# Patient Record
Sex: MALE | Race: WHITE | NOT HISPANIC OR LATINO | URBAN - METROPOLITAN AREA
[De-identification: names, ages, dates, MRNs, and addresses within clinical notes are randomized per-mention and may not be internally consistent; named-entity substitution may affect disease eponyms.]

---

## 2017-04-21 ENCOUNTER — OUTPATIENT (OUTPATIENT)
Dept: OUTPATIENT SERVICES | Facility: HOSPITAL | Age: 67
LOS: 1 days | Discharge: HOME | End: 2017-04-21

## 2017-06-09 ENCOUNTER — OUTPATIENT (OUTPATIENT)
Dept: OUTPATIENT SERVICES | Facility: HOSPITAL | Age: 67
LOS: 1 days | Discharge: HOME | End: 2017-06-09

## 2017-06-09 DIAGNOSIS — K57.90 DIVERTICULOSIS OF INTESTINE, PART UNSPECIFIED, WITHOUT PERFORATION OR ABSCESS WITHOUT BLEEDING: ICD-10-CM

## 2017-06-09 DIAGNOSIS — K50.90 CROHN'S DISEASE, UNSPECIFIED, WITHOUT COMPLICATIONS: ICD-10-CM

## 2017-06-09 DIAGNOSIS — E78.5 HYPERLIPIDEMIA, UNSPECIFIED: ICD-10-CM

## 2017-06-09 DIAGNOSIS — G47.33 OBSTRUCTIVE SLEEP APNEA (ADULT) (PEDIATRIC): ICD-10-CM

## 2017-06-09 DIAGNOSIS — C44.90 UNSPECIFIED MALIGNANT NEOPLASM OF SKIN, UNSPECIFIED: ICD-10-CM

## 2017-06-09 DIAGNOSIS — I10 ESSENTIAL (PRIMARY) HYPERTENSION: ICD-10-CM

## 2017-06-09 DIAGNOSIS — F93.0 SEPARATION ANXIETY DISORDER OF CHILDHOOD: ICD-10-CM

## 2017-06-09 DIAGNOSIS — I25.10 ATHEROSCLEROTIC HEART DISEASE OF NATIVE CORONARY ARTERY WITHOUT ANGINA PECTORIS: ICD-10-CM

## 2017-06-09 DIAGNOSIS — I48.91 UNSPECIFIED ATRIAL FIBRILLATION: ICD-10-CM

## 2017-06-09 DIAGNOSIS — K56.600 PARTIAL INTESTINAL OBSTRUCTION, UNSPECIFIED AS TO CAUSE: ICD-10-CM

## 2017-06-28 DIAGNOSIS — I48.91 UNSPECIFIED ATRIAL FIBRILLATION: ICD-10-CM

## 2017-06-28 DIAGNOSIS — Z79.01 LONG TERM (CURRENT) USE OF ANTICOAGULANTS: ICD-10-CM

## 2017-07-05 ENCOUNTER — OUTPATIENT (OUTPATIENT)
Dept: OUTPATIENT SERVICES | Facility: HOSPITAL | Age: 67
LOS: 1 days | Discharge: HOME | End: 2017-07-05

## 2017-07-05 DIAGNOSIS — I10 ESSENTIAL (PRIMARY) HYPERTENSION: ICD-10-CM

## 2017-07-05 DIAGNOSIS — C44.90 UNSPECIFIED MALIGNANT NEOPLASM OF SKIN, UNSPECIFIED: ICD-10-CM

## 2017-07-05 DIAGNOSIS — I25.10 ATHEROSCLEROTIC HEART DISEASE OF NATIVE CORONARY ARTERY WITHOUT ANGINA PECTORIS: ICD-10-CM

## 2017-07-05 DIAGNOSIS — E78.5 HYPERLIPIDEMIA, UNSPECIFIED: ICD-10-CM

## 2017-07-05 DIAGNOSIS — F93.0 SEPARATION ANXIETY DISORDER OF CHILDHOOD: ICD-10-CM

## 2017-07-05 DIAGNOSIS — K57.90 DIVERTICULOSIS OF INTESTINE, PART UNSPECIFIED, WITHOUT PERFORATION OR ABSCESS WITHOUT BLEEDING: ICD-10-CM

## 2017-07-05 DIAGNOSIS — I48.91 UNSPECIFIED ATRIAL FIBRILLATION: ICD-10-CM

## 2017-07-05 DIAGNOSIS — Z79.01 LONG TERM (CURRENT) USE OF ANTICOAGULANTS: ICD-10-CM

## 2017-07-05 DIAGNOSIS — K50.90 CROHN'S DISEASE, UNSPECIFIED, WITHOUT COMPLICATIONS: ICD-10-CM

## 2017-07-05 DIAGNOSIS — G47.33 OBSTRUCTIVE SLEEP APNEA (ADULT) (PEDIATRIC): ICD-10-CM

## 2017-07-05 DIAGNOSIS — K56.600 PARTIAL INTESTINAL OBSTRUCTION, UNSPECIFIED AS TO CAUSE: ICD-10-CM

## 2017-08-08 ENCOUNTER — OUTPATIENT (OUTPATIENT)
Dept: OUTPATIENT SERVICES | Facility: HOSPITAL | Age: 67
LOS: 1 days | Discharge: HOME | End: 2017-08-08

## 2017-08-08 DIAGNOSIS — E78.5 HYPERLIPIDEMIA, UNSPECIFIED: ICD-10-CM

## 2017-08-08 DIAGNOSIS — I48.91 UNSPECIFIED ATRIAL FIBRILLATION: ICD-10-CM

## 2017-08-08 DIAGNOSIS — G47.33 OBSTRUCTIVE SLEEP APNEA (ADULT) (PEDIATRIC): ICD-10-CM

## 2017-08-08 DIAGNOSIS — K50.90 CROHN'S DISEASE, UNSPECIFIED, WITHOUT COMPLICATIONS: ICD-10-CM

## 2017-08-08 DIAGNOSIS — F93.0 SEPARATION ANXIETY DISORDER OF CHILDHOOD: ICD-10-CM

## 2017-08-08 DIAGNOSIS — K57.90 DIVERTICULOSIS OF INTESTINE, PART UNSPECIFIED, WITHOUT PERFORATION OR ABSCESS WITHOUT BLEEDING: ICD-10-CM

## 2017-08-08 DIAGNOSIS — C44.90 UNSPECIFIED MALIGNANT NEOPLASM OF SKIN, UNSPECIFIED: ICD-10-CM

## 2017-08-08 DIAGNOSIS — Z79.01 LONG TERM (CURRENT) USE OF ANTICOAGULANTS: ICD-10-CM

## 2017-08-08 DIAGNOSIS — I25.10 ATHEROSCLEROTIC HEART DISEASE OF NATIVE CORONARY ARTERY WITHOUT ANGINA PECTORIS: ICD-10-CM

## 2017-08-08 DIAGNOSIS — I10 ESSENTIAL (PRIMARY) HYPERTENSION: ICD-10-CM

## 2017-08-08 DIAGNOSIS — K56.600 PARTIAL INTESTINAL OBSTRUCTION, UNSPECIFIED AS TO CAUSE: ICD-10-CM

## 2017-09-05 ENCOUNTER — OUTPATIENT (OUTPATIENT)
Dept: OUTPATIENT SERVICES | Facility: HOSPITAL | Age: 67
LOS: 1 days | Discharge: HOME | End: 2017-09-05

## 2017-09-05 DIAGNOSIS — Z79.01 LONG TERM (CURRENT) USE OF ANTICOAGULANTS: ICD-10-CM

## 2017-09-05 DIAGNOSIS — G47.33 OBSTRUCTIVE SLEEP APNEA (ADULT) (PEDIATRIC): ICD-10-CM

## 2017-09-05 DIAGNOSIS — K57.90 DIVERTICULOSIS OF INTESTINE, PART UNSPECIFIED, WITHOUT PERFORATION OR ABSCESS WITHOUT BLEEDING: ICD-10-CM

## 2017-09-05 DIAGNOSIS — I48.91 UNSPECIFIED ATRIAL FIBRILLATION: ICD-10-CM

## 2017-09-05 DIAGNOSIS — C44.90 UNSPECIFIED MALIGNANT NEOPLASM OF SKIN, UNSPECIFIED: ICD-10-CM

## 2017-09-05 DIAGNOSIS — I25.10 ATHEROSCLEROTIC HEART DISEASE OF NATIVE CORONARY ARTERY WITHOUT ANGINA PECTORIS: ICD-10-CM

## 2017-09-05 DIAGNOSIS — I10 ESSENTIAL (PRIMARY) HYPERTENSION: ICD-10-CM

## 2017-09-05 DIAGNOSIS — K50.90 CROHN'S DISEASE, UNSPECIFIED, WITHOUT COMPLICATIONS: ICD-10-CM

## 2017-09-05 DIAGNOSIS — K56.600 PARTIAL INTESTINAL OBSTRUCTION, UNSPECIFIED AS TO CAUSE: ICD-10-CM

## 2017-09-05 DIAGNOSIS — F93.0 SEPARATION ANXIETY DISORDER OF CHILDHOOD: ICD-10-CM

## 2017-09-05 DIAGNOSIS — E78.5 HYPERLIPIDEMIA, UNSPECIFIED: ICD-10-CM

## 2017-10-03 ENCOUNTER — OUTPATIENT (OUTPATIENT)
Dept: OUTPATIENT SERVICES | Facility: HOSPITAL | Age: 67
LOS: 1 days | Discharge: HOME | End: 2017-10-03

## 2017-10-03 DIAGNOSIS — Z79.01 LONG TERM (CURRENT) USE OF ANTICOAGULANTS: ICD-10-CM

## 2017-10-03 DIAGNOSIS — C44.90 UNSPECIFIED MALIGNANT NEOPLASM OF SKIN, UNSPECIFIED: ICD-10-CM

## 2017-10-03 DIAGNOSIS — E78.5 HYPERLIPIDEMIA, UNSPECIFIED: ICD-10-CM

## 2017-10-03 DIAGNOSIS — I48.91 UNSPECIFIED ATRIAL FIBRILLATION: ICD-10-CM

## 2017-10-03 DIAGNOSIS — I25.10 ATHEROSCLEROTIC HEART DISEASE OF NATIVE CORONARY ARTERY WITHOUT ANGINA PECTORIS: ICD-10-CM

## 2017-10-03 DIAGNOSIS — K57.90 DIVERTICULOSIS OF INTESTINE, PART UNSPECIFIED, WITHOUT PERFORATION OR ABSCESS WITHOUT BLEEDING: ICD-10-CM

## 2017-10-03 DIAGNOSIS — K56.600 PARTIAL INTESTINAL OBSTRUCTION, UNSPECIFIED AS TO CAUSE: ICD-10-CM

## 2017-10-03 DIAGNOSIS — I10 ESSENTIAL (PRIMARY) HYPERTENSION: ICD-10-CM

## 2017-10-03 DIAGNOSIS — F93.0 SEPARATION ANXIETY DISORDER OF CHILDHOOD: ICD-10-CM

## 2017-10-03 DIAGNOSIS — K50.90 CROHN'S DISEASE, UNSPECIFIED, WITHOUT COMPLICATIONS: ICD-10-CM

## 2017-10-03 DIAGNOSIS — G47.33 OBSTRUCTIVE SLEEP APNEA (ADULT) (PEDIATRIC): ICD-10-CM

## 2017-10-16 ENCOUNTER — OUTPATIENT (OUTPATIENT)
Dept: OUTPATIENT SERVICES | Facility: HOSPITAL | Age: 67
LOS: 1 days | Discharge: HOME | End: 2017-10-16

## 2017-10-16 DIAGNOSIS — K50.90 CROHN'S DISEASE, UNSPECIFIED, WITHOUT COMPLICATIONS: ICD-10-CM

## 2017-10-16 DIAGNOSIS — I48.91 UNSPECIFIED ATRIAL FIBRILLATION: ICD-10-CM

## 2017-10-16 DIAGNOSIS — Z79.01 LONG TERM (CURRENT) USE OF ANTICOAGULANTS: ICD-10-CM

## 2017-10-16 DIAGNOSIS — K56.600 PARTIAL INTESTINAL OBSTRUCTION, UNSPECIFIED AS TO CAUSE: ICD-10-CM

## 2017-10-16 DIAGNOSIS — E78.5 HYPERLIPIDEMIA, UNSPECIFIED: ICD-10-CM

## 2017-10-16 DIAGNOSIS — C44.90 UNSPECIFIED MALIGNANT NEOPLASM OF SKIN, UNSPECIFIED: ICD-10-CM

## 2017-10-16 DIAGNOSIS — G47.33 OBSTRUCTIVE SLEEP APNEA (ADULT) (PEDIATRIC): ICD-10-CM

## 2017-10-16 DIAGNOSIS — F93.0 SEPARATION ANXIETY DISORDER OF CHILDHOOD: ICD-10-CM

## 2017-10-16 DIAGNOSIS — K57.90 DIVERTICULOSIS OF INTESTINE, PART UNSPECIFIED, WITHOUT PERFORATION OR ABSCESS WITHOUT BLEEDING: ICD-10-CM

## 2017-10-16 DIAGNOSIS — I10 ESSENTIAL (PRIMARY) HYPERTENSION: ICD-10-CM

## 2017-10-16 DIAGNOSIS — I25.10 ATHEROSCLEROTIC HEART DISEASE OF NATIVE CORONARY ARTERY WITHOUT ANGINA PECTORIS: ICD-10-CM

## 2017-11-06 ENCOUNTER — OUTPATIENT (OUTPATIENT)
Dept: OUTPATIENT SERVICES | Facility: HOSPITAL | Age: 67
LOS: 1 days | Discharge: HOME | End: 2017-11-06

## 2017-11-06 DIAGNOSIS — I10 ESSENTIAL (PRIMARY) HYPERTENSION: ICD-10-CM

## 2017-11-06 DIAGNOSIS — K57.90 DIVERTICULOSIS OF INTESTINE, PART UNSPECIFIED, WITHOUT PERFORATION OR ABSCESS WITHOUT BLEEDING: ICD-10-CM

## 2017-11-06 DIAGNOSIS — F93.0 SEPARATION ANXIETY DISORDER OF CHILDHOOD: ICD-10-CM

## 2017-11-06 DIAGNOSIS — K50.90 CROHN'S DISEASE, UNSPECIFIED, WITHOUT COMPLICATIONS: ICD-10-CM

## 2017-11-06 DIAGNOSIS — G47.33 OBSTRUCTIVE SLEEP APNEA (ADULT) (PEDIATRIC): ICD-10-CM

## 2017-11-06 DIAGNOSIS — I48.91 UNSPECIFIED ATRIAL FIBRILLATION: ICD-10-CM

## 2017-11-06 DIAGNOSIS — E78.5 HYPERLIPIDEMIA, UNSPECIFIED: ICD-10-CM

## 2017-11-06 DIAGNOSIS — K56.600 PARTIAL INTESTINAL OBSTRUCTION, UNSPECIFIED AS TO CAUSE: ICD-10-CM

## 2017-11-06 DIAGNOSIS — I25.10 ATHEROSCLEROTIC HEART DISEASE OF NATIVE CORONARY ARTERY WITHOUT ANGINA PECTORIS: ICD-10-CM

## 2017-11-06 DIAGNOSIS — Z79.01 LONG TERM (CURRENT) USE OF ANTICOAGULANTS: ICD-10-CM

## 2017-11-06 DIAGNOSIS — C44.90 UNSPECIFIED MALIGNANT NEOPLASM OF SKIN, UNSPECIFIED: ICD-10-CM

## 2017-11-27 ENCOUNTER — OUTPATIENT (OUTPATIENT)
Dept: OUTPATIENT SERVICES | Facility: HOSPITAL | Age: 67
LOS: 1 days | Discharge: HOME | End: 2017-11-27

## 2017-11-27 DIAGNOSIS — I25.10 ATHEROSCLEROTIC HEART DISEASE OF NATIVE CORONARY ARTERY WITHOUT ANGINA PECTORIS: ICD-10-CM

## 2017-11-27 DIAGNOSIS — I48.91 UNSPECIFIED ATRIAL FIBRILLATION: ICD-10-CM

## 2017-11-27 DIAGNOSIS — Z79.01 LONG TERM (CURRENT) USE OF ANTICOAGULANTS: ICD-10-CM

## 2017-11-27 DIAGNOSIS — F93.0 SEPARATION ANXIETY DISORDER OF CHILDHOOD: ICD-10-CM

## 2017-11-27 DIAGNOSIS — C44.90 UNSPECIFIED MALIGNANT NEOPLASM OF SKIN, UNSPECIFIED: ICD-10-CM

## 2017-11-27 DIAGNOSIS — K56.600 PARTIAL INTESTINAL OBSTRUCTION, UNSPECIFIED AS TO CAUSE: ICD-10-CM

## 2017-11-27 DIAGNOSIS — E78.5 HYPERLIPIDEMIA, UNSPECIFIED: ICD-10-CM

## 2017-11-27 DIAGNOSIS — K57.90 DIVERTICULOSIS OF INTESTINE, PART UNSPECIFIED, WITHOUT PERFORATION OR ABSCESS WITHOUT BLEEDING: ICD-10-CM

## 2017-11-27 DIAGNOSIS — G47.33 OBSTRUCTIVE SLEEP APNEA (ADULT) (PEDIATRIC): ICD-10-CM

## 2017-11-27 DIAGNOSIS — I10 ESSENTIAL (PRIMARY) HYPERTENSION: ICD-10-CM

## 2017-11-27 DIAGNOSIS — K50.90 CROHN'S DISEASE, UNSPECIFIED, WITHOUT COMPLICATIONS: ICD-10-CM

## 2017-12-18 ENCOUNTER — OUTPATIENT (OUTPATIENT)
Dept: OUTPATIENT SERVICES | Facility: HOSPITAL | Age: 67
LOS: 1 days | Discharge: HOME | End: 2017-12-18

## 2017-12-18 DIAGNOSIS — K56.600 PARTIAL INTESTINAL OBSTRUCTION, UNSPECIFIED AS TO CAUSE: ICD-10-CM

## 2017-12-18 DIAGNOSIS — C44.90 UNSPECIFIED MALIGNANT NEOPLASM OF SKIN, UNSPECIFIED: ICD-10-CM

## 2017-12-18 DIAGNOSIS — E78.5 HYPERLIPIDEMIA, UNSPECIFIED: ICD-10-CM

## 2017-12-18 DIAGNOSIS — F93.0 SEPARATION ANXIETY DISORDER OF CHILDHOOD: ICD-10-CM

## 2017-12-18 DIAGNOSIS — K50.90 CROHN'S DISEASE, UNSPECIFIED, WITHOUT COMPLICATIONS: ICD-10-CM

## 2017-12-18 DIAGNOSIS — I10 ESSENTIAL (PRIMARY) HYPERTENSION: ICD-10-CM

## 2017-12-18 DIAGNOSIS — I25.10 ATHEROSCLEROTIC HEART DISEASE OF NATIVE CORONARY ARTERY WITHOUT ANGINA PECTORIS: ICD-10-CM

## 2017-12-18 DIAGNOSIS — G47.33 OBSTRUCTIVE SLEEP APNEA (ADULT) (PEDIATRIC): ICD-10-CM

## 2017-12-18 DIAGNOSIS — Z79.01 LONG TERM (CURRENT) USE OF ANTICOAGULANTS: ICD-10-CM

## 2017-12-18 DIAGNOSIS — K57.90 DIVERTICULOSIS OF INTESTINE, PART UNSPECIFIED, WITHOUT PERFORATION OR ABSCESS WITHOUT BLEEDING: ICD-10-CM

## 2017-12-18 DIAGNOSIS — I48.91 UNSPECIFIED ATRIAL FIBRILLATION: ICD-10-CM

## 2017-12-29 ENCOUNTER — OUTPATIENT (OUTPATIENT)
Dept: OUTPATIENT SERVICES | Facility: HOSPITAL | Age: 67
LOS: 1 days | Discharge: HOME | End: 2017-12-29

## 2017-12-29 DIAGNOSIS — K50.90 CROHN'S DISEASE, UNSPECIFIED, WITHOUT COMPLICATIONS: ICD-10-CM

## 2017-12-29 DIAGNOSIS — I48.91 UNSPECIFIED ATRIAL FIBRILLATION: ICD-10-CM

## 2017-12-29 DIAGNOSIS — K56.600 PARTIAL INTESTINAL OBSTRUCTION, UNSPECIFIED AS TO CAUSE: ICD-10-CM

## 2017-12-29 DIAGNOSIS — F93.0 SEPARATION ANXIETY DISORDER OF CHILDHOOD: ICD-10-CM

## 2017-12-29 DIAGNOSIS — Z79.01 LONG TERM (CURRENT) USE OF ANTICOAGULANTS: ICD-10-CM

## 2017-12-29 DIAGNOSIS — G47.33 OBSTRUCTIVE SLEEP APNEA (ADULT) (PEDIATRIC): ICD-10-CM

## 2017-12-29 DIAGNOSIS — C44.90 UNSPECIFIED MALIGNANT NEOPLASM OF SKIN, UNSPECIFIED: ICD-10-CM

## 2017-12-29 DIAGNOSIS — I10 ESSENTIAL (PRIMARY) HYPERTENSION: ICD-10-CM

## 2017-12-29 DIAGNOSIS — I25.10 ATHEROSCLEROTIC HEART DISEASE OF NATIVE CORONARY ARTERY WITHOUT ANGINA PECTORIS: ICD-10-CM

## 2017-12-29 DIAGNOSIS — E78.5 HYPERLIPIDEMIA, UNSPECIFIED: ICD-10-CM

## 2017-12-29 DIAGNOSIS — K57.90 DIVERTICULOSIS OF INTESTINE, PART UNSPECIFIED, WITHOUT PERFORATION OR ABSCESS WITHOUT BLEEDING: ICD-10-CM

## 2018-01-22 ENCOUNTER — OUTPATIENT (OUTPATIENT)
Dept: OUTPATIENT SERVICES | Facility: HOSPITAL | Age: 68
LOS: 1 days | Discharge: HOME | End: 2018-01-22

## 2018-01-22 DIAGNOSIS — I48.91 UNSPECIFIED ATRIAL FIBRILLATION: ICD-10-CM

## 2018-01-22 DIAGNOSIS — Z79.01 LONG TERM (CURRENT) USE OF ANTICOAGULANTS: ICD-10-CM

## 2018-01-29 ENCOUNTER — OUTPATIENT (OUTPATIENT)
Dept: OUTPATIENT SERVICES | Facility: HOSPITAL | Age: 68
LOS: 1 days | Discharge: HOME | End: 2018-01-29

## 2018-01-29 DIAGNOSIS — I48.91 UNSPECIFIED ATRIAL FIBRILLATION: ICD-10-CM

## 2018-01-29 DIAGNOSIS — Z79.01 LONG TERM (CURRENT) USE OF ANTICOAGULANTS: ICD-10-CM

## 2018-02-04 DIAGNOSIS — C44.90 UNSPECIFIED MALIGNANT NEOPLASM OF SKIN, UNSPECIFIED: ICD-10-CM

## 2018-02-04 DIAGNOSIS — I25.10 ATHEROSCLEROTIC HEART DISEASE OF NATIVE CORONARY ARTERY WITHOUT ANGINA PECTORIS: ICD-10-CM

## 2018-02-04 DIAGNOSIS — K57.90 DIVERTICULOSIS OF INTESTINE, PART UNSPECIFIED, WITHOUT PERFORATION OR ABSCESS WITHOUT BLEEDING: ICD-10-CM

## 2018-02-04 DIAGNOSIS — G47.33 OBSTRUCTIVE SLEEP APNEA (ADULT) (PEDIATRIC): ICD-10-CM

## 2018-02-04 DIAGNOSIS — I48.91 UNSPECIFIED ATRIAL FIBRILLATION: ICD-10-CM

## 2018-02-04 DIAGNOSIS — K50.90 CROHN'S DISEASE, UNSPECIFIED, WITHOUT COMPLICATIONS: ICD-10-CM

## 2018-02-04 DIAGNOSIS — F93.0 SEPARATION ANXIETY DISORDER OF CHILDHOOD: ICD-10-CM

## 2018-02-04 DIAGNOSIS — E78.5 HYPERLIPIDEMIA, UNSPECIFIED: ICD-10-CM

## 2018-02-04 DIAGNOSIS — I10 ESSENTIAL (PRIMARY) HYPERTENSION: ICD-10-CM

## 2018-02-04 DIAGNOSIS — K56.600 PARTIAL INTESTINAL OBSTRUCTION, UNSPECIFIED AS TO CAUSE: ICD-10-CM

## 2018-02-12 ENCOUNTER — OUTPATIENT (OUTPATIENT)
Dept: OUTPATIENT SERVICES | Facility: HOSPITAL | Age: 68
LOS: 1 days | Discharge: HOME | End: 2018-02-12

## 2018-02-12 DIAGNOSIS — I48.91 UNSPECIFIED ATRIAL FIBRILLATION: ICD-10-CM

## 2018-02-12 DIAGNOSIS — Z79.01 LONG TERM (CURRENT) USE OF ANTICOAGULANTS: ICD-10-CM

## 2018-02-26 ENCOUNTER — OUTPATIENT (OUTPATIENT)
Dept: OUTPATIENT SERVICES | Facility: HOSPITAL | Age: 68
LOS: 1 days | Discharge: HOME | End: 2018-02-26

## 2018-02-26 DIAGNOSIS — Z79.01 LONG TERM (CURRENT) USE OF ANTICOAGULANTS: ICD-10-CM

## 2018-02-26 DIAGNOSIS — I48.91 UNSPECIFIED ATRIAL FIBRILLATION: ICD-10-CM

## 2018-03-12 ENCOUNTER — OUTPATIENT (OUTPATIENT)
Dept: OUTPATIENT SERVICES | Facility: HOSPITAL | Age: 68
LOS: 1 days | Discharge: HOME | End: 2018-03-12

## 2018-03-12 DIAGNOSIS — I48.91 UNSPECIFIED ATRIAL FIBRILLATION: ICD-10-CM

## 2018-03-12 DIAGNOSIS — Z79.01 LONG TERM (CURRENT) USE OF ANTICOAGULANTS: ICD-10-CM

## 2018-04-02 ENCOUNTER — OUTPATIENT (OUTPATIENT)
Dept: OUTPATIENT SERVICES | Facility: HOSPITAL | Age: 68
LOS: 1 days | Discharge: HOME | End: 2018-04-02

## 2018-04-02 DIAGNOSIS — Z79.01 LONG TERM (CURRENT) USE OF ANTICOAGULANTS: ICD-10-CM

## 2018-04-02 DIAGNOSIS — I48.91 UNSPECIFIED ATRIAL FIBRILLATION: ICD-10-CM

## 2018-04-16 ENCOUNTER — OUTPATIENT (OUTPATIENT)
Dept: OUTPATIENT SERVICES | Facility: HOSPITAL | Age: 68
LOS: 1 days | Discharge: HOME | End: 2018-04-16

## 2018-04-16 DIAGNOSIS — I48.91 UNSPECIFIED ATRIAL FIBRILLATION: ICD-10-CM

## 2018-04-16 DIAGNOSIS — Z79.01 LONG TERM (CURRENT) USE OF ANTICOAGULANTS: ICD-10-CM

## 2018-05-08 ENCOUNTER — OUTPATIENT (OUTPATIENT)
Dept: OUTPATIENT SERVICES | Facility: HOSPITAL | Age: 68
LOS: 1 days | Discharge: HOME | End: 2018-05-08

## 2018-05-08 DIAGNOSIS — Z79.01 LONG TERM (CURRENT) USE OF ANTICOAGULANTS: ICD-10-CM

## 2018-05-08 DIAGNOSIS — I48.91 UNSPECIFIED ATRIAL FIBRILLATION: ICD-10-CM

## 2018-05-29 ENCOUNTER — OUTPATIENT (OUTPATIENT)
Dept: OUTPATIENT SERVICES | Facility: HOSPITAL | Age: 68
LOS: 1 days | Discharge: HOME | End: 2018-05-29

## 2018-05-29 DIAGNOSIS — Z79.01 LONG TERM (CURRENT) USE OF ANTICOAGULANTS: ICD-10-CM

## 2018-05-29 DIAGNOSIS — I48.91 UNSPECIFIED ATRIAL FIBRILLATION: ICD-10-CM

## 2018-06-26 ENCOUNTER — OUTPATIENT (OUTPATIENT)
Dept: OUTPATIENT SERVICES | Facility: HOSPITAL | Age: 68
LOS: 1 days | Discharge: HOME | End: 2018-06-26

## 2018-06-26 DIAGNOSIS — I48.91 UNSPECIFIED ATRIAL FIBRILLATION: ICD-10-CM

## 2018-06-26 DIAGNOSIS — Z79.01 LONG TERM (CURRENT) USE OF ANTICOAGULANTS: ICD-10-CM

## 2018-07-30 ENCOUNTER — OUTPATIENT (OUTPATIENT)
Dept: OUTPATIENT SERVICES | Facility: HOSPITAL | Age: 68
LOS: 1 days | Discharge: HOME | End: 2018-07-30

## 2018-07-30 DIAGNOSIS — I48.91 UNSPECIFIED ATRIAL FIBRILLATION: ICD-10-CM

## 2018-07-30 DIAGNOSIS — Z79.01 LONG TERM (CURRENT) USE OF ANTICOAGULANTS: ICD-10-CM

## 2018-09-07 ENCOUNTER — OUTPATIENT (OUTPATIENT)
Dept: OUTPATIENT SERVICES | Facility: HOSPITAL | Age: 68
LOS: 1 days | Discharge: HOME | End: 2018-09-07

## 2018-09-07 DIAGNOSIS — Z79.01 LONG TERM (CURRENT) USE OF ANTICOAGULANTS: ICD-10-CM

## 2018-09-07 DIAGNOSIS — I48.91 UNSPECIFIED ATRIAL FIBRILLATION: ICD-10-CM

## 2018-09-07 LAB
POCT INR: 3.2 RATIO — HIGH (ref 0.9–1.2)
POCT PT: 38.8 SEC — HIGH (ref 10–13.4)

## 2018-10-05 ENCOUNTER — OUTPATIENT (OUTPATIENT)
Dept: OUTPATIENT SERVICES | Facility: HOSPITAL | Age: 68
LOS: 1 days | Discharge: HOME | End: 2018-10-05

## 2018-10-05 DIAGNOSIS — Z79.01 LONG TERM (CURRENT) USE OF ANTICOAGULANTS: ICD-10-CM

## 2018-10-05 DIAGNOSIS — I48.91 UNSPECIFIED ATRIAL FIBRILLATION: ICD-10-CM

## 2018-10-05 LAB
POCT INR: 3.4 RATIO — HIGH (ref 0.9–1.2)
POCT PT: 41.3 SEC — HIGH (ref 10–13.4)

## 2018-10-09 ENCOUNTER — OUTPATIENT (OUTPATIENT)
Dept: OUTPATIENT SERVICES | Facility: HOSPITAL | Age: 68
LOS: 1 days | Discharge: HOME | End: 2018-10-09

## 2018-10-09 DIAGNOSIS — I48.91 UNSPECIFIED ATRIAL FIBRILLATION: ICD-10-CM

## 2018-10-09 DIAGNOSIS — Z79.01 LONG TERM (CURRENT) USE OF ANTICOAGULANTS: ICD-10-CM

## 2018-10-09 LAB
POCT INR: 1.2 RATIO — SIGNIFICANT CHANGE UP (ref 0.9–1.2)
POCT PT: 14.2 SEC — HIGH (ref 10–13.4)

## 2018-10-13 ENCOUNTER — OUTPATIENT (OUTPATIENT)
Dept: OUTPATIENT SERVICES | Facility: HOSPITAL | Age: 68
LOS: 1 days | Discharge: HOME | End: 2018-10-13

## 2018-10-13 DIAGNOSIS — I48.91 UNSPECIFIED ATRIAL FIBRILLATION: ICD-10-CM

## 2018-10-13 DIAGNOSIS — Z79.01 LONG TERM (CURRENT) USE OF ANTICOAGULANTS: ICD-10-CM

## 2018-10-13 LAB
POCT INR: 1.4 RATIO — HIGH (ref 0.9–1.2)
POCT PT: 17.2 SEC — HIGH (ref 10–13.4)

## 2018-10-15 ENCOUNTER — OUTPATIENT (OUTPATIENT)
Dept: OUTPATIENT SERVICES | Facility: HOSPITAL | Age: 68
LOS: 1 days | Discharge: HOME | End: 2018-10-15

## 2018-10-15 DIAGNOSIS — Z79.01 LONG TERM (CURRENT) USE OF ANTICOAGULANTS: ICD-10-CM

## 2018-10-15 DIAGNOSIS — I48.91 UNSPECIFIED ATRIAL FIBRILLATION: ICD-10-CM

## 2018-10-15 LAB
POCT INR: 3 RATIO — HIGH (ref 0.9–1.2)
POCT PT: 35.9 SEC — HIGH (ref 10–13.4)

## 2018-10-23 ENCOUNTER — OUTPATIENT (OUTPATIENT)
Dept: OUTPATIENT SERVICES | Facility: HOSPITAL | Age: 68
LOS: 1 days | Discharge: HOME | End: 2018-10-23

## 2018-10-23 DIAGNOSIS — Z79.01 LONG TERM (CURRENT) USE OF ANTICOAGULANTS: ICD-10-CM

## 2018-10-23 DIAGNOSIS — I48.91 UNSPECIFIED ATRIAL FIBRILLATION: ICD-10-CM

## 2018-11-14 ENCOUNTER — OUTPATIENT (OUTPATIENT)
Dept: OUTPATIENT SERVICES | Facility: HOSPITAL | Age: 68
LOS: 1 days | Discharge: HOME | End: 2018-11-14

## 2018-11-14 DIAGNOSIS — Z79.01 LONG TERM (CURRENT) USE OF ANTICOAGULANTS: ICD-10-CM

## 2018-11-14 DIAGNOSIS — I48.91 UNSPECIFIED ATRIAL FIBRILLATION: ICD-10-CM

## 2018-11-14 LAB
POCT INR: 1.5 RATIO — HIGH (ref 0.9–1.2)
POCT PT: 18.3 SEC — HIGH (ref 10–13.4)

## 2018-11-21 ENCOUNTER — OUTPATIENT (OUTPATIENT)
Dept: OUTPATIENT SERVICES | Facility: HOSPITAL | Age: 68
LOS: 1 days | Discharge: HOME | End: 2018-11-21

## 2018-11-21 DIAGNOSIS — I48.91 UNSPECIFIED ATRIAL FIBRILLATION: ICD-10-CM

## 2018-11-21 DIAGNOSIS — Z79.01 LONG TERM (CURRENT) USE OF ANTICOAGULANTS: ICD-10-CM

## 2018-11-21 LAB
POCT INR: 1.4 RATIO — HIGH (ref 0.9–1.2)
POCT PT: 17 SEC — HIGH (ref 10–13.4)

## 2018-11-28 ENCOUNTER — OUTPATIENT (OUTPATIENT)
Dept: OUTPATIENT SERVICES | Facility: HOSPITAL | Age: 68
LOS: 1 days | Discharge: HOME | End: 2018-11-28

## 2018-11-28 DIAGNOSIS — Z79.01 LONG TERM (CURRENT) USE OF ANTICOAGULANTS: ICD-10-CM

## 2018-11-28 DIAGNOSIS — I48.91 UNSPECIFIED ATRIAL FIBRILLATION: ICD-10-CM

## 2018-11-28 LAB
POCT INR: 3 RATIO — HIGH (ref 0.9–1.2)
POCT PT: 36.1 SEC — HIGH (ref 10–13.4)

## 2018-12-10 ENCOUNTER — OUTPATIENT (OUTPATIENT)
Dept: OUTPATIENT SERVICES | Facility: HOSPITAL | Age: 68
LOS: 1 days | Discharge: HOME | End: 2018-12-10

## 2018-12-10 DIAGNOSIS — Z79.01 LONG TERM (CURRENT) USE OF ANTICOAGULANTS: ICD-10-CM

## 2018-12-10 DIAGNOSIS — I48.91 UNSPECIFIED ATRIAL FIBRILLATION: ICD-10-CM

## 2018-12-10 LAB
POCT INR: 2.1 RATIO — HIGH (ref 0.9–1.2)
POCT PT: 25.1 SEC — HIGH (ref 10–13.4)

## 2018-12-19 ENCOUNTER — OUTPATIENT (OUTPATIENT)
Dept: OUTPATIENT SERVICES | Facility: HOSPITAL | Age: 68
LOS: 1 days | Discharge: HOME | End: 2018-12-19

## 2018-12-19 DIAGNOSIS — Z79.01 LONG TERM (CURRENT) USE OF ANTICOAGULANTS: ICD-10-CM

## 2018-12-19 DIAGNOSIS — I48.91 UNSPECIFIED ATRIAL FIBRILLATION: ICD-10-CM

## 2018-12-19 LAB
POCT INR: 3.5 RATIO — HIGH (ref 0.9–1.2)
POCT PT: 42.1 SEC — HIGH (ref 10–13.4)

## 2019-01-02 ENCOUNTER — OUTPATIENT (OUTPATIENT)
Dept: OUTPATIENT SERVICES | Facility: HOSPITAL | Age: 69
LOS: 1 days | Discharge: HOME | End: 2019-01-02

## 2019-01-02 DIAGNOSIS — Z79.01 LONG TERM (CURRENT) USE OF ANTICOAGULANTS: ICD-10-CM

## 2019-01-02 DIAGNOSIS — I48.91 UNSPECIFIED ATRIAL FIBRILLATION: ICD-10-CM

## 2019-01-02 LAB
POCT INR: 2.4 RATIO — HIGH (ref 0.9–1.2)
POCT PT: 29.2 SEC — HIGH (ref 10–13.4)

## 2019-01-24 ENCOUNTER — OUTPATIENT (OUTPATIENT)
Dept: OUTPATIENT SERVICES | Facility: HOSPITAL | Age: 69
LOS: 1 days | Discharge: HOME | End: 2019-01-24

## 2019-01-24 DIAGNOSIS — I48.91 UNSPECIFIED ATRIAL FIBRILLATION: ICD-10-CM

## 2019-01-24 DIAGNOSIS — Z79.01 LONG TERM (CURRENT) USE OF ANTICOAGULANTS: ICD-10-CM

## 2019-02-21 ENCOUNTER — OUTPATIENT (OUTPATIENT)
Dept: OUTPATIENT SERVICES | Facility: HOSPITAL | Age: 69
LOS: 1 days | Discharge: HOME | End: 2019-02-21

## 2019-02-21 DIAGNOSIS — Z79.01 LONG TERM (CURRENT) USE OF ANTICOAGULANTS: ICD-10-CM

## 2019-02-21 DIAGNOSIS — I48.91 UNSPECIFIED ATRIAL FIBRILLATION: ICD-10-CM

## 2019-02-21 LAB
POCT INR: 1.7 RATIO — HIGH (ref 0.9–1.2)
POCT PT: 20.5 SEC — HIGH (ref 10–13.4)

## 2019-03-06 ENCOUNTER — OUTPATIENT (OUTPATIENT)
Dept: OUTPATIENT SERVICES | Facility: HOSPITAL | Age: 69
LOS: 1 days | Discharge: HOME | End: 2019-03-06

## 2019-03-06 DIAGNOSIS — Z79.01 LONG TERM (CURRENT) USE OF ANTICOAGULANTS: ICD-10-CM

## 2019-03-06 DIAGNOSIS — I48.91 UNSPECIFIED ATRIAL FIBRILLATION: ICD-10-CM

## 2019-03-06 LAB
POCT INR: 2.7 RATIO — HIGH (ref 0.9–1.2)
POCT PT: 32 SEC — HIGH (ref 10–13.4)

## 2019-04-03 ENCOUNTER — OUTPATIENT (OUTPATIENT)
Dept: OUTPATIENT SERVICES | Facility: HOSPITAL | Age: 69
LOS: 1 days | Discharge: HOME | End: 2019-04-03

## 2019-04-03 DIAGNOSIS — I48.91 UNSPECIFIED ATRIAL FIBRILLATION: ICD-10-CM

## 2019-04-03 DIAGNOSIS — Z79.01 LONG TERM (CURRENT) USE OF ANTICOAGULANTS: ICD-10-CM

## 2019-04-03 LAB
POCT INR: 2.8 RATIO — HIGH (ref 0.9–1.2)
POCT PT: 33.9 SEC — HIGH (ref 10–13.4)

## 2019-05-01 ENCOUNTER — OUTPATIENT (OUTPATIENT)
Dept: OUTPATIENT SERVICES | Facility: HOSPITAL | Age: 69
LOS: 1 days | Discharge: HOME | End: 2019-05-01

## 2019-05-01 DIAGNOSIS — I48.91 UNSPECIFIED ATRIAL FIBRILLATION: ICD-10-CM

## 2019-05-01 DIAGNOSIS — Z79.01 LONG TERM (CURRENT) USE OF ANTICOAGULANTS: ICD-10-CM

## 2019-05-01 LAB
POCT INR: 2.9 RATIO — HIGH (ref 0.9–1.2)
POCT PT: 35.2 SEC — HIGH (ref 10–13.4)

## 2019-05-18 ENCOUNTER — EMERGENCY (EMERGENCY)
Facility: HOSPITAL | Age: 69
LOS: 0 days | Discharge: HOME | End: 2019-05-19
Attending: STUDENT IN AN ORGANIZED HEALTH CARE EDUCATION/TRAINING PROGRAM | Admitting: STUDENT IN AN ORGANIZED HEALTH CARE EDUCATION/TRAINING PROGRAM
Payer: MEDICARE

## 2019-05-18 VITALS
TEMPERATURE: 98 F | SYSTOLIC BLOOD PRESSURE: 215 MMHG | OXYGEN SATURATION: 98 % | HEART RATE: 71 BPM | WEIGHT: 197.98 LBS | DIASTOLIC BLOOD PRESSURE: 90 MMHG | RESPIRATION RATE: 18 BRPM

## 2019-05-18 DIAGNOSIS — E11.9 TYPE 2 DIABETES MELLITUS WITHOUT COMPLICATIONS: ICD-10-CM

## 2019-05-18 DIAGNOSIS — M54.41 LUMBAGO WITH SCIATICA, RIGHT SIDE: ICD-10-CM

## 2019-05-18 DIAGNOSIS — I10 ESSENTIAL (PRIMARY) HYPERTENSION: ICD-10-CM

## 2019-05-18 DIAGNOSIS — Z79.899 OTHER LONG TERM (CURRENT) DRUG THERAPY: ICD-10-CM

## 2019-05-18 DIAGNOSIS — Z79.84 LONG TERM (CURRENT) USE OF ORAL HYPOGLYCEMIC DRUGS: ICD-10-CM

## 2019-05-18 DIAGNOSIS — M54.9 DORSALGIA, UNSPECIFIED: ICD-10-CM

## 2019-05-18 LAB
HCT VFR BLD CALC: 43.6 % — SIGNIFICANT CHANGE UP (ref 42–52)
HGB BLD-MCNC: 14.8 G/DL — SIGNIFICANT CHANGE UP (ref 14–18)
MCHC RBC-ENTMCNC: 29.2 PG — SIGNIFICANT CHANGE UP (ref 27–31)
MCHC RBC-ENTMCNC: 33.9 G/DL — SIGNIFICANT CHANGE UP (ref 32–37)
MCV RBC AUTO: 86.2 FL — SIGNIFICANT CHANGE UP (ref 80–94)
PLATELET # BLD AUTO: 238 K/UL — SIGNIFICANT CHANGE UP (ref 130–400)
RBC # BLD: 5.06 M/UL — SIGNIFICANT CHANGE UP (ref 4.7–6.1)
RBC # FLD: 12.8 % — SIGNIFICANT CHANGE UP (ref 11.5–14.5)
WBC # BLD: 9.63 K/UL — SIGNIFICANT CHANGE UP (ref 4.8–10.8)
WBC # FLD AUTO: 9.63 K/UL — SIGNIFICANT CHANGE UP (ref 4.8–10.8)

## 2019-05-18 PROCEDURE — 99284 EMERGENCY DEPT VISIT MOD MDM: CPT

## 2019-05-18 RX ORDER — MORPHINE SULFATE 50 MG/1
6 CAPSULE, EXTENDED RELEASE ORAL ONCE
Refills: 0 | Status: DISCONTINUED | OUTPATIENT
Start: 2019-05-18 | End: 2019-05-18

## 2019-05-18 RX ORDER — METHOCARBAMOL 500 MG/1
1000 TABLET, FILM COATED ORAL ONCE
Refills: 0 | Status: COMPLETED | OUTPATIENT
Start: 2019-05-18 | End: 2019-05-18

## 2019-05-18 RX ORDER — ONDANSETRON 8 MG/1
4 TABLET, FILM COATED ORAL ONCE
Refills: 0 | Status: COMPLETED | OUTPATIENT
Start: 2019-05-18 | End: 2019-05-18

## 2019-05-18 RX ADMIN — METHOCARBAMOL 1000 MILLIGRAM(S): 500 TABLET, FILM COATED ORAL at 23:38

## 2019-05-18 RX ADMIN — MORPHINE SULFATE 6 MILLIGRAM(S): 50 CAPSULE, EXTENDED RELEASE ORAL at 23:39

## 2019-05-18 RX ADMIN — MORPHINE SULFATE 6 MILLIGRAM(S): 50 CAPSULE, EXTENDED RELEASE ORAL at 23:27

## 2019-05-18 RX ADMIN — ONDANSETRON 4 MILLIGRAM(S): 8 TABLET, FILM COATED ORAL at 23:27

## 2019-05-18 NOTE — ED ADULT NURSE NOTE - OBJECTIVE STATEMENT
68 y.o male with a hx of chrons, dm complains of back 68 y.o male with a hx of Crohn's, dm, htn, skin cancer, and Factor V Leiden  complains of back 68 y.o male with a hx of Crohn's, dm, htn, skin cancer, and Factor V Leiden  complains of back spasm on the right side with pain. pt is yelling very loudly

## 2019-05-18 NOTE — ED ADULT NURSE NOTE - NSIMPLEMENTINTERV_GEN_ALL_ED
Implemented All Fall with Harm Risk Interventions:  Guin to call system. Call bell, personal items and telephone within reach. Instruct patient to call for assistance. Room bathroom lighting operational. Non-slip footwear when patient is off stretcher. Physically safe environment: no spills, clutter or unnecessary equipment. Stretcher in lowest position, wheels locked, appropriate side rails in place. Provide visual cue, wrist band, yellow gown, etc. Monitor gait and stability. Monitor for mental status changes and reorient to person, place, and time. Review medications for side effects contributing to fall risk. Reinforce activity limits and safety measures with patient and family. Provide visual clues: red socks.

## 2019-05-18 NOTE — ED ADULT TRIAGE NOTE - CHIEF COMPLAINT QUOTE
pt c/o intermittent back spasms x 3 days. pt stated they started after doing yard work.  pt hx of 4 stents, DM, and chrons.

## 2019-05-18 NOTE — ED PROVIDER NOTE - CARE PLAN
Principal Discharge DX:	Acute low back pain, unspecified back pain laterality, with sciatica presence unspecified  Assessment and plan of treatment:	pt w/ progressive RLBP after yardwork. now acutely worse, no improvement w/ apap. no numbness, weakness, intcontinence/retention.  given severity of pain, will get basic labs, dissection protocol, pain control, serial reassessment

## 2019-05-18 NOTE — ED PROVIDER NOTE - NSFOLLOWUPCLINICS_GEN_ALL_ED_FT
[facility] ([facility])  Rehabilitation  [facility] [address], [address] [place] [address]  Phone: [phone]  Fax:   Follow Up Time:

## 2019-05-18 NOTE — ED PROVIDER NOTE - CLINICAL SUMMARY MEDICAL DECISION MAKING FREE TEXT BOX
pt w/ R LBP x3 days, acutely worse. unable to walk 2/2 pain  no fnd. ed w/u negative- labs, cta. pt ambulatory after analgesia. will d/c w/ pcp f/u, physical therapy

## 2019-05-18 NOTE — ED PROVIDER NOTE - PHYSICAL EXAMINATION
PHYSICAL EXAM:    Constitutional: awake, alert, NAD  Eyes: EOMI, no conj injection  HENT: NC AT  Back: R lower back paraspinal ttp rad to side  Respiratory: no respiratory distress, breath sounds equal b/l, no wheezing, rhonchi or stridor.   Cardiovascular: RRR nml S1S2  Gastrointestinal: soft, no masses, nontender, nondistended. No guarding or rebound.   Extremities: no peripheral edema  Neurological: AAOx3, CN II-XII grossly intact, no focal numbness or weakness  Skin: no rash  Musculoskeletal: no gross deformity PHYSICAL EXAM:    Constitutional: awake, alert, NAD  Eyes: EOMI, no conj injection  HENT: NC AT  Back: R lower back paraspinal ttp rad to side  Respiratory: no respiratory distress, breath sounds equal b/l, no wheezing, rhonchi or stridor.   Cardiovascular: RRR nml S1S2  Gastrointestinal: soft, no masses, nontender, nondistended. No guarding or rebound.   Extremities: no peripheral edema  Neurological: AAOx3, CN II-XII grossly intact, no focal numbness or weakness, no saddle anesthesia  Skin: no rash  Musculoskeletal: no gross deformity

## 2019-05-18 NOTE — ED PROVIDER NOTE - PLAN OF CARE
pt w/ progressive RLBP after yardwork. now acutely worse, no improvement w/ apap. no numbness, weakness, intcontinence/retention.  given severity of pain, will get basic labs, dissection protocol, pain control, serial reassessment

## 2019-05-18 NOTE — ED PROVIDER NOTE - NSFOLLOWUPINSTRUCTIONS_ED_ALL_ED_FT
Take acetaminophen 650mg every 4 hours for mild pain. Apply lidoderm patch for 12 hours a day and remove. Take methocarbamol for moderate to severe pain. Take oxycodone for severe pain. Follow up with physical therapy. Do not drive if taking oxycodone or methocarbamol.    Back Pain    Back pain is very common in adults. The cause of back pain is rarely dangerous and the pain often gets better over time. The cause of your back pain may not be known and may include strain of muscles or ligaments, degeneration of the spinal disks, or arthritis. Occasionally the pain may radiate down your leg(s). Over-the-counter medicines to reduce pain and inflammation are often the most helpful. Stretching and remaining active frequently helps the healing process.     SEEK IMMEDIATE MEDICAL CARE IF YOU HAVE ANY OF THE FOLLOWING SYMPTOMS: bowel or bladder control problems, unusual weakness or numbness in your arms or legs, nausea or vomiting, abdominal pain, fever, dizziness/lightheadedness.

## 2019-05-18 NOTE — ED PROVIDER NOTE - NS ED ROS FT
Constitutional: no fever or rigors  Eyes: no eye redness, acute visual change  ENMT: no ear pain, no throat pain  Card: no chest pain, no palpitations  Pulm: no cough, no shortness of breath  GI: pos abdominal/flank pain, no nausea or vomiting  : no dysuria or hematuria  MSK: pos LBP, no neck pain  Skin: no rash, no abrasion  Neuro: no numbness, no focal weakness  Heme/Onc: no easy bruising, no bleeding tendency   Allergic: no hives, no throat swelling

## 2019-05-18 NOTE — ED PROVIDER NOTE - OBJECTIVE STATEMENT
69 yo m hx cad s/p pci x4, htn, dm, chrons, factor 5 leiden here for back. pt states he was doing yardwork 3 days and has had back spasm. pain more lower right back. no radiation down leg. pain wraps to front. no numbness or weakness. pain intermittent. pain worse with movement.

## 2019-05-19 VITALS
RESPIRATION RATE: 20 BRPM | OXYGEN SATURATION: 95 % | DIASTOLIC BLOOD PRESSURE: 83 MMHG | HEART RATE: 63 BPM | SYSTOLIC BLOOD PRESSURE: 163 MMHG

## 2019-05-19 LAB
ALBUMIN SERPL ELPH-MCNC: 5 G/DL — SIGNIFICANT CHANGE UP (ref 3.5–5.2)
ALP SERPL-CCNC: 83 U/L — SIGNIFICANT CHANGE UP (ref 30–115)
ALT FLD-CCNC: 25 U/L — SIGNIFICANT CHANGE UP (ref 0–41)
ANION GAP SERPL CALC-SCNC: 15 MMOL/L — HIGH (ref 7–14)
ANISOCYTOSIS BLD QL: SLIGHT — SIGNIFICANT CHANGE UP
APTT BLD: 41.2 SEC — HIGH (ref 27–39.2)
AST SERPL-CCNC: 54 U/L — HIGH (ref 0–41)
BASOPHILS # BLD AUTO: 0.25 K/UL — HIGH (ref 0–0.2)
BASOPHILS NFR BLD AUTO: 2.6 % — HIGH (ref 0–1)
BILIRUB SERPL-MCNC: 0.5 MG/DL — SIGNIFICANT CHANGE UP (ref 0.2–1.2)
BUN SERPL-MCNC: 18 MG/DL — SIGNIFICANT CHANGE UP (ref 10–20)
CALCIUM SERPL-MCNC: 9.7 MG/DL — SIGNIFICANT CHANGE UP (ref 8.5–10.1)
CHLORIDE SERPL-SCNC: 100 MMOL/L — SIGNIFICANT CHANGE UP (ref 98–110)
CO2 SERPL-SCNC: 26 MMOL/L — SIGNIFICANT CHANGE UP (ref 17–32)
CREAT SERPL-MCNC: 1 MG/DL — SIGNIFICANT CHANGE UP (ref 0.7–1.5)
EOSINOPHIL # BLD AUTO: 0 K/UL — SIGNIFICANT CHANGE UP (ref 0–0.7)
EOSINOPHIL NFR BLD AUTO: 0 % — SIGNIFICANT CHANGE UP (ref 0–8)
GIANT PLATELETS BLD QL SMEAR: PRESENT — SIGNIFICANT CHANGE UP
GLUCOSE SERPL-MCNC: 153 MG/DL — HIGH (ref 70–99)
INR BLD: 2.47 RATIO — HIGH (ref 0.65–1.3)
LIDOCAIN IGE QN: 35 U/L — SIGNIFICANT CHANGE UP (ref 7–60)
LYMPHOCYTES # BLD AUTO: 0.67 K/UL — LOW (ref 1.2–3.4)
LYMPHOCYTES # BLD AUTO: 7 % — LOW (ref 20.5–51.1)
MANUAL SMEAR VERIFICATION: SIGNIFICANT CHANGE UP
MONOCYTES # BLD AUTO: 0.67 K/UL — HIGH (ref 0.1–0.6)
MONOCYTES NFR BLD AUTO: 7 % — SIGNIFICANT CHANGE UP (ref 1.7–9.3)
NEUTROPHILS # BLD AUTO: 7.86 K/UL — HIGH (ref 1.4–6.5)
NEUTROPHILS NFR BLD AUTO: 81.6 % — HIGH (ref 42.2–75.2)
PLAT MORPH BLD: NORMAL — SIGNIFICANT CHANGE UP
POTASSIUM SERPL-MCNC: 5.3 MMOL/L — HIGH (ref 3.5–5)
POTASSIUM SERPL-SCNC: 5.3 MMOL/L — HIGH (ref 3.5–5)
PROT SERPL-MCNC: 7.8 G/DL — SIGNIFICANT CHANGE UP (ref 6–8)
PROTHROM AB SERPL-ACNC: 28.2 SEC — HIGH (ref 9.95–12.87)
RBC BLD AUTO: NORMAL — SIGNIFICANT CHANGE UP
SMUDGE CELLS # BLD: PRESENT — SIGNIFICANT CHANGE UP
SODIUM SERPL-SCNC: 141 MMOL/L — SIGNIFICANT CHANGE UP (ref 135–146)
TROPONIN T SERPL-MCNC: <0.01 NG/ML — SIGNIFICANT CHANGE UP
VARIANT LYMPHS # BLD: 1.8 % — SIGNIFICANT CHANGE UP (ref 0–5)

## 2019-05-19 PROCEDURE — 93010 ELECTROCARDIOGRAM REPORT: CPT

## 2019-05-19 PROCEDURE — 71275 CT ANGIOGRAPHY CHEST: CPT | Mod: 26

## 2019-05-19 RX ORDER — LIDOCAINE 4 G/100G
1 CREAM TOPICAL
Qty: 12 | Refills: 0
Start: 2019-05-19

## 2019-05-19 RX ORDER — DIAZEPAM 5 MG
5 TABLET ORAL ONCE
Refills: 0 | Status: DISCONTINUED | OUTPATIENT
Start: 2019-05-19 | End: 2019-05-19

## 2019-05-19 RX ORDER — OXYCODONE HYDROCHLORIDE 5 MG/1
1 TABLET ORAL
Qty: 9 | Refills: 0
Start: 2019-05-19

## 2019-05-19 RX ORDER — METHOCARBAMOL 500 MG/1
2 TABLET, FILM COATED ORAL
Qty: 20 | Refills: 0
Start: 2019-05-19

## 2019-05-19 RX ORDER — ACETAMINOPHEN 500 MG
975 TABLET ORAL ONCE
Refills: 0 | Status: COMPLETED | OUTPATIENT
Start: 2019-05-19 | End: 2019-05-19

## 2019-05-19 RX ORDER — KETOROLAC TROMETHAMINE 30 MG/ML
15 SYRINGE (ML) INJECTION ONCE
Refills: 0 | Status: DISCONTINUED | OUTPATIENT
Start: 2019-05-19 | End: 2019-05-19

## 2019-05-19 RX ORDER — ACETAMINOPHEN 500 MG
2 TABLET ORAL
Qty: 28 | Refills: 0
Start: 2019-05-19

## 2019-05-19 RX ADMIN — Medication 15 MILLIGRAM(S): at 05:00

## 2019-05-19 RX ADMIN — MORPHINE SULFATE 6 MILLIGRAM(S): 50 CAPSULE, EXTENDED RELEASE ORAL at 00:00

## 2019-05-19 RX ADMIN — Medication 15 MILLIGRAM(S): at 04:36

## 2019-05-19 RX ADMIN — Medication 975 MILLIGRAM(S): at 01:41

## 2019-05-19 RX ADMIN — Medication 975 MILLIGRAM(S): at 02:41

## 2019-05-19 RX ADMIN — Medication 5 MILLIGRAM(S): at 01:41

## 2019-05-30 ENCOUNTER — OUTPATIENT (OUTPATIENT)
Dept: OUTPATIENT SERVICES | Facility: HOSPITAL | Age: 69
LOS: 1 days | Discharge: HOME | End: 2019-05-30

## 2019-05-30 DIAGNOSIS — I48.91 UNSPECIFIED ATRIAL FIBRILLATION: ICD-10-CM

## 2019-05-30 DIAGNOSIS — Z79.01 LONG TERM (CURRENT) USE OF ANTICOAGULANTS: ICD-10-CM

## 2019-05-30 PROBLEM — E11.9 TYPE 2 DIABETES MELLITUS WITHOUT COMPLICATIONS: Chronic | Status: ACTIVE | Noted: 2019-05-18

## 2019-05-30 PROBLEM — I10 ESSENTIAL (PRIMARY) HYPERTENSION: Chronic | Status: ACTIVE | Noted: 2019-05-18

## 2019-05-30 PROBLEM — D68.51 ACTIVATED PROTEIN C RESISTANCE: Chronic | Status: ACTIVE | Noted: 2019-05-18

## 2019-05-30 LAB
POCT INR: 3.1 RATIO — HIGH (ref 0.9–1.2)
POCT PT: 37.5 SEC — HIGH (ref 10–13.4)

## 2019-06-26 ENCOUNTER — OUTPATIENT (OUTPATIENT)
Dept: OUTPATIENT SERVICES | Facility: HOSPITAL | Age: 69
LOS: 1 days | Discharge: HOME | End: 2019-06-26

## 2019-06-26 DIAGNOSIS — I48.91 UNSPECIFIED ATRIAL FIBRILLATION: ICD-10-CM

## 2019-06-26 DIAGNOSIS — Z79.01 LONG TERM (CURRENT) USE OF ANTICOAGULANTS: ICD-10-CM

## 2019-06-26 LAB
POCT INR: 3.2 RATIO — HIGH (ref 0.9–1.2)
POCT PT: 38.6 SEC — HIGH (ref 10–13.4)

## 2019-07-18 ENCOUNTER — OUTPATIENT (OUTPATIENT)
Dept: OUTPATIENT SERVICES | Facility: HOSPITAL | Age: 69
LOS: 1 days | Discharge: HOME | End: 2019-07-18

## 2019-07-18 DIAGNOSIS — I48.91 UNSPECIFIED ATRIAL FIBRILLATION: ICD-10-CM

## 2019-07-18 DIAGNOSIS — Z79.01 LONG TERM (CURRENT) USE OF ANTICOAGULANTS: ICD-10-CM

## 2019-08-15 ENCOUNTER — OUTPATIENT (OUTPATIENT)
Dept: OUTPATIENT SERVICES | Facility: HOSPITAL | Age: 69
LOS: 1 days | Discharge: HOME | End: 2019-08-15

## 2019-08-15 DIAGNOSIS — I48.91 UNSPECIFIED ATRIAL FIBRILLATION: ICD-10-CM

## 2019-08-15 DIAGNOSIS — Z79.01 LONG TERM (CURRENT) USE OF ANTICOAGULANTS: ICD-10-CM

## 2019-08-15 LAB
POCT INR: 3 RATIO — HIGH (ref 0.9–1.2)
POCT PT: 35.7 SEC — HIGH (ref 10–13.4)

## 2019-09-17 ENCOUNTER — OUTPATIENT (OUTPATIENT)
Dept: OUTPATIENT SERVICES | Facility: HOSPITAL | Age: 69
LOS: 1 days | Discharge: HOME | End: 2019-09-17

## 2019-09-17 DIAGNOSIS — Z79.01 LONG TERM (CURRENT) USE OF ANTICOAGULANTS: ICD-10-CM

## 2019-09-17 DIAGNOSIS — I48.91 UNSPECIFIED ATRIAL FIBRILLATION: ICD-10-CM

## 2019-09-17 LAB
POCT INR: 1.7 RATIO — HIGH (ref 0.9–1.2)
POCT PT: 20 SEC — HIGH (ref 10–13.4)

## 2019-09-24 ENCOUNTER — OUTPATIENT (OUTPATIENT)
Dept: OUTPATIENT SERVICES | Facility: HOSPITAL | Age: 69
LOS: 1 days | Discharge: HOME | End: 2019-09-24

## 2019-09-24 DIAGNOSIS — I48.91 UNSPECIFIED ATRIAL FIBRILLATION: ICD-10-CM

## 2019-09-24 DIAGNOSIS — Z79.01 LONG TERM (CURRENT) USE OF ANTICOAGULANTS: ICD-10-CM

## 2019-09-24 LAB
POCT INR: 2.1 RATIO — HIGH (ref 0.9–1.2)
POCT PT: 25.3 SEC — HIGH (ref 10–13.4)

## 2019-10-10 ENCOUNTER — OUTPATIENT (OUTPATIENT)
Dept: OUTPATIENT SERVICES | Facility: HOSPITAL | Age: 69
LOS: 1 days | Discharge: HOME | End: 2019-10-10

## 2019-10-10 DIAGNOSIS — I48.91 UNSPECIFIED ATRIAL FIBRILLATION: ICD-10-CM

## 2019-10-10 DIAGNOSIS — Z79.01 LONG TERM (CURRENT) USE OF ANTICOAGULANTS: ICD-10-CM

## 2019-11-01 ENCOUNTER — OUTPATIENT (OUTPATIENT)
Dept: OUTPATIENT SERVICES | Facility: HOSPITAL | Age: 69
LOS: 1 days | Discharge: HOME | End: 2019-11-01

## 2019-11-01 DIAGNOSIS — I48.91 UNSPECIFIED ATRIAL FIBRILLATION: ICD-10-CM

## 2019-11-01 DIAGNOSIS — Z79.01 LONG TERM (CURRENT) USE OF ANTICOAGULANTS: ICD-10-CM

## 2019-11-01 LAB
POCT INR: 1.9 RATIO — HIGH (ref 0.9–1.2)
POCT PT: 23.3 SEC — HIGH (ref 10–13.4)

## 2019-11-15 ENCOUNTER — OUTPATIENT (OUTPATIENT)
Dept: OUTPATIENT SERVICES | Facility: HOSPITAL | Age: 69
LOS: 1 days | Discharge: HOME | End: 2019-11-15

## 2019-11-15 DIAGNOSIS — Z79.01 LONG TERM (CURRENT) USE OF ANTICOAGULANTS: ICD-10-CM

## 2019-11-15 DIAGNOSIS — I48.91 UNSPECIFIED ATRIAL FIBRILLATION: ICD-10-CM

## 2019-11-15 LAB
POCT INR: 3.7 RATIO — HIGH (ref 0.9–1.2)
POCT PT: 43.9 SEC — HIGH (ref 10–13.4)

## 2019-11-21 DIAGNOSIS — Z79.01 LONG TERM (CURRENT) USE OF ANTICOAGULANTS: ICD-10-CM

## 2019-11-21 DIAGNOSIS — I48.91 UNSPECIFIED ATRIAL FIBRILLATION: ICD-10-CM

## 2019-12-03 ENCOUNTER — OUTPATIENT (OUTPATIENT)
Dept: OUTPATIENT SERVICES | Facility: HOSPITAL | Age: 69
LOS: 1 days | Discharge: HOME | End: 2019-12-03

## 2019-12-03 DIAGNOSIS — Z79.01 LONG TERM (CURRENT) USE OF ANTICOAGULANTS: ICD-10-CM

## 2019-12-03 DIAGNOSIS — I48.91 UNSPECIFIED ATRIAL FIBRILLATION: ICD-10-CM

## 2019-12-03 LAB
POCT INR: 2.1 RATIO — HIGH (ref 0.9–1.2)
POCT PT: 24.9 SEC — HIGH (ref 10–13.4)

## 2019-12-26 ENCOUNTER — OUTPATIENT (OUTPATIENT)
Dept: OUTPATIENT SERVICES | Facility: HOSPITAL | Age: 69
LOS: 1 days | Discharge: HOME | End: 2019-12-26

## 2019-12-26 DIAGNOSIS — I48.91 UNSPECIFIED ATRIAL FIBRILLATION: ICD-10-CM

## 2019-12-26 DIAGNOSIS — Z79.01 LONG TERM (CURRENT) USE OF ANTICOAGULANTS: ICD-10-CM

## 2019-12-26 LAB
POCT INR: 4 RATIO — HIGH (ref 0.9–1.2)
POCT PT: 48.5 SEC — HIGH (ref 10–13.4)

## 2020-01-02 ENCOUNTER — OUTPATIENT (OUTPATIENT)
Dept: OUTPATIENT SERVICES | Facility: HOSPITAL | Age: 70
LOS: 1 days | Discharge: HOME | End: 2020-01-02

## 2020-01-02 DIAGNOSIS — Z79.01 LONG TERM (CURRENT) USE OF ANTICOAGULANTS: ICD-10-CM

## 2020-01-02 DIAGNOSIS — I48.91 UNSPECIFIED ATRIAL FIBRILLATION: ICD-10-CM

## 2020-01-02 LAB
POCT INR: 3.3 RATIO — HIGH (ref 0.9–1.2)
POCT PT: 29.4 SEC — HIGH (ref 10–13.4)

## 2020-01-10 ENCOUNTER — OUTPATIENT (OUTPATIENT)
Dept: OUTPATIENT SERVICES | Facility: HOSPITAL | Age: 70
LOS: 1 days | Discharge: HOME | End: 2020-01-10

## 2020-01-10 DIAGNOSIS — I48.91 UNSPECIFIED ATRIAL FIBRILLATION: ICD-10-CM

## 2020-01-10 DIAGNOSIS — Z79.01 LONG TERM (CURRENT) USE OF ANTICOAGULANTS: ICD-10-CM

## 2020-01-10 LAB
POCT INR: 2.4 RATIO — HIGH (ref 0.9–1.2)
POCT PT: 29.2 SEC — HIGH (ref 10–13.4)

## 2020-01-24 ENCOUNTER — OUTPATIENT (OUTPATIENT)
Dept: OUTPATIENT SERVICES | Facility: HOSPITAL | Age: 70
LOS: 1 days | Discharge: HOME | End: 2020-01-24

## 2020-01-24 DIAGNOSIS — Z79.01 LONG TERM (CURRENT) USE OF ANTICOAGULANTS: ICD-10-CM

## 2020-01-24 DIAGNOSIS — I48.91 UNSPECIFIED ATRIAL FIBRILLATION: ICD-10-CM

## 2020-01-24 LAB
POCT INR: 1.9 RATIO — HIGH (ref 0.9–1.2)
POCT PT: 22.5 SEC — HIGH (ref 10–13.4)

## 2020-02-07 ENCOUNTER — OUTPATIENT (OUTPATIENT)
Dept: OUTPATIENT SERVICES | Facility: HOSPITAL | Age: 70
LOS: 1 days | Discharge: HOME | End: 2020-02-07

## 2020-02-07 DIAGNOSIS — I48.91 UNSPECIFIED ATRIAL FIBRILLATION: ICD-10-CM

## 2020-02-07 DIAGNOSIS — Z79.01 LONG TERM (CURRENT) USE OF ANTICOAGULANTS: ICD-10-CM

## 2020-02-07 LAB
POCT INR: 2.7 RATIO — HIGH (ref 0.9–1.2)
POCT PT: 32.8 SEC — HIGH (ref 10–13.4)

## 2020-02-28 ENCOUNTER — OUTPATIENT (OUTPATIENT)
Dept: OUTPATIENT SERVICES | Facility: HOSPITAL | Age: 70
LOS: 1 days | Discharge: HOME | End: 2020-02-28

## 2020-02-28 DIAGNOSIS — I48.91 UNSPECIFIED ATRIAL FIBRILLATION: ICD-10-CM

## 2020-02-28 DIAGNOSIS — Z79.01 LONG TERM (CURRENT) USE OF ANTICOAGULANTS: ICD-10-CM

## 2020-02-28 LAB
POCT INR: 4.1 RATIO — HIGH (ref 0.9–1.2)
POCT INR: 4.1 RATIO — HIGH (ref 0.9–1.2)
POCT PT: 48.7 SEC — HIGH (ref 10–13.4)
POCT PT: 48.7 SEC — HIGH (ref 10–13.4)

## 2020-03-06 ENCOUNTER — OUTPATIENT (OUTPATIENT)
Dept: OUTPATIENT SERVICES | Facility: HOSPITAL | Age: 70
LOS: 1 days | Discharge: HOME | End: 2020-03-06

## 2020-03-06 DIAGNOSIS — Z79.01 LONG TERM (CURRENT) USE OF ANTICOAGULANTS: ICD-10-CM

## 2020-03-06 DIAGNOSIS — I48.91 UNSPECIFIED ATRIAL FIBRILLATION: ICD-10-CM

## 2020-03-06 LAB
POCT INR: 2.5 RATIO — HIGH (ref 0.9–1.2)
POCT PT: 30.4 SEC — HIGH (ref 10–13.4)

## 2020-03-19 ENCOUNTER — OUTPATIENT (OUTPATIENT)
Dept: OUTPATIENT SERVICES | Facility: HOSPITAL | Age: 70
LOS: 1 days | Discharge: HOME | End: 2020-03-19

## 2020-03-19 DIAGNOSIS — I48.91 UNSPECIFIED ATRIAL FIBRILLATION: ICD-10-CM

## 2020-03-19 DIAGNOSIS — Z79.01 LONG TERM (CURRENT) USE OF ANTICOAGULANTS: ICD-10-CM

## 2020-04-16 ENCOUNTER — OUTPATIENT (OUTPATIENT)
Dept: OUTPATIENT SERVICES | Facility: HOSPITAL | Age: 70
LOS: 1 days | Discharge: HOME | End: 2020-04-16

## 2020-04-16 DIAGNOSIS — I48.91 UNSPECIFIED ATRIAL FIBRILLATION: ICD-10-CM

## 2020-04-16 DIAGNOSIS — Z79.01 LONG TERM (CURRENT) USE OF ANTICOAGULANTS: ICD-10-CM

## 2020-04-16 LAB
POCT INR: 2.4 RATIO — HIGH (ref 0.9–1.2)
POCT PT: 29.1 SEC — HIGH (ref 10–13.4)

## 2020-04-30 ENCOUNTER — OUTPATIENT (OUTPATIENT)
Dept: OUTPATIENT SERVICES | Facility: HOSPITAL | Age: 70
LOS: 1 days | Discharge: HOME | End: 2020-04-30

## 2020-04-30 DIAGNOSIS — Z79.01 LONG TERM (CURRENT) USE OF ANTICOAGULANTS: ICD-10-CM

## 2020-04-30 DIAGNOSIS — I48.91 UNSPECIFIED ATRIAL FIBRILLATION: ICD-10-CM

## 2020-04-30 LAB
POCT INR: 2.3 RATIO — HIGH (ref 0.9–1.2)
POCT PT: 27.3 SEC — HIGH (ref 10–13.4)

## 2020-05-28 ENCOUNTER — OUTPATIENT (OUTPATIENT)
Dept: OUTPATIENT SERVICES | Facility: HOSPITAL | Age: 70
LOS: 1 days | Discharge: HOME | End: 2020-05-28

## 2020-05-28 DIAGNOSIS — I48.91 UNSPECIFIED ATRIAL FIBRILLATION: ICD-10-CM

## 2020-05-28 DIAGNOSIS — Z79.01 LONG TERM (CURRENT) USE OF ANTICOAGULANTS: ICD-10-CM

## 2020-05-28 LAB
POCT INR: 2.5 RATIO — HIGH (ref 0.9–1.2)
POCT PT: 30.5 SEC — HIGH (ref 10–13.4)

## 2020-06-10 ENCOUNTER — TRANSCRIPTION ENCOUNTER (OUTPATIENT)
Age: 70
End: 2020-06-10

## 2020-06-10 ENCOUNTER — INPATIENT (INPATIENT)
Facility: HOSPITAL | Age: 70
LOS: 0 days | Discharge: HOME | End: 2020-06-10
Attending: SURGERY | Admitting: SURGERY
Payer: MEDICARE

## 2020-06-10 ENCOUNTER — EMERGENCY (EMERGENCY)
Facility: HOSPITAL | Age: 70
LOS: 0 days | Discharge: HOME | End: 2020-06-10
Admitting: PATHOLOGY

## 2020-06-10 VITALS
SYSTOLIC BLOOD PRESSURE: 133 MMHG | HEART RATE: 64 BPM | RESPIRATION RATE: 18 BRPM | OXYGEN SATURATION: 99 % | DIASTOLIC BLOOD PRESSURE: 75 MMHG | TEMPERATURE: 98 F

## 2020-06-10 VITALS
TEMPERATURE: 98 F | WEIGHT: 169.98 LBS | HEART RATE: 63 BPM | SYSTOLIC BLOOD PRESSURE: 185 MMHG | OXYGEN SATURATION: 97 % | DIASTOLIC BLOOD PRESSURE: 86 MMHG | RESPIRATION RATE: 18 BRPM

## 2020-06-10 DIAGNOSIS — S32.009A UNSPECIFIED FRACTURE OF UNSPECIFIED LUMBAR VERTEBRA, INITIAL ENCOUNTER FOR CLOSED FRACTURE: ICD-10-CM

## 2020-06-10 DIAGNOSIS — M54.89 OTHER DORSALGIA: ICD-10-CM

## 2020-06-10 DIAGNOSIS — D68.51 ACTIVATED PROTEIN C RESISTANCE: ICD-10-CM

## 2020-06-10 DIAGNOSIS — I25.10 ATHEROSCLEROTIC HEART DISEASE OF NATIVE CORONARY ARTERY WITHOUT ANGINA PECTORIS: ICD-10-CM

## 2020-06-10 DIAGNOSIS — I10 ESSENTIAL (PRIMARY) HYPERTENSION: ICD-10-CM
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
## 2020-06-10 DIAGNOSIS — Z87.19 PERSONAL HISTORY OF OTHER DISEASES OF THE DIGESTIVE SYSTEM: ICD-10-CM
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
## 2020-06-10 DIAGNOSIS — S22.32XA FRACTURE OF ONE RIB, LEFT SIDE, INITIAL ENCOUNTER FOR CLOSED FRACTURE: ICD-10-CM

## 2020-06-10 DIAGNOSIS — Z79.01 LONG TERM (CURRENT) USE OF ANTICOAGULANTS: ICD-10-CM

## 2020-06-10 DIAGNOSIS — Z79.84 LONG TERM (CURRENT) USE OF ORAL HYPOGLYCEMIC DRUGS: ICD-10-CM

## 2020-06-10 DIAGNOSIS — E78.5 HYPERLIPIDEMIA, UNSPECIFIED: ICD-10-CM
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
## 2020-06-10 DIAGNOSIS — E78.00 PURE HYPERCHOLESTEROLEMIA, UNSPECIFIED: ICD-10-CM

## 2020-06-10 DIAGNOSIS — D68.2 HEREDITARY DEFICIENCY OF OTHER CLOTTING FACTORS: ICD-10-CM

## 2020-06-10 DIAGNOSIS — E11.9 TYPE 2 DIABETES MELLITUS WITHOUT COMPLICATIONS: ICD-10-CM

## 2020-06-10 DIAGNOSIS — S22.39XA FRACTURE OF ONE RIB, UNSPECIFIED SIDE, INITIAL ENCOUNTER FOR CLOSED FRACTURE: ICD-10-CM

## 2020-06-10 DIAGNOSIS — I48.91 UNSPECIFIED ATRIAL FIBRILLATION: ICD-10-CM
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
## 2020-06-10 DIAGNOSIS — Z85.828 PERSONAL HISTORY OF OTHER MALIGNANT NEOPLASM OF SKIN: ICD-10-CM

## 2020-06-10 DIAGNOSIS — Z79.02 LONG TERM (CURRENT) USE OF ANTITHROMBOTICS/ANTIPLATELETS: ICD-10-CM

## 2020-06-10 DIAGNOSIS — R26.9 UNSPECIFIED ABNORMALITIES OF GAIT AND MOBILITY: ICD-10-CM

## 2020-06-10 LAB
ALBUMIN SERPL ELPH-MCNC: 4.4 G/DL — SIGNIFICANT CHANGE UP (ref 3.5–5.2)
ALP SERPL-CCNC: 124 U/L — HIGH (ref 30–115)
ALT FLD-CCNC: 18 U/L — SIGNIFICANT CHANGE UP (ref 0–41)
ANION GAP SERPL CALC-SCNC: 12 MMOL/L — SIGNIFICANT CHANGE UP (ref 7–14)
APTT BLD: 41.2 SEC — HIGH (ref 27–39.2)
AST SERPL-CCNC: 20 U/L — SIGNIFICANT CHANGE UP (ref 0–41)
BASOPHILS # BLD AUTO: 0.08 K/UL — SIGNIFICANT CHANGE UP (ref 0–0.2)
BASOPHILS NFR BLD AUTO: 1.4 % — HIGH (ref 0–1)
BILIRUB SERPL-MCNC: 0.3 MG/DL — SIGNIFICANT CHANGE UP (ref 0.2–1.2)
BUN SERPL-MCNC: 14 MG/DL — SIGNIFICANT CHANGE UP (ref 10–20)
CALCIUM SERPL-MCNC: 9 MG/DL — SIGNIFICANT CHANGE UP (ref 8.5–10.1)
CHLORIDE SERPL-SCNC: 98 MMOL/L — SIGNIFICANT CHANGE UP (ref 98–110)
CO2 SERPL-SCNC: 27 MMOL/L — SIGNIFICANT CHANGE UP (ref 17–32)
CREAT SERPL-MCNC: 0.5 MG/DL — LOW (ref 0.7–1.5)
EOSINOPHIL # BLD AUTO: 0.15 K/UL — SIGNIFICANT CHANGE UP (ref 0–0.7)
EOSINOPHIL NFR BLD AUTO: 2.7 % — SIGNIFICANT CHANGE UP (ref 0–8)
GLUCOSE SERPL-MCNC: 255 MG/DL — HIGH (ref 70–99)
HCT VFR BLD CALC: 41.4 % — LOW (ref 42–52)
HGB BLD-MCNC: 14.6 G/DL — SIGNIFICANT CHANGE UP (ref 14–18)
IMM GRANULOCYTES NFR BLD AUTO: 0.5 % — HIGH (ref 0.1–0.3)
INR BLD: 2.57 RATIO — HIGH (ref 0.65–1.3)
LACTATE SERPL-SCNC: 2.2 MMOL/L — HIGH (ref 0.7–2)
LIDOCAIN IGE QN: 31 U/L — SIGNIFICANT CHANGE UP (ref 7–60)
LYMPHOCYTES # BLD AUTO: 0.6 K/UL — LOW (ref 1.2–3.4)
LYMPHOCYTES # BLD AUTO: 10.7 % — LOW (ref 20.5–51.1)
MCHC RBC-ENTMCNC: 30.5 PG — SIGNIFICANT CHANGE UP (ref 27–31)
MCHC RBC-ENTMCNC: 35.3 G/DL — SIGNIFICANT CHANGE UP (ref 32–37)
MCV RBC AUTO: 86.6 FL — SIGNIFICANT CHANGE UP (ref 80–94)
MONOCYTES # BLD AUTO: 0.83 K/UL — HIGH (ref 0.1–0.6)
MONOCYTES NFR BLD AUTO: 14.8 % — HIGH (ref 1.7–9.3)
NEUTROPHILS # BLD AUTO: 3.92 K/UL — SIGNIFICANT CHANGE UP (ref 1.4–6.5)
NEUTROPHILS NFR BLD AUTO: 69.9 % — SIGNIFICANT CHANGE UP (ref 42.2–75.2)
NRBC # BLD: 0 /100 WBCS — SIGNIFICANT CHANGE UP (ref 0–0)
PLATELET # BLD AUTO: 204 K/UL — SIGNIFICANT CHANGE UP (ref 130–400)
POTASSIUM SERPL-MCNC: 3.5 MMOL/L — SIGNIFICANT CHANGE UP (ref 3.5–5)
POTASSIUM SERPL-SCNC: 3.5 MMOL/L — SIGNIFICANT CHANGE UP (ref 3.5–5)
PROT SERPL-MCNC: 6.8 G/DL — SIGNIFICANT CHANGE UP (ref 6–8)
PROTHROM AB SERPL-ACNC: 29.5 SEC — HIGH (ref 9.95–12.87)
RBC # BLD: 4.78 M/UL — SIGNIFICANT CHANGE UP (ref 4.7–6.1)
RBC # FLD: 12.5 % — SIGNIFICANT CHANGE UP (ref 11.5–14.5)
SARS-COV-2 RNA SPEC QL NAA+PROBE: SIGNIFICANT CHANGE UP
SODIUM SERPL-SCNC: 137 MMOL/L — SIGNIFICANT CHANGE UP (ref 135–146)
WBC # BLD: 5.61 K/UL — SIGNIFICANT CHANGE UP (ref 4.8–10.8)
WBC # FLD AUTO: 5.61 K/UL — SIGNIFICANT CHANGE UP (ref 4.8–10.8)

## 2020-06-10 PROCEDURE — 70450 CT HEAD/BRAIN W/O DYE: CPT | Mod: 26

## 2020-06-10 PROCEDURE — 74177 CT ABD & PELVIS W/CONTRAST: CPT | Mod: 26

## 2020-06-10 PROCEDURE — 99285 EMERGENCY DEPT VISIT HI MDM: CPT | Mod: CS

## 2020-06-10 PROCEDURE — 71260 CT THORAX DX C+: CPT | Mod: 26

## 2020-06-10 PROCEDURE — 72125 CT NECK SPINE W/O DYE: CPT | Mod: 26

## 2020-06-10 PROCEDURE — 99222 1ST HOSP IP/OBS MODERATE 55: CPT

## 2020-06-10 PROCEDURE — 93010 ELECTROCARDIOGRAM REPORT: CPT

## 2020-06-10 RX ORDER — SERTRALINE 25 MG/1
50 TABLET, FILM COATED ORAL DAILY
Refills: 0 | Status: DISCONTINUED | OUTPATIENT
Start: 2020-06-10 | End: 2020-06-10

## 2020-06-10 RX ORDER — ATORVASTATIN CALCIUM 80 MG/1
1 TABLET, FILM COATED ORAL
Qty: 0 | Refills: 0 | DISCHARGE
Start: 2020-06-10

## 2020-06-10 RX ORDER — OMEGA-3 ACID ETHYL ESTERS 1 G
2 CAPSULE ORAL
Refills: 0 | Status: DISCONTINUED | OUTPATIENT
Start: 2020-06-10 | End: 2020-06-10

## 2020-06-10 RX ORDER — IBUPROFEN 200 MG
400 TABLET ORAL EVERY 8 HOURS
Refills: 0 | Status: DISCONTINUED | OUTPATIENT
Start: 2020-06-10 | End: 2020-06-10

## 2020-06-10 RX ORDER — AMLODIPINE BESYLATE 2.5 MG/1
5 TABLET ORAL DAILY
Refills: 0 | Status: DISCONTINUED | OUTPATIENT
Start: 2020-06-10 | End: 2020-06-10

## 2020-06-10 RX ORDER — OXYCODONE HYDROCHLORIDE 5 MG/1
5 TABLET ORAL EVERY 6 HOURS
Refills: 0 | Status: DISCONTINUED | OUTPATIENT
Start: 2020-06-10 | End: 2020-06-10

## 2020-06-10 RX ORDER — CHLORHEXIDINE GLUCONATE 213 G/1000ML
1 SOLUTION TOPICAL
Refills: 0 | Status: DISCONTINUED | OUTPATIENT
Start: 2020-06-10 | End: 2020-06-10

## 2020-06-10 RX ORDER — LOSARTAN POTASSIUM 100 MG/1
100 TABLET, FILM COATED ORAL DAILY
Refills: 0 | Status: DISCONTINUED | OUTPATIENT
Start: 2020-06-10 | End: 2020-06-10

## 2020-06-10 RX ORDER — KETOROLAC TROMETHAMINE 30 MG/ML
15 SYRINGE (ML) INJECTION ONCE
Refills: 0 | Status: DISCONTINUED | OUTPATIENT
Start: 2020-06-10 | End: 2020-06-10

## 2020-06-10 RX ORDER — PANTOPRAZOLE SODIUM 20 MG/1
40 TABLET, DELAYED RELEASE ORAL DAILY
Refills: 0 | Status: DISCONTINUED | OUTPATIENT
Start: 2020-06-10 | End: 2020-06-10

## 2020-06-10 RX ORDER — ACETAMINOPHEN 500 MG
650 TABLET ORAL EVERY 6 HOURS
Refills: 0 | Status: DISCONTINUED | OUTPATIENT
Start: 2020-06-10 | End: 2020-06-10

## 2020-06-10 RX ORDER — POTASSIUM CHLORIDE 20 MEQ
1 PACKET (EA) ORAL
Qty: 0 | Refills: 0 | DISCHARGE

## 2020-06-10 RX ORDER — POTASSIUM CHLORIDE 20 MEQ
20 PACKET (EA) ORAL ONCE
Refills: 0 | Status: COMPLETED | OUTPATIENT
Start: 2020-06-10 | End: 2020-06-10

## 2020-06-10 RX ORDER — KETOROLAC TROMETHAMINE 30 MG/ML
15 SYRINGE (ML) INJECTION EVERY 8 HOURS
Refills: 0 | Status: DISCONTINUED | OUTPATIENT
Start: 2020-06-10 | End: 2020-06-10

## 2020-06-10 RX ORDER — MORPHINE SULFATE 50 MG/1
4 CAPSULE, EXTENDED RELEASE ORAL ONCE
Refills: 0 | Status: DISCONTINUED | OUTPATIENT
Start: 2020-06-10 | End: 2020-06-10

## 2020-06-10 RX ORDER — METHOCARBAMOL 500 MG/1
1500 TABLET, FILM COATED ORAL ONCE
Refills: 0 | Status: COMPLETED | OUTPATIENT
Start: 2020-06-10 | End: 2020-06-10

## 2020-06-10 RX ORDER — OXYCODONE HYDROCHLORIDE 5 MG/1
1 TABLET ORAL
Qty: 5 | Refills: 0
Start: 2020-06-10

## 2020-06-10 RX ORDER — PRIMIDONE 250 MG/1
50 TABLET ORAL DAILY
Refills: 0 | Status: DISCONTINUED | OUTPATIENT
Start: 2020-06-10 | End: 2020-06-10

## 2020-06-10 RX ORDER — LIDOCAINE 4 G/100G
1 CREAM TOPICAL DAILY
Refills: 0 | Status: DISCONTINUED | OUTPATIENT
Start: 2020-06-10 | End: 2020-06-10

## 2020-06-10 RX ORDER — LOSARTAN POTASSIUM 100 MG/1
1 TABLET, FILM COATED ORAL
Qty: 0 | Refills: 0 | DISCHARGE
Start: 2020-06-10

## 2020-06-10 RX ORDER — METHOCARBAMOL 500 MG/1
500 TABLET, FILM COATED ORAL
Refills: 0 | Status: DISCONTINUED | OUTPATIENT
Start: 2020-06-10 | End: 2020-06-10

## 2020-06-10 RX ORDER — IBUPROFEN 200 MG
1 TABLET ORAL
Qty: 0 | Refills: 0 | DISCHARGE
Start: 2020-06-10

## 2020-06-10 RX ORDER — ATORVASTATIN CALCIUM 80 MG/1
80 TABLET, FILM COATED ORAL AT BEDTIME
Refills: 0 | Status: DISCONTINUED | OUTPATIENT
Start: 2020-06-10 | End: 2020-06-10

## 2020-06-10 RX ORDER — CLOPIDOGREL BISULFATE 75 MG/1
75 TABLET, FILM COATED ORAL DAILY
Refills: 0 | Status: DISCONTINUED | OUTPATIENT
Start: 2020-06-10 | End: 2020-06-10

## 2020-06-10 RX ADMIN — Medication 15 MILLIGRAM(S): at 05:30

## 2020-06-10 RX ADMIN — AMLODIPINE BESYLATE 5 MILLIGRAM(S): 2.5 TABLET ORAL at 06:00

## 2020-06-10 RX ADMIN — Medication 15 MILLIGRAM(S): at 08:21

## 2020-06-10 RX ADMIN — SERTRALINE 50 MILLIGRAM(S): 25 TABLET, FILM COATED ORAL at 11:49

## 2020-06-10 RX ADMIN — MORPHINE SULFATE 4 MILLIGRAM(S): 50 CAPSULE, EXTENDED RELEASE ORAL at 03:27

## 2020-06-10 RX ADMIN — Medication 2 GRAM(S): at 06:01

## 2020-06-10 RX ADMIN — METHOCARBAMOL 500 MILLIGRAM(S): 500 TABLET, FILM COATED ORAL at 11:48

## 2020-06-10 RX ADMIN — Medication 1 TABLET(S): at 11:48

## 2020-06-10 RX ADMIN — OXYCODONE HYDROCHLORIDE 5 MILLIGRAM(S): 5 TABLET ORAL at 09:20

## 2020-06-10 RX ADMIN — LOSARTAN POTASSIUM 100 MILLIGRAM(S): 100 TABLET, FILM COATED ORAL at 06:00

## 2020-06-10 RX ADMIN — METHOCARBAMOL 1500 MILLIGRAM(S): 500 TABLET, FILM COATED ORAL at 05:28

## 2020-06-10 RX ADMIN — Medication 50 MILLIEQUIVALENT(S): at 08:50

## 2020-06-10 RX ADMIN — MORPHINE SULFATE 4 MILLIGRAM(S): 50 CAPSULE, EXTENDED RELEASE ORAL at 01:51

## 2020-06-10 RX ADMIN — PRIMIDONE 50 MILLIGRAM(S): 250 TABLET ORAL at 11:49

## 2020-06-10 RX ADMIN — Medication 650 MILLIGRAM(S): at 11:48

## 2020-06-10 RX ADMIN — Medication 650 MILLIGRAM(S): at 06:00

## 2020-06-10 RX ADMIN — CLOPIDOGREL BISULFATE 75 MILLIGRAM(S): 75 TABLET, FILM COATED ORAL at 11:48

## 2020-06-10 RX ADMIN — LIDOCAINE 1 PATCH: 4 CREAM TOPICAL at 11:29

## 2020-06-10 NOTE — H&P ADULT - HISTORY OF PRESENT ILLNESS
70y m  70y m    TRAUMA ACTIVATION LEVEL:  consult    MECHANISM OF INJURY:      [] Blunt  	[] MVC	[x] Fall	[] Pedestrian Struck	[] Motorcycle   [] Assault   [] Bicycle collision  [] Sports injury     [] Penetrating  	[] Gun Shot Wound 		[] Stab Wound    GCS: 15    HPI:  70 year old male with a past medical history of factor V deficiency a fib, on coumadin, CAD s/p stents x4 on plavix, HTN, HLD, Crohn's (in remission, no tx currently), skin cancer s/p resection presented to the ED s/p fall down 5 stairs -HT -LOC +coumadin and plavix. Patient landed on left side/back and states he heard a crack upon landing. Immediately after, he suffered from excruciating pain.     PAST MEDICAL & SURGICAL HISTORY:  Factor V Leiden  Hypertension, unspecified type  Diabetes mellitus  Coronary artery disease      Allergies    No Known Drug Allergies  Originally Entered as [HIVES] reaction to [blood] (Unknown) TRAUMA ACTIVATION LEVEL:  consult    MECHANISM OF INJURY:      [] Blunt  	[] MVC	[x] Fall	[] Pedestrian Struck	[] Motorcycle   [] Assault   [] Bicycle collision  [] Sports injury     [] Penetrating  	[] Gun Shot Wound 		[] Stab Wound    GCS: 15    HPI:  70 year old male with a past medical history of factor V deficiency a fib, on coumadin, CAD s/p stents x4 on plavix, HTN, HLD, Crohn's (in remission, no tx currently), skin cancer s/p resection presented to the ED s/p fall down 5 stairs -HT -LOC +coumadin and plavix. Patient landed on left side/back and states he heard a crack upon landing. Immediately after, he suffered from excruciating pain.

## 2020-06-10 NOTE — PHYSICAL THERAPY INITIAL EVALUATION ADULT - GENERAL OBSERVATIONS, REHAB EVAL
Pt encountered supine in bed stretcher in NAD, RN at b/s, c/o lumbar stiffness due to recent fall no c/o pain. Pt is indep in bed moblity, transfer mobility and ambulation >300 ft without AD with no gait deviation. Pt negotiated 11 steps indep step -over pattern using 1HR. Pt is not indicated for acute PT services secondary indep in functional mobility. Recommed out-patient PT services.

## 2020-06-10 NOTE — DISCHARGE NOTE PROVIDER - HOSPITAL COURSE
70 year old male with a PMHx of factor V deficiency a fib, on coumadin, CAD s/p stents x4 on plavix, HTN, HLD, Crohn's (in remission, no tx currently), skin cancer s/p resection presented to the ED s/p fall down 5 stairs -HT -LOC +coumadin and plavix. Patient reported landing on left side/back and states he heard a crack upon landing. Immediately after, he reported excruciating pain. CT imaging was significant for acute displaced left posterior 11th rib fracture with no signs of pneumothorax or additional acute traumatic injuries to the chest, abdomen or pelvis. Patient was admitted for pain control and further observation. While here patient was puilling 1-1/5L on incentive spirometer and pain has been well controlled. Patient was evaluated by physical therapy, no assistive devices were needed with a gait distance of >300ft with normal balance. Patient is tolerating diet, ambulating with adequate pain control.

## 2020-06-10 NOTE — CONSULT NOTE ADULT - ASSESSMENT
IMPRESSION: Rehab of gait dysfunction / left 11th rib fx    PRECAUTIONS: [  ] Cardiac  [  ] Respiratory  [  ] Seizures [  ] Contact Isolation  [  ] Droplet Isolation  [  ] Other    Weight Bearing Status:     RECOMMENDATION:    Out of Bed to Chair     DVT/Decubiti Prophylaxis    REHAB PLAN:     [ x  ] Bedside P/T 3-5 times a week   [   ]   Bedside O/T  2-3 times a week             [   ] No Rehab Therapy Indicated                   [   ]  Speech Therapy   Conditioning/ROM                                    ADL  Bed Mobility                                               Conditioning/ROM  Transfers                                                     Bed Mobility  Sitting /Standing Balance                         Transfers                                        Gait Training                                               Sitting/Standing Balance  Stair Training [   ]Applicable                    Home equipment Eval                                                                        Splinting  [   ] Only      GOALS:   ADL   [   ]   Independent                    Transfers  [ x  ] Independent                          Ambulation  [  x ] Independent     [   x ] With device                            [   ]  CG                                                         [   ]  CG                                                                  [   ] CG                            [    ] Min A                                                   [   ] Min A                                                              [   ] Min  A          DISCHARGE PLAN:   [   ]  Good candidate for Intensive Rehabilitation/Hospital based                                             Will tolerate 3hrs Intensive Rehab Daily                                       [    ]  Short Term Rehab in Skilled Nursing Facility                                       [  x  ]  Home with Outpatient or  services                                         [    ]  Possible Candidate for Intensive Hospital based Rehab

## 2020-06-10 NOTE — H&P ADULT - NSHPLABSRESULTS_GEN_ALL_CORE
LABS:  Labs:  CAPILLARY BLOOD GLUCOSE                          14.6   5.61  )-----------( 204      ( 10 Shane 2020 01:38 )             41.4       Auto Immature Granulocyte %: 0.5 % (06-10-20 @ 01:38)  Auto Neutrophil %: 69.9 % (06-10-20 @ 01:38)    06-10    137  |  98  |  14  ----------------------------<  255<H>  3.5   |  27  |  0.5<L>      Calcium, Total Serum: 9.0 mg/dL (06-10-20 @ 01:38)      LFTs:             6.8  | 0.3  | 20       ------------------[124     ( 10 Shane 2020 01:38 )  4.4  | x    | 18          Lipase:31     Amylase:x         Lactate, Blood: 2.2 mmol/L (06-10-20 @ 01:38)      Coags:     29.50  ----< 2.57    ( 10 Shane 2020 01:38 )     41.2    RADIOLOGY & ADDITIONAL STUDIES:  < from: CT Head No Cont (06.10.20 @ 03:16) >    IMPRESSION:    No CT evidence of acute intracranial hemorrhage, midline shift, or mass effect.    < end of copied text >    < from: CT Cervical Spine No Cont (06.10.20 @ 03:17) >    IMPRESSION:    2 mm fragment off the superior posterior endplate of C5. It is unclear if this is well corticated and therefore may reflect an age indeterminate fracture. No CT evidence for retropulsion or significant surrounding soft tissue swelling is identified. If warranted, MRI can be utilized for further evaluation.    < end of copied text >  < from: CT Chest w/ IV Cont (06.10.20 @ 03:21) >    IMPRESSION:    Displaced acute fracture of the posterior left 11th rib. Probable nondisplaced fractures of the left L1-L3 transverse processes.    < end of copied text > LABS:  Labs:  CAPILLARY BLOOD GLUCOSE                          14.6   5.61  )-----------( 204      ( 10 Shane 2020 01:38 )             41.4       Auto Immature Granulocyte %: 0.5 % (06-10-20 @ 01:38)  Auto Neutrophil %: 69.9 % (06-10-20 @ 01:38)    06-10    137  |  98  |  14  ----------------------------<  255<H>  3.5   |  27  |  0.5<L>      Calcium, Total Serum: 9.0 mg/dL (06-10-20 @ 01:38)      LFTs:             6.8  | 0.3  | 20       ------------------[124     ( 10 Shane 2020 01:38 )  4.4  | x    | 18          Lipase:31     Amylase:x         Lactate, Blood: 2.2 mmol/L (06-10-20 @ 01:38)      Coags:     29.50  ----< 2.57    ( 10 Shane 2020 01:38 )     41.2    RADIOLOGY & ADDITIONAL STUDIES:  < from: CT Head No Cont (06.10.20 @ 03:16) >    IMPRESSION:    No CT evidence of acute intracranial hemorrhage, midline shift, or mass effect.    < end of copied text >    < from: CT Cervical Spine No Cont (06.10.20 @ 03:17) >    IMPRESSION:    2 mm fragment off the superior posterior endplate of C5. It is unclear if this is well corticated and therefore may reflect an age indeterminate fracture. No CT evidence for retropulsion or significant surrounding soft tissue swelling is identified. If warranted, MRI can be utilized for further evaluation.    < end of copied text >    < from: CT Abdomen and Pelvis w/ IV Cont (06.10.20 @ 03:22) >    IMPRESSION:    Acute displaced left posterior 11th rib fracture.No pneumothorax.    No definite additional acute traumatic injury to the chest, abdomen or pelvis was identified.    < end of copied text >

## 2020-06-10 NOTE — ED ADULT TRIAGE NOTE - CHIEF COMPLAINT QUOTE
pt slipped and fell at home landing on lower back; pt only c/o lower back pain, no LOC, did not hit head; pt on coumadin and plavix

## 2020-06-10 NOTE — H&P ADULT - NSHPPHYSICALEXAM_GEN_ALL_CORE
Primary Survey:    A - airway intact  B - bilateral breath sounds and good chest rise  C - palpable pulses in all extremities  D - GCS 15 on arrival, CONCEPCION  Exposure obtained    Vital Signs Last 24 Hrs  T(C): 36.7 (10 Shane 2020 01:26), Max: 36.7 (10 Shane 2020 01:26)  T(F): 98.1 (10 Shane 2020 01:26), Max: 98.1 (10 Shane 2020 01:26)  HR: 61 (10 Shane 2020 03:22) (61 - 63)  BP: 168/79 (10 Shane 2020 03:22) (168/79 - 185/86)  BP(mean): --  RR: 16 (10 Shane 2020 03:22) (16 - 18)  SpO2: 98% (10 Shane 2020 03:22) (97% - 98%)    Secondary Survey:   General: NAD  HEENT: Normocephalic, atraumatic, EOMI, PEERLA. no scalp lacerations   Neck: Soft, midline trachea. no cspine tenderness  Chest: No left lateral wall tenderness. or subq  emphysema   Cardiac: S1, S2, RRR  Respiratory: Bilateral breath sounds, clear and equal bilaterally  Abdomen: Soft, non-distended, non-tender, no rebound,   Groin: Normal appearing, pelvis stable   Ext: palp radial b/l UE, b/l DP palp in Lower Extrem.   Back: tenderness in mid thoracic spine, no palpable runoff/stepoff/deformity Primary Survey:    A - airway intact  B - bilateral breath sounds and good chest rise  C - palpable pulses in all extremities  D - GCS 15 on arrival, CONCEPCION  Exposure obtained    Vital Signs Last 24 Hrs  T(C): 36.7 (10 Shane 2020 01:26), Max: 36.7 (10 Shane 2020 01:26)  T(F): 98.1 (10 Shane 2020 01:26), Max: 98.1 (10 Shane 2020 01:26)  HR: 61 (10 Shane 2020 03:22) (61 - 63)  BP: 168/79 (10 Shane 2020 03:22) (168/79 - 185/86)  BP(mean): --  RR: 16 (10 Shane 2020 03:22) (16 - 18)  SpO2: 98% (10 Shane 2020 03:22) (97% - 98%)    Secondary Survey:   General: NAD  HEENT: Normocephalic, atraumatic, EOMI, PEERLA. no scalp lacerations   Neck: Soft, midline trachea. no cspine tenderness  Chest: No left lateral wall tenderness. or subq  emphysema pain and muscle spasms at rest, pulling 1.5l on Incentive  Cardiac: S1, S2, RRR  Respiratory: Bilateral breath sounds, clear and equal bilaterally  Abdomen: Soft, non-distended, non-tender, no rebound,   Groin: Normal appearing, pelvis stable   Ext: palp radial b/l UE, b/l DP palp in Lower Extrem.   Back: tenderness in mid thoracic spine, no palpable runoff/stepoff/deformity

## 2020-06-10 NOTE — ED PROVIDER NOTE - ATTENDING CONTRIBUTION TO CARE
I personally evaluated the patient. I reviewed the Resident’s or Physician Assistant’s note (as assigned above), and agree with the findings and plan except as documented in my note.    71 yo male, pmg of fact V deficiency on coumadin, htn, hld, cad on plavix, presents to ed for fall. pt reports had mechanical trip and fall down five steps tonight. Reports some discomfort over r lower rib area  Denies loc, ha, cp, sob, le swelling, nvd, neck pain, joint pain.    CONSTITUTIONAL: Well-developed; well-nourished; in no acute distress. Sitting up and providing appropriate history and physical examination  SKIN: skin exam is warm and dry, no acute rash.  HEAD: Normocephalic; atraumatic.  EYES: PERRL, 3 mm bilateral, no nystagmus, EOM intact; conjunctiva and sclera clear.  ENT: No nasal discharge; airway clear.  NECK: Supple; non tender.+ full passive ROM in all directions. No JVD  CARD: S1, S2 normal; no murmurs, gallops, or rubs. Regular rate and rhythm. + Symmetric Strong Pulses  RESP: No wheezes, rales or rhonchi. Good air movement bilaterally  ABD: soft; non-distended; non-tender. No Rebound, No Gaurding, No signs of peritnitis, No CVA tenderness  EXT: Normal ROM. No clubbing, cyanosis or edema. Dp and Pt Pulses intact. Cap refill less than 3 seconds      Plan- CT head, ct chest, labs, reassess

## 2020-06-10 NOTE — ED PROVIDER NOTE - OBJECTIVE STATEMENT
71 yo male, pmg of fact V deficiency on coumadin, htn, hld, cad on plavix, presents to ed for fall. pt reports had mechanical trip and fall down five steps tonight. Pt c/o left back pain, mild, aching, no radiation. Pt states di not hit head. Denies loc, ha, cp, sob, le swelling, nvd, neck pain, joint pain.

## 2020-06-10 NOTE — DISCHARGE NOTE PROVIDER - NSDCCPCAREPLAN_GEN_ALL_CORE_FT
PRINCIPAL DISCHARGE DIAGNOSIS  Diagnosis: Rib fracture  Assessment and Plan of Treatment: You are being discharged after your fall, resulting in a left 11th rib fracture.   Pain: Continue taking Tylenol and Motrin around the clock with food. Oxycodone has been sent to your pharmacy, take for severe pain. Please take with caution, it can cause drowsiness. Continue the use of the incentive spirometer 10 times per hour.   Medications: Continue taking your home medications as previously prescribed.   Diet: continue regular diet. Take medications with food.   Follow up: please make an appointment at trauma clinic in two weeks for your follow up appointment. The phone number is attached to this discharge.   Please return to the emergency room if you experience severe pain, shortness of breath, chest pain or unable to tolerate diet.

## 2020-06-10 NOTE — CONSULT NOTE ADULT - SUBJECTIVE AND OBJECTIVE BOX
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 pt reports had mechanical trip and fall down five steps tonight. Pt c/o left back pain, mild, aching, no radiation. Pt states di not hit head. Denies loc, ha, cp, sob, le swelling, nvd, neck pain, joint pain.	    PAST MEDICAL/SURGICAL/FAMILY/SOCIAL HISTORY:    Past Medical History:  Coronary artery disease    Diabetes mellitus    Factor V Leiden    Hypertension, unspecified type.    MEDICATIONS  (STANDING):  acetaminophen   Tablet .. 650 milliGRAM(s) Oral every 6 hours  amLODIPine   Tablet 5 milliGRAM(s) Oral daily  atorvastatin 80 milliGRAM(s) Oral at bedtime  calcium carbonate 1250 mG  + Vitamin D (OsCal 500 + D) 1 Tablet(s) Oral daily  chlorhexidine 4% Liquid 1 Application(s) Topical <User Schedule>  clopidogrel Tablet 75 milliGRAM(s) Oral daily  ibuprofen  Tablet. 400 milliGRAM(s) Oral every 8 hours  losartan 100 milliGRAM(s) Oral daily  methocarbamol 500 milliGRAM(s) Oral four times a day  omega-3-Acid Ethyl Esters 2 Gram(s) Oral two times a day  pantoprazole   Suspension 40 milliGRAM(s) Oral daily  potassium chloride  20 mEq/100 mL IVPB 20 milliEquivalent(s) IV Intermittent once  primidone 50 milliGRAM(s) Oral daily  sertraline 50 milliGRAM(s) Oral daily    MEDICATIONS  (PRN):  ketorolac   Injectable 15 milliGRAM(s) IV Push every 8 hours PRN Moderate Pain (4 - 6)  oxyCODONE    IR 5 milliGRAM(s) Oral every 6 hours PRN Severe Pain (7 - 10)      Allergies    No Known Drug Allergies  Originally Entered as [HIVES] reaction to [blood] (Unknown)      Vital Signs Last 24 Hrs  T(C): 36.7 (10 Shane 2020 01:26), Max: 36.7 (10 Shane 2020 01:26)  T(F): 98.1 (10 Shane 2020 01:26), Max: 98.1 (10 Shane 2020 01:26)  HR: 61 (10 Shane 2020 03:22) (61 - 63)  BP: 168/79 (10 Shane 2020 03:22) (168/79 - 185/86)  BP(mean): --  RR: 16 (10 Shane 2020 03:22) (16 - 18)  SpO2: 98% (10 Shane 2020 03:22) (97% - 98%)    PHYSICAL EXAM:    GENERAL: NAD, well-groomed, well-developed  HEAD:  Atraumatic, Normocephalic  EYES: EOMI, PERRLA, conjunctiva and sclera clear  NERVOUS SYSTEM:  Awake  alert oriented to self, place situation   Fund of knowledge, recent and remote memory are intact   Mood; normal affect   CN II-XII intact PERRRL, EOMI, no ptosis, no Nystagmus, normal shoulder shrug   Face symmetrical tongue is midline speech is clear and fluent  Motor: 5/5 UE/LE all muscle groups   Sensory: No deficit to light touch   Gait is not tested      EXTREMITIES:  2+ Peripheral Pulses, No clubbing, cyanosis, or edema      LABS:                        14.6   5.61  )-----------( 204      ( 10 Shane 2020 01:38 )             41.4     06-10    137  |  98  |  14  ----------------------------<  255<H>  3.5   |  27  |  0.5<L>    Ca    9.0      10 Shane 2020 01:38    TPro  6.8  /  Alb  4.4  /  TBili  0.3  /  DBili  x   /  AST  20  /  ALT  18  /  AlkPhos  124<H>  06-10    PT/INR - ( 10 Shane 2020 01:38 )   PT: 29.50 sec;   INR: 2.57 ratio         PTT - ( 10 Shane 2020 01:38 )  PTT:41.2 sec      RADIOLOGY & ADDITIONAL TESTS:  < from: CT Chest w/ IV Cont (06.10.20 @ 03:21) >  EXAM:  CT ABDOMEN AND PELVIS IC        EXAM:  CT CHEST IC            PROCEDURE DATE:  06/10/2020            INTERPRETATION:  CLINICAL HISTORY/REASON FOR EXAM: Trauma.    TECHNIQUE: Contiguous axial CT images were obtained from the thoracic inlet tothe pubic symphysis following administration of intravenous contrast. Oral contrast was not administered. Reformatted images in the coronal and sagittal planes and MIP images were also obtained.    COMPARISON: CTA dissection protocol dated 5/19/2019.CT abdomen pelvis dated 1/1/2016.      FINDINGS:    CHEST:    LUNGS, PLEURA, AIRWAYS: The central tracheobronchial tree is patent. Bilateral dependent subsegmental atelectasis. No pulmonary consolidation or mass. No pneumothorax or pleural effusion.    THORACIC NODES: No axillary, mediastinal, or hilar lymphadenopathy.    MEDIASTINUM/GREAT VESSELS: No pericardial effusion. The thoracic aorta and main pulmonary trunk are normal in caliber. Diffuse coronary artery calcifications.    ABDOMEN/PELVIS:    HEPATOBILIARY: Unremarkable.    SPLEEN: Unremarkable.    PANCREAS: Unremarkable.    ADRENAL GLANDS: Unremarkable.    KIDNEYS: No hydronephrosis bilaterally. Symmetric nephrograms.    ABDOMINOPELVIC NODES: No lymphadenopathy.    PELVIC ORGANS: Enlarged prostate gland.    PERITONEUM/MESENTERY/BOWEL: There is mild wall thickening and mucosal hyperenhancement of the distal/terminal ileum, likely reflecting mild active disease in the setting of underlying inflammatory bowel disease. No bowel obstruction, ascites or free air. Unremarkable appendix. Small bilateral fat-containing inguinal hernias    BONES/SOFT TISSUES: Displaced acute fracture of the posterior left 11th rib.         IMPRESSION:    Acute displaced left posterior 11th rib fracture.No pneumothorax.    No definite additional acute traumatic injury to the chest, abdomen or pelvis was identified.    < end of copied text >

## 2020-06-10 NOTE — DISCHARGE NOTE PROVIDER - NSDCMRMEDTOKEN_GEN_ALL_CORE_FT
acetaminophen 325 mg oral tablet: 2 tab(s) orally 4 times a day prn mild - moderate pain  amLODIPine 5 mg oral tablet: 1 tab(s) orally once a day  Co-Q10 100 mg oral capsule: 1 cap(s) orally once a day  ferrous sulfate 75 mg (15 mg elemental iron) oral tablet: 1 tab(s) orally once a day  Fish Oil oral capsule:   ibuprofen 400 mg oral tablet: 1 tab(s) orally every 8 hours  Klor-Con 10 mEq oral tablet, extended release: 1 tab(s) orally 2 times a day  Lipitor 80 mg oral tablet: 1 tab(s) orally once a day (at bedtime)  losartan 100 mg oral tablet: 1 tab(s) orally once a day  Lutein:   metFORMIN 500 mg oral tablet, extended release: 1 tab(s) orally once a day  methocarbamol 500 mg oral tablet: 2 tab(s) orally every 8 hours prn moderate to severe pain  oxyCODONE 5 mg oral capsule: 1 cap(s) orally every 8 hours MDD:3 prn severe pain  Plavix 75 mg oral tablet: 1 tab(s) orally once a day  primidone 50 mg oral tablet: orally once a day  Probiotic Formula oral capsule: 1 cap(s) orally once a day  rosuvastatin 20 mg oral tablet: 1 tab(s) orally once a day  sertraline 50 mg oral tablet: 1 tab(s) orally once a day  telmisartan 80 mg oral tablet: 1 tab(s) orally once a day  vitamin D-calcium (as carbonate) 1000 intl units (25 mcg)-90 mg oral tablet: 1 tab(s) orally once a day  warfarin 5 mg oral tablet: 1 tab(s) orally once a day acetaminophen 325 mg oral tablet: 2 tab(s) orally 4 times a day prn mild - moderate pain  amLODIPine 5 mg oral tablet: 1 tab(s) orally once a day  Co-Q10 100 mg oral capsule: 1 cap(s) orally once a day  ferrous sulfate 75 mg (15 mg elemental iron) oral tablet: 1 tab(s) orally once a day  Fish Oil oral capsule:   ibuprofen 400 mg oral tablet: 1 tab(s) orally every 8 hours  Klor-Con 10 mEq oral tablet, extended release: 1 tab(s) orally 2 times a day  Lipitor 80 mg oral tablet: 1 tab(s) orally once a day (at bedtime)  losartan 100 mg oral tablet: 1 tab(s) orally once a day  Lutein:   metFORMIN 500 mg oral tablet, extended release: 1 tab(s) orally once a day  methocarbamol 500 mg oral tablet: 2 tab(s) orally every 8 hours prn moderate to severe pain  oxyCODONE 5 mg oral tablet: 1 tab(s) orally every 6 hours MDD:4  Plavix 75 mg oral tablet: 1 tab(s) orally once a day  primidone 50 mg oral tablet: orally once a day  Probiotic Formula oral capsule: 1 cap(s) orally once a day  rosuvastatin 20 mg oral tablet: 1 tab(s) orally once a day  sertraline 50 mg oral tablet: 1 tab(s) orally once a day  telmisartan 80 mg oral tablet: 1 tab(s) orally once a day  vitamin D-calcium (as carbonate) 1000 intl units (25 mcg)-90 mg oral tablet: 1 tab(s) orally once a day  warfarin 5 mg oral tablet: 1 tab(s) orally once a day

## 2020-06-10 NOTE — DISCHARGE NOTE NURSING/CASE MANAGEMENT/SOCIAL WORK - PATIENT PORTAL LINK FT
You can access the FollowMyHealth Patient Portal offered by [facility] by registering at the following website: [email or web address]. By joining [facility]’s FollowMyHealth portal, you will also be able to view your health information using other applications (apps) compatible with our system.

## 2020-06-10 NOTE — H&P ADULT - ATTENDING COMMENTS
I examined the patient with the pa and discussed my plan with the resident  the patient has pain with 1 rib fx.  he is pulling >1.5 L on is, and ambulating.  he can be discharged home with outpatient follow-up

## 2020-06-10 NOTE — ED ADULT NURSE NOTE - CHPI ED NUR SYMPTOMS NEG
no deformity/no numbness/no vomiting/no weakness/no fever/no loss of consciousness/no tingling/no abrasion/no bleeding/no confusion

## 2020-06-10 NOTE — DISCHARGE NOTE PROVIDER - CARE PROVIDER_API CALL
Shaggy Ashley  SURGERY  [address] [place] [id number]  Phone: [phone]  Fax: [phone]  Follow Up Time:

## 2020-06-10 NOTE — ED PROVIDER NOTE - PHYSICAL EXAMINATION
Physical Exam    Vital Signs: I have reviewed the initial vital signs.  Constitutional: well-nourished, appears stated age, no acute distress  Eyes: Conjunctiva pink, Sclera clear,   Cardiovascular: S1 and S2, regular rate, regular rhythm, well-perfused extremities, radial pulses equal and 2+  Respiratory: unlabored respiratory effort, clear to auscultation bilaterally no wheezing, rales and rhonchi  Gastrointestinal: soft, non-tender abdomen, no pulsatile mass, normal bowl sounds  Musculoskeletal: supple neck, no lower extremity edema, no midline tenderness, ttp of left lower back over ribs, no midline ttp, neck from, joints from.   Integumentary: warm, dry, no rash  Neurologic: awake, alert, nvi

## 2020-06-10 NOTE — H&P ADULT - NSICDXPASTMEDICALHX_GEN_ALL_CORE_FT
PAST MEDICAL HISTORY:  Coronary artery disease     Diabetes mellitus     Factor V Leiden     Hypertension, unspecified type

## 2020-06-10 NOTE — PHYSICAL THERAPY INITIAL EVALUATION ADULT - REHAB POTENTIAL, PT EVAL
Pt is not indicated for acute care rehab secondary indep in functional mobility, ambulation and stair management.

## 2020-06-10 NOTE — CONSULT NOTE ADULT - REASON FOR ADMISSION
s/p fall down 5 steps -HT - LOC +coumadin and plavix
s/p fall down 5 steps -HT - LOC +coumadin and plavix

## 2020-06-10 NOTE — H&P ADULT - ASSESSMENT
ASSESSMENT:  70 year old male with a past medical history of factor V deficiency a fib, on coumadin, CAD s/p stents x4 on plavix, HTN, HLD, Crohn's (in remission, no tx currently), skin cancer s/p resection presented to the ED s/p fall down 5 stairs -HT -LOC +coumadin and plavix. Prelim read of PAN scan demonstrates an acute 11th rib fracture and L1-L3 TP fracture.     PLAN:   - follow up final read of CT TAP    - will complete med rec   - pain control   - IS (currently getting 1000)  - f/u NSx ASSESSMENT:  70 year old male with a past medical history of factor V deficiency a fib, on coumadin, CAD s/p stents x4 on plavix, HTN, HLD, Crohn's (in remission, no tx currently), skin cancer s/p resection presented to the ED s/p fall down 5 stairs -HT -LOC +coumadin and plavix. Prelim read of PAN scan demonstrates an acute 11th rib fracture and L1-L3 TP fracture.     PLAN:   - follow up final read of CT CAP    - Resume home medications  - pain control (tylenol, motrin, oxycodone, robaxin)  - IS (currently getting 1-1.5 L)  - f/u NSx res for TP fx, bedside stated no intervention required    d/w Dr. Ashley ASSESSMENT:  70 year old male with a past medical history of factor V deficiency a fib, on coumadin, CAD s/p stents x4 on plavix, HTN, HLD, Crohn's (in remission, no tx currently), skin cancer s/p resection presented to the ED s/p fall down 5 stairs -HT -LOC +coumadin and plavix. PAN scan demonstrates an acute 11th rib fracture.     PLAN:     - Resume home medications  - pain control (tylenol, motrin, toradol oxycodone, robaxin)  - IS (currently getting 1-1.5 L)  - f/u NSx res for TP fx, bedside stated no intervention required and final read does not include TP fractures that were included in prelim read    d/w Dr. Ashley

## 2020-06-10 NOTE — PHYSICAL THERAPY INITIAL EVALUATION ADULT - PERTINENT HX OF CURRENT PROBLEM, REHAB EVAL
70 year old male with a past medical history of factor V deficiency a fib, on coumadin, CAD s/p stents x4 on plavix, HTN, HLD, Crohn's (in remission, no tx currently), skin cancer s/p resection presented to the ED s/p fall down 5 stairs -HT -LOC +coumadin and plavix. Patient landed on left side/back and states he heard a crack upon landing. Immediately after, he suffered from excruciating pain.

## 2020-06-10 NOTE — ED PROVIDER NOTE - CARE PLAN
Principal Discharge DX:	Rib fracture  Secondary Diagnosis:	Lumbar transverse process fracture  Secondary Diagnosis:	Fall

## 2020-06-10 NOTE — ED ADULT NURSE NOTE - OBJECTIVE STATEMENT
Pt reports GLF from stairs. Was walking down and fell on back on edge of stair. Pt states " I heard my back crack when I landed down " Pt reports extreme sharp shooting and stabbing pain in lower back and left lower chest. On Plavix and Coumadin.

## 2020-06-10 NOTE — CONSULT NOTE ADULT - SUBJECTIVE AND OBJECTIVE BOX
HPI:  TRAUMA ACTIVATION LEVEL:  consult    MECHANISM OF INJURY:      [] Blunt  	[] MVC	[x] Fall	[] Pedestrian Struck	[] Motorcycle   [] Assault   [] Bicycle collision  [] Sports injury     [] Penetrating  	[] Gun Shot Wound 		[] Stab Wound    GCS: 15    HPI:  70 year old male with a past medical history of factor V deficiency a fib, on coumadin, CAD s/p stents x4 on plavix, HTN, HLD, Crohn's (in remission, no tx currently), skin cancer s/p resection presented to the ED s/p fall down 5 stairs -HT -LOC +coumadin and plavix. Patient landed on left side/back and states he heard a crack upon landing. Immediately after, he suffered from excruciating pain. (10 Shane 2020 05:01). Trauma w/u + for left 11th rib fx without pneumothorax.      PAST MEDICAL & SURGICAL HISTORY:  Factor V Leiden  Hypertension, unspecified type  Diabetes mellitus  Coronary artery disease      Hospital Course:    TODAY'S SUBJECTIVE & REVIEW OF SYMPTOMS:     Constitutional WNL   Cardio WNL   Resp WNL   GI WNL  Heme WNL  Endo WNL  Skin WNL  MSK pain  Neuro WNL  Cognitive WNL  Psych WNL      MEDICATIONS  (STANDING):  acetaminophen   Tablet .. 650 milliGRAM(s) Oral every 6 hours  amLODIPine   Tablet 5 milliGRAM(s) Oral daily  atorvastatin 80 milliGRAM(s) Oral at bedtime  calcium carbonate 1250 mG  + Vitamin D (OsCal 500 + D) 1 Tablet(s) Oral daily  chlorhexidine 4% Liquid 1 Application(s) Topical <User Schedule>  clopidogrel Tablet 75 milliGRAM(s) Oral daily  ibuprofen  Tablet. 400 milliGRAM(s) Oral every 8 hours  lidocaine   Patch 1 Patch Transdermal daily  losartan 100 milliGRAM(s) Oral daily  methocarbamol 500 milliGRAM(s) Oral four times a day  omega-3-Acid Ethyl Esters 2 Gram(s) Oral two times a day  pantoprazole   Suspension 40 milliGRAM(s) Oral daily  primidone 50 milliGRAM(s) Oral daily  sertraline 50 milliGRAM(s) Oral daily    MEDICATIONS  (PRN):  ketorolac   Injectable 15 milliGRAM(s) IV Push every 8 hours PRN Moderate Pain (4 - 6)  oxyCODONE    IR 5 milliGRAM(s) Oral every 6 hours PRN Severe Pain (7 - 10)      FAMILY HISTORY:      Allergies    No Known Drug Allergies  Originally Entered as [HIVES] reaction to [blood] (Unknown)    Intolerances        SOCIAL HISTORY:    [  ] Etoh  [  ] Smoking  [  ] Substance abuse     Home Environment:  [  ] Home Alone  [ x ] Lives with Family  [  ] Home Health Aid    Dwelling:  [  ] Apartment  [ x ] Private House  [  ] Adult Home  [  ] Skilled Nursing Facility      [  ] Short Term  [  ] Long Term  [ x ] Stairs       Elevator [  ]    FUNCTIONAL STATUS PTA: (Check all that apply)  Ambulation: [ x  ]Independent    [  ] Dependent     [  ] Non-Ambulatory  Assistive Device: [  ] SA Cane  [  ]  Q Cane  [  ] Walker  [  ]  Wheelchair  ADL : [x  ] Independent  [  ]  Dependent       Vital Signs Last 24 Hrs  T(C): 36.8 (10 Shane 2020 07:28), Max: 36.8 (10 Shane 2020 07:28)  T(F): 98.3 (10 Shane 2020 07:28), Max: 98.3 (10 Shane 2020 07:28)  HR: 64 (10 Shane 2020 07:28) (61 - 64)  BP: 133/75 (10 Shane 2020 07:28) (133/75 - 185/86)  BP(mean): --  RR: 18 (10 Shane 2020 07:28) (16 - 18)  SpO2: 99% (10 Shane 2020 07:28) (97% - 99%)      PHYSICAL EXAM: Alert & Oriented X3  GENERAL: NAD, well-groomed, well-developed  HEAD:  Atraumatic, Normocephalic  CHEST/LUNG: Clear   HEART: S1S2+  ABDOMEN: Soft, Nontender  EXTREMITIES:  no calf tenderness    NERVOUS SYSTEM:  Cranial Nerves 2-12 intact [  ] Abnormal  [  ]  ROM: WFL all extremities [x  ]  Abnormal [  ]  Motor Strength: WFL all extremities  [x  ]  Abnormal [  ]  Sensation: intact to light touch [x  ] Abnormal [  ]  Reflexes: Symmetric [  ]  Abnormal [  ]    FUNCTIONAL STATUS:  Bed Mobility: Independent [  ]  Supervision [ x ]  Needs Assistance [  ]  N/A [  ]  Transfers: Independent [  ]  Supervision [x  ]  Needs Assistance [  ]  N/A [  ]   Ambulation: Independent [  ]  Supervision [ x ]  Needs Assistance [  ]  N/A [  ]  ADL: Independent [  ] Requires Assistance [  ] N/A [  ]      LABS:                        14.6   5.61  )-----------( 204      ( 10 Shane 2020 01:38 )             41.4     06-10    137  |  98  |  14  ----------------------------<  255<H>  3.5   |  27  |  0.5<L>    Ca    9.0      10 Shane 2020 01:38    TPro  6.8  /  Alb  4.4  /  TBili  0.3  /  DBili  x   /  AST  20  /  ALT  18  /  AlkPhos  124<H>  06-10    PT/INR - ( 10 Shane 2020 01:38 )   PT: 29.50 sec;   INR: 2.57 ratio         PTT - ( 10 Shane 2020 01:38 )  PTT:41.2 sec      RADIOLOGY & ADDITIONAL STUDIES:    Assesment:

## 2020-06-10 NOTE — ED ADULT NURSE NOTE - NSIMPLEMENTINTERV_GEN_ALL_ED
Implemented All Fall Risk Interventions:  [place] to call system. Call bell, personal items and telephone within reach. Instruct patient to call for assistance. Room bathroom lighting operational. Non-slip footwear when patient is off stretcher. Physically safe environment: no spills, clutter or unnecessary equipment. Stretcher in lowest position, wheels locked, appropriate side rails in place. Provide visual cue, wrist band, yellow gown, etc. Monitor gait and stability. Monitor for mental status changes and reorient to person, place, and time. Review medications for side effects contributing to fall risk. Reinforce activity limits and safety measures with patient and family.

## 2020-06-17 PROBLEM — Z00.00 ENCOUNTER FOR PREVENTIVE HEALTH EXAMINATION: Status: ACTIVE | Noted: 2020-06-17

## 2020-06-18 ENCOUNTER — TRANSCRIPTION ENCOUNTER (OUTPATIENT)
Age: 70
End: 2020-06-18

## 2020-06-18 ENCOUNTER — APPOINTMENT (OUTPATIENT)
Dept: SURGERY | Facility: CLINIC | Age: 70
End: 2020-06-18
Payer: MEDICARE

## 2020-06-18 VITALS
HEART RATE: 87 BPM | SYSTOLIC BLOOD PRESSURE: 138 MMHG | TEMPERATURE: 97.5 F | BODY MASS INDEX: 30.21 KG/M2 | DIASTOLIC BLOOD PRESSURE: 90 MMHG | HEIGHT: 69 IN | WEIGHT: 204 LBS

## 2020-06-18 PROCEDURE — 99211 OFF/OP EST MAY X REQ PHY/QHP: CPT

## 2020-06-18 NOTE — HISTORY OF PRESENT ILLNESS
[de-identified] : the patient resports persistent intermittent spasms, though he reports he is able to pull 2L on is routinely\par he has good bilateral respiratory excursion\par I reassured him about his fx healing and will call him in 2 weeks to f/u his symptoms

## 2020-06-25 ENCOUNTER — TRANSCRIPTION ENCOUNTER (OUTPATIENT)
Age: 70
End: 2020-06-25

## 2020-06-29 ENCOUNTER — OUTPATIENT (OUTPATIENT)
Dept: OUTPATIENT SERVICES | Facility: HOSPITAL | Age: 70
LOS: 1 days | Discharge: HOME | End: 2020-06-29

## 2020-06-29 DIAGNOSIS — I48.91 UNSPECIFIED ATRIAL FIBRILLATION: ICD-10-CM

## 2020-06-29 DIAGNOSIS — Z79.01 LONG TERM (CURRENT) USE OF ANTICOAGULANTS: ICD-10-CM

## 2020-06-29 LAB
INR PPP: 2.1 RATIO
POCT INR: 2.1 RATIO — HIGH (ref 0.9–1.2)
POCT PT: 25.7 SEC — HIGH (ref 10–13.4)
POCT-PROTHROMBIN TIME: 25.7 SECS
QUALITY CONTROL: YES

## 2020-07-02 ENCOUNTER — APPOINTMENT (OUTPATIENT)
Dept: SURGERY | Facility: CLINIC | Age: 70
End: 2020-07-02
Payer: MEDICARE

## 2020-07-02 DIAGNOSIS — S22.32XD FRACTURE OF ONE RIB, LEFT SIDE, SUBSEQUENT ENCOUNTER FOR FRACTURE WITH ROUTINE HEALING: ICD-10-CM

## 2020-07-02 PROCEDURE — G2012 BRIEF CHECK IN BY MD/QHP: CPT

## 2020-07-14 PROBLEM — S22.32XD CLOSED FRACTURE OF ONE RIB OF LEFT SIDE WITH ROUTINE HEALING, SUBSEQUENT ENCOUNTER: Status: ACTIVE | Noted: 2020-06-18

## 2020-07-31 ENCOUNTER — OUTPATIENT (OUTPATIENT)
Dept: OUTPATIENT SERVICES | Facility: HOSPITAL | Age: 70
LOS: 1 days | Discharge: HOME | End: 2020-07-31

## 2020-07-31 DIAGNOSIS — I48.91 UNSPECIFIED ATRIAL FIBRILLATION: ICD-10-CM

## 2020-07-31 DIAGNOSIS — Z79.01 LONG TERM (CURRENT) USE OF ANTICOAGULANTS: ICD-10-CM

## 2020-07-31 LAB
INR PPP: 2.6 RATIO
POCT INR: 2.6 RATIO — HIGH (ref 0.9–1.2)
POCT PT: 31 SEC — HIGH (ref 10–13.4)
POCT-PROTHROMBIN TIME: 31 SECS
QUALITY CONTROL: YES

## 2020-08-07 ENCOUNTER — RECORD ABSTRACTING (OUTPATIENT)
Age: 70
End: 2020-08-07

## 2020-08-07 DIAGNOSIS — Z86.79 PERSONAL HISTORY OF OTHER DISEASES OF THE CIRCULATORY SYSTEM: ICD-10-CM

## 2020-08-07 DIAGNOSIS — Z86.39 PERSONAL HISTORY OF OTHER ENDOCRINE, NUTRITIONAL AND METABOLIC DISEASE: ICD-10-CM

## 2020-08-07 DIAGNOSIS — Z86.2 PERSONAL HISTORY OF DISEASES OF THE BLOOD AND BLOOD-FORMING ORGANS AND CERTAIN DISORDERS INVOLVING THE IMMUNE MECHANISM: ICD-10-CM

## 2020-08-07 DIAGNOSIS — I82.90 ACUTE EMBOLISM AND THROMBOSIS OF UNSPECIFIED VEIN: ICD-10-CM

## 2020-08-07 DIAGNOSIS — Z86.69 PERSONAL HISTORY OF OTHER DISEASES OF THE NERVOUS SYSTEM AND SENSE ORGANS: ICD-10-CM

## 2020-08-07 DIAGNOSIS — I10 ESSENTIAL (PRIMARY) HYPERTENSION: ICD-10-CM

## 2020-08-07 DIAGNOSIS — I25.2 OLD MYOCARDIAL INFARCTION: ICD-10-CM

## 2020-08-07 DIAGNOSIS — Z86.718 PERSONAL HISTORY OF OTHER VENOUS THROMBOSIS AND EMBOLISM: ICD-10-CM

## 2020-08-07 DIAGNOSIS — Z78.9 OTHER SPECIFIED HEALTH STATUS: ICD-10-CM

## 2020-08-07 RX ORDER — METFORMIN ER 750 MG 750 MG/1
750 TABLET ORAL DAILY
Refills: 0 | Status: ACTIVE | COMMUNITY

## 2020-08-07 RX ORDER — ROSUVASTATIN CALCIUM 20 MG/1
20 TABLET, FILM COATED ORAL DAILY
Refills: 0 | Status: ACTIVE | COMMUNITY

## 2020-08-07 RX ORDER — SERTRALINE HYDROCHLORIDE 50 MG/1
50 TABLET, FILM COATED ORAL DAILY
Refills: 0 | Status: ACTIVE | COMMUNITY

## 2020-08-07 RX ORDER — FLUOROURACIL 5 MG/G
CREAM TOPICAL
Refills: 0 | Status: ACTIVE | COMMUNITY

## 2020-08-07 RX ORDER — PSYLLIUM HUSK 0.4 G
CAPSULE ORAL
Refills: 0 | Status: ACTIVE | COMMUNITY

## 2020-08-07 RX ORDER — UBIDECARENONE 200 MG
CAPSULE ORAL
Refills: 0 | Status: ACTIVE | COMMUNITY

## 2020-08-07 RX ORDER — CHROMIUM 200 MCG
TABLET ORAL
Refills: 0 | Status: ACTIVE | COMMUNITY

## 2020-08-07 RX ORDER — AMLODIPINE BESYLATE 5 MG/1
5 TABLET ORAL DAILY
Refills: 0 | Status: ACTIVE | COMMUNITY

## 2020-09-09 ENCOUNTER — OUTPATIENT (OUTPATIENT)
Dept: OUTPATIENT SERVICES | Facility: HOSPITAL | Age: 70
LOS: 1 days | Discharge: HOME | End: 2020-09-09

## 2020-09-09 ENCOUNTER — APPOINTMENT (OUTPATIENT)
Dept: MEDICATION MANAGEMENT | Facility: CLINIC | Age: 70
End: 2020-09-09

## 2020-09-09 VITALS — RESPIRATION RATE: 16 BRPM | OXYGEN SATURATION: 98 % | HEART RATE: 60 BPM

## 2020-09-09 DIAGNOSIS — Z79.01 LONG TERM (CURRENT) USE OF ANTICOAGULANTS: ICD-10-CM

## 2020-09-09 DIAGNOSIS — I48.91 UNSPECIFIED ATRIAL FIBRILLATION: ICD-10-CM

## 2020-09-09 LAB
INR PPP: 2.2 RATIO
POCT-PROTHROMBIN TIME: 26.2 SECS
QUALITY CONTROL: YES

## 2020-10-14 ENCOUNTER — OUTPATIENT (OUTPATIENT)
Dept: OUTPATIENT SERVICES | Facility: HOSPITAL | Age: 70
LOS: 1 days | Discharge: HOME | End: 2020-10-14

## 2020-10-14 ENCOUNTER — APPOINTMENT (OUTPATIENT)
Dept: MEDICATION MANAGEMENT | Facility: CLINIC | Age: 70
End: 2020-10-14

## 2020-10-14 VITALS — OXYGEN SATURATION: 98 % | HEART RATE: 63 BPM | RESPIRATION RATE: 16 BRPM

## 2020-10-14 DIAGNOSIS — Z79.01 LONG TERM (CURRENT) USE OF ANTICOAGULANTS: ICD-10-CM

## 2020-10-14 DIAGNOSIS — I48.91 UNSPECIFIED ATRIAL FIBRILLATION: ICD-10-CM

## 2020-10-14 LAB
INR PPP: 3.1 RATIO
POCT-PROTHROMBIN TIME: 37.5 SECS
QUALITY CONTROL: YES

## 2020-11-15 ENCOUNTER — TRANSCRIPTION ENCOUNTER (OUTPATIENT)
Age: 70
End: 2020-11-15

## 2020-12-07 ENCOUNTER — APPOINTMENT (OUTPATIENT)
Dept: MEDICATION MANAGEMENT | Facility: CLINIC | Age: 70
End: 2020-12-07

## 2020-12-07 ENCOUNTER — OUTPATIENT (OUTPATIENT)
Dept: OUTPATIENT SERVICES | Facility: HOSPITAL | Age: 70
LOS: 1 days | Discharge: HOME | End: 2020-12-07

## 2020-12-07 VITALS — OXYGEN SATURATION: 98 % | HEART RATE: 62 BPM | RESPIRATION RATE: 16 BRPM

## 2020-12-07 DIAGNOSIS — Z79.01 LONG TERM (CURRENT) USE OF ANTICOAGULANTS: ICD-10-CM

## 2020-12-07 DIAGNOSIS — I48.91 UNSPECIFIED ATRIAL FIBRILLATION: ICD-10-CM

## 2020-12-07 LAB
INR PPP: 3.8 RATIO
POCT-PROTHROMBIN TIME: 46 SECS
QUALITY CONTROL: YES

## 2020-12-18 ENCOUNTER — TRANSCRIPTION ENCOUNTER (OUTPATIENT)
Age: 70
End: 2020-12-18

## 2020-12-21 ENCOUNTER — OUTPATIENT (OUTPATIENT)
Dept: OUTPATIENT SERVICES | Facility: HOSPITAL | Age: 70
LOS: 1 days | Discharge: HOME | End: 2020-12-21

## 2020-12-21 ENCOUNTER — APPOINTMENT (OUTPATIENT)
Dept: MEDICATION MANAGEMENT | Facility: CLINIC | Age: 70
End: 2020-12-21

## 2020-12-21 VITALS — OXYGEN SATURATION: 97 % | HEART RATE: 65 BPM | RESPIRATION RATE: 16 BRPM

## 2020-12-21 DIAGNOSIS — Z79.01 LONG TERM (CURRENT) USE OF ANTICOAGULANTS: ICD-10-CM

## 2020-12-21 DIAGNOSIS — I48.91 UNSPECIFIED ATRIAL FIBRILLATION: ICD-10-CM

## 2020-12-21 LAB
INR PPP: 3 RATIO
POCT-PROTHROMBIN TIME: 36.3 SECS
QUALITY CONTROL: YES

## 2020-12-21 RX ORDER — TROSPIUM CHLORIDE 60 MG/1
60 CAPSULE, EXTENDED RELEASE ORAL
Refills: 0 | Status: ACTIVE | COMMUNITY
Start: 2020-12-21

## 2020-12-21 RX ORDER — TAMSULOSIN HYDROCHLORIDE 0.4 MG/1
0.4 CAPSULE ORAL
Refills: 0 | Status: ACTIVE | COMMUNITY
Start: 2020-12-21

## 2021-01-19 ENCOUNTER — APPOINTMENT (OUTPATIENT)
Dept: MEDICATION MANAGEMENT | Facility: CLINIC | Age: 71
End: 2021-01-19

## 2021-01-19 ENCOUNTER — OUTPATIENT (OUTPATIENT)
Dept: OUTPATIENT SERVICES | Facility: HOSPITAL | Age: 71
LOS: 1 days | Discharge: HOME | End: 2021-01-19

## 2021-01-19 VITALS — HEART RATE: 58 BPM | RESPIRATION RATE: 16 BRPM | OXYGEN SATURATION: 98 %

## 2021-01-19 DIAGNOSIS — I48.91 UNSPECIFIED ATRIAL FIBRILLATION: ICD-10-CM

## 2021-01-19 DIAGNOSIS — Z79.01 LONG TERM (CURRENT) USE OF ANTICOAGULANTS: ICD-10-CM

## 2021-01-19 LAB
INR PPP: 2.7 RATIO
POCT-PROTHROMBIN TIME: 32.3 SECS
QUALITY CONTROL: YES

## 2021-02-16 ENCOUNTER — OUTPATIENT (OUTPATIENT)
Dept: OUTPATIENT SERVICES | Facility: HOSPITAL | Age: 71
LOS: 1 days | Discharge: HOME | End: 2021-02-16

## 2021-02-16 ENCOUNTER — APPOINTMENT (OUTPATIENT)
Dept: MEDICATION MANAGEMENT | Facility: CLINIC | Age: 71
End: 2021-02-16

## 2021-02-16 VITALS — OXYGEN SATURATION: 99 % | HEART RATE: 62 BPM | RESPIRATION RATE: 16 BRPM

## 2021-02-16 DIAGNOSIS — I48.91 UNSPECIFIED ATRIAL FIBRILLATION: ICD-10-CM

## 2021-02-16 DIAGNOSIS — Z79.01 LONG TERM (CURRENT) USE OF ANTICOAGULANTS: ICD-10-CM

## 2021-02-16 LAB
INR PPP: 2.4 RATIO
POCT-PROTHROMBIN TIME: 29.2 SECS
QUALITY CONTROL: YES

## 2021-03-12 DIAGNOSIS — I25.10 ATHEROSCLEROTIC HEART DISEASE OF NATIVE CORONARY ARTERY W/OUT ANGINA PECTORIS: ICD-10-CM

## 2021-03-12 DIAGNOSIS — Z87.891 PERSONAL HISTORY OF NICOTINE DEPENDENCE: ICD-10-CM

## 2021-03-12 RX ORDER — VITAMIN B COMPLEX
CAPSULE ORAL
Refills: 0 | Status: ACTIVE | COMMUNITY

## 2021-03-15 ENCOUNTER — OUTPATIENT (OUTPATIENT)
Dept: OUTPATIENT SERVICES | Facility: HOSPITAL | Age: 71
LOS: 1 days | Discharge: HOME | End: 2021-03-15

## 2021-03-15 ENCOUNTER — APPOINTMENT (OUTPATIENT)
Dept: MEDICATION MANAGEMENT | Facility: CLINIC | Age: 71
End: 2021-03-15

## 2021-03-15 VITALS — HEART RATE: 60 BPM | RESPIRATION RATE: 16 BRPM | OXYGEN SATURATION: 97 %

## 2021-03-15 DIAGNOSIS — I48.91 UNSPECIFIED ATRIAL FIBRILLATION: ICD-10-CM

## 2021-03-15 DIAGNOSIS — Z79.01 LONG TERM (CURRENT) USE OF ANTICOAGULANTS: ICD-10-CM

## 2021-03-15 LAB
INR PPP: 3 RATIO
POCT-PROTHROMBIN TIME: 35.9 SECS
QUALITY CONTROL: YES

## 2021-03-31 ENCOUNTER — APPOINTMENT (OUTPATIENT)
Dept: PULMONOLOGY | Facility: CLINIC | Age: 71
End: 2021-03-31
Payer: MEDICARE

## 2021-03-31 VITALS
HEIGHT: 69 IN | BODY MASS INDEX: 31.4 KG/M2 | RESPIRATION RATE: 14 BRPM | DIASTOLIC BLOOD PRESSURE: 76 MMHG | HEART RATE: 65 BPM | OXYGEN SATURATION: 94 % | WEIGHT: 212 LBS | SYSTOLIC BLOOD PRESSURE: 124 MMHG

## 2021-03-31 PROCEDURE — 94010 BREATHING CAPACITY TEST: CPT

## 2021-03-31 PROCEDURE — 71046 X-RAY EXAM CHEST 2 VIEWS: CPT

## 2021-03-31 PROCEDURE — 99213 OFFICE O/P EST LOW 20 MIN: CPT | Mod: 25

## 2021-03-31 NOTE — PROCEDURE
[FreeTextEntry1] : CXR PA and Lateral \par The costophrenic and cardiophrenic angles are sharp\par The veena parenchyma shows no infiltrates, consolidations, or nodules \par The Mediastinum is within normal limits\par No pleural effusions\par

## 2021-03-31 NOTE — HISTORY OF PRESENT ILLNESS
[Follow-Up - Routine Clinic] : a routine clinic follow-up of [None] : No associated symptoms are reported [BiPAP] : BiPAP [Good Compliance] : good compliance with treatment [Good Tolerance] : good tolerance of treatment [Good Symptom Control] : good symptom control [Shortness of Breath] : Shortness of Breath

## 2021-04-21 ENCOUNTER — OUTPATIENT (OUTPATIENT)
Dept: OUTPATIENT SERVICES | Facility: HOSPITAL | Age: 71
LOS: 1 days | Discharge: HOME | End: 2021-04-21

## 2021-04-21 ENCOUNTER — APPOINTMENT (OUTPATIENT)
Dept: MEDICATION MANAGEMENT | Facility: CLINIC | Age: 71
End: 2021-04-21

## 2021-04-21 VITALS — RESPIRATION RATE: 16 BRPM | HEART RATE: 59 BPM | OXYGEN SATURATION: 97 %

## 2021-04-21 DIAGNOSIS — I48.91 UNSPECIFIED ATRIAL FIBRILLATION: ICD-10-CM

## 2021-04-21 DIAGNOSIS — Z79.01 LONG TERM (CURRENT) USE OF ANTICOAGULANTS: ICD-10-CM

## 2021-04-21 LAB
INR PPP: 2.3 RATIO
POCT-PROTHROMBIN TIME: 27.1 SECS
QUALITY CONTROL: YES

## 2021-05-26 ENCOUNTER — OUTPATIENT (OUTPATIENT)
Dept: OUTPATIENT SERVICES | Facility: HOSPITAL | Age: 71
LOS: 1 days | Discharge: HOME | End: 2021-05-26

## 2021-05-26 ENCOUNTER — APPOINTMENT (OUTPATIENT)
Dept: MEDICATION MANAGEMENT | Facility: CLINIC | Age: 71
End: 2021-05-26

## 2021-05-26 VITALS — RESPIRATION RATE: 16 BRPM | OXYGEN SATURATION: 98 % | HEART RATE: 61 BPM

## 2021-05-26 DIAGNOSIS — Z79.01 LONG TERM (CURRENT) USE OF ANTICOAGULANTS: ICD-10-CM

## 2021-05-26 DIAGNOSIS — I48.91 UNSPECIFIED ATRIAL FIBRILLATION: ICD-10-CM

## 2021-05-26 LAB
INR PPP: 1.9 RATIO
POCT-PROTHROMBIN TIME: 23.2 SECS
QUALITY CONTROL: YES

## 2021-06-09 ENCOUNTER — APPOINTMENT (OUTPATIENT)
Dept: MEDICATION MANAGEMENT | Facility: CLINIC | Age: 71
End: 2021-06-09

## 2021-06-09 VITALS — RESPIRATION RATE: 16 BRPM | HEART RATE: 60 BPM | OXYGEN SATURATION: 97 %

## 2021-06-09 LAB
INR PPP: 2.1 RATIO
POCT-PROTHROMBIN TIME: 25.1 SECS
QUALITY CONTROL: YES

## 2021-07-07 ENCOUNTER — OUTPATIENT (OUTPATIENT)
Dept: OUTPATIENT SERVICES | Facility: HOSPITAL | Age: 71
LOS: 1 days | Discharge: HOME | End: 2021-07-07

## 2021-07-07 ENCOUNTER — APPOINTMENT (OUTPATIENT)
Dept: MEDICATION MANAGEMENT | Facility: CLINIC | Age: 71
End: 2021-07-07

## 2021-07-07 VITALS — RESPIRATION RATE: 16 BRPM | OXYGEN SATURATION: 96 % | HEART RATE: 68 BPM

## 2021-07-07 DIAGNOSIS — I48.91 UNSPECIFIED ATRIAL FIBRILLATION: ICD-10-CM

## 2021-07-07 DIAGNOSIS — Z79.01 LONG TERM (CURRENT) USE OF ANTICOAGULANTS: ICD-10-CM

## 2021-07-07 LAB
INR PPP: 2.5 RATIO
POCT-PROTHROMBIN TIME: 30.1 SECS
QUALITY CONTROL: YES

## 2021-07-09 ENCOUNTER — APPOINTMENT (OUTPATIENT)
Dept: PLASTIC SURGERY | Facility: CLINIC | Age: 71
End: 2021-07-09
Payer: MEDICARE

## 2021-07-09 VITALS — WEIGHT: 205 LBS | BODY MASS INDEX: 30.36 KG/M2 | HEIGHT: 69 IN

## 2021-07-09 DIAGNOSIS — C43.20 MALIGNANT MELANOMA OF UNSPECIFIED EAR AND EXTERNAL AURICULAR CANAL: ICD-10-CM

## 2021-07-09 PROCEDURE — 99203 OFFICE O/P NEW LOW 30 MIN: CPT

## 2021-07-09 RX ORDER — POTASSIUM CHLORIDE 1500 MG/1
20 TABLET, EXTENDED RELEASE ORAL
Refills: 0 | Status: ACTIVE | COMMUNITY

## 2021-07-09 RX ORDER — LUTEIN 6 MG
TABLET ORAL
Refills: 0 | Status: ACTIVE | COMMUNITY

## 2021-07-09 RX ORDER — TELMISARTAN 80 MG/1
80 TABLET ORAL
Refills: 0 | Status: ACTIVE | COMMUNITY

## 2021-07-09 NOTE — HISTORY OF PRESENT ILLNESS
[FreeTextEntry1] : 71 yr old male seen w wife\par here for tx option re melanoma posterior neck\par \par multiple prior SCC and BCC (seven--scattered throughout body)\par \par \par \par 6/28/2021--right postauricualr SCC--Nic to address completely\par 0.6mm melanoma--this is reason for referral\par \par NIDDM\par nonsmoker\par Crohns (in remission)--not on immunosupressants\par MI 2006--4 stents placed  [place]

## 2021-07-09 NOTE — ASSESSMENT
[FreeTextEntry1] : recovered from Covi in Nov--vaccinated\par \par discussed nature of 0.6mm melanoma and workup necessary\par discussed possible upstaging of melanoma with final resection path report\par \par given no systemioc sxs and no LAD appreciated will get PET scan preop\par \par pt aware maybe indicated for SLNB either at this operation (results of PET) or in the future (final path report)\par \par Regarding the procedure proposed to excise the melanoma, we discussed the following risks:  Patient is aware of possible upstaging pending the final pathology report, as well as the need for additional surgery and/or other therapeutic interventions.  We discussed scarring, risk of repeat procedure, poor wound healing, bleeding, infection, and dissatisfaction with the outcome.  We also discussed the possible role of adjuvant therapy and other specialists being involved in the care of the patient.  Dermatologic surveillance was emphasized.\par All questions were answered to the patient's satisfaction.\par \par Regarding the reconstruction after skin cancer  surgery, we discussed scarring, risk of repeat procedure, poor wound healing, need for additional revisionary surgery,asymmetry, dissatisfaction with the outcome, and unplanned surgery in the future.  All questions were answered.  All risks were well understood by the patient.\par \par Regarding the procedure, we discussed scarring, poor wound healing, bleeding, infection, need for additional surgery, and dissatisfaction with the outcome.  Also discussed possibility of keloid and/or hypertrophic scar formation as well as recurrence.  All questions were answered and risks understood.\par \par Due to COVID 19, pre-visit patient instructions were explained to the patient and their symptoms were checked upon arrival.  \par Masks were used by the health care providers and staff and the examination room was cleaned after the patient visit was completed.\par \par

## 2021-07-09 NOTE — PHYSICAL EXAM
[NI] : Normal [de-identified] : excesssive sun damage w sig scattered actinic changes [de-identified] : posterior neck with well healing scar approx 4mm  no LAD appreciated

## 2021-07-16 ENCOUNTER — OUTPATIENT (OUTPATIENT)
Dept: OUTPATIENT SERVICES | Facility: HOSPITAL | Age: 71
LOS: 1 days | Discharge: HOME | End: 2021-07-16
Payer: MEDICARE

## 2021-07-16 ENCOUNTER — LABORATORY RESULT (OUTPATIENT)
Age: 71
End: 2021-07-16

## 2021-07-16 ENCOUNTER — RESULT REVIEW (OUTPATIENT)
Age: 71
End: 2021-07-16

## 2021-07-16 DIAGNOSIS — C43.20 MALIGNANT MELANOMA OF UNSPECIFIED EAR AND EXTERNAL AURICULAR CANAL: ICD-10-CM

## 2021-07-16 DIAGNOSIS — C43.4 MALIGNANT MELANOMA OF SCALP AND NECK: ICD-10-CM

## 2021-07-16 LAB — GLUCOSE BLDC GLUCOMTR-MCNC: 160 MG/DL — HIGH (ref 70–99)

## 2021-07-16 PROCEDURE — 78816 PET IMAGE W/CT FULL BODY: CPT | Mod: 26,PI,MH

## 2021-07-26 ENCOUNTER — RESULT REVIEW (OUTPATIENT)
Age: 71
End: 2021-07-26

## 2021-07-26 ENCOUNTER — OUTPATIENT (OUTPATIENT)
Dept: OUTPATIENT SERVICES | Facility: HOSPITAL | Age: 71
LOS: 1 days | Discharge: HOME | End: 2021-07-26
Payer: MEDICARE

## 2021-07-26 VITALS
SYSTOLIC BLOOD PRESSURE: 133 MMHG | OXYGEN SATURATION: 97 % | DIASTOLIC BLOOD PRESSURE: 67 MMHG | HEART RATE: 65 BPM | HEIGHT: 69 IN | WEIGHT: 209.66 LBS | RESPIRATION RATE: 18 BRPM | TEMPERATURE: 97 F

## 2021-07-26 DIAGNOSIS — Z98.890 OTHER SPECIFIED POSTPROCEDURAL STATES: Chronic | ICD-10-CM

## 2021-07-26 DIAGNOSIS — C43.4 MALIGNANT MELANOMA OF SCALP AND NECK: ICD-10-CM

## 2021-07-26 DIAGNOSIS — Z01.818 ENCOUNTER FOR OTHER PREPROCEDURAL EXAMINATION: ICD-10-CM

## 2021-07-26 DIAGNOSIS — Z95.5 PRESENCE OF CORONARY ANGIOPLASTY IMPLANT AND GRAFT: Chronic | ICD-10-CM

## 2021-07-26 LAB
A1C WITH ESTIMATED AVERAGE GLUCOSE RESULT: 7.4 % — HIGH (ref 4–5.6)
ALBUMIN SERPL ELPH-MCNC: 4.9 G/DL — SIGNIFICANT CHANGE UP (ref 3.5–5.2)
ALP SERPL-CCNC: 87 U/L — SIGNIFICANT CHANGE UP (ref 30–115)
ALT FLD-CCNC: 21 U/L — SIGNIFICANT CHANGE UP (ref 0–41)
ANION GAP SERPL CALC-SCNC: 11 MMOL/L — SIGNIFICANT CHANGE UP (ref 7–14)
APTT BLD: 40 SEC — HIGH (ref 27–39.2)
AST SERPL-CCNC: 25 U/L — SIGNIFICANT CHANGE UP (ref 0–41)
BASOPHILS # BLD AUTO: 0.06 K/UL — SIGNIFICANT CHANGE UP (ref 0–0.2)
BASOPHILS NFR BLD AUTO: 0.9 % — SIGNIFICANT CHANGE UP (ref 0–1)
BILIRUB SERPL-MCNC: 0.6 MG/DL — SIGNIFICANT CHANGE UP (ref 0.2–1.2)
BUN SERPL-MCNC: 15 MG/DL — SIGNIFICANT CHANGE UP (ref 10–20)
CALCIUM SERPL-MCNC: 9.3 MG/DL — SIGNIFICANT CHANGE UP (ref 8.5–10.1)
CHLORIDE SERPL-SCNC: 103 MMOL/L — SIGNIFICANT CHANGE UP (ref 98–110)
CO2 SERPL-SCNC: 27 MMOL/L — SIGNIFICANT CHANGE UP (ref 17–32)
CREAT SERPL-MCNC: 1 MG/DL — SIGNIFICANT CHANGE UP (ref 0.7–1.5)
EOSINOPHIL # BLD AUTO: 0.1 K/UL — SIGNIFICANT CHANGE UP (ref 0–0.7)
EOSINOPHIL NFR BLD AUTO: 1.5 % — SIGNIFICANT CHANGE UP (ref 0–8)
ESTIMATED AVERAGE GLUCOSE: 166 MG/DL — HIGH (ref 68–114)
GLUCOSE SERPL-MCNC: 146 MG/DL — HIGH (ref 70–99)
HCT VFR BLD CALC: 42.5 % — SIGNIFICANT CHANGE UP (ref 42–52)
HGB BLD-MCNC: 14.3 G/DL — SIGNIFICANT CHANGE UP (ref 14–18)
IMM GRANULOCYTES NFR BLD AUTO: 0.6 % — HIGH (ref 0.1–0.3)
INR BLD: 2.23 RATIO — HIGH (ref 0.65–1.3)
LYMPHOCYTES # BLD AUTO: 0.46 K/UL — LOW (ref 1.2–3.4)
LYMPHOCYTES # BLD AUTO: 6.7 % — LOW (ref 20.5–51.1)
MCHC RBC-ENTMCNC: 28.8 PG — SIGNIFICANT CHANGE UP (ref 27–31)
MCHC RBC-ENTMCNC: 33.6 G/DL — SIGNIFICANT CHANGE UP (ref 32–37)
MCV RBC AUTO: 85.5 FL — SIGNIFICANT CHANGE UP (ref 80–94)
MONOCYTES # BLD AUTO: 0.71 K/UL — HIGH (ref 0.1–0.6)
MONOCYTES NFR BLD AUTO: 10.4 % — HIGH (ref 1.7–9.3)
NEUTROPHILS # BLD AUTO: 5.48 K/UL — SIGNIFICANT CHANGE UP (ref 1.4–6.5)
NEUTROPHILS NFR BLD AUTO: 79.9 % — HIGH (ref 42.2–75.2)
NRBC # BLD: 0 /100 WBCS — SIGNIFICANT CHANGE UP (ref 0–0)
PLATELET # BLD AUTO: 199 K/UL — SIGNIFICANT CHANGE UP (ref 130–400)
POTASSIUM SERPL-MCNC: 3.9 MMOL/L — SIGNIFICANT CHANGE UP (ref 3.5–5)
POTASSIUM SERPL-SCNC: 3.9 MMOL/L — SIGNIFICANT CHANGE UP (ref 3.5–5)
PROT SERPL-MCNC: 7.3 G/DL — SIGNIFICANT CHANGE UP (ref 6–8)
PROTHROM AB SERPL-ACNC: 25.7 SEC — HIGH (ref 9.95–12.87)
RBC # BLD: 4.97 M/UL — SIGNIFICANT CHANGE UP (ref 4.7–6.1)
RBC # FLD: 12.8 % — SIGNIFICANT CHANGE UP (ref 11.5–14.5)
SODIUM SERPL-SCNC: 141 MMOL/L — SIGNIFICANT CHANGE UP (ref 135–146)
WBC # BLD: 6.85 K/UL — SIGNIFICANT CHANGE UP (ref 4.8–10.8)
WBC # FLD AUTO: 6.85 K/UL — SIGNIFICANT CHANGE UP (ref 4.8–10.8)

## 2021-07-26 PROCEDURE — 93010 ELECTROCARDIOGRAM REPORT: CPT

## 2021-07-26 PROCEDURE — 71046 X-RAY EXAM CHEST 2 VIEWS: CPT | Mod: 26

## 2021-07-26 RX ORDER — SERTRALINE 25 MG/1
1 TABLET, FILM COATED ORAL
Qty: 0 | Refills: 0 | DISCHARGE

## 2021-07-26 RX ORDER — PRIMIDONE 250 MG/1
0 TABLET ORAL
Qty: 0 | Refills: 0 | DISCHARGE

## 2021-07-26 RX ORDER — METFORMIN HYDROCHLORIDE 850 MG/1
1 TABLET ORAL
Qty: 0 | Refills: 0 | DISCHARGE

## 2021-07-26 RX ORDER — PRIMIDONE 250 MG/1
2 TABLET ORAL
Qty: 0 | Refills: 0 | DISCHARGE

## 2021-07-26 RX ORDER — ROSUVASTATIN CALCIUM 5 MG/1
1 TABLET ORAL
Qty: 0 | Refills: 0 | DISCHARGE

## 2021-07-26 RX ORDER — POTASSIUM CHLORIDE 20 MEQ
1 PACKET (EA) ORAL
Qty: 0 | Refills: 0 | DISCHARGE

## 2021-07-26 RX ORDER — FERROUS SULFATE 325(65) MG
1 TABLET ORAL
Qty: 0 | Refills: 0 | DISCHARGE

## 2021-07-26 RX ORDER — LUTEIN 20 MG
0 CAPSULE ORAL
Qty: 0 | Refills: 0 | DISCHARGE

## 2021-07-26 RX ORDER — AMLODIPINE BESYLATE 2.5 MG/1
1 TABLET ORAL
Qty: 0 | Refills: 0 | DISCHARGE

## 2021-07-26 NOTE — H&P PST ADULT - NSICDXFAMILYHX_GEN_ALL_CORE_FT
FAMILY HISTORY:  Sibling  Still living? Yes, Estimated age: Age Unknown  FH: factor V Leiden mutation, Age at diagnosis: Age Unknown

## 2021-07-26 NOTE — H&P PST ADULT - REASON FOR ADMISSION
72 Y/O M SCHEDULED FOR PAST FOR EXCISION OF POSTERIOR NECK MELANOMA ON 8/9/21 WITH DR MIRANDA UNDER GA. PT REPORTS GOING FOR ROUTINE SKIN EXAM WITH DERMATOLOGIST IN JUNE. THE DOCTOR SAW AN AREA ON THE BACK OF THE NECK THAT LOOKED SUSPICIOUS. A BIOPSY WAS TAKEN AND MELANOMA WAS SHOWN. REPORTS HAVING A PET SCAN LAST FRIDAY AND RESULTS ARE PENDING. PT REPORTS A HISTORY OF SKIN CANCER INCLUDING MOHS PROCEDURE. DENIES PAIN AT THIS TIME.

## 2021-07-26 NOTE — H&P PST ADULT - HISTORY OF PRESENT ILLNESS
CURRENTLY  DENIES ANY CP, SOB,PALPITATIONS,COUGH OR DYSURIA  EXERCISE TOLERANCE 3 FOS WITHOUT SOB    AS PER PATIENT  this is his/her complete medical history including medications - PRESCRIPTIONS  OVER THE COUNTER MEDS    pt denies any covid s/s, HAS  tested positive in the past 11/2020.  Received covid vaccine- MODERNA X 2  pt advised self quarantine till day of procedure    Anesthesia Alert  NO--Difficult Airway  NO--History of neck surgery or radiation  NO--Limited ROM of neck  NO--History of Malignant hyperthermia  NO--No personal or family history of Pseudocholinesterase deficiency.  NO--Prior Anesthesia Complication  NO--Latex Allergy  NO--Loose teeth  NO--History of Rheumatoid Arthritis  YES--DWIGHT  NO--Bleeding risk-   YES--Other_____+FACTOR V

## 2021-07-26 NOTE — H&P PST ADULT - NSICDXPASTMEDICALHX_GEN_ALL_CORE_FT
PAST MEDICAL HISTORY:  Afib     Coronary artery disease 4 STENTS    Crohn's disease     Diabetes mellitus     Essential tremor HANDS R>L    Factor V Leiden     Hypertension, unspecified type     DWIGHT treated with BiPAP     Skin cancer

## 2021-07-26 NOTE — H&P PST ADULT - NSICDXPASTSURGICALHX_GEN_ALL_CORE_FT
PAST SURGICAL HISTORY:  History of coronary angioplasty with insertion of stent     History of Mohs surgery for squamous cell carcinoma of skin

## 2021-07-26 NOTE — H&P PST ADULT - PRIMARY CARE PROVIDER
SUZETTE- LV 2 M AGO HAS APPT THIS WEEK FOR CLEARANCE     SHAKEEL- LV 2/2021      DYLAN- LV 6 M AGO     COUMADIN [facility]- LV 6/2021-

## 2021-07-29 ENCOUNTER — OUTPATIENT (OUTPATIENT)
Dept: OUTPATIENT SERVICES | Facility: HOSPITAL | Age: 71
LOS: 1 days | Discharge: HOME | End: 2021-07-29

## 2021-07-29 ENCOUNTER — NON-APPOINTMENT (OUTPATIENT)
Age: 71
End: 2021-07-29

## 2021-07-29 ENCOUNTER — APPOINTMENT (OUTPATIENT)
Dept: MEDICATION MANAGEMENT | Facility: CLINIC | Age: 71
End: 2021-07-29

## 2021-07-29 VITALS — RESPIRATION RATE: 18 BRPM | OXYGEN SATURATION: 99 % | HEART RATE: 63 BPM

## 2021-07-29 DIAGNOSIS — Z95.5 PRESENCE OF CORONARY ANGIOPLASTY IMPLANT AND GRAFT: Chronic | ICD-10-CM

## 2021-07-29 DIAGNOSIS — I48.91 UNSPECIFIED ATRIAL FIBRILLATION: ICD-10-CM

## 2021-07-29 DIAGNOSIS — Z98.890 OTHER SPECIFIED POSTPROCEDURAL STATES: Chronic | ICD-10-CM

## 2021-07-29 DIAGNOSIS — Z79.01 LONG TERM (CURRENT) USE OF ANTICOAGULANTS: ICD-10-CM

## 2021-07-29 PROBLEM — C44.90 UNSPECIFIED MALIGNANT NEOPLASM OF SKIN, UNSPECIFIED: Chronic | Status: ACTIVE | Noted: 2021-07-26

## 2021-07-29 PROBLEM — G47.33 OBSTRUCTIVE SLEEP APNEA (ADULT) (PEDIATRIC): Chronic | Status: ACTIVE | Noted: 2021-07-26

## 2021-07-29 PROBLEM — I25.10 ATHEROSCLEROTIC HEART DISEASE OF NATIVE CORONARY ARTERY WITHOUT ANGINA PECTORIS: Chronic | Status: ACTIVE | Noted: 2019-05-18

## 2021-07-29 PROBLEM — K50.90 CROHN'S DISEASE, UNSPECIFIED, WITHOUT COMPLICATIONS: Chronic | Status: ACTIVE | Noted: 2021-07-26

## 2021-07-29 PROBLEM — G25.0 ESSENTIAL TREMOR: Chronic | Status: ACTIVE | Noted: 2021-07-26

## 2021-07-29 LAB
INR PPP: 2.4 RATIO
POCT-PROTHROMBIN TIME: 28.9 SECS
QUALITY CONTROL: YES

## 2021-08-06 ENCOUNTER — APPOINTMENT (OUTPATIENT)
Dept: MEDICATION MANAGEMENT | Facility: CLINIC | Age: 71
End: 2021-08-06

## 2021-08-06 ENCOUNTER — OUTPATIENT (OUTPATIENT)
Dept: OUTPATIENT SERVICES | Facility: HOSPITAL | Age: 71
LOS: 1 days | Discharge: HOME | End: 2021-08-06

## 2021-08-06 ENCOUNTER — NON-APPOINTMENT (OUTPATIENT)
Age: 71
End: 2021-08-06

## 2021-08-06 DIAGNOSIS — Z79.01 LONG TERM (CURRENT) USE OF ANTICOAGULANTS: ICD-10-CM

## 2021-08-06 DIAGNOSIS — Z95.5 PRESENCE OF CORONARY ANGIOPLASTY IMPLANT AND GRAFT: Chronic | ICD-10-CM

## 2021-08-06 DIAGNOSIS — I48.91 UNSPECIFIED ATRIAL FIBRILLATION: ICD-10-CM

## 2021-08-06 DIAGNOSIS — Z98.890 OTHER SPECIFIED POSTPROCEDURAL STATES: Chronic | ICD-10-CM

## 2021-08-06 LAB
INR PPP: 1.9 RATIO
POCT-PROTHROMBIN TIME: 23.3 SECS
QUALITY CONTROL: YES

## 2021-08-06 NOTE — ASU PATIENT PROFILE, ADULT - PSH
History of coronary angioplasty with insertion of stent    History of Mohs surgery for squamous cell carcinoma of skin

## 2021-08-06 NOTE — ASU PATIENT PROFILE, ADULT - PMH
Afib    Coronary artery disease  4 STENTS  Crohn's disease    Diabetes mellitus    Essential tremor  HANDS R>L  Factor V Leiden    Hypertension, unspecified type    DWIGHT treated with BiPAP    Skin cancer

## 2021-08-09 ENCOUNTER — APPOINTMENT (OUTPATIENT)
Dept: PLASTIC SURGERY | Facility: AMBULATORY SURGERY CENTER | Age: 71
End: 2021-08-09
Payer: MEDICARE

## 2021-08-09 ENCOUNTER — OUTPATIENT (OUTPATIENT)
Dept: OUTPATIENT SERVICES | Facility: HOSPITAL | Age: 71
LOS: 1 days | Discharge: HOME | End: 2021-08-09
Payer: MEDICARE

## 2021-08-09 ENCOUNTER — RESULT REVIEW (OUTPATIENT)
Age: 71
End: 2021-08-09

## 2021-08-09 VITALS
RESPIRATION RATE: 19 BRPM | OXYGEN SATURATION: 97 % | DIASTOLIC BLOOD PRESSURE: 84 MMHG | TEMPERATURE: 98 F | SYSTOLIC BLOOD PRESSURE: 185 MMHG | HEIGHT: 69 IN | HEART RATE: 72 BPM | WEIGHT: 207.9 LBS

## 2021-08-09 VITALS — RESPIRATION RATE: 18 BRPM | OXYGEN SATURATION: 98 % | SYSTOLIC BLOOD PRESSURE: 142 MMHG | HEART RATE: 63 BPM

## 2021-08-09 DIAGNOSIS — Z95.5 PRESENCE OF CORONARY ANGIOPLASTY IMPLANT AND GRAFT: Chronic | ICD-10-CM

## 2021-08-09 DIAGNOSIS — Z98.890 OTHER SPECIFIED POSTPROCEDURAL STATES: Chronic | ICD-10-CM

## 2021-08-09 LAB — GLUCOSE BLDC GLUCOMTR-MCNC: 166 MG/DL — HIGH (ref 70–99)

## 2021-08-09 PROCEDURE — 88342 IMHCHEM/IMCYTCHM 1ST ANTB: CPT | Mod: 26

## 2021-08-09 PROCEDURE — 14040 TIS TRNFR F/C/C/M/N/A/G/H/F: CPT

## 2021-08-09 PROCEDURE — 88341 IMHCHEM/IMCYTCHM EA ADD ANTB: CPT | Mod: 26

## 2021-08-09 PROCEDURE — 88307 TISSUE EXAM BY PATHOLOGIST: CPT | Mod: 26

## 2021-08-09 RX ORDER — OXYCODONE HYDROCHLORIDE 5 MG/1
5 TABLET ORAL ONCE
Refills: 0 | Status: DISCONTINUED | OUTPATIENT
Start: 2021-08-09 | End: 2021-08-09

## 2021-08-09 RX ORDER — ACETAMINOPHEN 500 MG
500 TABLET ORAL ONCE
Refills: 0 | Status: DISCONTINUED | OUTPATIENT
Start: 2021-08-09 | End: 2021-08-09

## 2021-08-09 RX ORDER — CEPHALEXIN 500 MG
1 CAPSULE ORAL
Qty: 12 | Refills: 0
Start: 2021-08-09 | End: 2021-08-11

## 2021-08-09 RX ORDER — HYDROMORPHONE HYDROCHLORIDE 2 MG/ML
0.5 INJECTION INTRAMUSCULAR; INTRAVENOUS; SUBCUTANEOUS
Refills: 0 | Status: DISCONTINUED | OUTPATIENT
Start: 2021-08-09 | End: 2021-08-09

## 2021-08-09 RX ORDER — TRAMADOL HYDROCHLORIDE 50 MG/1
1 TABLET ORAL
Qty: 10 | Refills: 0
Start: 2021-08-09

## 2021-08-09 RX ORDER — ONDANSETRON 8 MG/1
4 TABLET, FILM COATED ORAL ONCE
Refills: 0 | Status: DISCONTINUED | OUTPATIENT
Start: 2021-08-09 | End: 2021-08-23

## 2021-08-09 NOTE — BRIEF OPERATIVE NOTE - NSICDXBRIEFPROCEDURE_GEN_ALL_CORE_FT
PROCEDURES:  Excision, neoplasm, malignant, neck, 2.1 cm to 3.0 cm in diameter 09-Aug-2021 08:42:06 posterior neck melanoma Arlene Ariza L

## 2021-08-09 NOTE — PRE-ANESTHESIA EVALUATION ADULT - NSANTHPMHFT_GEN_ALL_CORE
72 y/o M with pmh of HTN, CAD with 4 stents, DM, DWIGHT on bipap, factor V leiden, crohn's disease, essential tremor who presents for excision of posterior neck melanoma.

## 2021-08-09 NOTE — CHART NOTE - NSCHARTNOTEFT_GEN_A_CORE
PACU ANESTHESIA ADMISSION NOTE      Procedure: Excision, neoplasm, malignant, neck, 2.1 cm to 3.0 cm in diameter  posterior neck melanoma      Post op diagnosis:  Melanoma of neck        ____  Intubated  TV:______       Rate: ______      FiO2: ______    _x___  Patent Airway    _x___  Full return of protective reflexes    _x___  Full recovery from anesthesia / back to baseline status    Vitals:    See anesthesia record      Mental Status:  _x___ Awake   _____ Alert   _____ Drowsy   _____ Sedated    Nausea/Vomiting:  _x___  NO       ______Yes,   See Post - Op Orders         Pain Scale (0-10):  __0___    Treatment: _x___ None    ____ See Post - Op/PCA Orders    Post - Operative Fluids:   __x__ Oral   ____ See Post - Op Orders    Plan: Discharge:   _x___Home       _____Floor     _____Critical Care    _____  Other:_________________    Comments:  No anesthesia issues or complications noted.  Discharge when criteria met.

## 2021-08-09 NOTE — ASU PREOP CHECKLIST - RESPIRATORY RATE (BREATHS/MIN)
ONGOING DISCHARGE PLAN:    Spoke with patient regarding discharge plan and patient confirms that plan is still home with no needs. Patient declines VNS. Home care encourage. GI ordered repeat Abdominal CT.  May perform colonoscopy if fever / diarrhea persists.      Infectious disease consulted for drain in the pelvic gluteal abscess.     Currently on PO augmentin.      Sitter at bedside for SI per psych.      Will continue to follow for additional discharge needs.    19

## 2021-08-09 NOTE — ASU DISCHARGE PLAN (ADULT/PEDIATRIC) - ASU DC SPECIAL INSTRUCTIONSFT
-Keep dressing clean and dry. Do not remove.  -Take antibiotics for 3 days as prescribed.  -Take Tylenol as needed for pain. If not well controlled, take Tramadol as needed.  -Sleep with the head of the bed elevated using pillows to help prevent swelling.  -No gym or strenuous activity until cleared.  -No driving if taking pain medication.  -Bruising, swelling, and numbness are normal and are expected to improve over time.  -Call the office anytime for questions or concerns.  -Follow up in 1 week.

## 2021-08-13 ENCOUNTER — OUTPATIENT (OUTPATIENT)
Dept: OUTPATIENT SERVICES | Facility: HOSPITAL | Age: 71
LOS: 1 days | Discharge: HOME | End: 2021-08-13

## 2021-08-13 ENCOUNTER — APPOINTMENT (OUTPATIENT)
Dept: MEDICATION MANAGEMENT | Facility: CLINIC | Age: 71
End: 2021-08-13

## 2021-08-13 DIAGNOSIS — Z95.5 PRESENCE OF CORONARY ANGIOPLASTY IMPLANT AND GRAFT: Chronic | ICD-10-CM

## 2021-08-13 DIAGNOSIS — Z98.890 OTHER SPECIFIED POSTPROCEDURAL STATES: Chronic | ICD-10-CM

## 2021-08-13 DIAGNOSIS — I48.91 UNSPECIFIED ATRIAL FIBRILLATION: ICD-10-CM

## 2021-08-13 DIAGNOSIS — Z79.01 LONG TERM (CURRENT) USE OF ANTICOAGULANTS: ICD-10-CM

## 2021-08-13 LAB
INR PPP: 2.1 RATIO
POCT-PROTHROMBIN TIME: 25 SECS
QUALITY CONTROL: YES

## 2021-08-17 ENCOUNTER — APPOINTMENT (OUTPATIENT)
Dept: PLASTIC SURGERY | Facility: CLINIC | Age: 71
End: 2021-08-17
Payer: MEDICARE

## 2021-08-17 PROCEDURE — 99024 POSTOP FOLLOW-UP VISIT: CPT

## 2021-08-17 NOTE — PHYSICAL EXAM
[de-identified] : NAD [de-identified] : excessive sun damage w sig scattered actinic changes [de-identified] : posterior neck incision healing well, c/d/i with minimal swelling, no erythema or evidence of cellulitis

## 2021-08-17 NOTE — HISTORY OF PRESENT ILLNESS
[FreeTextEntry1] : 71 yr old male with PMHx of HTN, MI 2006 s/p cardiac stents x4, NIDDM, Crohn's disease seen w wife for tx option re melanoma posterior neck.\par Pt with multiple prior SCC and BCC (seven--scattered throughout body).\par \par \par 6/28/2021--right postauricualr SCC-- Dr. Lucero to address\par 0.6mm melanoma--this is reason for referral\par \par nonsmoker\par \par Interval hx (8/17/21). Pt presents today POD#8 s/p excision of posterior neck melanoma. Doing well with no significant pain, f/c or bleeding.

## 2021-08-18 DIAGNOSIS — Z79.01 LONG TERM (CURRENT) USE OF ANTICOAGULANTS: ICD-10-CM

## 2021-08-18 DIAGNOSIS — I48.91 UNSPECIFIED ATRIAL FIBRILLATION: ICD-10-CM

## 2021-08-18 DIAGNOSIS — Z99.89 DEPENDENCE ON OTHER ENABLING MACHINES AND DEVICES: ICD-10-CM

## 2021-08-18 DIAGNOSIS — C43.4 MALIGNANT MELANOMA OF SCALP AND NECK: ICD-10-CM

## 2021-08-18 DIAGNOSIS — E11.9 TYPE 2 DIABETES MELLITUS WITHOUT COMPLICATIONS: ICD-10-CM

## 2021-08-18 DIAGNOSIS — I10 ESSENTIAL (PRIMARY) HYPERTENSION: ICD-10-CM

## 2021-08-18 DIAGNOSIS — K50.90 CROHN'S DISEASE, UNSPECIFIED, WITHOUT COMPLICATIONS: ICD-10-CM

## 2021-08-18 DIAGNOSIS — G25.0 ESSENTIAL TREMOR: ICD-10-CM

## 2021-08-18 DIAGNOSIS — I25.10 ATHEROSCLEROTIC HEART DISEASE OF NATIVE CORONARY ARTERY WITHOUT ANGINA PECTORIS: ICD-10-CM

## 2021-08-18 DIAGNOSIS — D68.2 HEREDITARY DEFICIENCY OF OTHER CLOTTING FACTORS: ICD-10-CM

## 2021-08-18 DIAGNOSIS — L57.0 ACTINIC KERATOSIS: ICD-10-CM

## 2021-08-18 DIAGNOSIS — G47.33 OBSTRUCTIVE SLEEP APNEA (ADULT) (PEDIATRIC): ICD-10-CM

## 2021-08-18 DIAGNOSIS — Z79.02 LONG TERM (CURRENT) USE OF ANTITHROMBOTICS/ANTIPLATELETS: ICD-10-CM

## 2021-08-18 DIAGNOSIS — Z95.5 PRESENCE OF CORONARY ANGIOPLASTY IMPLANT AND GRAFT: ICD-10-CM

## 2021-08-18 LAB — SURGICAL PATHOLOGY STUDY: SIGNIFICANT CHANGE UP

## 2021-08-19 ENCOUNTER — APPOINTMENT (OUTPATIENT)
Dept: MEDICATION MANAGEMENT | Facility: CLINIC | Age: 71
End: 2021-08-19

## 2021-08-25 ENCOUNTER — APPOINTMENT (OUTPATIENT)
Dept: PLASTIC SURGERY | Facility: CLINIC | Age: 71
End: 2021-08-25
Payer: MEDICARE

## 2021-08-25 PROCEDURE — 99024 POSTOP FOLLOW-UP VISIT: CPT

## 2021-08-25 NOTE — PHYSICAL EXAM
[de-identified] : NAD [de-identified] : excessive sun damage w sig scattered actinic changes [de-identified] : posterior neck incision healing well, c/d/i with minimal swelling, no erythema or evidence of cellulitis

## 2021-08-25 NOTE — DATA REVIEWED
[FreeTextEntry1] : Pathology             Final\par \par No Documents Attached\par \par \par \par   [facility] Accession Number : [id number]\par \par FERNANDA HAGAN                          2\par \par \par \par Surgical Final Report\par \par \par \par \par Final Diagnosis\par Posterior neck melanoma, excision:\par - Focal residual melanoma measuring 0.6 mm in depth (Slide 1F).\par - Melanoma is at least 1 cm from inked peripheral  margin.\par - Deep margin is 1.5 cm from the tumor.\par - Actinic keratosis and chronic inflammation.\par - Previous consultation slides have been reviewed [id number]).\par \par Verified by: Jane Greer M.D.\par (Electronic Signature)\par Reported on: 08/18/21 12:20 EDT, [address] [facility] [place] [address]\par Phone: [phone]   Fax: [phone]\par _________________________________________________________________\par \par Comment\par Note: This case was reviewed in intradepartmental QA conference\par and the diagnosis represents a consensus opinion.\par \par Case reported to Tumor Registry.\par \par Clinical History\par Excision\par See outside slide review\par COVID (-)\par \par Specimen(s) Submitted\par Posterior neck melanoma\par \par Gross Description\par The specimen is received in formalin, labeled "posterior neck\par melanoma" and consists of one ellipse of skin with soft fatty\par underlying tissue. the ellipse of skin measures 4.5 x 2.9 x 0.2\par cm. Underlying tissue measures 5.5 x 3 x 1.8 cm. It is oriented\par with a suture at 12:00. There is a scar on the ellipse of skin,\par measuring 1 cm. The scar is 0.6 cm from the closest margin. The\par specimen is inked as follows: 12:00 to 3:00 - red, 3:00 to 6:00 -\par yellow, 6:00 to 9:00 - blue, 9:00 to 12:00 - green. Deep margin\par is inked black. Representative sections are submitted.\par \par Summary of Sections:\par 1A- 3:00 margin-1\par 1B- 9:00 margin-1\par 1C- cross section of 12:00 margin-1\par 1D- 1:00 margin-1\par 1E- 11:00 margin-1\par 1F- 7:00 margin-1\par \par \par \par \par \par \par FERNANDA HAGAN                          2\par \par \par \par Surgical Final Report\par \par \par \par \par Total: 6 blocks\par \par Specimen was received and underwent gross examination at [facility], [address] [place] [address],\[place] [address].\par \par 08/09/2021 15:53:42 EDT lm\par \par Perioperative Diagnosis\par Posterior neck melanoma\par \par  \par \par  Ordered by: JESUS MIRANDA IV       Collected/Examined: [id number] 08:10AM       \par Verification Required       Stage: Final       \par  Performed at: [facility]       Resulted: [id number] 12:20PM       Last Updated: [id number] 01:05PM       Accession: [id number]

## 2021-08-25 NOTE — HISTORY OF PRESENT ILLNESS
[FreeTextEntry1] : 71 yr old male with PMHx of HTN, MI 2006 s/p cardiac stents x4, NIDDM, Crohn's disease seen w wife for tx option re melanoma posterior neck.\par Pt with multiple prior SCC and BCC (seven--scattered throughout body).\par \par \par 6/28/2021--right postauricualr SCC-- Dr. Lucero to address\par 0.6mm melanoma--this is reason for referral\par \par nonsmoker\par \par Interval hx (8/17/21). Pt presents today POD#8 s/p excision of posterior neck melanoma. Doing well with no significant pain, f/c or bleeding. \par \par Interval hx (8/25/21). Pt presents today POD#16 s/p excision of posterior neck melanoma. Offers no new complaints. Denies any fever, chills or drainage form the surgical site.

## 2021-08-25 NOTE — ASSESSMENT
[FreeTextEntry1] : 72 yo M with malignant melanoma of posterior neck now POD#16 s/p excision. Doing well. \par \par - sutures removed, steri stips applied \par - daily Aquaphor \par - pathology discussed - focal MM with adequate margins \par - post-op instructions discussed and all questions answered \par - dermatologic surveillance with Dr. Vidal \par - f/u 1 month \par \par Due to COVID 19, pre-visit patient instructions were explained to the patient and their symptoms were checked upon arrival.  \par Masks were used by the health care providers and staff and the examination room was cleaned after the patient visit was completed.\par \par

## 2021-08-31 ENCOUNTER — APPOINTMENT (OUTPATIENT)
Dept: MEDICATION MANAGEMENT | Facility: CLINIC | Age: 71
End: 2021-08-31

## 2021-08-31 ENCOUNTER — OUTPATIENT (OUTPATIENT)
Dept: OUTPATIENT SERVICES | Facility: HOSPITAL | Age: 71
LOS: 1 days | Discharge: HOME | End: 2021-08-31

## 2021-08-31 VITALS — HEART RATE: 66 BPM | RESPIRATION RATE: 16 BRPM | OXYGEN SATURATION: 99 %

## 2021-08-31 DIAGNOSIS — I48.91 UNSPECIFIED ATRIAL FIBRILLATION: ICD-10-CM

## 2021-08-31 DIAGNOSIS — Z98.890 OTHER SPECIFIED POSTPROCEDURAL STATES: Chronic | ICD-10-CM

## 2021-08-31 DIAGNOSIS — Z79.01 LONG TERM (CURRENT) USE OF ANTICOAGULANTS: ICD-10-CM

## 2021-08-31 DIAGNOSIS — Z95.5 PRESENCE OF CORONARY ANGIOPLASTY IMPLANT AND GRAFT: Chronic | ICD-10-CM

## 2021-08-31 LAB
INR PPP: 2.7 RATIO
POCT-PROTHROMBIN TIME: 33 SECS
QUALITY CONTROL: YES

## 2021-09-10 ENCOUNTER — APPOINTMENT (OUTPATIENT)
Dept: MEDICATION MANAGEMENT | Facility: CLINIC | Age: 71
End: 2021-09-10

## 2021-09-10 ENCOUNTER — OUTPATIENT (OUTPATIENT)
Dept: OUTPATIENT SERVICES | Facility: HOSPITAL | Age: 71
LOS: 1 days | Discharge: HOME | End: 2021-09-10

## 2021-09-10 VITALS — WEIGHT: 205 LBS | BODY MASS INDEX: 30.27 KG/M2

## 2021-09-10 VITALS — OXYGEN SATURATION: 98 % | RESPIRATION RATE: 16 BRPM | HEART RATE: 68 BPM

## 2021-09-10 DIAGNOSIS — I48.91 UNSPECIFIED ATRIAL FIBRILLATION: ICD-10-CM

## 2021-09-10 DIAGNOSIS — Z79.01 LONG TERM (CURRENT) USE OF ANTICOAGULANTS: ICD-10-CM

## 2021-09-10 DIAGNOSIS — Z95.5 PRESENCE OF CORONARY ANGIOPLASTY IMPLANT AND GRAFT: Chronic | ICD-10-CM

## 2021-09-10 DIAGNOSIS — Z98.890 OTHER SPECIFIED POSTPROCEDURAL STATES: Chronic | ICD-10-CM

## 2021-09-10 LAB
INR PPP: 2 RATIO
POCT-PROTHROMBIN TIME: 23.8 SECS
QUALITY CONTROL: YES

## 2021-09-13 ENCOUNTER — APPOINTMENT (OUTPATIENT)
Dept: MEDICATION MANAGEMENT | Facility: CLINIC | Age: 71
End: 2021-09-13

## 2021-09-13 ENCOUNTER — OUTPATIENT (OUTPATIENT)
Dept: OUTPATIENT SERVICES | Facility: HOSPITAL | Age: 71
LOS: 1 days | Discharge: HOME | End: 2021-09-13

## 2021-09-13 VITALS — RESPIRATION RATE: 16 BRPM | HEART RATE: 61 BPM | OXYGEN SATURATION: 97 %

## 2021-09-13 DIAGNOSIS — I48.91 UNSPECIFIED ATRIAL FIBRILLATION: ICD-10-CM

## 2021-09-13 DIAGNOSIS — Z95.5 PRESENCE OF CORONARY ANGIOPLASTY IMPLANT AND GRAFT: Chronic | ICD-10-CM

## 2021-09-13 DIAGNOSIS — Z98.890 OTHER SPECIFIED POSTPROCEDURAL STATES: Chronic | ICD-10-CM

## 2021-09-13 DIAGNOSIS — Z79.01 LONG TERM (CURRENT) USE OF ANTICOAGULANTS: ICD-10-CM

## 2021-09-13 LAB
INR PPP: 2.6 RATIO
POCT-PROTHROMBIN TIME: 30.9 SECS
QUALITY CONTROL: YES

## 2021-09-29 ENCOUNTER — APPOINTMENT (OUTPATIENT)
Dept: PLASTIC SURGERY | Facility: CLINIC | Age: 71
End: 2021-09-29
Payer: MEDICARE

## 2021-09-29 PROCEDURE — 99024 POSTOP FOLLOW-UP VISIT: CPT

## 2021-10-05 ENCOUNTER — APPOINTMENT (OUTPATIENT)
Dept: MEDICATION MANAGEMENT | Facility: CLINIC | Age: 71
End: 2021-10-05

## 2021-10-13 NOTE — DATA REVIEWED
[FreeTextEntry1] : Pathology             Final\par \par No Documents Attached\par \par \par \par   [facility] Accession Number : [id number]\par \par FERNANDA HAGAN                          2\par \par \par \par Surgical Final Report\par \par \par \par \par Final Diagnosis\par Posterior neck melanoma, excision:\par - Focal residual melanoma measuring 0.6 mm in depth (Slide 1F).\par - Melanoma is at least 1 cm from inked peripheral  margin.\par - Deep margin is 1.5 cm from the tumor.\par - Actinic keratosis and chronic inflammation.\par - Previous consultation slides have been reviewed [id number]).\par \par Verified by: Jane Greer M.D.\par (Electronic Signature)\par Reported on: 08/18/21 12:20 EDT, [address] [facility] [place] [address]\par Phone: [phone]   Fax: [phone]\par _________________________________________________________________\par \par Comment\par Note: This case was reviewed in intradepartmental QA conference\par and the diagnosis represents a consensus opinion.\par \par Case reported to Tumor Registry.\par \par Clinical History\par Excision\par See outside slide review\par COVID (-)\par \par Specimen(s) Submitted\par Posterior neck melanoma\par \par Gross Description\par The specimen is received in formalin, labeled "posterior neck\par melanoma" and consists of one ellipse of skin with soft fatty\par underlying tissue. the ellipse of skin measures 4.5 x 2.9 x 0.2\par cm. Underlying tissue measures 5.5 x 3 x 1.8 cm. It is oriented\par with a suture at 12:00. There is a scar on the ellipse of skin,\par measuring 1 cm. The scar is 0.6 cm from the closest margin. The\par specimen is inked as follows: 12:00 to 3:00 - red, 3:00 to 6:00 -\par yellow, 6:00 to 9:00 - blue, 9:00 to 12:00 - green. Deep margin\par is inked black. Representative sections are submitted.\par \par Summary of Sections:\par 1A- 3:00 margin-1\par 1B- 9:00 margin-1\par 1C- cross section of 12:00 margin-1\par 1D- 1:00 margin-1\par 1E- 11:00 margin-1\par 1F- 7:00 margin-1\par \par \par \par \par \par \par FERNANDA HAGAN                          2\par \par \par \par Surgical Final Report\par \par \par \par \par Total: 6 blocks\par \par Specimen was received and underwent gross examination at [facility], [address] [place] [address],\[place] [address].\par \par 08/09/2021 15:53:42 EDT lm\par \par Perioperative Diagnosis\par Posterior neck melanoma\par \par  \par \par  Ordered by: JESUS MIRANDA IV       Collected/Examined: [id number] 08:10AM       \par Verification Required       Stage: Final       \par  Performed at: [facility]       Resulted: [id number] 12:20PM       Last Updated: [id number] 01:05PM       Accession: [id number]

## 2021-10-13 NOTE — HISTORY OF PRESENT ILLNESS
[FreeTextEntry1] : 71 yr old male with PMHx of HTN, MI 2006 s/p cardiac stents x4, NIDDM, Crohn's disease seen w wife for tx option re melanoma posterior neck.\par Pt with multiple prior SCC and BCC (seven--scattered throughout body).\par \par \par 6/28/2021--right postauricualr SCC-- Dr. Lucero to address\par 0.6mm melanoma--this is reason for referral\par \par nonsmoker\par \par Interval hx (8/17/21). Pt presents today POD#8 s/p excision of posterior neck melanoma. Doing well with no significant pain, f/c or bleeding. \par \par Interval hx (8/25/21). Pt presents today POD#16 s/p excision of posterior neck melanoma. Offers no new complaints. Denies any fever, chills or drainage form the surgical site. \par \par Interval hx (9/29/21): Pt presents today 6 weeks s/p excision of posterior neck melanoma. C/o mild sensitivity to posterior scalp that has been improving. Denies pain or f/c.

## 2021-10-13 NOTE — ASSESSMENT
[FreeTextEntry1] : 70 yo M with malignant melanoma of posterior neck now 6 weeks s/p excision. Doing well. \par Initial biopsy 0.6 mm, excision 0.6 mm = TOTAL 1.2 mm\par \par - daily scar creams\par - scar massage\par - dermatologic surveillance with Dr. Vidal, next appt 12/1/21\par - will review pathology with Dr. Marie\par - f/u 3-4 months\par \par Due to COVID 19, pre-visit patient instructions were explained to the patient and their symptoms were checked upon arrival.  \par Masks were used by the health care providers and staff and the examination room was cleaned after the patient visit was completed.\par \par

## 2021-10-13 NOTE — ADDENDUM
[FreeTextEntry1] : D/w Dr. Marie who requested addendum from surgical pathology. After reviewing addendum, Dr. Marie recommends office visit this week to discuss possible SLNBx. Pt aware and will come in for discussion.

## 2021-10-13 NOTE — PHYSICAL EXAM
[de-identified] : NAD [de-identified] : excessive sun damage w sig scattered actinic changes [de-identified] : posterior neck scar slightly indurated with small dog ear deformities, nontender, no significant erythema or swelling, well-healed

## 2021-10-14 ENCOUNTER — APPOINTMENT (OUTPATIENT)
Dept: PLASTIC SURGERY | Facility: CLINIC | Age: 71
End: 2021-10-14
Payer: MEDICARE

## 2021-10-14 ENCOUNTER — APPOINTMENT (OUTPATIENT)
Dept: MEDICATION MANAGEMENT | Facility: CLINIC | Age: 71
End: 2021-10-14

## 2021-10-14 ENCOUNTER — OUTPATIENT (OUTPATIENT)
Dept: OUTPATIENT SERVICES | Facility: HOSPITAL | Age: 71
LOS: 1 days | Discharge: HOME | End: 2021-10-14

## 2021-10-14 VITALS — HEART RATE: 76 BPM | RESPIRATION RATE: 16 BRPM | OXYGEN SATURATION: 96 %

## 2021-10-14 DIAGNOSIS — Z95.5 PRESENCE OF CORONARY ANGIOPLASTY IMPLANT AND GRAFT: Chronic | ICD-10-CM

## 2021-10-14 DIAGNOSIS — I48.91 UNSPECIFIED ATRIAL FIBRILLATION: ICD-10-CM

## 2021-10-14 DIAGNOSIS — Z98.890 OTHER SPECIFIED POSTPROCEDURAL STATES: Chronic | ICD-10-CM

## 2021-10-14 DIAGNOSIS — Z79.01 LONG TERM (CURRENT) USE OF ANTICOAGULANTS: ICD-10-CM

## 2021-10-14 LAB
INR PPP: 2.6 RATIO
POCT-PROTHROMBIN TIME: 30.9 SECS
QUALITY CONTROL: YES

## 2021-10-14 PROCEDURE — 99024 POSTOP FOLLOW-UP VISIT: CPT

## 2021-10-14 NOTE — DATA REVIEWED
[FreeTextEntry1] : Pathology             Final\par \par No Documents Attached\par \par \par \par   [facility] Accession Number : [id number]\par \par FERNANDA HAGAN                          2\par \par \par \par Surgical Final Report\par \par \par \par \par Final Diagnosis\par Posterior neck melanoma, excision:\par - Focal residual melanoma measuring 0.6 mm in depth (Slide 1F).\par - Melanoma is at least 1 cm from inked peripheral  margin.\par - Deep margin is 1.5 cm from the tumor.\par - Actinic keratosis and chronic inflammation.\par - Previous consultation slides have been reviewed [id number]).\par \par Verified by: Jane Greer M.D.\par (Electronic Signature)\par Reported on: 08/18/21 12:20 EDT, [address] [facility] [address]\par Phone: [phone]   Fax: [phone]\par _________________________________________________________________\par \par Comment\par Note: This case was reviewed in intradepartmental QA conference\par and the diagnosis represents a consensus opinion.\par \par Case reported to Tumor Registry.\par \par Clinical History\par Excision\par See outside slide review\par COVID (-)\par \par Specimen(s) Submitted\par Posterior neck melanoma\par \par Gross Description\par The specimen is received in formalin, labeled "posterior neck\par melanoma" and consists of one ellipse of skin with soft fatty\par underlying tissue. the ellipse of skin measures 4.5 x 2.9 x 0.2\par cm. Underlying tissue measures 5.5 x 3 x 1.8 cm. It is oriented\par with a suture at 12:00. There is a scar on the ellipse of skin,\par measuring 1 cm. The scar is 0.6 cm from the closest margin. The\par specimen is inked as follows: 12:00 to 3:00 - red, 3:00 to 6:00 -\par yellow, 6:00 to 9:00 - blue, 9:00 to 12:00 - green. Deep margin\par is inked black. Representative sections are submitted.\par \par Summary of Sections:\par 1A- 3:00 margin-1\par 1B- 9:00 margin-1\par 1C- cross section of 12:00 margin-1\par 1D- 1:00 margin-1\par 1E- 11:00 margin-1\par 1F- 7:00 margin-1\par \par \par \par \par \par \par FERNANDA HAGAN                          2\par \par \par \par Surgical Final Report\par \par \par \par \par Total: 6 blocks\par \par Specimen was received and underwent gross examination at [facility], [address] [place] [address],\[place] [address].\par \par 08/09/2021 15:53:42 EDT lm\par \par Perioperative Diagnosis\par Posterior neck melanoma\par \par  \par \par  Ordered by: JESUS MIRANDA IV       Collected/Examined: [id number] 08:10AM       \par Verification Required       Stage: Final       \par  Performed at: [facility]       Resulted: [id number] 12:20PM       Last Updated: [id number] 01:05PM       Accession: [id number]

## 2021-10-14 NOTE — HISTORY OF PRESENT ILLNESS
[FreeTextEntry1] : 71 yr old male with PMHx of HTN, MI 2006 s/p cardiac stents x4, NIDDM, Crohn's disease seen w wife for tx option re melanoma posterior neck.\par Pt with multiple prior SCC and BCC (seven--scattered throughout body).\par \par \par 6/28/2021--right postauricualr SCC-- Dr. Lucero to address\par 0.6mm melanoma--this is reason for referral\par \par nonsmoker\par \par Interval hx (8/17/21). Pt presents today POD#8 s/p excision of posterior neck melanoma. Doing well with no significant pain, f/c or bleeding. \par \par Interval hx (8/25/21). Pt presents today POD#16 s/p excision of posterior neck melanoma. Offers no new complaints. Denies any fever, chills or drainage form the surgical site. \par \par Interval hx (9/29/21): Pt presents today 6 weeks s/p excision of posterior neck melanoma. C/o mild sensitivity to posterior scalp that has been improving. Denies pain or f/c.\par \par Interval hx (10/14/21). Patient presents today 8 weeks post-op for f/u to discuss treatment plan.

## 2021-10-14 NOTE — PHYSICAL EXAM
[de-identified] : NAD [de-identified] : excessive sun damage w sig scattered actinic changes [de-identified] : posterior neck scar slightly indurated with small dog ear deformities, nontender, no significant erythema or swelling, well-healed

## 2021-10-14 NOTE — ASSESSMENT
[FreeTextEntry1] : 70 yo M with malignant melanoma of posterior neck now 8 weeks s/p excision. Doing well. \par Initial biopsy 0.6 mm, excision 0.6 mm = TOTAL 1.2 mm\par \par \par \par Due to COVID 19, pre-visit patient instructions were explained to the patient and their symptoms were checked upon arrival.  \par Masks were used by the health care providers and staff and the examination room was cleaned after the patient visit was completed.\par \par \par Regarding sentinel lymph node biopsy, we discussed my recommendation to perform the procedure and rationale for it.  Patient is aware the risk of the procedure include possible neurovascular injury with temporary or permanent deficit, lymphatic injury resulting in postoperative seroma, lymphocoele or recurrent fluid collections that may require intervention, additional or formal lymph node dissection depending upon the pathology results, as well as bleeding and infection.  Patient understands the additional incisions that will be required for the procedure.  Patient understands the risks and had their questions answered.\par \par Pt aware SLN may be in neck, axillae, or groin\par

## 2021-10-14 NOTE — PHYSICAL EXAM
[de-identified] : NAD [de-identified] : excessive sun damage w sig scattered actinic changes [de-identified] : posterior neck scar slightly indurated with small dog ear deformities, nontender, no significant erythema or swelling, well-healed

## 2021-10-14 NOTE — DATA REVIEWED
[FreeTextEntry1] : Pathology             Final\par \par No Documents Attached\par \par \par \par   [facility] Accession Number : [id number]\par \par FERNANDA HAGAN                          2\par \par \par \par Surgical Final Report\par \par \par \par \par Final Diagnosis\par Posterior neck melanoma, excision:\par - Focal residual melanoma measuring 0.6 mm in depth (Slide 1F).\par - Melanoma is at least 1 cm from inked peripheral  margin.\par - Deep margin is 1.5 cm from the tumor.\par - Actinic keratosis and chronic inflammation.\par - Previous consultation slides have been reviewed [id number]).\par \par Verified by: Jane Greer M.D.\par (Electronic Signature)\par Reported on: 08/18/21 12:20 EDT, [address] [facility] [address]\par Phone: [phone]   Fax: [phone]\par _________________________________________________________________\par \par Comment\par Note: This case was reviewed in intradepartmental QA conference\par and the diagnosis represents a consensus opinion.\par \par Case reported to Tumor Registry.\par \par Clinical History\par Excision\par See outside slide review\par COVID (-)\par \par Specimen(s) Submitted\par Posterior neck melanoma\par \par Gross Description\par The specimen is received in formalin, labeled "posterior neck\par melanoma" and consists of one ellipse of skin with soft fatty\par underlying tissue. the ellipse of skin measures 4.5 x 2.9 x 0.2\par cm. Underlying tissue measures 5.5 x 3 x 1.8 cm. It is oriented\par with a suture at 12:00. There is a scar on the ellipse of skin,\par measuring 1 cm. The scar is 0.6 cm from the closest margin. The\par specimen is inked as follows: 12:00 to 3:00 - red, 3:00 to 6:00 -\par yellow, 6:00 to 9:00 - blue, 9:00 to 12:00 - green. Deep margin\par is inked black. Representative sections are submitted.\par \par Summary of Sections:\par 1A- 3:00 margin-1\par 1B- 9:00 margin-1\par 1C- cross section of 12:00 margin-1\par 1D- 1:00 margin-1\par 1E- 11:00 margin-1\par 1F- 7:00 margin-1\par \par \par \par \par \par \par FERNANDA HAGAN                          2\par \par \par \par Surgical Final Report\par \par \par \par \par Total: 6 blocks\par \par Specimen was received and underwent gross examination at [facility], [address] [place] [address],\[place] [address].\par \par 08/09/2021 15:53:42 EDT lm\par \par Perioperative Diagnosis\par Posterior neck melanoma\par \par  \par \par  Ordered by: JESUS MIRANDA IV       Collected/Examined: [id number] 08:10AM       \par Verification Required       Stage: Final       \par  Performed at: [facility]       Resulted: [id number] 12:20PM       Last Updated: [id number] 01:05PM       Accession: [id number]

## 2021-10-14 NOTE — ASSESSMENT
[FreeTextEntry1] : 72 yo M with malignant melanoma of posterior neck now 8 weeks s/p excision. Doing well. \par Initial biopsy 0.6 mm, excision 0.6 mm = TOTAL 1.2 mm\par \par \par \par Due to COVID 19, pre-visit patient instructions were explained to the patient and their symptoms were checked upon arrival.  \par Masks were used by the health care providers and staff and the examination room was cleaned after the patient visit was completed.\par \par \par Regarding sentinel lymph node biopsy, we discussed my recommendation to perform the procedure and rationale for it.  Patient is aware the risk of the procedure include possible neurovascular injury with temporary or permanent deficit, lymphatic injury resulting in postoperative seroma, lymphocoele or recurrent fluid collections that may require intervention, additional or formal lymph node dissection depending upon the pathology results, as well as bleeding and infection.  Patient understands the additional incisions that will be required for the procedure.  Patient understands the risks and had their questions answered.\par \par Pt aware SLN may be in neck, axillae, or groin\par

## 2021-10-27 ENCOUNTER — APPOINTMENT (OUTPATIENT)
Dept: MEDICATION MANAGEMENT | Facility: CLINIC | Age: 71
End: 2021-10-27

## 2021-10-27 ENCOUNTER — OUTPATIENT (OUTPATIENT)
Dept: OUTPATIENT SERVICES | Facility: HOSPITAL | Age: 71
LOS: 1 days | Discharge: HOME | End: 2021-10-27

## 2021-10-27 ENCOUNTER — NON-APPOINTMENT (OUTPATIENT)
Age: 71
End: 2021-10-27

## 2021-10-27 VITALS — RESPIRATION RATE: 16 BRPM | HEART RATE: 62 BPM | OXYGEN SATURATION: 98 %

## 2021-10-27 DIAGNOSIS — Z95.5 PRESENCE OF CORONARY ANGIOPLASTY IMPLANT AND GRAFT: Chronic | ICD-10-CM

## 2021-10-27 DIAGNOSIS — Z79.01 LONG TERM (CURRENT) USE OF ANTICOAGULANTS: ICD-10-CM

## 2021-10-27 DIAGNOSIS — Z98.890 OTHER SPECIFIED POSTPROCEDURAL STATES: Chronic | ICD-10-CM

## 2021-10-27 DIAGNOSIS — I48.91 UNSPECIFIED ATRIAL FIBRILLATION: ICD-10-CM

## 2021-10-27 LAB
INR PPP: 2.5 RATIO
POCT-PROTHROMBIN TIME: 30.5 SECS
QUALITY CONTROL: YES

## 2021-10-29 ENCOUNTER — APPOINTMENT (OUTPATIENT)
Dept: MEDICATION MANAGEMENT | Facility: CLINIC | Age: 71
End: 2021-10-29

## 2021-10-29 ENCOUNTER — OUTPATIENT (OUTPATIENT)
Dept: OUTPATIENT SERVICES | Facility: HOSPITAL | Age: 71
LOS: 1 days | Discharge: HOME | End: 2021-10-29

## 2021-10-29 ENCOUNTER — NON-APPOINTMENT (OUTPATIENT)
Age: 71
End: 2021-10-29

## 2021-10-29 ENCOUNTER — LABORATORY RESULT (OUTPATIENT)
Age: 71
End: 2021-10-29

## 2021-10-29 VITALS — HEART RATE: 64 BPM | RESPIRATION RATE: 16 BRPM | OXYGEN SATURATION: 98 %

## 2021-10-29 DIAGNOSIS — Z98.890 OTHER SPECIFIED POSTPROCEDURAL STATES: Chronic | ICD-10-CM

## 2021-10-29 DIAGNOSIS — Z11.59 ENCOUNTER FOR SCREENING FOR OTHER VIRAL DISEASES: ICD-10-CM

## 2021-10-29 DIAGNOSIS — Z79.01 LONG TERM (CURRENT) USE OF ANTICOAGULANTS: ICD-10-CM

## 2021-10-29 DIAGNOSIS — Z95.5 PRESENCE OF CORONARY ANGIOPLASTY IMPLANT AND GRAFT: Chronic | ICD-10-CM

## 2021-10-29 DIAGNOSIS — I48.91 UNSPECIFIED ATRIAL FIBRILLATION: ICD-10-CM

## 2021-10-29 LAB
INR PPP: 2.1 RATIO
POCT-PROTHROMBIN TIME: 25.4 SECS
QUALITY CONTROL: YES

## 2021-11-01 ENCOUNTER — RESULT REVIEW (OUTPATIENT)
Age: 71
End: 2021-11-01

## 2021-11-01 ENCOUNTER — OUTPATIENT (OUTPATIENT)
Dept: OUTPATIENT SERVICES | Facility: HOSPITAL | Age: 71
LOS: 1 days | Discharge: HOME | End: 2021-11-01
Payer: MEDICARE

## 2021-11-01 ENCOUNTER — APPOINTMENT (OUTPATIENT)
Dept: PLASTIC SURGERY | Facility: AMBULATORY SURGERY CENTER | Age: 71
End: 2021-11-01
Payer: MEDICARE

## 2021-11-01 VITALS
WEIGHT: 209.66 LBS | SYSTOLIC BLOOD PRESSURE: 182 MMHG | RESPIRATION RATE: 20 BRPM | TEMPERATURE: 98 F | OXYGEN SATURATION: 97 % | HEART RATE: 61 BPM | DIASTOLIC BLOOD PRESSURE: 85 MMHG | HEIGHT: 69 IN

## 2021-11-01 VITALS
DIASTOLIC BLOOD PRESSURE: 72 MMHG | HEART RATE: 70 BPM | TEMPERATURE: 99 F | OXYGEN SATURATION: 95 % | RESPIRATION RATE: 15 BRPM | SYSTOLIC BLOOD PRESSURE: 155 MMHG

## 2021-11-01 DIAGNOSIS — Z95.5 PRESENCE OF CORONARY ANGIOPLASTY IMPLANT AND GRAFT: Chronic | ICD-10-CM

## 2021-11-01 DIAGNOSIS — G89.18 OTHER ACUTE POSTPROCEDURAL PAIN: ICD-10-CM

## 2021-11-01 DIAGNOSIS — Z98.890 OTHER SPECIFIED POSTPROCEDURAL STATES: Chronic | ICD-10-CM

## 2021-11-01 PROCEDURE — 78195 LYMPH SYSTEM IMAGING: CPT | Mod: 26,MH

## 2021-11-01 PROCEDURE — 88307 TISSUE EXAM BY PATHOLOGIST: CPT | Mod: 26

## 2021-11-01 PROCEDURE — 38510 BIOPSY/REMOVAL LYMPH NODES: CPT | Mod: 50,58

## 2021-11-01 RX ORDER — HYDROMORPHONE HYDROCHLORIDE 2 MG/ML
1 INJECTION INTRAMUSCULAR; INTRAVENOUS; SUBCUTANEOUS
Refills: 0 | Status: DISCONTINUED | OUTPATIENT
Start: 2021-11-01 | End: 2021-11-01

## 2021-11-01 RX ORDER — ACETAMINOPHEN 500 MG
975 TABLET ORAL ONCE
Refills: 0 | Status: COMPLETED | OUTPATIENT
Start: 2021-11-01 | End: 2021-11-01

## 2021-11-01 RX ORDER — TRAMADOL HYDROCHLORIDE 50 MG/1
1 TABLET ORAL
Qty: 10 | Refills: 0
Start: 2021-11-01

## 2021-11-01 RX ORDER — CEPHALEXIN 500 MG
1 CAPSULE ORAL
Qty: 28 | Refills: 0
Start: 2021-11-01 | End: 2021-11-07

## 2021-11-01 RX ORDER — SODIUM CHLORIDE 9 MG/ML
1000 INJECTION, SOLUTION INTRAVENOUS
Refills: 0 | Status: DISCONTINUED | OUTPATIENT
Start: 2021-11-01 | End: 2021-11-15

## 2021-11-01 RX ORDER — HYDROMORPHONE HYDROCHLORIDE 2 MG/ML
0.5 INJECTION INTRAMUSCULAR; INTRAVENOUS; SUBCUTANEOUS
Refills: 0 | Status: DISCONTINUED | OUTPATIENT
Start: 2021-11-01 | End: 2021-11-01

## 2021-11-01 RX ORDER — ONDANSETRON 8 MG/1
4 TABLET, FILM COATED ORAL ONCE
Refills: 0 | Status: DISCONTINUED | OUTPATIENT
Start: 2021-11-01 | End: 2021-11-15

## 2021-11-01 RX ADMIN — SODIUM CHLORIDE 100 MILLILITER(S): 9 INJECTION, SOLUTION INTRAVENOUS at 12:32

## 2021-11-01 RX ADMIN — Medication 975 MILLIGRAM(S): at 13:19

## 2021-11-01 RX ADMIN — HYDROMORPHONE HYDROCHLORIDE 0.5 MILLIGRAM(S): 2 INJECTION INTRAMUSCULAR; INTRAVENOUS; SUBCUTANEOUS at 13:19

## 2021-11-01 RX ADMIN — Medication 975 MILLIGRAM(S): at 10:12

## 2021-11-01 NOTE — ASU DISCHARGE PLAN (ADULT/PEDIATRIC) - CARE PROVIDER_API CALL
Mil Marie)  Plastic Surgery; Surgery of the Hand  [address] [place] [address], [address]  [place] [address]  Phone: [phone]  Fax: [phone]  Follow Up Time:

## 2021-11-01 NOTE — CHART NOTE - NSCHARTNOTEFT_GEN_A_CORE
PACU ANESTHESIA ADMISSION NOTE      Procedure: Lymph node excision  sentinel lymph node biopsy of neck      Post op diagnosis:  Malignant melanoma of skin of neck        ____  Intubated  TV:______       Rate: ______      FiO2: ______    __x__  Patent Airway    __x__  Full return of protective reflexes    __x__  Full recovery from anesthesia / back to baseline     Vitals:   T: 98.4          R:   16               BP:     156/76             Sat:     95%              P: 72      Mental Status:  __x__ Awake   __x___ Alert   _____ Drowsy   _____ Sedated    Nausea/Vomiting:  __x__ NO  ______Yes,   See Post - Op Orders          Pain Scale (0-10):  __0___    Treatment: ____ None    ___x_ See Post - Op/PCA Orders    Post - Operative Fluids:   ____ Oral   __x__ See Post - Op Orders    Plan: Discharge:   __x__Home       _____Floor     _____Critical Care    _____  Other:_________________    Comments:  pt tolerated procedure well, pt transferred to PACU and report was given to PACU RN, vital signs are stable and pt shows no signs of distress. no anesthesia complications, discharge pt when criteria met.

## 2021-11-01 NOTE — ASU DISCHARGE PLAN (ADULT/PEDIATRIC) - ASU DC SPECIAL INSTRUCTIONSFT
-Keep dressings clean and dry. Do not remove.  -Take antibiotics for 7 days as prescribed.  -Resume Warfarin per Coumadin clinic.  -Take Tylenol as needed for pain. If not well controlled, take Tramadol as needed.  -Sleep with the head of the bed elevated using pillows to help prevent swelling.  -No driving if taking pain medication.  -Bruising, swelling, and numbness are normal and are expected to improve over time.  -Call the office anytime for questions or concerns.  -Follow up in 1 week.

## 2021-11-02 ENCOUNTER — NON-APPOINTMENT (OUTPATIENT)
Age: 71
End: 2021-11-02

## 2021-11-02 PROBLEM — G89.18 ACUTE POSTOPERATIVE PAIN: Status: ACTIVE | Noted: 2021-11-02

## 2021-11-04 LAB — SURGICAL PATHOLOGY STUDY: SIGNIFICANT CHANGE UP

## 2021-11-08 ENCOUNTER — APPOINTMENT (OUTPATIENT)
Dept: PLASTIC SURGERY | Facility: CLINIC | Age: 71
End: 2021-11-08
Payer: MEDICARE

## 2021-11-08 DIAGNOSIS — I25.10 ATHEROSCLEROTIC HEART DISEASE OF NATIVE CORONARY ARTERY WITHOUT ANGINA PECTORIS: ICD-10-CM

## 2021-11-08 DIAGNOSIS — G47.33 OBSTRUCTIVE SLEEP APNEA (ADULT) (PEDIATRIC): ICD-10-CM

## 2021-11-08 DIAGNOSIS — D68.51 ACTIVATED PROTEIN C RESISTANCE: ICD-10-CM

## 2021-11-08 DIAGNOSIS — I48.91 UNSPECIFIED ATRIAL FIBRILLATION: ICD-10-CM

## 2021-11-08 DIAGNOSIS — Z79.84 LONG TERM (CURRENT) USE OF ORAL HYPOGLYCEMIC DRUGS: ICD-10-CM

## 2021-11-08 DIAGNOSIS — Z95.5 PRESENCE OF CORONARY ANGIOPLASTY IMPLANT AND GRAFT: ICD-10-CM

## 2021-11-08 DIAGNOSIS — L90.5 SCAR CONDITIONS AND FIBROSIS OF SKIN: ICD-10-CM

## 2021-11-08 DIAGNOSIS — E11.9 TYPE 2 DIABETES MELLITUS WITHOUT COMPLICATIONS: ICD-10-CM

## 2021-11-08 DIAGNOSIS — G25.0 ESSENTIAL TREMOR: ICD-10-CM

## 2021-11-08 DIAGNOSIS — Z79.02 LONG TERM (CURRENT) USE OF ANTITHROMBOTICS/ANTIPLATELETS: ICD-10-CM

## 2021-11-08 DIAGNOSIS — C43.4 MALIGNANT MELANOMA OF SCALP AND NECK: ICD-10-CM

## 2021-11-08 DIAGNOSIS — Z79.01 LONG TERM (CURRENT) USE OF ANTICOAGULANTS: ICD-10-CM

## 2021-11-08 DIAGNOSIS — Z99.89 DEPENDENCE ON OTHER ENABLING MACHINES AND DEVICES: ICD-10-CM

## 2021-11-08 DIAGNOSIS — I10 ESSENTIAL (PRIMARY) HYPERTENSION: ICD-10-CM

## 2021-11-08 PROCEDURE — 99024 POSTOP FOLLOW-UP VISIT: CPT

## 2021-11-08 NOTE — DATA REVIEWED
[FreeTextEntry1] : Pathology             Final\par \par No Documents Attached\par \par \par \par   [facility] Accession Number : [id number]\par \par FERNANDA HAGAN                          2\par \par \par \par Surgical Final Report\par \par \par \par \par Final Diagnosis\par Posterior neck melanoma, excision:\par - Focal residual melanoma measuring 0.6 mm in depth (Slide 1F).\par - Melanoma is at least 1 cm from inked peripheral  margin.\par - Deep margin is 1.5 cm from the tumor.\par - Actinic keratosis and chronic inflammation.\par - Previous consultation slides have been reviewed [id number]).\par \par Verified by: Jane Greer M.D.\par (Electronic Signature)\par Reported on: 08/18/21 12:20 EDT, [address] [facility] [address]\par Phone: [phone]   Fax: [phone]\par _________________________________________________________________\par \par Comment\par Note: This case was reviewed in intradepartmental QA conference\par and the diagnosis represents a consensus opinion.\par \par Case reported to Tumor Registry.\par \par Clinical History\par Excision\par See outside slide review\par COVID (-)\par \par Specimen(s) Submitted\par Posterior neck melanoma\par \par Gross Description\par The specimen is received in formalin, labeled "posterior neck\par melanoma" and consists of one ellipse of skin with soft fatty\par underlying tissue. the ellipse of skin measures 4.5 x 2.9 x 0.2\par cm. Underlying tissue measures 5.5 x 3 x 1.8 cm. It is oriented\par with a suture at 12:00. There is a scar on the ellipse of skin,\par measuring 1 cm. The scar is 0.6 cm from the closest margin. The\par specimen is inked as follows: 12:00 to 3:00 - red, 3:00 to 6:00 -\par yellow, 6:00 to 9:00 - blue, 9:00 to 12:00 - green. Deep margin\par is inked black. Representative sections are submitted.\par \par Summary of Sections:\par 1A- 3:00 margin-1\par 1B- 9:00 margin-1\par 1C- cross section of 12:00 margin-1\par 1D- 1:00 margin-1\par 1E- 11:00 margin-1\par 1F- 7:00 margin-1\par \par \par \par \par \par \par FERNANDA HAGAN                          2\par \par \par \par Surgical Final Report\par \par \par \par \par Total: 6 blocks\par \par Specimen was received and underwent gross examination at [facility], [address] [place] [address],\[place] [address].\par \par 08/09/2021 15:53:42 EDT lm\par \par Perioperative Diagnosis\par Posterior neck melanoma\par \par  \par \par  Ordered by: JESUS MIRANDA IV       Collected/Examined: [id number] 08:10AM       \par Verification Required       Stage: Final       \par  Performed at: [facility]       Resulted: [id number] 12:20PM       Last Updated: [id number] 01:05PM       Accession: [id number]

## 2021-11-08 NOTE — ASSESSMENT
[FreeTextEntry1] : 72 yo M with malignant melanoma of posterior neck, s/p excision 8/9/21. Doing well. \par Initial biopsy 0.6 mm, excision 0.6 mm = TOTAL 1.2 mm\par Now POD #7 s/p SLN Bx bilateral neck, doing well with resolving ecchymoses due to warfarin\par \par -Pathology reviewed: left cervical LN x5 and right supraclavicular LN x9 all negative for melanoma\par -Bandages changed\par -Warm compresses\par -Tylenol PRN\par -F/u 1 week\par \par Due to COVID-19, pre-visit patient instructions were explained to the patient and their symptoms were checked upon arrival. Masks were used by the healthcare provider and staff and the examination room was cleaned after the patient visit concluded\par

## 2021-11-08 NOTE — PHYSICAL EXAM
[de-identified] : NAD [de-identified] : excessive sun damage w sig scattered actinic changes [de-identified] : posterior neck scar slightly indurated with small dog ear deformities, nontender, no significant erythema or swelling, well-healed, no evidence of recurrence\par bilateral anterior neck with healing incision with swelling and significant ecchymoses extending into chest R>>L, no palpable fluid collection

## 2021-11-08 NOTE — HISTORY OF PRESENT ILLNESS
[FreeTextEntry1] : 71 yr old male with PMHx of HTN, MI 2006 s/p cardiac stents x4, NIDDM, Crohn's disease seen w wife for tx option re melanoma posterior neck.\par Pt with multiple prior SCC and BCC (seven--scattered throughout body).\par \par \par 6/28/2021--right postauricualr SCC-- Dr. Lucero to address\par 0.6mm melanoma--this is reason for referral\par \par nonsmoker\par \par Interval hx (8/17/21). Pt presents today POD#8 s/p excision of posterior neck melanoma. Doing well with no significant pain, f/c or bleeding. \par \par Interval hx (8/25/21). Pt presents today POD#16 s/p excision of posterior neck melanoma. Offers no new complaints. Denies any fever, chills or drainage form the surgical site. \par \par Interval hx (9/29/21): Pt presents today 6 weeks s/p excision of posterior neck melanoma. C/o mild sensitivity to posterior scalp that has been improving. Denies pain or f/c.\par \par Interval hx (10/14/21). Patient presents today 8 weeks post-op for f/u to discuss treatment plan. \par \par Interval hx (11/8/21): Pt presents today POD #7 s/p SLN Bx bilateral neck. C/o discomfort and bruising that is slowly resolving. Denies f/c or bleeding.

## 2021-11-09 ENCOUNTER — APPOINTMENT (OUTPATIENT)
Dept: MEDICATION MANAGEMENT | Facility: CLINIC | Age: 71
End: 2021-11-09

## 2021-11-09 ENCOUNTER — OUTPATIENT (OUTPATIENT)
Dept: OUTPATIENT SERVICES | Facility: HOSPITAL | Age: 71
LOS: 1 days | Discharge: HOME | End: 2021-11-09

## 2021-11-09 VITALS — RESPIRATION RATE: 16 BRPM | HEART RATE: 64 BPM | OXYGEN SATURATION: 97 %

## 2021-11-09 DIAGNOSIS — I48.91 UNSPECIFIED ATRIAL FIBRILLATION: ICD-10-CM

## 2021-11-09 DIAGNOSIS — Z98.890 OTHER SPECIFIED POSTPROCEDURAL STATES: Chronic | ICD-10-CM

## 2021-11-09 DIAGNOSIS — Z79.01 LONG TERM (CURRENT) USE OF ANTICOAGULANTS: ICD-10-CM

## 2021-11-09 DIAGNOSIS — Z95.5 PRESENCE OF CORONARY ANGIOPLASTY IMPLANT AND GRAFT: Chronic | ICD-10-CM

## 2021-11-09 LAB
INR PPP: 2.3 RATIO
POCT-PROTHROMBIN TIME: 27.1 SECS
QUALITY CONTROL: YES

## 2021-11-16 ENCOUNTER — APPOINTMENT (OUTPATIENT)
Dept: PLASTIC SURGERY | Facility: CLINIC | Age: 71
End: 2021-11-16
Payer: MEDICARE

## 2021-11-16 PROCEDURE — 99212 OFFICE O/P EST SF 10 MIN: CPT

## 2021-11-16 NOTE — PHYSICAL EXAM
[de-identified] : NAD [de-identified] : excessive sun damage w sig scattered actinic changes [de-identified] : posterior neck scar healed, nontender, well-healed, no evidence of recurrence\par bilateral anterior neck with healing incisions with swelling and significant ecchymoses extending into chest R>>L, resolving, no palpable fluid collection

## 2021-11-16 NOTE — DATA REVIEWED
[FreeTextEntry1] : Pathology             Final\par \par No Documents Attached\par \par \par \par   [facility] Accession Number : [id number]\par \par FERNANDA HAGAN                          2\par \par \par \par Surgical Final Report\par \par \par \par \par Final Diagnosis\par Posterior neck melanoma, excision:\par - Focal residual melanoma measuring 0.6 mm in depth (Slide 1F).\par - Melanoma is at least 1 cm from inked peripheral  margin.\par - Deep margin is 1.5 cm from the tumor.\par - Actinic keratosis and chronic inflammation.\par - Previous consultation slides have been reviewed [id number]).\par \par Verified by: Jane Greer M.D.\par (Electronic Signature)\par Reported on: 08/18/21 12:20 EDT, [address] [facility] [address]\par Phone: [phone]   Fax: [phone]\par _________________________________________________________________\par \par Comment\par Note: This case was reviewed in intradepartmental QA conference\par and the diagnosis represents a consensus opinion.\par \par Case reported to Tumor Registry.\par \par Clinical History\par Excision\par See outside slide review\par COVID (-)\par \par Specimen(s) Submitted\par Posterior neck melanoma\par \par Gross Description\par The specimen is received in formalin, labeled "posterior neck\par melanoma" and consists of one ellipse of skin with soft fatty\par underlying tissue. the ellipse of skin measures 4.5 x 2.9 x 0.2\par cm. Underlying tissue measures 5.5 x 3 x 1.8 cm. It is oriented\par with a suture at 12:00. There is a scar on the ellipse of skin,\par measuring 1 cm. The scar is 0.6 cm from the closest margin. The\par specimen is inked as follows: 12:00 to 3:00 - red, 3:00 to 6:00 -\par yellow, 6:00 to 9:00 - blue, 9:00 to 12:00 - green. Deep margin\par is inked black. Representative sections are submitted.\par \par Summary of Sections:\par 1A- 3:00 margin-1\par 1B- 9:00 margin-1\par 1C- cross section of 12:00 margin-1\par 1D- 1:00 margin-1\par 1E- 11:00 margin-1\par 1F- 7:00 margin-1\par \par Total: 6 blocks\par \par Specimen was received and underwent gross examination at [facility], [address] [place] [address],\par [place] [address].\par \par 08/09/2021 15:53:42 EDT lm\par \par Perioperative Diagnosis\par Posterior neck melanoma\par \par  \par \par  Ordered by: JESUS MIRANDA IV       Collected/Examined: [id number] 08:10AM       \par Verification Required       Stage: Final       \par  Performed at: [facility]       Resulted: [id number] 12:20PM       Last Updated: [id number] 01:05PM       Accession: [id number]       \par \par \par \par  Pathology             Final\par \par No Documents Attached\par \par \par \par \par   [facility] Accession Number : [id number]\par Patient:   FERNANDA HAGAN\par \par \par Accession:                             [id number]\par \par Collected Date/Time:                   11/1/2021 10:45 EDT\par Received Date/Time:                    11/1/2021 15:41 EDT\par \par Surgical Pathology Report - Auth (Verified)\par \par Specimen(s) Submitted\par 1  Left cervical sentinel lymph node\par 2  Right supraclavicular sentinel lymph node\par \par Final Diagnosis\par 1. Left cervical sentinel lymph node, dissection:\par - Five lymph nodes, no melanoma seen (0/5).\par 2. Right supraclavicular sentinel lymph node, dissection:\par \par - Nine lymph nodes, no melanoma seen (0/9).\par Verified by: Alexsandra Kolb M.D.\par (Electronic Signature)\par Reported on: 11/04/21 16:02 EDT, [address] [place] [address]\par Phone: [phone]   Fax: [phone]\par _________________________________________________________________\par \par \par Clinical Information\par Excision of bilateral neck sentinel lymph nodes\par See prior excision of posterior neck melanoma\par \par Perioperative Diagnosis\par Posterior neck melanoma\par \par Gross Description\par 1.  The specimen is received in formalin, labeled "left cervical sentinel\par lymph node" and consists of a lymph node with attached adipose tissue,\par measuring 1.1 x 1.0 x 0.5 cm in aggregate. The specimen is serially\par sectioned.  The specimen is submitted entirely. (1 block)\par \par 2.  The specimen is received in formalin, labeled "right   supraclavicular\par sentinel lymph node" and consists of multiple irregular yellow\par tan  fibroadipose tissue fragments, measuring 3.5 x 3.5 x 1.0 cm in\par aggregate. On palpation multiple lymph nodes are identified, measuring\par 0.6 to 1.2 cm in maximum dimension.   The specimen is submitted entirely.\par \par Summary of Sections:\par 2A - One lymph node serially sectioned -1\par 2B-2D - Remaining fibroadipose tissue containing multiple lymph nodes -3\par \par Total blocks - 4\par \par Specimen was received and underwent gross examination at [facility] [place], [address] [place] [address].\par \par \par 11/02/2021 07:57:44 EDT mt\par \par  \par \par  Ordered by: JESUS MIRANDA IV       Collected/Examined: 01Nov2021 10:45AM       \par Verification Required       Stage: Final       \par  Performed at: [facility]       Resulted: 04Nov2021 04:02PM       Last Updated: 04Nov2021 04:02PM       Accession: [id number]

## 2021-11-16 NOTE — ASSESSMENT
[FreeTextEntry1] : 72 yo M with malignant melanoma of posterior neck, s/p excision 8/9/21. Doing well. \par Initial biopsy 0.6 mm, excision 0.6 mm = TOTAL 1.2 mm\par Now POD #15 s/p SLN Bx bilateral neck, doing well with resolving ecchymoses due to warfarin\par \par - Suture tags removed, steri strips applied\par - Pathology reviewed again : left cervical LN x5 and right supraclavicular LN x9 all negative for melanoma\par  -Warm compresses\par - Reassurance given \par - Tylenol PRN\par - F/U 2 months with Dr. Marie \par \par Due to COVID-19, pre-visit patient instructions were explained to the patient and their symptoms were checked upon arrival. Masks were used by the healthcare provider and staff and the examination room was cleaned after the patient visit concluded\par

## 2021-11-16 NOTE — HISTORY OF PRESENT ILLNESS
[FreeTextEntry1] : 71 yr old male with PMHx of HTN, MI 2006 s/p cardiac stents x4, NIDDM, Crohn's disease seen w wife for tx option re melanoma posterior neck.\par Pt with multiple prior SCC and BCC (seven--scattered throughout body).\par \par \par 6/28/2021--right postauricualr SCC-- Dr. Lucero to address\par 0.6mm melanoma--this is reason for referral\par \par nonsmoker\par \par Interval hx (8/17/21). Pt presents today POD#8 s/p excision of posterior neck melanoma. Doing well with no significant pain, f/c or bleeding. \par \par Interval hx (8/25/21). Pt presents today POD#16 s/p excision of posterior neck melanoma. Offers no new complaints. Denies any fever, chills or drainage form the surgical site. \par \par Interval hx (9/29/21): Pt presents today 6 weeks s/p excision of posterior neck melanoma. C/o mild sensitivity to posterior scalp that has been improving. Denies pain or f/c.\par \par Interval hx (10/14/21). Patient presents today 8 weeks post-op for f/u to discuss treatment plan. \par \par Interval hx (11/8/21): Pt presents today POD #7 s/p SLN Bx bilateral neck. C/o discomfort and bruising that is slowly resolving. Denies f/c or bleeding.\par \par Interval hx (11/16/21): Pt presents today POD #15 s/p SLN Bx bilateral neck (negative). Doing well with resolving neck and chest bruising and swelling. Denies any f/c or bleeding.

## 2021-12-09 ENCOUNTER — APPOINTMENT (OUTPATIENT)
Dept: PLASTIC SURGERY | Facility: CLINIC | Age: 71
End: 2021-12-09

## 2021-12-12 NOTE — ASU PATIENT PROFILE, ADULT - PRO MENTAL HEALTH SX RECENT
Vomiting and diarrhea since yesterday, emesis x3 today, unable to ebony any PO. Diarrhea x3 today, 5 urine diapers today. Tactile fever this AM    
none

## 2021-12-15 ENCOUNTER — APPOINTMENT (OUTPATIENT)
Dept: MEDICATION MANAGEMENT | Facility: CLINIC | Age: 71
End: 2021-12-15

## 2021-12-15 ENCOUNTER — OUTPATIENT (OUTPATIENT)
Dept: OUTPATIENT SERVICES | Facility: HOSPITAL | Age: 71
LOS: 1 days | Discharge: HOME | End: 2021-12-15

## 2021-12-15 VITALS — OXYGEN SATURATION: 96 % | RESPIRATION RATE: 16 BRPM | HEART RATE: 61 BPM

## 2021-12-15 DIAGNOSIS — Z95.5 PRESENCE OF CORONARY ANGIOPLASTY IMPLANT AND GRAFT: Chronic | ICD-10-CM

## 2021-12-15 DIAGNOSIS — Z79.01 LONG TERM (CURRENT) USE OF ANTICOAGULANTS: ICD-10-CM

## 2021-12-15 DIAGNOSIS — Z98.890 OTHER SPECIFIED POSTPROCEDURAL STATES: Chronic | ICD-10-CM

## 2021-12-15 DIAGNOSIS — I48.91 UNSPECIFIED ATRIAL FIBRILLATION: ICD-10-CM

## 2021-12-15 LAB
INR PPP: 2.4 RATIO
POCT-PROTHROMBIN TIME: 29.1 SECS
QUALITY CONTROL: YES

## 2021-12-21 ENCOUNTER — TRANSCRIPTION ENCOUNTER (OUTPATIENT)
Age: 71
End: 2021-12-21

## 2022-01-06 ENCOUNTER — APPOINTMENT (OUTPATIENT)
Dept: PLASTIC SURGERY | Facility: CLINIC | Age: 72
End: 2022-01-06
Payer: MEDICARE

## 2022-01-06 PROCEDURE — 99212 OFFICE O/P EST SF 10 MIN: CPT

## 2022-01-06 NOTE — DATA REVIEWED
[FreeTextEntry1] : Pathology             Final\par \par No Documents Attached\par \par \par \par   [facility] Accession Number : [id number]\par \par FERNANDA HAGAN                          2\par \par \par \par Surgical Final Report\par \par \par \par \par Final Diagnosis\par Posterior neck melanoma, excision:\par - Focal residual melanoma measuring 0.6 mm in depth (Slide 1F).\par - Melanoma is at least 1 cm from inked peripheral  margin.\par - Deep margin is 1.5 cm from the tumor.\par - Actinic keratosis and chronic inflammation.\par - Previous consultation slides have been reviewed [id number]).\par \par Verified by: Jane Greer M.D.\par (Electronic Signature)\par Reported on: 08/18/21 12:20 EDT, [facility] [address]\par Phone: [phone]   Fax: [phone]\par _________________________________________________________________\par \par Comment\par Note: This case was reviewed in intradepartmental QA conference\par and the diagnosis represents a consensus opinion.\par \par Case reported to Tumor Registry.\par \par Clinical History\par Excision\par See outside slide review\par COVID (-)\par \par Specimen(s) Submitted\par Posterior neck melanoma\par \par Gross Description\par The specimen is received in formalin, labeled "posterior neck\par melanoma" and consists of one ellipse of skin with soft fatty\par underlying tissue. the ellipse of skin measures 4.5 x 2.9 x 0.2\par cm. Underlying tissue measures 5.5 x 3 x 1.8 cm. It is oriented\par with a suture at 12:00. There is a scar on the ellipse of skin,\par measuring 1 cm. The scar is 0.6 cm from the closest margin. The\par specimen is inked as follows: 12:00 to 3:00 - red, 3:00 to 6:00 -\par yellow, 6:00 to 9:00 - blue, 9:00 to 12:00 - green. Deep margin\par is inked black. Representative sections are submitted.\par \par Summary of Sections:\par 1A- 3:00 margin-1\par 1B- 9:00 margin-1\par 1C- cross section of 12:00 margin-1\par 1D- 1:00 margin-1\par 1E- 11:00 margin-1\par 1F- 7:00 margin-1\par \par Total: 6 blocks\par \par Specimen was received and underwent gross examination at [facility], [address] [place] [address],\[place] [address].\par \par 08/09/2021 15:53:42 EDT lm\par \par Perioperative Diagnosis\par Posterior neck melanoma\par \par  \par \par  Ordered by: JESUS MIRANDA IV       Collected/Examined: [id number] 08:10AM       \par Verification Required       Stage: Final       \par  Performed at: [facility]       Resulted: [id number] 12:20PM       Last Updated: [id number] 01:05PM       Accession: [id number]       \par \par \par \par  Pathology             Final\par \par No Documents Attached\par \par \par \par \par   [facility] Accession Number : [id number]\par Patient:   FERNANDA HAGAN\par \par \par Accession:                             [id number]\par \par Collected Date/Time:                   11/1/2021 10:45 EDT\par Received Date/Time:                    11/1/2021 15:41 EDT\par \par Surgical Pathology Report - Auth (Verified)\par \par Specimen(s) Submitted\par 1  Left cervical sentinel lymph node\par 2  Right supraclavicular sentinel lymph node\par \par Final Diagnosis\par 1. Left cervical sentinel lymph node, dissection:\par - Five lymph nodes, no melanoma seen (0/5).\par 2. Right supraclavicular sentinel lymph node, dissection:\par \par - Nine lymph nodes, no melanoma seen (0/9).\par Verified by: Alexsandra Kolb M.D.\par (Electronic Signature)\par Reported on: 11/04/21 16:02 EDT, [address] [place] [address]\par Phone: [phone]   Fax: [phone]\par _________________________________________________________________\par \par \par Clinical Information\par Excision of bilateral neck sentinel lymph nodes\par See prior excision of posterior neck melanoma\par \par Perioperative Diagnosis\par Posterior neck melanoma\par \par Gross Description\par 1.  The specimen is received in formalin, labeled "left cervical sentinel\par lymph node" and consists of a lymph node with attached adipose tissue,\par measuring 1.1 x 1.0 x 0.5 cm in aggregate. The specimen is serially\par sectioned.  The specimen is submitted entirely. (1 block)\par \par 2.  The specimen is received in formalin, labeled "right   supraclavicular\par sentinel lymph node" and consists of multiple irregular yellow\par tan  fibroadipose tissue fragments, measuring 3.5 x 3.5 x 1.0 cm in\par aggregate. On palpation multiple lymph nodes are identified, measuring\par 0.6 to 1.2 cm in maximum dimension.   The specimen is submitted entirely.\par \par Summary of Sections:\par 2A - One lymph node serially sectioned -1\par 2B-2D - Remaining fibroadipose tissue containing multiple lymph nodes -3\par \par Total blocks - 4\par \par Specimen was received and underwent gross examination at [facility], [address] [place] [address].\par \par \par 11/02/2021 07:57:44 EDT mt\par \par  \par \par  Ordered by: JESUS MIRANDA IV       Collected/Examined: 01Nov2021 10:45AM       \par Verification Required       Stage: Final       \par  Performed at: [facility]       Resulted: 04Nov2021 04:02PM       Last Updated: 04Nov2021 04:02PM       Accession: [id number]

## 2022-01-06 NOTE — ASSESSMENT
[FreeTextEntry1] : 72 yo M with malignant melanoma of posterior neck, s/p excision 8/9/21. Doing well. \par Initial biopsy 0.6 mm, excision 0.6 mm = TOTAL 1.2 mm\par Now POD #15 s/p SLN Bx bilateral neck, doing well with resolving ecchymoses due to warfarin\par \par - Suture tags removed, steri strips applied\par - Pathology reviewed again : left cervical LN x5 and right supraclavicular LN x9 all negative for melanoma\par  -Warm compresses\par - Reassurance given \par - Tylenol PRN\par - F/U 2 months with Dr. Marie \par \par Due to COVID-19, pre-visit patient instructions were explained to the patient and their symptoms were checked upon arrival. Masks were used by the healthcare provider and staff and the examination room was cleaned after the patient visit concluded\par \par \par 1/6/22\par pt doing fine\par no issues\par B/L supraclavicular swelling resolving\par pt happy, no issues\par posterior neck scar fine, no issues\par \par Due to COVID 19, pre-visit patient instructions were explained to the patient and their symptoms were checked upon arrival.  \par Masks were used by the health care providers and staff and the examination room was cleaned after the patient visit was completed.\par \par f/u 6 months

## 2022-01-06 NOTE — PHYSICAL EXAM
[de-identified] : NAD [de-identified] : excessive sun damage w sig scattered actinic changes [de-identified] : posterior neck scar healed, nontender, well-healed, no evidence of recurrence\par bilateral anterior neck with healing incisions with swelling and significant ecchymoses extending into chest R>>L, resolving, no palpable fluid collection

## 2022-01-18 ENCOUNTER — OUTPATIENT (OUTPATIENT)
Dept: OUTPATIENT SERVICES | Facility: HOSPITAL | Age: 72
LOS: 1 days | Discharge: HOME | End: 2022-01-18

## 2022-01-18 ENCOUNTER — APPOINTMENT (OUTPATIENT)
Dept: MEDICATION MANAGEMENT | Facility: CLINIC | Age: 72
End: 2022-01-18

## 2022-01-18 DIAGNOSIS — I48.91 UNSPECIFIED ATRIAL FIBRILLATION: ICD-10-CM

## 2022-01-18 DIAGNOSIS — Z79.01 LONG TERM (CURRENT) USE OF ANTICOAGULANTS: ICD-10-CM

## 2022-01-18 DIAGNOSIS — Z98.890 OTHER SPECIFIED POSTPROCEDURAL STATES: Chronic | ICD-10-CM

## 2022-01-18 DIAGNOSIS — Z95.5 PRESENCE OF CORONARY ANGIOPLASTY IMPLANT AND GRAFT: Chronic | ICD-10-CM

## 2022-01-18 LAB
INR PPP: 1.7 RATIO
POCT-PROTHROMBIN TIME: 20.5 SECS
QUALITY CONTROL: YES

## 2022-02-01 ENCOUNTER — APPOINTMENT (OUTPATIENT)
Dept: MEDICATION MANAGEMENT | Facility: CLINIC | Age: 72
End: 2022-02-01

## 2022-02-01 ENCOUNTER — OUTPATIENT (OUTPATIENT)
Dept: OUTPATIENT SERVICES | Facility: HOSPITAL | Age: 72
LOS: 1 days | Discharge: HOME | End: 2022-02-01

## 2022-02-01 VITALS — RESPIRATION RATE: 16 BRPM | OXYGEN SATURATION: 99 % | HEART RATE: 75 BPM

## 2022-02-01 DIAGNOSIS — Z98.890 OTHER SPECIFIED POSTPROCEDURAL STATES: Chronic | ICD-10-CM

## 2022-02-01 DIAGNOSIS — Z79.01 LONG TERM (CURRENT) USE OF ANTICOAGULANTS: ICD-10-CM

## 2022-02-01 DIAGNOSIS — Z95.5 PRESENCE OF CORONARY ANGIOPLASTY IMPLANT AND GRAFT: Chronic | ICD-10-CM

## 2022-02-01 DIAGNOSIS — I48.91 UNSPECIFIED ATRIAL FIBRILLATION: ICD-10-CM

## 2022-02-01 LAB
INR PPP: 1.8 RATIO
POCT-PROTHROMBIN TIME: 21.6 SECS
QUALITY CONTROL: YES

## 2022-02-08 ENCOUNTER — OUTPATIENT (OUTPATIENT)
Dept: OUTPATIENT SERVICES | Facility: HOSPITAL | Age: 72
LOS: 1 days | Discharge: HOME | End: 2022-02-08

## 2022-02-08 ENCOUNTER — APPOINTMENT (OUTPATIENT)
Dept: MEDICATION MANAGEMENT | Facility: CLINIC | Age: 72
End: 2022-02-08

## 2022-02-08 VITALS — OXYGEN SATURATION: 99 % | HEART RATE: 70 BPM | RESPIRATION RATE: 16 BRPM

## 2022-02-08 DIAGNOSIS — Z95.5 PRESENCE OF CORONARY ANGIOPLASTY IMPLANT AND GRAFT: Chronic | ICD-10-CM

## 2022-02-08 DIAGNOSIS — Z79.01 LONG TERM (CURRENT) USE OF ANTICOAGULANTS: ICD-10-CM

## 2022-02-08 DIAGNOSIS — I48.91 UNSPECIFIED ATRIAL FIBRILLATION: ICD-10-CM

## 2022-02-08 DIAGNOSIS — Z98.890 OTHER SPECIFIED POSTPROCEDURAL STATES: Chronic | ICD-10-CM

## 2022-02-08 LAB
INR PPP: 1.8 RATIO
POCT-PROTHROMBIN TIME: 21.6 SECS
QUALITY CONTROL: YES

## 2022-02-08 RX ORDER — TADALAFIL 5 MG/1
5 TABLET ORAL
Refills: 0 | Status: ACTIVE | COMMUNITY
Start: 2022-02-08

## 2022-02-14 ENCOUNTER — APPOINTMENT (OUTPATIENT)
Dept: MEDICATION MANAGEMENT | Facility: CLINIC | Age: 72
End: 2022-02-14

## 2022-02-14 ENCOUNTER — OUTPATIENT (OUTPATIENT)
Dept: OUTPATIENT SERVICES | Facility: HOSPITAL | Age: 72
LOS: 1 days | Discharge: HOME | End: 2022-02-14

## 2022-02-14 VITALS — HEART RATE: 60 BPM | OXYGEN SATURATION: 99 % | RESPIRATION RATE: 16 BRPM

## 2022-02-14 DIAGNOSIS — Z98.890 OTHER SPECIFIED POSTPROCEDURAL STATES: Chronic | ICD-10-CM

## 2022-02-14 DIAGNOSIS — Z95.5 PRESENCE OF CORONARY ANGIOPLASTY IMPLANT AND GRAFT: Chronic | ICD-10-CM

## 2022-02-14 DIAGNOSIS — I48.91 UNSPECIFIED ATRIAL FIBRILLATION: ICD-10-CM

## 2022-02-14 DIAGNOSIS — Z79.01 LONG TERM (CURRENT) USE OF ANTICOAGULANTS: ICD-10-CM

## 2022-02-14 LAB
INR PPP: 1.5 RATIO
POCT-PROTHROMBIN TIME: 18.1 SECS
QUALITY CONTROL: YES

## 2022-02-18 ENCOUNTER — OUTPATIENT (OUTPATIENT)
Dept: OUTPATIENT SERVICES | Facility: HOSPITAL | Age: 72
LOS: 1 days | Discharge: HOME | End: 2022-02-18

## 2022-02-18 ENCOUNTER — APPOINTMENT (OUTPATIENT)
Dept: MEDICATION MANAGEMENT | Facility: CLINIC | Age: 72
End: 2022-02-18

## 2022-02-18 VITALS — RESPIRATION RATE: 16 BRPM | OXYGEN SATURATION: 98 % | HEART RATE: 60 BPM

## 2022-02-18 DIAGNOSIS — Z79.01 LONG TERM (CURRENT) USE OF ANTICOAGULANTS: ICD-10-CM

## 2022-02-18 DIAGNOSIS — Z98.890 OTHER SPECIFIED POSTPROCEDURAL STATES: Chronic | ICD-10-CM

## 2022-02-18 DIAGNOSIS — I48.91 UNSPECIFIED ATRIAL FIBRILLATION: ICD-10-CM

## 2022-02-18 DIAGNOSIS — Z95.5 PRESENCE OF CORONARY ANGIOPLASTY IMPLANT AND GRAFT: Chronic | ICD-10-CM

## 2022-02-18 LAB
INR PPP: 2 RATIO
POCT-PROTHROMBIN TIME: 24.3 SECS
QUALITY CONTROL: YES

## 2022-02-24 ENCOUNTER — TRANSCRIPTION ENCOUNTER (OUTPATIENT)
Age: 72
End: 2022-02-24

## 2022-02-25 ENCOUNTER — APPOINTMENT (OUTPATIENT)
Dept: MEDICATION MANAGEMENT | Facility: CLINIC | Age: 72
End: 2022-02-25

## 2022-02-25 ENCOUNTER — OUTPATIENT (OUTPATIENT)
Dept: OUTPATIENT SERVICES | Facility: HOSPITAL | Age: 72
LOS: 1 days | Discharge: HOME | End: 2022-02-25

## 2022-02-25 VITALS — OXYGEN SATURATION: 99 % | RESPIRATION RATE: 16 BRPM | HEART RATE: 68 BPM

## 2022-02-25 DIAGNOSIS — I48.91 UNSPECIFIED ATRIAL FIBRILLATION: ICD-10-CM

## 2022-02-25 DIAGNOSIS — Z79.01 LONG TERM (CURRENT) USE OF ANTICOAGULANTS: ICD-10-CM

## 2022-02-25 DIAGNOSIS — Z98.890 OTHER SPECIFIED POSTPROCEDURAL STATES: Chronic | ICD-10-CM

## 2022-02-25 DIAGNOSIS — Z95.5 PRESENCE OF CORONARY ANGIOPLASTY IMPLANT AND GRAFT: Chronic | ICD-10-CM

## 2022-02-25 LAB
INR PPP: 2.1 RATIO
POCT-PROTHROMBIN TIME: 25.6 SECS
QUALITY CONTROL: YES

## 2022-03-19 ENCOUNTER — LABORATORY RESULT (OUTPATIENT)
Age: 72
End: 2022-03-19

## 2022-03-22 ENCOUNTER — APPOINTMENT (OUTPATIENT)
Age: 72
End: 2022-03-22
Payer: MEDICARE

## 2022-03-22 ENCOUNTER — APPOINTMENT (OUTPATIENT)
Dept: MEDICATION MANAGEMENT | Facility: CLINIC | Age: 72
End: 2022-03-22

## 2022-03-22 ENCOUNTER — OUTPATIENT (OUTPATIENT)
Dept: OUTPATIENT SERVICES | Facility: HOSPITAL | Age: 72
LOS: 1 days | Discharge: HOME | End: 2022-03-22

## 2022-03-22 VITALS — RESPIRATION RATE: 16 BRPM | HEART RATE: 68 BPM | OXYGEN SATURATION: 99 %

## 2022-03-22 DIAGNOSIS — Z51.81 ENCOUNTER FOR THERAPEUTIC DRUG LVL MONITORING: ICD-10-CM

## 2022-03-22 DIAGNOSIS — Z95.5 PRESENCE OF CORONARY ANGIOPLASTY IMPLANT AND GRAFT: Chronic | ICD-10-CM

## 2022-03-22 DIAGNOSIS — Z98.890 OTHER SPECIFIED POSTPROCEDURAL STATES: Chronic | ICD-10-CM

## 2022-03-22 DIAGNOSIS — Z79.01 LONG TERM (CURRENT) USE OF ANTICOAGULANTS: ICD-10-CM

## 2022-03-22 DIAGNOSIS — I48.91 UNSPECIFIED ATRIAL FIBRILLATION: ICD-10-CM

## 2022-03-22 DIAGNOSIS — R79.1 ABNORMAL COAGULATION PROFILE: ICD-10-CM

## 2022-03-22 LAB
INR PPP: 2.3 RATIO
POCT-PROTHROMBIN TIME: 27.3 SECS
QUALITY CONTROL: YES

## 2022-03-22 PROCEDURE — 94729 DIFFUSING CAPACITY: CPT

## 2022-03-22 PROCEDURE — 94010 BREATHING CAPACITY TEST: CPT

## 2022-03-22 PROCEDURE — 94727 GAS DIL/WSHOT DETER LNG VOL: CPT

## 2022-03-22 PROCEDURE — 99214 OFFICE O/P EST MOD 30 MIN: CPT | Mod: 25

## 2022-03-22 NOTE — ASSESSMENT
[FreeTextEntry1] : DWIGHT on BiPAP 11/7 cmn H2O\par HO Afib\par SP COVID in November 2021\par HO Melanoma sp resection.  SP PET in July with 5 mm PET negative lung nodule

## 2022-03-30 ENCOUNTER — OUTPATIENT (OUTPATIENT)
Dept: OUTPATIENT SERVICES | Facility: HOSPITAL | Age: 72
LOS: 1 days | Discharge: HOME | End: 2022-03-30
Payer: MEDICARE

## 2022-03-30 ENCOUNTER — RESULT REVIEW (OUTPATIENT)
Age: 72
End: 2022-03-30

## 2022-03-30 DIAGNOSIS — R91.1 SOLITARY PULMONARY NODULE: ICD-10-CM

## 2022-03-30 DIAGNOSIS — Z98.890 OTHER SPECIFIED POSTPROCEDURAL STATES: Chronic | ICD-10-CM

## 2022-03-30 DIAGNOSIS — Z95.5 PRESENCE OF CORONARY ANGIOPLASTY IMPLANT AND GRAFT: Chronic | ICD-10-CM

## 2022-03-30 PROCEDURE — 71250 CT THORAX DX C-: CPT | Mod: 26,MH

## 2022-04-18 ENCOUNTER — APPOINTMENT (OUTPATIENT)
Age: 72
End: 2022-04-18
Payer: MEDICARE

## 2022-04-18 PROCEDURE — 99442: CPT | Mod: 95

## 2022-04-18 NOTE — ASSESSMENT
[FreeTextEntry1] : DWIGHT on BiPAP 11/7 cmn H2O\par HO Afib\par SP COVID in November 2021\par HO Melanoma sp resection.  SP PET in July with 5 mm PET negative lung nodule \par Repeat CT stable

## 2022-04-19 ENCOUNTER — APPOINTMENT (OUTPATIENT)
Dept: MEDICATION MANAGEMENT | Facility: CLINIC | Age: 72
End: 2022-04-19

## 2022-04-19 ENCOUNTER — OUTPATIENT (OUTPATIENT)
Dept: OUTPATIENT SERVICES | Facility: HOSPITAL | Age: 72
LOS: 1 days | Discharge: HOME | End: 2022-04-19

## 2022-04-19 DIAGNOSIS — I48.91 UNSPECIFIED ATRIAL FIBRILLATION: ICD-10-CM

## 2022-04-19 DIAGNOSIS — Z95.5 PRESENCE OF CORONARY ANGIOPLASTY IMPLANT AND GRAFT: Chronic | ICD-10-CM

## 2022-04-19 DIAGNOSIS — Z98.890 OTHER SPECIFIED POSTPROCEDURAL STATES: Chronic | ICD-10-CM

## 2022-04-19 DIAGNOSIS — Z79.01 LONG TERM (CURRENT) USE OF ANTICOAGULANTS: ICD-10-CM

## 2022-04-19 LAB
INR PPP: 3 RATIO
POCT-PROTHROMBIN TIME: 36.3 SECS
QUALITY CONTROL: YES

## 2022-05-10 ENCOUNTER — OUTPATIENT (OUTPATIENT)
Dept: OUTPATIENT SERVICES | Facility: HOSPITAL | Age: 72
LOS: 1 days | Discharge: HOME | End: 2022-05-10

## 2022-05-10 ENCOUNTER — APPOINTMENT (OUTPATIENT)
Dept: MEDICATION MANAGEMENT | Facility: CLINIC | Age: 72
End: 2022-05-10

## 2022-05-10 DIAGNOSIS — Z95.5 PRESENCE OF CORONARY ANGIOPLASTY IMPLANT AND GRAFT: Chronic | ICD-10-CM

## 2022-05-10 DIAGNOSIS — Z79.01 LONG TERM (CURRENT) USE OF ANTICOAGULANTS: ICD-10-CM

## 2022-05-10 DIAGNOSIS — I48.91 UNSPECIFIED ATRIAL FIBRILLATION: ICD-10-CM

## 2022-05-10 DIAGNOSIS — Z98.890 OTHER SPECIFIED POSTPROCEDURAL STATES: Chronic | ICD-10-CM

## 2022-05-10 LAB
INR PPP: 2.1 RATIO
POCT-PROTHROMBIN TIME: 25.1 SECS
QUALITY CONTROL: YES

## 2022-06-01 ENCOUNTER — APPOINTMENT (OUTPATIENT)
Dept: MEDICATION MANAGEMENT | Facility: CLINIC | Age: 72
End: 2022-06-01

## 2022-06-03 ENCOUNTER — APPOINTMENT (OUTPATIENT)
Dept: MEDICATION MANAGEMENT | Facility: CLINIC | Age: 72
End: 2022-06-03

## 2022-06-03 ENCOUNTER — OUTPATIENT (OUTPATIENT)
Dept: OUTPATIENT SERVICES | Facility: HOSPITAL | Age: 72
LOS: 1 days | Discharge: HOME | End: 2022-06-03

## 2022-06-03 DIAGNOSIS — Z79.01 LONG TERM (CURRENT) USE OF ANTICOAGULANTS: ICD-10-CM

## 2022-06-03 DIAGNOSIS — I48.91 UNSPECIFIED ATRIAL FIBRILLATION: ICD-10-CM

## 2022-06-03 DIAGNOSIS — Z98.890 OTHER SPECIFIED POSTPROCEDURAL STATES: Chronic | ICD-10-CM

## 2022-06-03 DIAGNOSIS — Z95.5 PRESENCE OF CORONARY ANGIOPLASTY IMPLANT AND GRAFT: Chronic | ICD-10-CM

## 2022-06-03 LAB
INR PPP: 2.9 RATIO
POCT-PROTHROMBIN TIME: 34.1 SECS
QUALITY CONTROL: YES

## 2022-06-16 ENCOUNTER — APPOINTMENT (OUTPATIENT)
Dept: PLASTIC SURGERY | Facility: CLINIC | Age: 72
End: 2022-06-16
Payer: MEDICARE

## 2022-06-16 PROCEDURE — 99212 OFFICE O/P EST SF 10 MIN: CPT

## 2022-06-16 NOTE — DATA REVIEWED
[FreeTextEntry1] : Pathology             Final\par \par No Documents Attached\par \par \par \par   [facility] Accession Number : [id number]\par \par FERNANDA HAGAN                          2\par \par \par \par Surgical Final Report\par \par \par \par \par Final Diagnosis\par Posterior neck melanoma, excision:\par - Focal residual melanoma measuring 0.6 mm in depth (Slide 1F).\par - Melanoma is at least 1 cm from inked peripheral  margin.\par - Deep margin is 1.5 cm from the tumor.\par - Actinic keratosis and chronic inflammation.\par - Previous consultation slides have been reviewed [id number]).\par \par Verified by: Jane Greer M.D.\par (Electronic Signature)\par Reported on: 08/18/21 12:20 EDT, [address] [facility],\[place] [address]\par Phone: [phone]   Fax: [phone]\par _________________________________________________________________\par \par Comment\par Note: This case was reviewed in intradepartmental QA conference\par and the diagnosis represents a consensus opinion.\par \par Case reported to Tumor Registry.\par \par Clinical History\par Excision\par See outside slide review\par COVID (-)\par \par Specimen(s) Submitted\par Posterior neck melanoma\par \par Gross Description\par The specimen is received in formalin, labeled "posterior neck\par melanoma" and consists of one ellipse of skin with soft fatty\par underlying tissue. the ellipse of skin measures 4.5 x 2.9 x 0.2\par cm. Underlying tissue measures 5.5 x 3 x 1.8 cm. It is oriented\par with a suture at 12:00. There is a scar on the ellipse of skin,\par measuring 1 cm. The scar is 0.6 cm from the closest margin. The\par specimen is inked as follows: 12:00 to 3:00 - red, 3:00 to 6:00 -\par yellow, 6:00 to 9:00 - blue, 9:00 to 12:00 - green. Deep margin\par is inked black. Representative sections are submitted.\par \par Summary of Sections:\par 1A- 3:00 margin-1\par 1B- 9:00 margin-1\par 1C- cross section of 12:00 margin-1\par 1D- 1:00 margin-1\par 1E- 11:00 margin-1\par 1F- 7:00 margin-1\par \par Total: 6 blocks\par \par Specimen was received and underwent gross examination at [facility], [address] [place] [address],\[place] [address].\par \par 08/09/2021 15:53:42 EDT lm\par \par Perioperative Diagnosis\par Posterior neck melanoma\par \par  \par \par  Ordered by: JESUS MIRANDA IV       Collected/Examined: [id number] 08:10AM       \par Verification Required       Stage: Final       \par  Performed at: [facility]       Resulted: [id number] 12:20PM       Last Updated: [id number] 01:05PM       Accession: [id number]       \par \par \par \par  Pathology             Final\par \par No Documents Attached\par \par \par \par \par   [facility] Accession Number : [id number]\par Patient:   FERNANDA HAGAN\par \par \par Accession:                             [id number]\par \par Collected Date/Time:                   11/1/2021 10:45 EDT\par Received Date/Time:                    11/1/2021 15:41 EDT\par \par Surgical Pathology Report - Auth (Verified)\par \par Specimen(s) Submitted\par 1  Left cervical sentinel lymph node\par 2  Right supraclavicular sentinel lymph node\par \par Final Diagnosis\par 1. Left cervical sentinel lymph node, dissection:\par - Five lymph nodes, no melanoma seen (0/5).\par 2. Right supraclavicular sentinel lymph node, dissection:\par \par - Nine lymph nodes, no melanoma seen (0/9).\par Verified by: Alexsandra Kolb M.D.\par (Electronic Signature)\par Reported on: 11/04/21 16:02 EDT, [address] [place] [address]\par Phone: [phone]   Fax: [phone]\par _________________________________________________________________\par \par \par Clinical Information\par Excision of bilateral neck sentinel lymph nodes\par See prior excision of posterior neck melanoma\par \par Perioperative Diagnosis\par Posterior neck melanoma\par \par Gross Description\par 1.  The specimen is received in formalin, labeled "left cervical sentinel\par lymph node" and consists of a lymph node with attached adipose tissue,\par measuring 1.1 x 1.0 x 0.5 cm in aggregate. The specimen is serially\par sectioned.  The specimen is submitted entirely. (1 block)\par \par 2.  The specimen is received in formalin, labeled "right   supraclavicular\par sentinel lymph node" and consists of multiple irregular yellow\par tan  fibroadipose tissue fragments, measuring 3.5 x 3.5 x 1.0 cm in\par aggregate. On palpation multiple lymph nodes are identified, measuring\par 0.6 to 1.2 cm in maximum dimension.   The specimen is submitted entirely.\par \par Summary of Sections:\par 2A - One lymph node serially sectioned -1\par 2B-2D - Remaining fibroadipose tissue containing multiple lymph nodes -3\par \par Total blocks - 4\par \par Specimen was received and underwent gross examination at [facility], [address] [place] [address].\par \par \par 11/02/2021 07:57:44 EDT mt\par \par  \par \par  Ordered by: JESUS MIRANDA IV       Collected/Examined: 01Nov2021 10:45AM       \par Verification Required       Stage: Final       \par  Performed at: [facility]       Resulted: 04Nov2021 04:02PM       Last Updated: 04Nov2021 04:02PM       Accession: [id number]

## 2022-06-16 NOTE — ASSESSMENT
[FreeTextEntry1] : 71 yo M with malignant melanoma of posterior neck, s/p excision 8/9/21. Doing well. \par Initial biopsy 0.6 mm, excision 0.6 mm = TOTAL 1.2 mm\par Now 8 months  s/p SLN Bx bilateral neck, doing well with resolving ecchymoses due to warfarin\par \par - Scar massage, scar creams \par  -Derm surveillance\par - F/U 6 months\par \par Due to COVID-19, pre-visit patient instructions were explained to the patient and their symptoms were checked upon arrival. Masks were used by the healthcare provider and staff and the examination room was cleaned after the patient visit concluded\par \par 6/16/2022\par as above\par B/L anterior neck and posterior neck incisions fine--no issues\par left sided diminished sensation around scar improving\par \par now has recent biopsy of right hand that cannot r/o underlying early evolving melanoma in situ (atypical proliferation of melanocytes)\par \par suggest office excisionon\par \par Regarding the procedure, we discussed scarring, poor wound healing, bleeding, infection, need for additional surgery, and dissatisfaction with the outcome.  Also discussed possibility of keloid and/or hypertrophic scar formation as well as recurrence.  All questions were answered and risks understood.\par \par Due to COVID 19, pre-visit patient instructions were explained to the patient and their symptoms were checked upon arrival.  \par Masks were used by the health care providers and staff and the examination room was cleaned after the patient visit was completed.\par \par \par \par \par \par

## 2022-06-16 NOTE — HISTORY OF PRESENT ILLNESS
[FreeTextEntry1] : 71 yr old male with PMHx of HTN, MI 2006 s/p cardiac stents x4, NIDDM, Crohn's disease seen w wife for tx option re melanoma posterior neck.\par Pt with multiple prior SCC and BCC (seven--scattered throughout body).\par \par \par 6/28/2021--right postauricualr SCC-- Dr. Lucero to address\par 0.6mm melanoma--this is reason for referral\par \par nonsmoker\par \par Interval hx (8/17/21). Pt presents today POD#8 s/p excision of posterior neck melanoma. Doing well with no significant pain, f/c or bleeding. \par \par Interval hx (8/25/21). Pt presents today POD#16 s/p excision of posterior neck melanoma. Offers no new complaints. Denies any fever, chills or drainage form the surgical site. \par \par Interval hx (9/29/21): Pt presents today 6 weeks s/p excision of posterior neck melanoma. C/o mild sensitivity to posterior scalp that has been improving. Denies pain or f/c.\par \par Interval hx (10/14/21). Patient presents today 8 weeks post-op for f/u to discuss treatment plan. \par \par Interval hx (11/8/21): Pt presents today POD #7 s/p SLN Bx bilateral neck. C/o discomfort and bruising that is slowly resolving. Denies f/c or bleeding.\par \par Interval hx (11/16/21): Pt presents today POD #15 s/p SLN Bx bilateral neck (negative). Doing well with resolving neck and chest bruising and swelling. Denies any f/c or bleeding. \par \par Interval hx (6/16/22): Pt presents today 8 months s/p SLN Bx bilateral neck (negative). Doing well.

## 2022-06-16 NOTE — PHYSICAL EXAM
[de-identified] : NAD [de-identified] : excessive sun damage w sig scattered actinic changes [de-identified] : posterior neck scar healed, nontender, well-healed, no evidence of recurrence\par bilateral anterior neck with healing incisions with swelling and significant ecchymoses extending into chest R>>L, resolving, no palpable fluid collection

## 2022-07-01 ENCOUNTER — APPOINTMENT (OUTPATIENT)
Dept: MEDICATION MANAGEMENT | Facility: CLINIC | Age: 72
End: 2022-07-01

## 2022-07-01 ENCOUNTER — OUTPATIENT (OUTPATIENT)
Dept: OUTPATIENT SERVICES | Facility: HOSPITAL | Age: 72
LOS: 1 days | Discharge: HOME | End: 2022-07-01

## 2022-07-01 ENCOUNTER — NON-APPOINTMENT (OUTPATIENT)
Age: 72
End: 2022-07-01

## 2022-07-01 DIAGNOSIS — Z95.5 PRESENCE OF CORONARY ANGIOPLASTY IMPLANT AND GRAFT: Chronic | ICD-10-CM

## 2022-07-01 DIAGNOSIS — Z79.01 LONG TERM (CURRENT) USE OF ANTICOAGULANTS: ICD-10-CM

## 2022-07-01 DIAGNOSIS — Z98.890 OTHER SPECIFIED POSTPROCEDURAL STATES: Chronic | ICD-10-CM

## 2022-07-01 DIAGNOSIS — I48.91 UNSPECIFIED ATRIAL FIBRILLATION: ICD-10-CM

## 2022-07-01 LAB
INR PPP: 3 RATIO
POCT-PROTHROMBIN TIME: 36.1 SECS
QUALITY CONTROL: YES

## 2022-07-08 ENCOUNTER — APPOINTMENT (OUTPATIENT)
Dept: MEDICATION MANAGEMENT | Facility: CLINIC | Age: 72
End: 2022-07-08

## 2022-07-08 ENCOUNTER — OUTPATIENT (OUTPATIENT)
Dept: OUTPATIENT SERVICES | Facility: HOSPITAL | Age: 72
LOS: 1 days | Discharge: HOME | End: 2022-07-08

## 2022-07-08 DIAGNOSIS — I48.91 UNSPECIFIED ATRIAL FIBRILLATION: ICD-10-CM

## 2022-07-08 DIAGNOSIS — Z95.5 PRESENCE OF CORONARY ANGIOPLASTY IMPLANT AND GRAFT: Chronic | ICD-10-CM

## 2022-07-08 DIAGNOSIS — Z79.01 LONG TERM (CURRENT) USE OF ANTICOAGULANTS: ICD-10-CM

## 2022-07-08 DIAGNOSIS — Z98.890 OTHER SPECIFIED POSTPROCEDURAL STATES: Chronic | ICD-10-CM

## 2022-07-08 LAB
INR PPP: 2.7 RATIO
POCT-PROTHROMBIN TIME: 32.4 SECS
QUALITY CONTROL: YES

## 2022-07-09 ENCOUNTER — NON-APPOINTMENT (OUTPATIENT)
Age: 72
End: 2022-07-09

## 2022-07-12 ENCOUNTER — APPOINTMENT (OUTPATIENT)
Dept: MEDICATION MANAGEMENT | Facility: CLINIC | Age: 72
End: 2022-07-12

## 2022-07-12 ENCOUNTER — APPOINTMENT (OUTPATIENT)
Dept: PLASTIC SURGERY | Facility: CLINIC | Age: 72
End: 2022-07-12

## 2022-07-12 ENCOUNTER — OUTPATIENT (OUTPATIENT)
Dept: OUTPATIENT SERVICES | Facility: HOSPITAL | Age: 72
LOS: 1 days | Discharge: HOME | End: 2022-07-12

## 2022-07-12 DIAGNOSIS — Z95.5 PRESENCE OF CORONARY ANGIOPLASTY IMPLANT AND GRAFT: Chronic | ICD-10-CM

## 2022-07-12 DIAGNOSIS — Z98.890 OTHER SPECIFIED POSTPROCEDURAL STATES: Chronic | ICD-10-CM

## 2022-07-12 DIAGNOSIS — I48.91 UNSPECIFIED ATRIAL FIBRILLATION: ICD-10-CM

## 2022-07-12 DIAGNOSIS — Z79.01 LONG TERM (CURRENT) USE OF ANTICOAGULANTS: ICD-10-CM

## 2022-07-15 ENCOUNTER — APPOINTMENT (OUTPATIENT)
Dept: MEDICATION MANAGEMENT | Facility: CLINIC | Age: 72
End: 2022-07-15

## 2022-07-15 ENCOUNTER — OUTPATIENT (OUTPATIENT)
Dept: OUTPATIENT SERVICES | Facility: HOSPITAL | Age: 72
LOS: 1 days | Discharge: HOME | End: 2022-07-15

## 2022-07-15 DIAGNOSIS — I48.91 UNSPECIFIED ATRIAL FIBRILLATION: ICD-10-CM

## 2022-07-15 DIAGNOSIS — Z95.5 PRESENCE OF CORONARY ANGIOPLASTY IMPLANT AND GRAFT: Chronic | ICD-10-CM

## 2022-07-15 DIAGNOSIS — Z79.01 LONG TERM (CURRENT) USE OF ANTICOAGULANTS: ICD-10-CM

## 2022-07-15 DIAGNOSIS — Z98.890 OTHER SPECIFIED POSTPROCEDURAL STATES: Chronic | ICD-10-CM

## 2022-07-18 ENCOUNTER — APPOINTMENT (OUTPATIENT)
Dept: MEDICATION MANAGEMENT | Facility: CLINIC | Age: 72
End: 2022-07-18

## 2022-07-18 ENCOUNTER — OUTPATIENT (OUTPATIENT)
Dept: OUTPATIENT SERVICES | Facility: HOSPITAL | Age: 72
LOS: 1 days | Discharge: HOME | End: 2022-07-18

## 2022-07-18 DIAGNOSIS — Z95.5 PRESENCE OF CORONARY ANGIOPLASTY IMPLANT AND GRAFT: Chronic | ICD-10-CM

## 2022-07-18 DIAGNOSIS — I48.91 UNSPECIFIED ATRIAL FIBRILLATION: ICD-10-CM

## 2022-07-18 DIAGNOSIS — Z79.01 LONG TERM (CURRENT) USE OF ANTICOAGULANTS: ICD-10-CM

## 2022-07-18 DIAGNOSIS — Z98.890 OTHER SPECIFIED POSTPROCEDURAL STATES: Chronic | ICD-10-CM

## 2022-07-18 LAB
INR PPP: 1.4 RATIO
POCT-PROTHROMBIN TIME: 17.1 SECS
QUALITY CONTROL: YES

## 2022-07-19 LAB
INR PPP: 1.1 RATIO
INR PPP: 1.3 RATIO
POCT-PROTHROMBIN TIME: 12.7 SECS
POCT-PROTHROMBIN TIME: 15.3 SECS
QUALITY CONTROL: YES
QUALITY CONTROL: YES

## 2022-07-21 ENCOUNTER — OUTPATIENT (OUTPATIENT)
Dept: OUTPATIENT SERVICES | Facility: HOSPITAL | Age: 72
LOS: 1 days | Discharge: HOME | End: 2022-07-21

## 2022-07-21 ENCOUNTER — APPOINTMENT (OUTPATIENT)
Dept: MEDICATION MANAGEMENT | Facility: CLINIC | Age: 72
End: 2022-07-21

## 2022-07-21 DIAGNOSIS — I48.91 UNSPECIFIED ATRIAL FIBRILLATION: ICD-10-CM

## 2022-07-21 DIAGNOSIS — Z79.01 LONG TERM (CURRENT) USE OF ANTICOAGULANTS: ICD-10-CM

## 2022-07-21 DIAGNOSIS — Z98.890 OTHER SPECIFIED POSTPROCEDURAL STATES: Chronic | ICD-10-CM

## 2022-07-21 DIAGNOSIS — Z95.5 PRESENCE OF CORONARY ANGIOPLASTY IMPLANT AND GRAFT: Chronic | ICD-10-CM

## 2022-07-21 LAB
INR PPP: 1.8 RATIO
POCT-PROTHROMBIN TIME: 21.8 SECS
QUALITY CONTROL: YES

## 2022-07-26 ENCOUNTER — APPOINTMENT (OUTPATIENT)
Dept: MEDICATION MANAGEMENT | Facility: CLINIC | Age: 72
End: 2022-07-26

## 2022-07-26 ENCOUNTER — OUTPATIENT (OUTPATIENT)
Dept: OUTPATIENT SERVICES | Facility: HOSPITAL | Age: 72
LOS: 1 days | Discharge: HOME | End: 2022-07-26

## 2022-07-26 DIAGNOSIS — Z98.890 OTHER SPECIFIED POSTPROCEDURAL STATES: Chronic | ICD-10-CM

## 2022-07-26 DIAGNOSIS — Z95.5 PRESENCE OF CORONARY ANGIOPLASTY IMPLANT AND GRAFT: Chronic | ICD-10-CM

## 2022-07-26 LAB
INR PPP: 2.2 RATIO
POCT-PROTHROMBIN TIME: 26.1 SECS
QUALITY CONTROL: YES

## 2022-08-19 ENCOUNTER — APPOINTMENT (OUTPATIENT)
Dept: MEDICATION MANAGEMENT | Facility: CLINIC | Age: 72
End: 2022-08-19

## 2022-08-19 ENCOUNTER — OUTPATIENT (OUTPATIENT)
Dept: OUTPATIENT SERVICES | Facility: HOSPITAL | Age: 72
LOS: 1 days | Discharge: HOME | End: 2022-08-19

## 2022-08-19 DIAGNOSIS — I48.91 UNSPECIFIED ATRIAL FIBRILLATION: ICD-10-CM

## 2022-08-19 DIAGNOSIS — Z95.5 PRESENCE OF CORONARY ANGIOPLASTY IMPLANT AND GRAFT: Chronic | ICD-10-CM

## 2022-08-19 DIAGNOSIS — Z98.890 OTHER SPECIFIED POSTPROCEDURAL STATES: Chronic | ICD-10-CM

## 2022-08-19 DIAGNOSIS — Z79.01 LONG TERM (CURRENT) USE OF ANTICOAGULANTS: ICD-10-CM

## 2022-08-19 LAB
INR PPP: 1.4 RATIO
POCT-PROTHROMBIN TIME: 16.9 SECS
QUALITY CONTROL: YES

## 2022-08-25 ENCOUNTER — APPOINTMENT (OUTPATIENT)
Dept: MEDICATION MANAGEMENT | Facility: CLINIC | Age: 72
End: 2022-08-25

## 2022-08-25 ENCOUNTER — OUTPATIENT (OUTPATIENT)
Dept: OUTPATIENT SERVICES | Facility: HOSPITAL | Age: 72
LOS: 1 days | Discharge: HOME | End: 2022-08-25

## 2022-08-25 DIAGNOSIS — I48.91 UNSPECIFIED ATRIAL FIBRILLATION: ICD-10-CM

## 2022-08-25 DIAGNOSIS — Z98.890 OTHER SPECIFIED POSTPROCEDURAL STATES: Chronic | ICD-10-CM

## 2022-08-25 DIAGNOSIS — Z95.5 PRESENCE OF CORONARY ANGIOPLASTY IMPLANT AND GRAFT: Chronic | ICD-10-CM

## 2022-08-25 DIAGNOSIS — Z79.01 LONG TERM (CURRENT) USE OF ANTICOAGULANTS: ICD-10-CM

## 2022-08-25 LAB
INR PPP: 2 RATIO
POCT-PROTHROMBIN TIME: 23.8 SECS
QUALITY CONTROL: YES

## 2022-08-26 ENCOUNTER — APPOINTMENT (OUTPATIENT)
Dept: PLASTIC SURGERY | Facility: CLINIC | Age: 72
End: 2022-08-26

## 2022-08-26 PROCEDURE — 13131 CMPLX RPR F/C/C/M/N/AX/G/H/F: CPT | Mod: 59

## 2022-08-26 PROCEDURE — 11421 EXC H-F-NK-SP B9+MARG 0.6-1: CPT

## 2022-08-26 NOTE — PROCEDURE
[FreeTextEntry6] : Patient is a 72 year old male with a right hand dorsum skin lesion measuring approximately 1 x 0.5 cm.  \par \par The area was prepped and draped in the usual fashion.  Local anesthetic was administered using 1% lidocaine with epinephrine.\par \par Lesion was sharply excised.  Area was irrigated copiously.  Complex wound closure was performed in layers.  The wound measured approximately 1.5 cm.\par \par Sterile dressing applied.  \par \par Patient tolerated procedure well and understands post-op instructions.\par \par Sutures Used: 4-0 nylon\par \par Due to COVID 19, pre-visit patient instructions were explained to the patient and their symptoms were checked upon arrival.  \par Masks were used by the health care providers and staff and the examination room was cleaned after the patient visit was completed.\par

## 2022-09-01 ENCOUNTER — APPOINTMENT (OUTPATIENT)
Dept: PLASTIC SURGERY | Facility: CLINIC | Age: 72
End: 2022-09-01

## 2022-09-01 PROCEDURE — 99024 POSTOP FOLLOW-UP VISIT: CPT

## 2022-09-01 NOTE — HISTORY OF PRESENT ILLNESS
[FreeTextEntry1] : 71 yr old male with PMHx of HTN, MI 2006 s/p cardiac stents x4, NIDDM, Crohn's disease seen w wife for tx option re melanoma posterior neck.\par Pt with multiple prior SCC and BCC (seven--scattered throughout body).\par \par \par 6/28/2021--right postauricualr SCC-- Dr. Lucero to address\par 0.6mm melanoma--this is reason for referral\par \par nonsmoker\par \par Interval hx (8/17/21). Pt presents today POD#8 s/p excision of posterior neck melanoma. Doing well with no significant pain, f/c or bleeding. \par \par Interval hx (8/25/21). Pt presents today POD#16 s/p excision of posterior neck melanoma. Offers no new complaints. Denies any fever, chills or drainage form the surgical site. \par \par Interval hx (9/29/21): Pt presents today 6 weeks s/p excision of posterior neck melanoma. C/o mild sensitivity to posterior scalp that has been improving. Denies pain or f/c.\par \par Interval hx (10/14/21). Patient presents today 8 weeks post-op for f/u to discuss treatment plan. \par \par Interval hx (11/8/21): Pt presents today POD #7 s/p SLN Bx bilateral neck. C/o discomfort and bruising that is slowly resolving. Denies f/c or bleeding.\par \par Interval hx (11/16/21): Pt presents today POD #15 s/p SLN Bx bilateral neck (negative). Doing well with resolving neck and chest bruising and swelling. Denies any f/c or bleeding. \par \par Interval hx (6/16/22): Pt presents today 8 months s/p SLN Bx bilateral neck (negative). Doing well. \par \par Interval hx (9/1/22). Patient here today POD#6 s/p excision of right dorsal hand skin lesion. Doing well with no significant pain, f/c or drainage.

## 2022-09-01 NOTE — ASSESSMENT
[FreeTextEntry1] : 71 yo M with malignant melanoma of posterior neck, s/p excision 8/9/21. Doing well. \par Initial biopsy 0.6 mm, excision 0.6 mm = TOTAL 1.2 mm\par s/p SLN Bx bilateral neck, doing well with resolving ecchymoses due to warfarin\par \par Now has recent biopsy of right hand that cannot r/o underlying early evolving melanoma in situ (atypical proliferation of melanocytes)\par POD#6 s/p excision. Doing well\par \par - dressing changed\par - hand rest and elevation\par  -pathology pending\par - post-op instructions discussed and all questions answered \par - F/U next week for suture removal and pathology\par \par Due to COVID-19, pre-visit patient instructions were explained to the patient and their symptoms were checked upon arrival. Masks were used by the healthcare provider and staff and the examination room was cleaned after the patient visit concluded\par \par \par \par

## 2022-09-01 NOTE — PHYSICAL EXAM
[de-identified] : NAD [de-identified] : excessive sun damage w sig scattered actinic changes [de-identified] : posterior neck scar healed, nontender, well-healed, no evidence of recurrence\par bilateral anterior neck with healing incisions, swelling resolving, no palpable fluid collection [de-identified] : right dorsal hand incision healing well with minimal swelling and bruising, no erythema or fluid collection

## 2022-09-07 ENCOUNTER — APPOINTMENT (OUTPATIENT)
Dept: PLASTIC SURGERY | Facility: CLINIC | Age: 72
End: 2022-09-07

## 2022-09-07 DIAGNOSIS — D48.5 NEOPLASM OF UNCERTAIN BEHAVIOR OF SKIN: ICD-10-CM

## 2022-09-07 PROCEDURE — 99212 OFFICE O/P EST SF 10 MIN: CPT

## 2022-09-07 NOTE — ASSESSMENT
[FreeTextEntry1] : 71 yo M with malignant melanoma of posterior neck, s/p excision 8/9/21. Doing well. \par Initial biopsy 0.6 mm, excision 0.6 mm = TOTAL 1.2 mm\par s/p SLN Bx bilateral neck, doing well with resolving ecchymoses due to warfarin\par \par Now has recent biopsy of right hand that cannot r/o underlying early evolving melanoma in situ (atypical proliferation of melanocytes)\par POD#12 s/p excision. Doing well\par \par - sutures removed, steris applied\par - daily Aquaphor \par  -pathology discussed, no residual nevus present, dermal scar \par - post-op instructions discussed and all questions answered \par - F/U 2 months \par \par Due to COVID-19, pre-visit patient instructions were explained to the patient and their symptoms were checked upon arrival. Masks were used by the healthcare provider and staff and the examination room was cleaned after the patient visit concluded\par \par \par \par

## 2022-09-07 NOTE — PHYSICAL EXAM
[de-identified] : NAD [de-identified] : excessive sun damage w sig scattered actinic changes [de-identified] : posterior neck scar healed, nontender, well-healed, no evidence of recurrence\par bilateral anterior neck with healing incisions, swelling resolving, no palpable fluid collection [de-identified] : right dorsal hand incision healing well with minimal swelling, no erythema or fluid collection

## 2022-09-07 NOTE — DATA REVIEWED
[FreeTextEntry1] : 8/26/22 Pathology - right hand - dermal scar, no residual melanocytic lesion, margins clear

## 2022-09-07 NOTE — HISTORY OF PRESENT ILLNESS
[FreeTextEntry1] : 71 yr old male with PMHx of HTN, MI 2006 s/p cardiac stents x4, NIDDM, Crohn's disease seen w wife for tx option re melanoma posterior neck.\par Pt with multiple prior SCC and BCC (seven--scattered throughout body).\par \par \par 6/28/2021--right postauricualr SCC-- Dr. Lucero to address\par 0.6mm melanoma--this is reason for referral\par \par nonsmoker\par \par Interval hx (8/17/21). Pt presents today POD#8 s/p excision of posterior neck melanoma. Doing well with no significant pain, f/c or bleeding. \par \par Interval hx (8/25/21). Pt presents today POD#16 s/p excision of posterior neck melanoma. Offers no new complaints. Denies any fever, chills or drainage form the surgical site. \par \par Interval hx (9/29/21): Pt presents today 6 weeks s/p excision of posterior neck melanoma. C/o mild sensitivity to posterior scalp that has been improving. Denies pain or f/c.\par \par Interval hx (10/14/21). Patient presents today 8 weeks post-op for f/u to discuss treatment plan. \par \par Interval hx (11/8/21): Pt presents today POD #7 s/p SLN Bx bilateral neck. C/o discomfort and bruising that is slowly resolving. Denies f/c or bleeding.\par \par Interval hx (11/16/21): Pt presents today POD #15 s/p SLN Bx bilateral neck (negative). Doing well with resolving neck and chest bruising and swelling. Denies any f/c or bleeding. \par \par Interval hx (6/16/22): Pt presents today 8 months s/p SLN Bx bilateral neck (negative). Doing well. \par \par Interval hx (9/1/22). Patient here today POD#6 s/p excision of right dorsal hand skin lesion. Doing well with no significant pain, f/c or drainage. \par \par Interval hx (9/7/22). Patient here today POD#12 s/p excision of right dorsal hand skin lesion. Doing well with no significant complaints.

## 2022-09-16 ENCOUNTER — APPOINTMENT (OUTPATIENT)
Dept: NEUROLOGY | Facility: CLINIC | Age: 72
End: 2022-09-16

## 2022-09-16 VITALS
HEART RATE: 67 BPM | SYSTOLIC BLOOD PRESSURE: 164 MMHG | HEIGHT: 69 IN | DIASTOLIC BLOOD PRESSURE: 75 MMHG | WEIGHT: 205 LBS | BODY MASS INDEX: 30.36 KG/M2

## 2022-09-16 DIAGNOSIS — M51.14 INTERVERTEBRAL DISC DISORDERS WITH RADICULOPATHY, THORACIC REGION: ICD-10-CM

## 2022-09-16 PROCEDURE — 99204 OFFICE O/P NEW MOD 45 MIN: CPT

## 2022-09-16 NOTE — PHYSICAL EXAM

## 2022-09-16 NOTE — HISTORY OF PRESENT ILLNESS
[FreeTextEntry1] : It's a pleasure to see Mr. FERNANDA HAGAN In the office today. He is a 72 year -  old man  who presents to the office today for the evaluation of   Mid thoracic pain on the left   Which shoots horizontally.  He reports that he had the 1st episode about 3 years ago which lasted 2 months before it went away.   he did try physical therapy, but he said therapy actually made the pain worse, therefore he did not continue.There was no skin lesions to suggest that it was shingles, but she never had MRI of the thoracic spine for detailed evaluation.  Recently the same pain came back after physical activity  it is bothersome.  He did go to the emergency room, but because of his heart disease, there hesitant to give him a muscle relaxant.  He has the most pain at night when he has to turn our get out of the bed.  Because of the recent diagnosis of prostate cancer, he has been waking up to go the bathroom multiple times at night.\par \par He is known to have obstructive sleep apnea, and his CPAP machine was on the recall list.  He is waiting for his new machine.  He has AFib and is currently on Coumadin.

## 2022-09-16 NOTE — ASSESSMENT
[FreeTextEntry1] :  thoracic radiculopathy- because of his history prostate cancer melanoma, I would like to send him for MRI of the thoracic spine without gadolinium 1st for further evaluation.  Since therapy made worse last time, he would like to consider injections as an alternative, therefore I will send him to pain management for that.  \par \par  sleep apnea- treating sleep apnea may decrease the aggressiveness and recurrence of cancer.  He is advised to go back on CPAP machine as soon as he gets his new machine.

## 2022-09-18 ENCOUNTER — EMERGENCY (EMERGENCY)
Facility: HOSPITAL | Age: 72
LOS: 0 days | Discharge: HOME | End: 2022-09-18
Attending: EMERGENCY MEDICINE | Admitting: EMERGENCY MEDICINE

## 2022-09-18 VITALS
WEIGHT: 190.04 LBS | SYSTOLIC BLOOD PRESSURE: 177 MMHG | HEIGHT: 69 IN | DIASTOLIC BLOOD PRESSURE: 79 MMHG | TEMPERATURE: 98 F | RESPIRATION RATE: 18 BRPM | OXYGEN SATURATION: 96 % | HEART RATE: 62 BPM

## 2022-09-18 VITALS
DIASTOLIC BLOOD PRESSURE: 93 MMHG | OXYGEN SATURATION: 97 % | RESPIRATION RATE: 18 BRPM | SYSTOLIC BLOOD PRESSURE: 177 MMHG | TEMPERATURE: 98 F | HEART RATE: 87 BPM

## 2022-09-18 DIAGNOSIS — Z98.890 OTHER SPECIFIED POSTPROCEDURAL STATES: Chronic | ICD-10-CM

## 2022-09-18 DIAGNOSIS — Z95.5 PRESENCE OF CORONARY ANGIOPLASTY IMPLANT AND GRAFT: Chronic | ICD-10-CM

## 2022-09-18 PROCEDURE — 93970 EXTREMITY STUDY: CPT | Mod: 26

## 2022-09-18 PROCEDURE — 99284 EMERGENCY DEPT VISIT MOD MDM: CPT | Mod: FS

## 2022-09-18 RX ORDER — METHOCARBAMOL 500 MG/1
2 TABLET, FILM COATED ORAL
Qty: 42 | Refills: 0
Start: 2022-09-18 | End: 2022-09-24

## 2022-09-18 RX ORDER — METHOCARBAMOL 500 MG/1
1000 TABLET, FILM COATED ORAL ONCE
Refills: 0 | Status: COMPLETED | OUTPATIENT
Start: 2022-09-18 | End: 2022-09-18

## 2022-09-18 RX ORDER — OXYCODONE AND ACETAMINOPHEN 5; 325 MG/1; MG/1
1 TABLET ORAL ONCE
Refills: 0 | Status: DISCONTINUED | OUTPATIENT
Start: 2022-09-18 | End: 2022-09-18

## 2022-09-18 RX ADMIN — OXYCODONE AND ACETAMINOPHEN 1 TABLET(S): 5; 325 TABLET ORAL at 09:33

## 2022-09-18 RX ADMIN — METHOCARBAMOL 1000 MILLIGRAM(S): 500 TABLET, FILM COATED ORAL at 09:33

## 2022-09-18 NOTE — ED PROVIDER NOTE - NSFOLLOWUPINSTRUCTIONS_ED_ALL_ED_FT
follow up with Dr. Bowden for re-evaluation and treatment    Leg pain may be caused by a variety of health conditions. Your tests did not show any broken bones or blood clots.    DISCHARGE INSTRUCTIONS:    Return to the emergency department if:   •You have a fever.      •Your leg starts to swell.      •Your leg pain gets worse.      •You have numbness or tingling in your leg or toes.      •You cannot put any weight on or move your leg.      Contact your healthcare provider if:   •Your pain does not decrease, even after treatment.      •You have questions or concerns about your condition or care.      Medicines:   •NSAIDs, such as ibuprofen, help decrease swelling, pain, and fever. This medicine is available with or without a doctor's order. NSAIDs can cause stomach bleeding or kidney problems in certain people. If you take blood thinner medicine, always ask your healthcare provider if NSAIDs are safe for you. Always read the medicine label and follow directions.      •Take your medicine as directed. Contact your healthcare provider if you think your medicine is not helping or if you have side effects. Tell him of her if you are allergic to any medicine. Keep a list of the medicines, vitamins, and herbs you take. Include the amounts, and when and why you take them. Bring the list or the pill bottles to follow-up visits. Carry your medicine list with you in case of an emergency.      Follow up with your healthcare provider as directed: You may need more tests to find the cause of your leg pain. You may need to see an orthopedic specialist or a physical therapist. Write down your questions so you remember to ask them during your visits.

## 2022-09-18 NOTE — ED PROVIDER NOTE - CLINICAL SUMMARY MEDICAL DECISION MAKING FREE TEXT BOX
Patient here with left leg pain radiating down the leg.  Duplex negative pain MSK\sciatica in nature likely.  DC on Robaxin and Tylenol.

## 2022-09-18 NOTE — ED ADULT NURSE NOTE - OBJECTIVE STATEMENT
Pt with C/O left upper leg pain on and off for 3 days ,worse from yesterday . Pt denies chest pain denies SOB no N/V no fever or chills .

## 2022-09-18 NOTE — ED PROVIDER NOTE - OBJECTIVE STATEMENT
Patient with history of coagulopathy, DVTs on Coumadin, diabetes, CAD presents with left lower leg pain radiating to left buttock and lower back for 3 days without trauma.  Denies weakness or numbness, chest pain, or shortness of breath.  Last INR was 2.1 last week.  Patient is concerned for DVT

## 2022-09-18 NOTE — ED PROVIDER NOTE - PATIENT PORTAL LINK FT
You can access the FollowMyHealth Patient Portal offered by [facility] by registering at the following website: [email or web address]. By joining [facility]’s FollowMyHealth portal, you will also be able to view your health information using other applications (apps) compatible with our system.

## 2022-09-18 NOTE — ED PROVIDER NOTE - ATTENDING APP SHARED VISIT CONTRIBUTION OF CARE
Agree with above history and exam.  Impression  Patient here with left leg pain radiating down the leg.  Duplex negative pain MSK\sciatica in nature likely.  DC on Robaxin and Tylenol.

## 2022-09-20 DIAGNOSIS — Z79.01 LONG TERM (CURRENT) USE OF ANTICOAGULANTS: ICD-10-CM

## 2022-09-20 DIAGNOSIS — M54.50 LOW BACK PAIN, UNSPECIFIED: ICD-10-CM

## 2022-09-20 DIAGNOSIS — E11.9 TYPE 2 DIABETES MELLITUS WITHOUT COMPLICATIONS: ICD-10-CM

## 2022-09-20 DIAGNOSIS — I25.10 ATHEROSCLEROTIC HEART DISEASE OF NATIVE CORONARY ARTERY WITHOUT ANGINA PECTORIS: ICD-10-CM

## 2022-09-20 DIAGNOSIS — M79.604 PAIN IN RIGHT LEG: ICD-10-CM

## 2022-09-20 DIAGNOSIS — Z91.048 OTHER NONMEDICINAL SUBSTANCE ALLERGY STATUS: ICD-10-CM

## 2022-09-20 DIAGNOSIS — Z79.84 LONG TERM (CURRENT) USE OF ORAL HYPOGLYCEMIC DRUGS: ICD-10-CM

## 2022-09-20 DIAGNOSIS — Z86.718 PERSONAL HISTORY OF OTHER VENOUS THROMBOSIS AND EMBOLISM: ICD-10-CM

## 2022-09-20 DIAGNOSIS — Z79.02 LONG TERM (CURRENT) USE OF ANTITHROMBOTICS/ANTIPLATELETS: ICD-10-CM

## 2022-09-28 ENCOUNTER — OUTPATIENT (OUTPATIENT)
Dept: OUTPATIENT SERVICES | Facility: HOSPITAL | Age: 72
LOS: 1 days | Discharge: HOME | End: 2022-09-28

## 2022-09-28 ENCOUNTER — APPOINTMENT (OUTPATIENT)
Dept: MEDICATION MANAGEMENT | Facility: CLINIC | Age: 72
End: 2022-09-28

## 2022-09-28 DIAGNOSIS — I48.91 UNSPECIFIED ATRIAL FIBRILLATION: ICD-10-CM

## 2022-09-28 DIAGNOSIS — Z79.01 LONG TERM (CURRENT) USE OF ANTICOAGULANTS: ICD-10-CM

## 2022-09-28 DIAGNOSIS — Z98.890 OTHER SPECIFIED POSTPROCEDURAL STATES: Chronic | ICD-10-CM

## 2022-09-28 DIAGNOSIS — Z95.5 PRESENCE OF CORONARY ANGIOPLASTY IMPLANT AND GRAFT: Chronic | ICD-10-CM

## 2022-09-28 LAB
INR PPP: 3 RATIO
POCT-PROTHROMBIN TIME: 36.2 SECS
QUALITY CONTROL: YES

## 2022-10-05 ENCOUNTER — OUTPATIENT (OUTPATIENT)
Dept: OUTPATIENT SERVICES | Facility: HOSPITAL | Age: 72
LOS: 1 days | Discharge: HOME | End: 2022-10-05

## 2022-10-05 ENCOUNTER — APPOINTMENT (OUTPATIENT)
Dept: MEDICATION MANAGEMENT | Facility: CLINIC | Age: 72
End: 2022-10-05

## 2022-10-05 DIAGNOSIS — Z79.01 LONG TERM (CURRENT) USE OF ANTICOAGULANTS: ICD-10-CM

## 2022-10-05 DIAGNOSIS — Z98.890 OTHER SPECIFIED POSTPROCEDURAL STATES: Chronic | ICD-10-CM

## 2022-10-05 DIAGNOSIS — Z95.5 PRESENCE OF CORONARY ANGIOPLASTY IMPLANT AND GRAFT: Chronic | ICD-10-CM

## 2022-10-05 DIAGNOSIS — I48.91 UNSPECIFIED ATRIAL FIBRILLATION: ICD-10-CM

## 2022-10-05 LAB
INR PPP: 1.6 RATIO
POCT-PROTHROMBIN TIME: 19.7 SECS
QUALITY CONTROL: YES

## 2022-10-06 ENCOUNTER — APPOINTMENT (OUTPATIENT)
Dept: PAIN MANAGEMENT | Facility: CLINIC | Age: 72
End: 2022-10-06

## 2022-10-06 VITALS
HEIGHT: 69 IN | BODY MASS INDEX: 29.62 KG/M2 | SYSTOLIC BLOOD PRESSURE: 161 MMHG | WEIGHT: 200 LBS | HEART RATE: 71 BPM | DIASTOLIC BLOOD PRESSURE: 82 MMHG

## 2022-10-06 VITALS — HEIGHT: 69 IN | WEIGHT: 200 LBS | BODY MASS INDEX: 29.62 KG/M2

## 2022-10-06 DIAGNOSIS — M54.6 PAIN IN THORACIC SPINE: ICD-10-CM

## 2022-10-06 PROCEDURE — 99204 OFFICE O/P NEW MOD 45 MIN: CPT

## 2022-10-06 NOTE — PHYSICAL EXAM
[Normal Coordination] : normal coordination [Normal DTR UE/LE] : normal DTR UE/LE  [Normal Sensation] : normal sensation [Normal Mood and Affect] : normal mood and affect [Orientated] : orientated [Able to Communicate] : able to communicate [Well Developed] : well developed [Well Nourished] : well nourished [] : no swelling [FreeTextEntry8] : Tenderness in the right axillary area.

## 2022-10-06 NOTE — HISTORY OF PRESENT ILLNESS
[FreeTextEntry1] : This is a 72 year old man referred by Dr. Kate and his PCP Dr. Bowden complaining of right thoracic pain and . He has had this pain for several years with occasional flare ups. He is trialing chiropractic manipulation along with taking Celebrex. He states that today his pain is not as severe as it has been within the last few weeks. He reports spasms in his back around the right scapula which causes a shooting pain in the right intercostal region. He has not trialed PT within the last 3 years but is interested in trying it again. There is no prior imaging however he will be undergoing an MRI of the thoracic and lumbar spine this coming week. Of note, he was recently diagnosed with prostate cancer and will begin chemo therapy. \par \par The patient has had this pain for 3 months. The pain started after no incident Patient describes pain as moderate to severe, less than 30% of the time. During the last month the pain has been worse during the night. Pain described as sharp, shooting. Pain is increased with lying down, sitting, coughing sneezing.  Pain is not changed with standing, walking, exercise, relaxation, bowel movements. Bowel or bladder habits have not changed.\par \par ACTIVITIES: Patient could walk 10 blocks before the pain starts. Patient can sit 10 before pain starts. Patient has no pain when sitting .The patient seldom lays down because of pain. Patient uses no assistive walking device at this time. Patient has difficulty doing yd work or shopping, participating in recreational activities, exercising at this time.\par \par PRIOR PAIN TREATMENTS: Moderate relief with physical therapy, TENS, heat treatment, cold treatment, chiropractic manipulation.\par \par PRIOR PAIN MEDICATIONS:  Tylenol, Celebrex, Flexeril, Zoloft\par \par

## 2022-10-06 NOTE — ASSESSMENT
[FreeTextEntry1] : This is a 72 year old man complaining of thoracic pain. He will be undergoing MRI of the lumbar and thoracic spine in the coming week. In the meantime, he will begin PT for the thoracic spine. We will follow up in 3 weeks to review imaging. \par \par All this patients questions were answered and the conversation was understood well.\par \par Physical therapy of the thoracic spine 2-3 times a week for 4-8 weeks stressing a home exercise program of walking, shoulder griddle strengthening,  swimming, elliptical , recumbent bike, Patric chi and Yoga. Use things that heat like hot shower or icy heat before rehab, exercising and at the beginning of the day, and ice (ice in a bag never directly on the skin) after activity and at the end of the day.\par \par \par I, Eugenia Harrison, attest that this documentation has been prepared under the direction and in the presence of Provider Fiordaliza Melendez MD.\par \par \par Thank you for allowing me to assist in the management of this patient. \par \par \par Best Regards, \par \par \par Fiordaliza Melendez M.D., FAAPMR\par \par \par Diplomate, American Board of Physical Medicine and Rehabilitation\par Diplomate, American Board of Pain Medicine \par Diplomate, American Board of Pain Management\par

## 2022-10-06 NOTE — REASON FOR VISIT
[Initial Consultation] : an initial pain management consultation [FreeTextEntry2] : RIGHT UPPER BACK PAIN AND LEFT LEG PAIN

## 2022-10-06 NOTE — DATA REVIEWED
[FreeTextEntry1] : SOAPP: low on 10/06/22\par Low risk: Patient has combination of a low risk SOAP and no risk factors. UDS would be repeated randomly every quarter.\par \par UDS: No data obtained today\par \par [place] REGISTRY: Checked\par

## 2022-10-11 ENCOUNTER — APPOINTMENT (OUTPATIENT)
Dept: MEDICATION MANAGEMENT | Facility: CLINIC | Age: 72
End: 2022-10-11

## 2022-10-11 ENCOUNTER — APPOINTMENT (OUTPATIENT)
Age: 72
End: 2022-10-11

## 2022-10-11 ENCOUNTER — OUTPATIENT (OUTPATIENT)
Dept: OUTPATIENT SERVICES | Facility: HOSPITAL | Age: 72
LOS: 1 days | Discharge: HOME | End: 2022-10-11

## 2022-10-11 DIAGNOSIS — Z95.5 PRESENCE OF CORONARY ANGIOPLASTY IMPLANT AND GRAFT: Chronic | ICD-10-CM

## 2022-10-11 DIAGNOSIS — Z79.01 LONG TERM (CURRENT) USE OF ANTICOAGULANTS: ICD-10-CM

## 2022-10-11 DIAGNOSIS — I48.91 UNSPECIFIED ATRIAL FIBRILLATION: ICD-10-CM

## 2022-10-11 DIAGNOSIS — Z98.890 OTHER SPECIFIED POSTPROCEDURAL STATES: Chronic | ICD-10-CM

## 2022-10-11 LAB
INR PPP: 1.5 RATIO
POCT-PROTHROMBIN TIME: 18 SECS
QUALITY CONTROL: YES

## 2022-10-17 ENCOUNTER — OUTPATIENT (OUTPATIENT)
Dept: OUTPATIENT SERVICES | Facility: HOSPITAL | Age: 72
LOS: 1 days | Discharge: HOME | End: 2022-10-17

## 2022-10-17 ENCOUNTER — APPOINTMENT (OUTPATIENT)
Dept: MEDICATION MANAGEMENT | Facility: CLINIC | Age: 72
End: 2022-10-17

## 2022-10-17 DIAGNOSIS — I48.91 UNSPECIFIED ATRIAL FIBRILLATION: ICD-10-CM

## 2022-10-17 DIAGNOSIS — Z79.01 LONG TERM (CURRENT) USE OF ANTICOAGULANTS: ICD-10-CM

## 2022-10-17 DIAGNOSIS — Z98.890 OTHER SPECIFIED POSTPROCEDURAL STATES: Chronic | ICD-10-CM

## 2022-10-17 DIAGNOSIS — Z95.5 PRESENCE OF CORONARY ANGIOPLASTY IMPLANT AND GRAFT: Chronic | ICD-10-CM

## 2022-10-17 LAB
INR PPP: 1.4 RATIO
QUALITY CONTROL: YES

## 2022-10-21 ENCOUNTER — APPOINTMENT (OUTPATIENT)
Dept: MEDICATION MANAGEMENT | Facility: CLINIC | Age: 72
End: 2022-10-21

## 2022-10-21 ENCOUNTER — OUTPATIENT (OUTPATIENT)
Dept: OUTPATIENT SERVICES | Facility: HOSPITAL | Age: 72
LOS: 1 days | Discharge: HOME | End: 2022-10-21

## 2022-10-21 DIAGNOSIS — Z98.890 OTHER SPECIFIED POSTPROCEDURAL STATES: Chronic | ICD-10-CM

## 2022-10-21 DIAGNOSIS — Z79.01 LONG TERM (CURRENT) USE OF ANTICOAGULANTS: ICD-10-CM

## 2022-10-21 DIAGNOSIS — I48.91 UNSPECIFIED ATRIAL FIBRILLATION: ICD-10-CM

## 2022-10-21 DIAGNOSIS — Z95.5 PRESENCE OF CORONARY ANGIOPLASTY IMPLANT AND GRAFT: Chronic | ICD-10-CM

## 2022-10-21 LAB
INR PPP: 2.3 RATIO
POCT-PROTHROMBIN TIME: 27.1 SECS
QUALITY CONTROL: YES

## 2022-10-25 ENCOUNTER — APPOINTMENT (OUTPATIENT)
Dept: NEUROLOGY | Facility: CLINIC | Age: 72
End: 2022-10-25

## 2022-11-01 ENCOUNTER — APPOINTMENT (OUTPATIENT)
Dept: PAIN MANAGEMENT | Facility: CLINIC | Age: 72
End: 2022-11-01

## 2022-11-08 ENCOUNTER — APPOINTMENT (OUTPATIENT)
Dept: PLASTIC SURGERY | Facility: CLINIC | Age: 72
End: 2022-11-08

## 2022-11-08 PROCEDURE — 99212 OFFICE O/P EST SF 10 MIN: CPT

## 2022-11-08 NOTE — PHYSICAL EXAM
[de-identified] : NAD [de-identified] : excessive sun damage w sig scattered actinic changes [de-identified] : posterior neck scar healed, nontender, well-healed, no evidence of recurrence\par bilateral anterior neck with healing incisions, swelling resolving, no palpable fluid collection [de-identified] : right dorsal hand incision healing well with minimal swelling, no erythema or fluid collection

## 2022-11-08 NOTE — ASSESSMENT
[FreeTextEntry1] : 73 yo M with malignant melanoma of posterior neck, s/p excision 8/9/21. Doing well. \par Initial biopsy 0.6 mm, excision 0.6 mm = TOTAL 1.2 mm\par s/p SLN Bx bilateral neck, doing well with resolving ecchymoses due to warfarin\par \par Now has recent biopsy of right hand that cannot r/o underlying early evolving melanoma in situ (atypical proliferation of melanocytes)\par POD#12 s/p excision. Doing well\par \par - sutures removed, steris applied\par - daily Aquaphor \par  -pathology discussed, no residual nevus present, dermal scar \par - post-op instructions discussed and all questions answered \par - F/U 2 months \par \par Due to COVID-19, pre-visit patient instructions were explained to the patient and their symptoms were checked upon arrival. Masks were used by the healthcare provider and staff and the examination room was cleaned after the patient visit concluded\par \par \par 11/8/2022since I saw last diagnosed pt was diagnosed w prostate ca and currently in middle of proton beam therapy in [place]--tolerating well thusfar\par \par re my prior txs:\par -posterior neck incision fine--no recurrent or suspicious areas of pigmentation\par -B/L incision (from SLNB) fine--no masses (path was negative)\par -right hand incision fine--no suspicious pigmentation\par \par cont derm f/u (sees Bonnie q3 months)\par \par f/u w me in 6 months\par \par Due to COVID 19, pre-visit patient instructions were explained to the patient and their symptoms were checked upon arrival.  \par Masks were used by the health care providers and staff and the examination room was cleaned after the patient visit was completed.\par \par \par \par

## 2022-11-18 ENCOUNTER — APPOINTMENT (OUTPATIENT)
Dept: MEDICATION MANAGEMENT | Facility: CLINIC | Age: 72
End: 2022-11-18

## 2022-11-18 ENCOUNTER — OUTPATIENT (OUTPATIENT)
Dept: OUTPATIENT SERVICES | Facility: HOSPITAL | Age: 72
LOS: 1 days | Discharge: HOME | End: 2022-11-18

## 2022-11-18 DIAGNOSIS — I48.91 UNSPECIFIED ATRIAL FIBRILLATION: ICD-10-CM

## 2022-11-18 DIAGNOSIS — Z79.01 LONG TERM (CURRENT) USE OF ANTICOAGULANTS: ICD-10-CM

## 2022-11-18 DIAGNOSIS — Z98.890 OTHER SPECIFIED POSTPROCEDURAL STATES: Chronic | ICD-10-CM

## 2022-11-18 DIAGNOSIS — Z95.5 PRESENCE OF CORONARY ANGIOPLASTY IMPLANT AND GRAFT: Chronic | ICD-10-CM

## 2022-11-18 LAB
INR PPP: 3.3 RATIO
POCT-PROTHROMBIN TIME: 39.8 SECS
QUALITY CONTROL: YES

## 2022-12-02 ENCOUNTER — APPOINTMENT (OUTPATIENT)
Dept: MEDICATION MANAGEMENT | Facility: CLINIC | Age: 72
End: 2022-12-02

## 2022-12-02 ENCOUNTER — OUTPATIENT (OUTPATIENT)
Dept: OUTPATIENT SERVICES | Facility: HOSPITAL | Age: 72
LOS: 1 days | Discharge: HOME | End: 2022-12-02

## 2022-12-02 DIAGNOSIS — Z79.01 LONG TERM (CURRENT) USE OF ANTICOAGULANTS: ICD-10-CM

## 2022-12-02 DIAGNOSIS — Z95.5 PRESENCE OF CORONARY ANGIOPLASTY IMPLANT AND GRAFT: Chronic | ICD-10-CM

## 2022-12-02 DIAGNOSIS — I48.91 UNSPECIFIED ATRIAL FIBRILLATION: ICD-10-CM

## 2022-12-02 DIAGNOSIS — Z98.890 OTHER SPECIFIED POSTPROCEDURAL STATES: Chronic | ICD-10-CM

## 2022-12-02 LAB
INR PPP: 2.5 RATIO
POCT-PROTHROMBIN TIME: 29.9 SECS
QUALITY CONTROL: YES

## 2023-01-10 ENCOUNTER — APPOINTMENT (OUTPATIENT)
Dept: MEDICATION MANAGEMENT | Facility: CLINIC | Age: 73
End: 2023-01-10

## 2023-01-10 ENCOUNTER — OUTPATIENT (OUTPATIENT)
Dept: OUTPATIENT SERVICES | Facility: HOSPITAL | Age: 73
LOS: 1 days | Discharge: HOME | End: 2023-01-10

## 2023-01-10 DIAGNOSIS — Z98.890 OTHER SPECIFIED POSTPROCEDURAL STATES: Chronic | ICD-10-CM

## 2023-01-10 DIAGNOSIS — I48.91 UNSPECIFIED ATRIAL FIBRILLATION: ICD-10-CM

## 2023-01-10 DIAGNOSIS — Z79.01 LONG TERM (CURRENT) USE OF ANTICOAGULANTS: ICD-10-CM

## 2023-01-10 DIAGNOSIS — Z95.5 PRESENCE OF CORONARY ANGIOPLASTY IMPLANT AND GRAFT: Chronic | ICD-10-CM

## 2023-01-10 LAB
INR PPP: 2.2 RATIO
POCT-PROTHROMBIN TIME: 26 SECS
QUALITY CONTROL: YES

## 2023-01-18 NOTE — ASU PREOP CHECKLIST - BLOOD AVAILABLE
We will start Mucinex 1 (600 mg tablet) twice daily as needed for chest congestion. I would like you to use this if you get an upper respiratory infection or when your sputum is thick. If you pick this up over the counter, make sure to get plain Mucinex, not Mucinex DM.
n/a

## 2023-01-23 ENCOUNTER — EMERGENCY (EMERGENCY)
Facility: HOSPITAL | Age: 73
LOS: 0 days | Discharge: NURSING, REHAB OR LONG-TERM CARE | End: 2023-01-24
Attending: STUDENT IN AN ORGANIZED HEALTH CARE EDUCATION/TRAINING PROGRAM | Admitting: STUDENT IN AN ORGANIZED HEALTH CARE EDUCATION/TRAINING PROGRAM
Payer: MEDICARE

## 2023-01-23 VITALS
SYSTOLIC BLOOD PRESSURE: 138 MMHG | OXYGEN SATURATION: 97 % | TEMPERATURE: 98 F | HEART RATE: 72 BPM | DIASTOLIC BLOOD PRESSURE: 82 MMHG | RESPIRATION RATE: 15 BRPM

## 2023-01-23 DIAGNOSIS — R19.7 DIARRHEA, UNSPECIFIED: ICD-10-CM

## 2023-01-23 DIAGNOSIS — Z95.5 PRESENCE OF CORONARY ANGIOPLASTY IMPLANT AND GRAFT: ICD-10-CM

## 2023-01-23 DIAGNOSIS — Z79.01 LONG TERM (CURRENT) USE OF ANTICOAGULANTS: ICD-10-CM

## 2023-01-23 DIAGNOSIS — Z95.5 PRESENCE OF CORONARY ANGIOPLASTY IMPLANT AND GRAFT: Chronic | ICD-10-CM

## 2023-01-23 DIAGNOSIS — I48.91 UNSPECIFIED ATRIAL FIBRILLATION: ICD-10-CM

## 2023-01-23 DIAGNOSIS — G47.33 OBSTRUCTIVE SLEEP APNEA (ADULT) (PEDIATRIC): ICD-10-CM

## 2023-01-23 DIAGNOSIS — Z79.84 LONG TERM (CURRENT) USE OF ORAL HYPOGLYCEMIC DRUGS: ICD-10-CM

## 2023-01-23 DIAGNOSIS — Z79.02 LONG TERM (CURRENT) USE OF ANTITHROMBOTICS/ANTIPLATELETS: ICD-10-CM

## 2023-01-23 DIAGNOSIS — Z20.822 CONTACT WITH AND (SUSPECTED) EXPOSURE TO COVID-19: ICD-10-CM

## 2023-01-23 DIAGNOSIS — Z98.890 OTHER SPECIFIED POSTPROCEDURAL STATES: Chronic | ICD-10-CM

## 2023-01-23 LAB
BASOPHILS # BLD AUTO: 0.02 K/UL — SIGNIFICANT CHANGE UP (ref 0–0.2)
BASOPHILS NFR BLD AUTO: 0.4 % — SIGNIFICANT CHANGE UP (ref 0–1)
EOSINOPHIL # BLD AUTO: 0.03 K/UL — SIGNIFICANT CHANGE UP (ref 0–0.7)
EOSINOPHIL NFR BLD AUTO: 0.6 % — SIGNIFICANT CHANGE UP (ref 0–8)
HCT VFR BLD CALC: 37.1 % — LOW (ref 42–52)
HGB BLD-MCNC: 12.9 G/DL — LOW (ref 14–18)
IMM GRANULOCYTES NFR BLD AUTO: 0.4 % — HIGH (ref 0.1–0.3)
LYMPHOCYTES # BLD AUTO: 0.2 K/UL — LOW (ref 1.2–3.4)
LYMPHOCYTES # BLD AUTO: 3.8 % — LOW (ref 20.5–51.1)
MCHC RBC-ENTMCNC: 29.6 PG — SIGNIFICANT CHANGE UP (ref 27–31)
MCHC RBC-ENTMCNC: 34.8 G/DL — SIGNIFICANT CHANGE UP (ref 32–37)
MCV RBC AUTO: 85.1 FL — SIGNIFICANT CHANGE UP (ref 80–94)
MONOCYTES # BLD AUTO: 0.45 K/UL — SIGNIFICANT CHANGE UP (ref 0.1–0.6)
MONOCYTES NFR BLD AUTO: 8.5 % — SIGNIFICANT CHANGE UP (ref 1.7–9.3)
NEUTROPHILS # BLD AUTO: 4.55 K/UL — SIGNIFICANT CHANGE UP (ref 1.4–6.5)
NEUTROPHILS NFR BLD AUTO: 86.3 % — HIGH (ref 42.2–75.2)
NRBC # BLD: 0 /100 WBCS — SIGNIFICANT CHANGE UP (ref 0–0)
PLATELET # BLD AUTO: 151 K/UL — SIGNIFICANT CHANGE UP (ref 130–400)
RBC # BLD: 4.36 M/UL — LOW (ref 4.7–6.1)
RBC # FLD: 13.1 % — SIGNIFICANT CHANGE UP (ref 11.5–14.5)
WBC # BLD: 5.27 K/UL — SIGNIFICANT CHANGE UP (ref 4.8–10.8)
WBC # FLD AUTO: 5.27 K/UL — SIGNIFICANT CHANGE UP (ref 4.8–10.8)

## 2023-01-23 PROCEDURE — 99285 EMERGENCY DEPT VISIT HI MDM: CPT | Mod: FS

## 2023-01-23 RX ORDER — MORPHINE SULFATE 50 MG/1
4 CAPSULE, EXTENDED RELEASE ORAL ONCE
Refills: 0 | Status: DISCONTINUED | OUTPATIENT
Start: 2023-01-23 | End: 2023-01-23

## 2023-01-23 RX ORDER — ONDANSETRON 8 MG/1
4 TABLET, FILM COATED ORAL ONCE
Refills: 0 | Status: COMPLETED | OUTPATIENT
Start: 2023-01-23 | End: 2023-01-23

## 2023-01-23 RX ORDER — DIATRIZOATE MEGLUMINE 180 MG/ML
30 INJECTION, SOLUTION INTRAVESICAL ONCE
Refills: 0 | Status: DISCONTINUED | OUTPATIENT
Start: 2023-01-23 | End: 2023-01-23

## 2023-01-23 RX ORDER — IOHEXOL 300 MG/ML
30 INJECTION, SOLUTION INTRAVENOUS ONCE
Refills: 0 | Status: DISCONTINUED | OUTPATIENT
Start: 2023-01-23 | End: 2023-01-24

## 2023-01-23 RX ORDER — SODIUM CHLORIDE 9 MG/ML
2000 INJECTION, SOLUTION INTRAVENOUS ONCE
Refills: 0 | Status: COMPLETED | OUTPATIENT
Start: 2023-01-23 | End: 2023-01-23

## 2023-01-23 RX ADMIN — ONDANSETRON 4 MILLIGRAM(S): 8 TABLET, FILM COATED ORAL at 23:32

## 2023-01-23 RX ADMIN — SODIUM CHLORIDE 2000 MILLILITER(S): 9 INJECTION, SOLUTION INTRAVENOUS at 23:51

## 2023-01-23 RX ADMIN — MORPHINE SULFATE 4 MILLIGRAM(S): 50 CAPSULE, EXTENDED RELEASE ORAL at 23:32

## 2023-01-23 NOTE — ED PROVIDER NOTE - CLINICAL SUMMARY MEDICAL DECISION MAKING FREE TEXT BOX
72-year-old male past medical history of A. deejay on anticoagulation prostate CA CAD status post stents presents with diarrhea body aches for 3 days 15 episodes daily associated with nausea no vomiting.  Positive for abdominal pain.  Well appearing, NAD, non toxic. NCAT PERRLA EOMI neck supple non tender normal wob cta bl rrr abdomen s lower abdominal tenderness palpation nd no rebound no guarding WWPx4 neuro non focal CBC CMP urinalysis CT abdomen were reviewed CT abdomen shows enlarged prostate gland with enhancement of the right seminal vesicle most likely secondary to prostate cancer treatment considered admission however patient feeling better will discharge previous records reviewed.  Differential included septic stone UTI urinary tract infection worsening prostate cancer

## 2023-01-23 NOTE — ED PROVIDER NOTE - PROGRESS NOTE DETAILS
Pt reports improvement in symptoms, rpt abd exam non-tender. Discussed results and provided copy to pt. Pt understands to follow-up with PMD/GI. Pt understands to return to ED if symptoms return/worsen. Agreeable to discharge.

## 2023-01-23 NOTE — ED PROVIDER NOTE - OBJECTIVE STATEMENT
72 year old male, past medical history chrons, afib on ac, prostate ca, cad s/p 4 stents, htn, dm, who presents with diarrhea. patient with body aches and chills that began x3 days ago, with gradual onset of diarrhea that began x2 days ago. patient reports ~15 episodes/day, non-bloody. reports associated nausea, no vomiting. denies chest pain, shortness of breath, back pain, urinary symptoms, syncope. last endoscopy/colonoscopy x3 years ago.

## 2023-01-23 NOTE — ED PROVIDER NOTE - NSFOLLOWUPINSTRUCTIONS_ED_ALL_ED_FT
[de-identified] : \par 70 y/o female presents for f/u.\par Was on PPI x a few months from Dr Alfonso but at CPE, we switched to Famotidine. That worked just as well but ran out and bough OTC Prilosec thinking it was the same thing. Wants prescription for reflux meds.\par \par BP slightly elevated last few weeks. "Lots of stress" which isn't going away. Has no CV symptoms. \par \par H/o Breast cancer. UTD with MAMMO. \par   Diarrhea    Diarrhea is frequent loose or watery bowel movements that has many causes. Diarrhea can make you feel weak and cause you to become dehydrated. Diarrhea typically lasts 2–3 days, but can last longer if it is a sign of something more serious. Drink clear fluids to prevent dehydration. Eat bland, easy-to-digest foods as tolerated.     SEEK IMMEDIATE MEDICAL CARE IF YOU HAVE ANY OF THE FOLLOWING SYMPTOMS: high fevers, lightheadedness/dizziness, chest pain, black or bloody stools, shortness of breath, severe abdominal or back pain, or any signs of dehydration.

## 2023-01-23 NOTE — ED PROVIDER NOTE - PHYSICAL EXAMINATION
CONSTITUTIONAL: pale, non-toxic appearing male, no acute distress  SKIN: skin exam is warm and dry  ENT: Dry MM   CARD: S1, S2 normal, no murmur  RESP: No wheezes, rales or rhonchi. Good air movement bilaterally  ABD: soft; non-distended; +lower abd ttp, no rebound/guarding. no CVAT   EXT: Normal ROM   NEURO: awake, alert, following commands, oriented, grossly unremarkable. No Focal deficits. GCS 15.   PSYCH: Cooperative, appropriate.

## 2023-01-24 VITALS
OXYGEN SATURATION: 96 % | HEART RATE: 77 BPM | DIASTOLIC BLOOD PRESSURE: 71 MMHG | SYSTOLIC BLOOD PRESSURE: 128 MMHG | RESPIRATION RATE: 16 BRPM

## 2023-01-24 LAB
ALBUMIN SERPL ELPH-MCNC: 4.1 G/DL — SIGNIFICANT CHANGE UP (ref 3.5–5.2)
ALP SERPL-CCNC: 72 U/L — SIGNIFICANT CHANGE UP (ref 30–115)
ALT FLD-CCNC: 30 U/L — SIGNIFICANT CHANGE UP (ref 0–41)
ANION GAP SERPL CALC-SCNC: 10 MMOL/L — SIGNIFICANT CHANGE UP (ref 7–14)
APPEARANCE UR: CLEAR — SIGNIFICANT CHANGE UP
AST SERPL-CCNC: 40 U/L — SIGNIFICANT CHANGE UP (ref 0–41)
BILIRUB DIRECT SERPL-MCNC: <0.2 MG/DL — SIGNIFICANT CHANGE UP (ref 0–0.3)
BILIRUB INDIRECT FLD-MCNC: >0.2 MG/DL — SIGNIFICANT CHANGE UP (ref 0.2–1.2)
BILIRUB SERPL-MCNC: 0.4 MG/DL — SIGNIFICANT CHANGE UP (ref 0.2–1.2)
BILIRUB UR-MCNC: NEGATIVE — SIGNIFICANT CHANGE UP
BUN SERPL-MCNC: 10 MG/DL — SIGNIFICANT CHANGE UP (ref 10–20)
CALCIUM SERPL-MCNC: 8.9 MG/DL — SIGNIFICANT CHANGE UP (ref 8.4–10.5)
CHLORIDE SERPL-SCNC: 103 MMOL/L — SIGNIFICANT CHANGE UP (ref 98–110)
CO2 SERPL-SCNC: 23 MMOL/L — SIGNIFICANT CHANGE UP (ref 17–32)
COLOR SPEC: SIGNIFICANT CHANGE UP
CREAT SERPL-MCNC: 0.8 MG/DL — SIGNIFICANT CHANGE UP (ref 0.7–1.5)
DIFF PNL FLD: NEGATIVE — SIGNIFICANT CHANGE UP
EGFR: 95 ML/MIN/1.73M2 — SIGNIFICANT CHANGE UP
GLUCOSE SERPL-MCNC: 168 MG/DL — HIGH (ref 70–99)
GLUCOSE UR QL: NEGATIVE — SIGNIFICANT CHANGE UP
KETONES UR-MCNC: NEGATIVE — SIGNIFICANT CHANGE UP
LEUKOCYTE ESTERASE UR-ACNC: NEGATIVE — SIGNIFICANT CHANGE UP
LIDOCAIN IGE QN: 20 U/L — SIGNIFICANT CHANGE UP (ref 7–60)
NITRITE UR-MCNC: NEGATIVE — SIGNIFICANT CHANGE UP
PH UR: 6 — SIGNIFICANT CHANGE UP (ref 5–8)
POTASSIUM SERPL-MCNC: 3.3 MMOL/L — LOW (ref 3.5–5)
POTASSIUM SERPL-SCNC: 3.3 MMOL/L — LOW (ref 3.5–5)
PROT SERPL-MCNC: 6.4 G/DL — SIGNIFICANT CHANGE UP (ref 6–8)
PROT UR-MCNC: NEGATIVE — SIGNIFICANT CHANGE UP
SARS-COV-2 RNA SPEC QL NAA+PROBE: SIGNIFICANT CHANGE UP
SODIUM SERPL-SCNC: 136 MMOL/L — SIGNIFICANT CHANGE UP (ref 135–146)
SP GR SPEC: 1.01 — LOW (ref 1.01–1.03)
UROBILINOGEN FLD QL: SIGNIFICANT CHANGE UP

## 2023-01-24 PROCEDURE — 74177 CT ABD & PELVIS W/CONTRAST: CPT | Mod: 26,MA

## 2023-01-24 RX ORDER — DIATRIZOATE MEGLUMINE 180 MG/ML
30 INJECTION, SOLUTION INTRAVESICAL ONCE
Refills: 0 | Status: DISCONTINUED | OUTPATIENT
Start: 2023-01-24 | End: 2023-01-24

## 2023-01-24 RX ORDER — POTASSIUM CHLORIDE 20 MEQ
40 PACKET (EA) ORAL ONCE
Refills: 0 | Status: COMPLETED | OUTPATIENT
Start: 2023-01-24 | End: 2023-01-24

## 2023-01-24 RX ORDER — ONDANSETRON 8 MG/1
4 TABLET, FILM COATED ORAL ONCE
Refills: 0 | Status: COMPLETED | OUTPATIENT
Start: 2023-01-24 | End: 2023-01-24

## 2023-01-24 RX ORDER — SODIUM CHLORIDE 9 MG/ML
500 INJECTION, SOLUTION INTRAVENOUS ONCE
Refills: 0 | Status: COMPLETED | OUTPATIENT
Start: 2023-01-24 | End: 2023-01-24

## 2023-01-24 RX ORDER — ONDANSETRON 8 MG/1
1 TABLET, FILM COATED ORAL
Qty: 9 | Refills: 0
Start: 2023-01-24 | End: 2023-01-26

## 2023-01-24 RX ADMIN — SODIUM CHLORIDE 2000 MILLILITER(S): 9 INJECTION, SOLUTION INTRAVENOUS at 03:52

## 2023-01-24 RX ADMIN — Medication 40 MILLIEQUIVALENT(S): at 03:52

## 2023-01-24 RX ADMIN — ONDANSETRON 4 MILLIGRAM(S): 8 TABLET, FILM COATED ORAL at 03:52

## 2023-01-24 RX ADMIN — SODIUM CHLORIDE 500 MILLILITER(S): 9 INJECTION, SOLUTION INTRAVENOUS at 03:52

## 2023-01-25 LAB
CULTURE RESULTS: SIGNIFICANT CHANGE UP
SPECIMEN SOURCE: SIGNIFICANT CHANGE UP

## 2023-01-30 NOTE — ED ADULT NURSE NOTE - NS ED NURSE LEVEL OF CONSCIOUSNESS MENTAL STATUS
Rx sent to pharmacy per protocol   Last appt: 11/6/2018  Last refill: 1-7-19 Awake/Alert Erivedge Counseling- I discussed with the patient the risks of Erivedge including but not limited to nausea, vomiting, diarrhea, constipation, weight loss, changes in the sense of taste, decreased appetite, muscle spasms, and hair loss.  The patient verbalized understanding of the proper use and possible adverse effects of Erivedge.  All of the patient's questions and concerns were addressed.

## 2023-02-07 ENCOUNTER — APPOINTMENT (OUTPATIENT)
Dept: MEDICATION MANAGEMENT | Facility: CLINIC | Age: 73
End: 2023-02-07

## 2023-02-07 ENCOUNTER — OUTPATIENT (OUTPATIENT)
Dept: OUTPATIENT SERVICES | Facility: HOSPITAL | Age: 73
LOS: 1 days | End: 2023-02-07
Payer: MEDICARE

## 2023-02-07 DIAGNOSIS — Z95.5 PRESENCE OF CORONARY ANGIOPLASTY IMPLANT AND GRAFT: Chronic | ICD-10-CM

## 2023-02-07 DIAGNOSIS — Z98.890 OTHER SPECIFIED POSTPROCEDURAL STATES: Chronic | ICD-10-CM

## 2023-02-07 DIAGNOSIS — Z79.01 LONG TERM (CURRENT) USE OF ANTICOAGULANTS: ICD-10-CM

## 2023-02-07 DIAGNOSIS — I48.91 UNSPECIFIED ATRIAL FIBRILLATION: ICD-10-CM

## 2023-02-07 DIAGNOSIS — R79.1 ABNORMAL COAGULATION PROFILE: ICD-10-CM

## 2023-02-07 LAB
INR PPP: 3.4 RATIO
POCT-PROTHROMBIN TIME: 41.4 SECS
QUALITY CONTROL: YES

## 2023-02-07 PROCEDURE — 99211 OFF/OP EST MAY X REQ PHY/QHP: CPT

## 2023-02-07 PROCEDURE — 36416 COLLJ CAPILLARY BLOOD SPEC: CPT

## 2023-02-07 PROCEDURE — 85610 PROTHROMBIN TIME: CPT

## 2023-02-08 DIAGNOSIS — R79.1 ABNORMAL COAGULATION PROFILE: ICD-10-CM

## 2023-02-08 DIAGNOSIS — I48.91 UNSPECIFIED ATRIAL FIBRILLATION: ICD-10-CM

## 2023-02-08 DIAGNOSIS — Z79.01 LONG TERM (CURRENT) USE OF ANTICOAGULANTS: ICD-10-CM

## 2023-02-22 ENCOUNTER — OUTPATIENT (OUTPATIENT)
Dept: OUTPATIENT SERVICES | Facility: HOSPITAL | Age: 73
LOS: 1 days | End: 2023-02-22
Payer: MEDICARE

## 2023-02-22 ENCOUNTER — APPOINTMENT (OUTPATIENT)
Dept: MEDICATION MANAGEMENT | Facility: CLINIC | Age: 73
End: 2023-02-22

## 2023-02-22 DIAGNOSIS — Z79.01 LONG TERM (CURRENT) USE OF ANTICOAGULANTS: ICD-10-CM

## 2023-02-22 DIAGNOSIS — Z95.5 PRESENCE OF CORONARY ANGIOPLASTY IMPLANT AND GRAFT: Chronic | ICD-10-CM

## 2023-02-22 DIAGNOSIS — Z98.890 OTHER SPECIFIED POSTPROCEDURAL STATES: Chronic | ICD-10-CM

## 2023-02-22 DIAGNOSIS — I48.91 UNSPECIFIED ATRIAL FIBRILLATION: ICD-10-CM

## 2023-02-22 LAB
INR PPP: 4.1 RATIO
POCT-PROTHROMBIN TIME: 49.7 SECS
QUALITY CONTROL: YES

## 2023-02-22 PROCEDURE — 36416 COLLJ CAPILLARY BLOOD SPEC: CPT

## 2023-02-22 PROCEDURE — 99211 OFF/OP EST MAY X REQ PHY/QHP: CPT

## 2023-02-22 PROCEDURE — 85610 PROTHROMBIN TIME: CPT

## 2023-02-23 DIAGNOSIS — I48.91 UNSPECIFIED ATRIAL FIBRILLATION: ICD-10-CM

## 2023-02-23 DIAGNOSIS — Z79.01 LONG TERM (CURRENT) USE OF ANTICOAGULANTS: ICD-10-CM

## 2023-03-01 ENCOUNTER — OUTPATIENT (OUTPATIENT)
Dept: OUTPATIENT SERVICES | Facility: HOSPITAL | Age: 73
LOS: 1 days | End: 2023-03-01
Payer: MEDICARE

## 2023-03-01 ENCOUNTER — APPOINTMENT (OUTPATIENT)
Dept: MEDICATION MANAGEMENT | Facility: CLINIC | Age: 73
End: 2023-03-01

## 2023-03-01 DIAGNOSIS — I48.91 UNSPECIFIED ATRIAL FIBRILLATION: ICD-10-CM

## 2023-03-01 DIAGNOSIS — Z98.890 OTHER SPECIFIED POSTPROCEDURAL STATES: Chronic | ICD-10-CM

## 2023-03-01 DIAGNOSIS — Z79.01 LONG TERM (CURRENT) USE OF ANTICOAGULANTS: ICD-10-CM

## 2023-03-01 DIAGNOSIS — Z95.5 PRESENCE OF CORONARY ANGIOPLASTY IMPLANT AND GRAFT: Chronic | ICD-10-CM

## 2023-03-01 LAB
INR PPP: 2.7 RATIO
POCT-PROTHROMBIN TIME: 32.3 SECS
QUALITY CONTROL: YES

## 2023-03-01 PROCEDURE — 85610 PROTHROMBIN TIME: CPT

## 2023-03-01 PROCEDURE — 36416 COLLJ CAPILLARY BLOOD SPEC: CPT

## 2023-03-01 PROCEDURE — 99211 OFF/OP EST MAY X REQ PHY/QHP: CPT

## 2023-03-02 DIAGNOSIS — Z79.01 LONG TERM (CURRENT) USE OF ANTICOAGULANTS: ICD-10-CM

## 2023-03-02 DIAGNOSIS — I48.91 UNSPECIFIED ATRIAL FIBRILLATION: ICD-10-CM

## 2023-03-20 ENCOUNTER — APPOINTMENT (OUTPATIENT)
Dept: MEDICATION MANAGEMENT | Facility: CLINIC | Age: 73
End: 2023-03-20

## 2023-03-20 ENCOUNTER — OUTPATIENT (OUTPATIENT)
Dept: OUTPATIENT SERVICES | Facility: HOSPITAL | Age: 73
LOS: 1 days | End: 2023-03-20
Payer: MEDICARE

## 2023-03-20 DIAGNOSIS — Z79.01 LONG TERM (CURRENT) USE OF ANTICOAGULANTS: ICD-10-CM

## 2023-03-20 DIAGNOSIS — Z95.5 PRESENCE OF CORONARY ANGIOPLASTY IMPLANT AND GRAFT: Chronic | ICD-10-CM

## 2023-03-20 DIAGNOSIS — I48.91 UNSPECIFIED ATRIAL FIBRILLATION: ICD-10-CM

## 2023-03-20 DIAGNOSIS — Z98.890 OTHER SPECIFIED POSTPROCEDURAL STATES: Chronic | ICD-10-CM

## 2023-03-20 LAB
INR PPP: 2.5 RATIO
POCT-PROTHROMBIN TIME: 29.4 SECS
QUALITY CONTROL: YES

## 2023-03-20 PROCEDURE — 36416 COLLJ CAPILLARY BLOOD SPEC: CPT

## 2023-03-20 PROCEDURE — 85610 PROTHROMBIN TIME: CPT

## 2023-03-20 PROCEDURE — 99211 OFF/OP EST MAY X REQ PHY/QHP: CPT

## 2023-03-21 ENCOUNTER — APPOINTMENT (OUTPATIENT)
Dept: PULMONOLOGY | Facility: CLINIC | Age: 73
End: 2023-03-21

## 2023-03-21 DIAGNOSIS — Z79.01 LONG TERM (CURRENT) USE OF ANTICOAGULANTS: ICD-10-CM

## 2023-03-21 DIAGNOSIS — I48.91 UNSPECIFIED ATRIAL FIBRILLATION: ICD-10-CM

## 2023-03-28 NOTE — ED PROVIDER NOTE - PHYSICAL EXAMINATION
We can order ultrasound or MRI if she wishes however the insurance company may not pay for further testing. CONST: Well appearing in NAD  EYES: PERRL, EOMI  NECK: Non-tender, no meningeal signs  CARD: Normal S1 S2; Normal rate and rhythm  RESP: Equal BS B/L, No wheezes, rhonchi or rales. No distress  GI: Soft, non-tender, non-distended.  MS: Normal ROM in all extremities. No midline spinal tenderness. No leg swelling or tenderness  SKIN: Warm, dry, no acute rashes. Good turgor  NEURO: A&Ox3, No focal deficits. Strength 5/5 with no sensory deficits. Steady gait

## 2023-04-12 ENCOUNTER — APPOINTMENT (OUTPATIENT)
Dept: PULMONOLOGY | Facility: CLINIC | Age: 73
End: 2023-04-12
Payer: MEDICARE

## 2023-04-12 VITALS
SYSTOLIC BLOOD PRESSURE: 122 MMHG | DIASTOLIC BLOOD PRESSURE: 72 MMHG | HEIGHT: 69 IN | BODY MASS INDEX: 31.1 KG/M2 | WEIGHT: 210 LBS | OXYGEN SATURATION: 96 % | RESPIRATION RATE: 14 BRPM | HEART RATE: 68 BPM

## 2023-04-12 PROCEDURE — 99213 OFFICE O/P EST LOW 20 MIN: CPT

## 2023-04-12 NOTE — HISTORY OF PRESENT ILLNESS
[Follow-Up - Routine Clinic] : a routine clinic follow-up of [None] : No associated symptoms are reported [BiPAP] : BiPAP [Good Compliance] : good compliance with treatment [Good Tolerance] : good tolerance of treatment [Good Symptom Control] : good symptom control [de-identified] : Machine broken  [Shortness of Breath] : Shortness of Breath

## 2023-04-12 NOTE — ASSESSMENT
[FreeTextEntry1] : DWIGHT on BiPAP 11/7 cmn H2O\par Machine broken \par HO Afib\par SP COVID in November 2021\par HO Melanoma sp resection.  SP PET in July with 5 mm PET negative lung nodule stable on repeat CT

## 2023-04-14 ENCOUNTER — APPOINTMENT (OUTPATIENT)
Dept: MEDICATION MANAGEMENT | Facility: CLINIC | Age: 73
End: 2023-04-14

## 2023-04-14 ENCOUNTER — OUTPATIENT (OUTPATIENT)
Dept: OUTPATIENT SERVICES | Facility: HOSPITAL | Age: 73
LOS: 1 days | End: 2023-04-14
Payer: MEDICARE

## 2023-04-14 DIAGNOSIS — Z98.890 OTHER SPECIFIED POSTPROCEDURAL STATES: Chronic | ICD-10-CM

## 2023-04-14 DIAGNOSIS — Z79.01 LONG TERM (CURRENT) USE OF ANTICOAGULANTS: ICD-10-CM

## 2023-04-14 DIAGNOSIS — I48.91 UNSPECIFIED ATRIAL FIBRILLATION: ICD-10-CM

## 2023-04-14 DIAGNOSIS — Z95.5 PRESENCE OF CORONARY ANGIOPLASTY IMPLANT AND GRAFT: Chronic | ICD-10-CM

## 2023-04-14 LAB
INR PPP: 2.5 RATIO
POCT-PROTHROMBIN TIME: 29.9 SECS
QUALITY CONTROL: YES

## 2023-04-14 PROCEDURE — 99211 OFF/OP EST MAY X REQ PHY/QHP: CPT

## 2023-04-14 PROCEDURE — 85610 PROTHROMBIN TIME: CPT

## 2023-04-14 PROCEDURE — 36416 COLLJ CAPILLARY BLOOD SPEC: CPT

## 2023-04-15 DIAGNOSIS — I48.91 UNSPECIFIED ATRIAL FIBRILLATION: ICD-10-CM

## 2023-04-15 DIAGNOSIS — Z79.01 LONG TERM (CURRENT) USE OF ANTICOAGULANTS: ICD-10-CM

## 2023-05-09 ENCOUNTER — APPOINTMENT (OUTPATIENT)
Dept: PLASTIC SURGERY | Facility: CLINIC | Age: 73
End: 2023-05-09
Payer: MEDICARE

## 2023-05-09 PROCEDURE — 99212 OFFICE O/P EST SF 10 MIN: CPT

## 2023-05-09 NOTE — ASSESSMENT
[FreeTextEntry1] : 73 yo M with malignant melanoma of posterior neck, s/p excision 8/9/21. Doing well. \par Initial biopsy 0.6 mm, excision 0.6 mm = TOTAL 1.2 mm\par s/p SLN Bx bilateral neck, doing well with resolving ecchymoses due to warfarin\par \par Now has recent biopsy of right hand that cannot r/o underlying early evolving melanoma in situ (atypical proliferation of melanocytes)\par POD#12 s/p excision. Doing well\par \par - sutures removed, steris applied\par - daily Aquaphor \par  -pathology discussed, no residual nevus present, dermal scar \par - post-op instructions discussed and all questions answered \par - F/U 2 months \par \par Due to COVID-19, pre-visit patient instructions were explained to the patient and their symptoms were checked upon arrival. Masks were used by the healthcare provider and staff and the examination room was cleaned after the patient visit concluded\par \par \par 11/8/2022since I saw last diagnosed pt was diagnosed w prostate ca and currently in middle of proton beam therapy in [place]--tolerating well thusfar\par \par re my prior txs:\par -posterior neck incision fine--no recurrent or suspicious areas of pigmentation\par -B/L incision (from SLNB) fine--no masses (path was negative)\par -right hand incision fine--no suspicious pigmentation\par \par cont derm f/u (sees Jonhtkind q3 months)\par \par f/u w me in 6 months\par \par Due to COVID 19, pre-visit patient instructions were explained to the patient and their symptoms were checked upon arrival.  \par Masks were used by the health care providers and staff and the examination room was cleaned after the patient visit was completed.\par \par 5/9/2023\par since I saw last he saw Marcin--everything fine, f/u in 6 months\par postrior nack, B/L neck and rigth hand all fine--no issues or suspicious pigmentation\par cont dermsurveillance\par \par suggest screening PET scan (prior 7/2021 (normal)) \par f/u 6 months\par \par pt will valerie after receive PET scan\par f/u 6 months\par all ?s asnwered\par \par to see [facility] med onc\par had seen oncologist at [facility] in [facility]

## 2023-05-09 NOTE — PHYSICAL EXAM
[de-identified] : NAD [de-identified] : excessive sun damage w sig scattered actinic changes [de-identified] : posterior neck scar healed, nontender, well-healed, no evidence of recurrence\par bilateral anterior neck with healing incisions, swelling resolving, no palpable fluid collection [de-identified] : right dorsal hand incision healing well with minimal swelling, no erythema or fluid collection

## 2023-05-12 ENCOUNTER — APPOINTMENT (OUTPATIENT)
Dept: MEDICATION MANAGEMENT | Facility: CLINIC | Age: 73
End: 2023-05-12

## 2023-05-12 ENCOUNTER — OUTPATIENT (OUTPATIENT)
Dept: OUTPATIENT SERVICES | Facility: HOSPITAL | Age: 73
LOS: 1 days | End: 2023-05-12
Payer: MEDICARE

## 2023-05-12 DIAGNOSIS — I48.91 UNSPECIFIED ATRIAL FIBRILLATION: ICD-10-CM

## 2023-05-12 DIAGNOSIS — Z79.01 LONG TERM (CURRENT) USE OF ANTICOAGULANTS: ICD-10-CM

## 2023-05-12 DIAGNOSIS — Z95.5 PRESENCE OF CORONARY ANGIOPLASTY IMPLANT AND GRAFT: Chronic | ICD-10-CM

## 2023-05-12 LAB
INR PPP: 1.4 RATIO
POCT-PROTHROMBIN TIME: 17.3 SECS
QUALITY CONTROL: YES

## 2023-05-12 PROCEDURE — 85610 PROTHROMBIN TIME: CPT

## 2023-05-12 PROCEDURE — 99211 OFF/OP EST MAY X REQ PHY/QHP: CPT

## 2023-05-12 PROCEDURE — 36416 COLLJ CAPILLARY BLOOD SPEC: CPT

## 2023-05-13 DIAGNOSIS — Z79.01 LONG TERM (CURRENT) USE OF ANTICOAGULANTS: ICD-10-CM

## 2023-05-13 DIAGNOSIS — I48.91 UNSPECIFIED ATRIAL FIBRILLATION: ICD-10-CM

## 2023-05-19 ENCOUNTER — OUTPATIENT (OUTPATIENT)
Dept: OUTPATIENT SERVICES | Facility: HOSPITAL | Age: 73
LOS: 1 days | End: 2023-05-19
Payer: MEDICARE

## 2023-05-19 ENCOUNTER — APPOINTMENT (OUTPATIENT)
Dept: MEDICATION MANAGEMENT | Facility: CLINIC | Age: 73
End: 2023-05-19

## 2023-05-19 DIAGNOSIS — Z98.890 OTHER SPECIFIED POSTPROCEDURAL STATES: Chronic | ICD-10-CM

## 2023-05-19 DIAGNOSIS — Z95.5 PRESENCE OF CORONARY ANGIOPLASTY IMPLANT AND GRAFT: Chronic | ICD-10-CM

## 2023-05-19 DIAGNOSIS — Z79.01 LONG TERM (CURRENT) USE OF ANTICOAGULANTS: ICD-10-CM

## 2023-05-19 DIAGNOSIS — I48.91 UNSPECIFIED ATRIAL FIBRILLATION: ICD-10-CM

## 2023-05-19 PROCEDURE — 36416 COLLJ CAPILLARY BLOOD SPEC: CPT

## 2023-05-19 PROCEDURE — 99211 OFF/OP EST MAY X REQ PHY/QHP: CPT

## 2023-05-19 PROCEDURE — 85610 PROTHROMBIN TIME: CPT

## 2023-05-20 DIAGNOSIS — Z79.01 LONG TERM (CURRENT) USE OF ANTICOAGULANTS: ICD-10-CM

## 2023-05-20 DIAGNOSIS — I48.91 UNSPECIFIED ATRIAL FIBRILLATION: ICD-10-CM

## 2023-05-24 LAB
INR PPP: 2.7 RATIO
POCT-PROTHROMBIN TIME: 32.4 SECS
QUALITY CONTROL: YES

## 2023-05-30 ENCOUNTER — APPOINTMENT (OUTPATIENT)
Dept: MEDICATION MANAGEMENT | Facility: CLINIC | Age: 73
End: 2023-05-30

## 2023-05-30 ENCOUNTER — OUTPATIENT (OUTPATIENT)
Dept: OUTPATIENT SERVICES | Facility: HOSPITAL | Age: 73
LOS: 1 days | End: 2023-05-30
Payer: MEDICARE

## 2023-05-30 ENCOUNTER — RESULT REVIEW (OUTPATIENT)
Age: 73
End: 2023-05-30

## 2023-05-30 DIAGNOSIS — Z98.890 OTHER SPECIFIED POSTPROCEDURAL STATES: Chronic | ICD-10-CM

## 2023-05-30 DIAGNOSIS — C43.4 MALIGNANT MELANOMA OF SCALP AND NECK: ICD-10-CM

## 2023-05-30 DIAGNOSIS — Z79.01 LONG TERM (CURRENT) USE OF ANTICOAGULANTS: ICD-10-CM

## 2023-05-30 DIAGNOSIS — Z95.5 PRESENCE OF CORONARY ANGIOPLASTY IMPLANT AND GRAFT: Chronic | ICD-10-CM

## 2023-05-30 DIAGNOSIS — I48.91 UNSPECIFIED ATRIAL FIBRILLATION: ICD-10-CM

## 2023-05-30 LAB
GLUCOSE BLDC GLUCOMTR-MCNC: 192 MG/DL — HIGH (ref 70–99)
INR PPP: 2.7 RATIO
POCT-PROTHROMBIN TIME: 32.2 SECS
QUALITY CONTROL: YES

## 2023-05-30 PROCEDURE — 99211 OFF/OP EST MAY X REQ PHY/QHP: CPT | Mod: 25

## 2023-05-30 PROCEDURE — 78816 PET IMAGE W/CT FULL BODY: CPT | Mod: 26,PS,MH

## 2023-05-30 PROCEDURE — 36416 COLLJ CAPILLARY BLOOD SPEC: CPT

## 2023-05-30 PROCEDURE — 85610 PROTHROMBIN TIME: CPT

## 2023-05-31 DIAGNOSIS — I48.91 UNSPECIFIED ATRIAL FIBRILLATION: ICD-10-CM

## 2023-05-31 DIAGNOSIS — Z79.01 LONG TERM (CURRENT) USE OF ANTICOAGULANTS: ICD-10-CM

## 2023-05-31 DIAGNOSIS — C43.4 MALIGNANT MELANOMA OF SCALP AND NECK: ICD-10-CM

## 2023-06-18 ENCOUNTER — RESULT REVIEW (OUTPATIENT)
Age: 73
End: 2023-06-18

## 2023-06-18 ENCOUNTER — OUTPATIENT (OUTPATIENT)
Dept: OUTPATIENT SERVICES | Facility: HOSPITAL | Age: 73
LOS: 1 days | End: 2023-06-18
Payer: MEDICARE

## 2023-06-18 DIAGNOSIS — Z95.5 PRESENCE OF CORONARY ANGIOPLASTY IMPLANT AND GRAFT: Chronic | ICD-10-CM

## 2023-06-18 DIAGNOSIS — Z00.8 ENCOUNTER FOR OTHER GENERAL EXAMINATION: ICD-10-CM

## 2023-06-18 DIAGNOSIS — Z98.890 OTHER SPECIFIED POSTPROCEDURAL STATES: Chronic | ICD-10-CM

## 2023-06-18 DIAGNOSIS — R91.1 SOLITARY PULMONARY NODULE: ICD-10-CM

## 2023-06-18 PROCEDURE — 71250 CT THORAX DX C-: CPT

## 2023-06-18 PROCEDURE — 71250 CT THORAX DX C-: CPT | Mod: 26,MH

## 2023-06-18 NOTE — H&P ADULT - NSHPLANGLIMITEDENGLISH_GEN_A_CORE
No
[facility]  Orthopaedic Surgery  [address] [place] [address]  Phone: [phone]  Fax: [phone]  Follow Up Time: 4-6 Days

## 2023-06-19 DIAGNOSIS — R91.1 SOLITARY PULMONARY NODULE: ICD-10-CM

## 2023-06-21 ENCOUNTER — APPOINTMENT (OUTPATIENT)
Dept: PULMONOLOGY | Facility: CLINIC | Age: 73
End: 2023-06-21
Payer: MEDICARE

## 2023-06-21 VITALS
BODY MASS INDEX: 31.4 KG/M2 | SYSTOLIC BLOOD PRESSURE: 112 MMHG | HEIGHT: 69 IN | WEIGHT: 212 LBS | OXYGEN SATURATION: 97 % | RESPIRATION RATE: 14 BRPM | HEART RATE: 66 BPM | DIASTOLIC BLOOD PRESSURE: 70 MMHG

## 2023-06-21 PROCEDURE — 99214 OFFICE O/P EST MOD 30 MIN: CPT

## 2023-06-21 NOTE — ASSESSMENT
[FreeTextEntry1] : DWIGHT on BiPAP 11/7 cmn H2O\par Machine broken SP new machine delivery \par HO Afib\par SP COVID in November 2021\par HO Melanoma sp resection.  SP PET in July with 5 mm PET negative lung nodule stable on repeat CT.  \par New CT with possible size increase.  PET remains negative

## 2023-06-21 NOTE — HISTORY OF PRESENT ILLNESS
[Follow-Up - Routine Clinic] : a routine clinic follow-up of [None] : No associated symptoms are reported [BiPAP] : BiPAP [Good Compliance] : good compliance with treatment [Good Tolerance] : good tolerance of treatment [Good Symptom Control] : good symptom control [de-identified] : Machine broken  [Shortness of Breath] : Shortness of Breath

## 2023-06-28 ENCOUNTER — OUTPATIENT (OUTPATIENT)
Dept: OUTPATIENT SERVICES | Facility: HOSPITAL | Age: 73
LOS: 1 days | End: 2023-06-28
Payer: MEDICARE

## 2023-06-28 ENCOUNTER — APPOINTMENT (OUTPATIENT)
Dept: MEDICATION MANAGEMENT | Facility: CLINIC | Age: 73
End: 2023-06-28

## 2023-06-28 DIAGNOSIS — I48.91 UNSPECIFIED ATRIAL FIBRILLATION: ICD-10-CM

## 2023-06-28 DIAGNOSIS — Z79.01 LONG TERM (CURRENT) USE OF ANTICOAGULANTS: ICD-10-CM

## 2023-06-28 DIAGNOSIS — Z95.5 PRESENCE OF CORONARY ANGIOPLASTY IMPLANT AND GRAFT: Chronic | ICD-10-CM

## 2023-06-28 DIAGNOSIS — Z98.890 OTHER SPECIFIED POSTPROCEDURAL STATES: Chronic | ICD-10-CM

## 2023-06-28 LAB
INR PPP: 2.4 RATIO
POCT-PROTHROMBIN TIME: 28 SECS
QUALITY CONTROL: YES

## 2023-06-28 PROCEDURE — 99211 OFF/OP EST MAY X REQ PHY/QHP: CPT

## 2023-06-28 PROCEDURE — 36416 COLLJ CAPILLARY BLOOD SPEC: CPT

## 2023-06-28 PROCEDURE — 85610 PROTHROMBIN TIME: CPT

## 2023-06-29 DIAGNOSIS — I48.91 UNSPECIFIED ATRIAL FIBRILLATION: ICD-10-CM

## 2023-06-29 DIAGNOSIS — Z79.01 LONG TERM (CURRENT) USE OF ANTICOAGULANTS: ICD-10-CM

## 2023-07-05 ENCOUNTER — OUTPATIENT (OUTPATIENT)
Dept: OUTPATIENT SERVICES | Facility: HOSPITAL | Age: 73
LOS: 1 days | End: 2023-07-05
Payer: MEDICARE

## 2023-07-05 ENCOUNTER — APPOINTMENT (OUTPATIENT)
Dept: HEMATOLOGY ONCOLOGY | Facility: CLINIC | Age: 73
End: 2023-07-05
Payer: MEDICARE

## 2023-07-05 VITALS
HEIGHT: 69 IN | TEMPERATURE: 98 F | WEIGHT: 215 LBS | RESPIRATION RATE: 16 BRPM | SYSTOLIC BLOOD PRESSURE: 163 MMHG | HEART RATE: 67 BPM | BODY MASS INDEX: 31.84 KG/M2 | DIASTOLIC BLOOD PRESSURE: 70 MMHG

## 2023-07-05 DIAGNOSIS — C61 MALIGNANT NEOPLASM OF PROSTATE: ICD-10-CM

## 2023-07-05 DIAGNOSIS — Z95.5 PRESENCE OF CORONARY ANGIOPLASTY IMPLANT AND GRAFT: Chronic | ICD-10-CM

## 2023-07-05 DIAGNOSIS — Z80.0 FAMILY HISTORY OF MALIGNANT NEOPLASM OF DIGESTIVE ORGANS: ICD-10-CM

## 2023-07-05 DIAGNOSIS — Z98.890 OTHER SPECIFIED POSTPROCEDURAL STATES: Chronic | ICD-10-CM

## 2023-07-05 DIAGNOSIS — I82.90 ACUTE EMBOLISM AND THROMBOSIS OF UNSPECIFIED VEIN: ICD-10-CM

## 2023-07-05 PROCEDURE — 99205 OFFICE O/P NEW HI 60 MIN: CPT

## 2023-07-06 DIAGNOSIS — C61 MALIGNANT NEOPLASM OF PROSTATE: ICD-10-CM

## 2023-07-07 ENCOUNTER — NON-APPOINTMENT (OUTPATIENT)
Age: 73
End: 2023-07-07

## 2023-07-07 PROBLEM — I82.90 VENOUS THROMBOEMBOLISM: Status: ACTIVE | Noted: 2023-07-07

## 2023-07-07 NOTE — HISTORY OF PRESENT ILLNESS
[AJCC Stage: ____] : AJCC Stage: [unfilled] [Disease: _____________________] : Disease: [unfilled] [de-identified] : CC: I have Prostate cancer, skin cancers \par \par He is here at the request of Tiffani Munson \par \par \par This is a 73 year old male with PMHx of HTN, MI 2006 s/p cardiac stents x4, NIDDM, Crohn's disease, disease limited to terminal ileum,  prostate Cancer s/p radiation,Afib,  Multiple prior SCC and BCC   0.6 mm melanoma in 6/28/2021 and he underwent   excision of posterior neck melanoma on 7/16/2021 and erma resection 11/2021\par Path:\par \par 7/16/2021\par \par Final Diagnosis\par A. One (1) outside H&E slide for review (right post auricular, slide\par -[id number]):\par - Superficial fragment of squamous cell carcinoma.\par - The carcinoma extends to the deep edge of the specimen.\par \par B. One (1) outside H&E slide for review (posterior neck, slide -[id number]\par B):\par - Malignant melanoma, measuring at least 0.6 mm in thickness.\par - The lesion extends to the deep and lateral margins.\par \par Note: As per outside pathology report, the CAP approved case summary for\par melanoma of the skin is as follows:\par - Histologic Type: NOS\par - Maximal tumor thickness: At least 0.6 mm\par - Ulceration: Absent\par - Surgical margin: Not applicable\par - Mitotic Index: Less than 1 mitosis / mm2\par - Micro satellitosis: Absent\par - Lymphovascular invasion: Absent\par - Tumor regression: Absent\par - Pathology Staging: pT1a.\par \par \par \par 11/2021\par \par Specimen(s) Submitted\par 1  Left cervical sentinel lymph node\par 2  Right supraclavicular sentinel lymph node\par \par Final Diagnosis\par 1. Left cervical sentinel lymph node, dissection:\par - Five lymph nodes, no melanoma seen (0/5).\par 2. Right supraclavicular sentinel lymph node, dissection:\par \par - Nine lymph nodes, no melanoma seen (0/9).\par \par He also has been following with  Jarrett Phipps for pulmonary nodule\par Review of imaging\par -5/30/2023: PET.CT scan . Since July 16, 2021, no definite foci of pathologic FDG uptake to suggest biologic tumor activity.\par -6/21/2023 Since  5/30/2023\par stable clustered nodules right upper lobe-largest approximately 7 mm (302-95) likely related to small airway disease\par Follow-up low-dose CT 3 months time to assess stability suggested.\par \par \par He was also found to have  [id number] Sydnee 8 prostate cancer with PSA of 4.15 This was diagnosed on 7/13/2022 and 5 out of 6 cores in the right prostate lobe with positive [place] score 4+4 = 8 with cancer involving 40 to 70% of each positive core.  He also had a PSMA PET on 8/30/2022 that did not show any metastatic disease.\par \par He was seen by Dr. Do in [facility] regarding possible radical prostatectomy.  \par \par He was started on ADT with Dr. Church in 10/2023 as per aptient  and underwent radition treatment with Dr. Major that he completed in late December 2022. Most PSA is undetectable. He was also started on Prolia  April 2023.\par \par His Next colonoscopy is in August 2023\par \par He also had history of recurrent venous thromboembolism, Firs one was 2010 left fomoral and was started on Coumaind for one year, HE stopped it and had a second clot was in the right groin and was than back on Coumadin. He also had left arm clot, maybe provoked we are not sure. \par \par \par Mom had blood clot during pregnancy.\par \par He sees dermatology every 3 months. \par He also had recurrent bowel obstructions from Chrons disease

## 2023-07-07 NOTE — CONSULT LETTER
[DrHusam  ___] : Dr. WHEATLEY [Dear  ___] : Dear  [unfilled], [Consult Letter:] : I had the pleasure of evaluating your patient, [unfilled]. [Please see my note below.] : Please see my note below. [Consult Closing:] : Thank you very much for allowing me to participate in the care of this patient.  If you have any questions, please do not hesitate to contact me. [Sincerely,] : Sincerely, [FreeTextEntry3] : Ted [DrHusam ___] : Dr. WHEATLEY

## 2023-07-07 NOTE — ASSESSMENT
[FreeTextEntry1] : # Stage IA melanoma of the neck resected in 2021 as above as well as multiple SCC and BCC he has light skin completion \par -follows with dermatology and has a skin exam every 3 months\par -no indication for blood work or imaging unless he has new signs or symptoms\par \par #Prostate Cancer   [id number] Sydnee 8 prostate cancer with PSA of 4.15 This was diagnosed on 7/13/2022\par -high risk\par -on ADT  to completed  about 2 years  with Dr. 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  bialteral and possibly provoked upper extremity event, needs indefinite anticontagion \par -he is on Coumadin and no longer intrusted on staying on it \par -If he does not have Valvular heart disease, which based on my avaialbe data he does not, but he will speak to his cardiologist, he can be switched to Apixiban 5 mg BID moving forward \par \par #Pulmonary nodule\par -PET.CT negative 5/30/23\par -CT chest 6/18/2023  stable clustered nodules right upper lobe-largest approximately 7 mm (302-95) likely related to small airway disease\par -has repeat CT chest  pending for September and follows with Dr. Phipps\par \par RTC PRN as per his wishes, he can call me with any issues.

## 2023-07-11 ENCOUNTER — APPOINTMENT (OUTPATIENT)
Dept: HEMATOLOGY ONCOLOGY | Facility: CLINIC | Age: 73
End: 2023-07-11

## 2023-07-11 DIAGNOSIS — C43.4 MALIGNANT MELANOMA OF SCALP AND NECK: ICD-10-CM

## 2023-07-11 DIAGNOSIS — I82.90 ACUTE EMBOLISM AND THROMBOSIS OF UNSPECIFIED VEIN: ICD-10-CM

## 2023-07-13 NOTE — DISCUSSION/SUMMARY
[FreeTextEntry1] : REASON FOR VISIT:\par Mr. Sunil Barrett is a 73-year-old male who was referred by Dr. Nilay Tao for cancer genetic counseling and risk assessment due to a personal history of prostate cancer and melanoma. The patient was seen on 7/11/2023 through the [facility] at [facility]. The patient was unaccompanied.\par \par RELEVANT MEDICAL AND SURGICAL HISTORY:\par The patient has a personal history of prostate cancer and melanoma.\par \par Mr. Barrett was diagnosed in 7/2022 with [id number] prostate cancer, Mona score 8. He was started on ADT in 10/2023 and underwent radiation treatment which was completed 12/2022. He was started on Prolia 4/2023. He reported two PSA tests since then have been normal. Currently on ADT, Prolia, Ambrosio and D.\par \par He was also diagnosed with malignant melanoma on the neck in 7/2021, surgically removed by Dr. Marie. PET scan was done 1 month ago and per patient was negative. He also reported four basal cell carcinoma and 4 squamous cell carcinomas were removed. He denied any extensive tanning history.\par \par OTHER MEDICAL AND SURGICAL HISTORY:\par • A-fib\par • Factor V Leiden\par • Two DVTs and one clot in arm\par • Heart attack\par • Hypertension\par • Hypercholesterolemia \par • Diabetes\par • Four stents\par • Central tremor dx 10 years ago\par • Bilateral cataract surgery\par \par CANCER SCREENING HISTORY: \par Prostate: See comments above.\par Colon: Last colonoscopy was 3 years ago. Follow-up: 3 years. Per patient no polyps were found. Crohn's disease.\par Skin: Last exam was yesterday 7/10/23, one bx done on head and results are pending (suspicious for squamous cell).\par \par SOCIAL HISTORY:\par • Tobacco-product use: Former smoker\par \par FAMILY HISTORY:\par Paternal ancestry was reported as [place]/English and maternal ancestry was reported as Fred. A detailed family history of cancer was ascertained, see below for pedigree. Of note:\par • Sister with rare leiomyosarcoma in the leg at age 61\par • Paternal uncle with colon cancer at 65\par • Paternal aunt with melanoma\par • Paternal grandmother with a gastrointestinal cancer at 65\par \par The remaining family history is unremarkable. According to the patient there are no other known cases of significant cancers in the family. To his knowledge no one else in the family has had germline testing for cancer susceptibility. \par 	\par RISK ASSESSMENT:\par The patient’s family history of cancer is suggestive of a hereditary cancer susceptibility syndrome. Variants in prostate and skin cancer susceptibility genes were of specific concern. \par 	 \par We recommended genetic testing for a Common Hereditary Cancer panel, Xeroderma Pigmentosa, FH and a Basal cell cancer panel. This test analyzes 59 genes:  APC, MICHAEL, AXIN2, BARD1, BMPR1A, BRCA1, BRCA2, BRIP1, CDH1, CDK4, CDKN2A, CHEK2, CTNNA1, DDB2, DICER1, EPCAM, ERCC1, ERCC2, ERCC3, ERCC4, ERCC5, FH, GREM1, HOXB13, KIT, MEN1, MLH1, MSH2, MSH3, MSH6, MUTYH, NBN, NF1, NTHL1, PALB2, PDGFRA, PMS2, POLD1, POLE, POLH, PTCH1, PTEN, RAD50, RAD51C, RAD51D, SDHA, SDHB, SDHC, SDHD, SMAD4, SMARCA4, STK11, SUFU, TP53, TSC1, TSC2, VHL, XPA, XPC \par \par We discussed the risks, benefits and limitations, financial cost and implications of genetic testing. We also discussed the psychosocial implications of genetic testing. Possible test results were reviewed with the patient, along with associated medical management options. The Genetic Information Non-discrimination Act (RACHAEL) was also reviewed.\par \par The patient consented to the above-mentioned genetic testing panel. A sample was obtained from the patient in our clinic and will be sent to [facility] for analysis.\par \par PLAN:\par 1. The patient's sample will be sent to [facility] for analysis. \par 2. We will contact the patient once the results are available. Results generally return in 2-3 weeks.\par \par For any additional questions please call Cancer Genetics at [phone].\par \par \par Sondra Villanueva MS, [facility] [place]par Genetic Counselor, Cancer Genetics\par \par \par \par

## 2023-07-27 ENCOUNTER — NON-APPOINTMENT (OUTPATIENT)
Age: 73
End: 2023-07-27

## 2023-07-27 NOTE — DISCUSSION/SUMMARY
[FreeTextEntry1] : REASON FOR VISIT:\par Mr. Sunil Barrett is a 73-year-old male who was called on 7/27/2023 for a discussion regarding his negative genetic test results related to hereditary cancer predisposition. \par \par RELEVANT MEDICAL AND SURGICAL HISTORY:\par The patient has a personal history of prostate cancer and melanoma, basal and squamous cell carcinomas.\par \par Mr. Barrett was diagnosed in 7/2022 with [id number] prostate cancer, Sydnee score 8. He was started on ADT in 10/2023 and underwent radiation treatment which was completed 12/2022. He was started on Prolia 4/2023. He reported two PSA tests since then have been normal. Currently on ADT, Prolia, Ambrosio and D.\par \par He was also diagnosed with malignant melanoma on the neck in 7/2021, surgically removed by Dr. Marie. PET scan was done 1 month ago and per patient was negative. He also reported four basal cell carcinoma and 4 squamous cell carcinomas were removed. He denied any extensive tanning history.\par \par OTHER MEDICAL AND SURGICAL HISTORY:\par • A-fib\par • Factor V Leiden\par • Two DVTs and one clot in arm\par • Heart attack\par • Hypertension\par • Hypercholesterolemia \par • Diabetes\par • Four stents\par • Central tremor dx 10 years ago\par • Bilateral cataract surgery\par \par CANCER SCREENING HISTORY: \par Prostate: See comments above.\par Colon: Last colonoscopy was 3 years ago. Follow-up: 3 years. Per patient no polyps were found. Crohn's disease.\par Skin: Last exam was yesterday 7/10/23, one bx done on head and results are pending (suspicious for squamous cell).\par \par SOCIAL HISTORY:\par • Tobacco-product use: Former smoker\par \par FAMILY HISTORY:\par Paternal ancestry was reported as Malian/English and maternal ancestry was reported as Fred. A detailed family history of cancer was ascertained, see below for pedigree. Of note:\par • Sister with rare leiomyosarcoma in the leg at age 61\par • Paternal uncle with colon cancer at 65\par • Paternal aunt with melanoma\par • Paternal grandmother with a gastrointestinal cancer at 65\par \par The remaining family history is unremarkable. According to the patient there are no other known cases of significant cancers in the family. To his knowledge no one else in the family has had germline testing for cancer susceptibility. \par \par TEST RESULTS: NEGATIVE\par \par NO pathogenic (disease-causing) variants or variants of uncertain significance were detected in the following genes:  APC, MICHAEL, AXIN2, BARD1, BMPR1A, BRCA1, BRCA2, BRIP1, CDH1, CDK4, CDKN2A, CHEK2, CTNNA1, DDB2, DICER1, EPCAM, ERCC1, ERCC2, ERCC3, ERCC4, ERCC5, FH, GREM1, HOXB13, KIT, MEN1, MLH1, MSH2, MSH3, MSH6, MUTYH, NBN, NF1, NTHL1, PALB2, PDGFRA, PMS2, POLD1, POLE, POLH, PTCH1, PTEN, RAD50, RAD51C, RAD51D, SDHA, SDHB, SDHC, SDHD, SMAD4, SMARCA4, STK11, SUFU, TP53, TSC1, TSC2, VHL, XPA, XPC\par \par RESULTS INTERPRETATION AND ASSESSMENT:\par Given Mr. Barrett’s current reported personal and family history of cancer, and his negative genetic test results, the following screening guidelines and risk-reducing recommendations were discussed:\par • PROSTATE: Long-term management and surveillance should be based on the patient’s on- or post-treatment protocol as recommended by his surgeons and/or oncologist.\par • SKIN:  Long-term management and surveillance should be based on the patient’s on- or post-treatment protocol as recommended by his dermatology/oncology team.\par • OTHER: In the absence of other indications, the patient should practice age-appropriate cancer screening for all other organ systems as recommended for the general population.\par \par We also discussed the limitations of negative results:\par 1. The cause of the patient’s personal and family history of cancer remains unknown. The cancer may have developed randomly, or due to environmental factors.  \par 2. This negative result does not completely rule out a hereditary basis for the reported personal history due to limitations in technology or a variant being present in an unidentified gene. \par 3. Variants in other genes would not be identified by this analysis, so this negative result does not rule out the possibility of having a mutation in a different hereditary cancer gene or the possibility of ever developing cancer.\par 4. It is possible there is a hereditary cancer predisposition gene mutation in the family, but the patient did not inherit it. \par \par We informed the patient that our knowledge of genetics and inherited cancer conditions is changing rapidly. Therefore, we recommended that the patient contact our office, every 2 to 3 years, to discuss relevant advances in cancer genetics. We emphasized the importance of re-contacting us with updates regarding his personal and family history of cancer as well as any updates regarding additional cancer genetic test results performed for the patient and/or family members.  Such updates could possibly change our risk assessment and recommendations. \par \par PLAN:\par 1. Long-term management and surveillance should be based on the patient’s personal and family history and general population guidelines for all other cancers.\par 2. A copy of the genetic test results were released to the patient.\par 3. The patient was encouraged to contact us every 2-3 years to discuss relevant advances in cancer genetics, or sooner if there are any changes in his personal or family history of cancer.\par \par For any additional questions please call Cancer Genetics at [phone] or [phone].\par \par \par Sondra Villanueva, MS, [place]\par Genetic Counselor, Cancer Genetics\par \par

## 2023-07-31 ENCOUNTER — APPOINTMENT (OUTPATIENT)
Dept: MEDICATION MANAGEMENT | Facility: CLINIC | Age: 73
End: 2023-07-31

## 2023-08-01 ENCOUNTER — APPOINTMENT (OUTPATIENT)
Dept: MEDICATION MANAGEMENT | Facility: CLINIC | Age: 73
End: 2023-08-01

## 2023-08-01 ENCOUNTER — OUTPATIENT (OUTPATIENT)
Dept: OUTPATIENT SERVICES | Facility: HOSPITAL | Age: 73
LOS: 1 days | End: 2023-08-01
Payer: MEDICARE

## 2023-08-01 DIAGNOSIS — Z79.01 LONG TERM (CURRENT) USE OF ANTICOAGULANTS: ICD-10-CM

## 2023-08-01 DIAGNOSIS — Z98.890 OTHER SPECIFIED POSTPROCEDURAL STATES: Chronic | ICD-10-CM

## 2023-08-01 DIAGNOSIS — Z95.5 PRESENCE OF CORONARY ANGIOPLASTY IMPLANT AND GRAFT: Chronic | ICD-10-CM

## 2023-08-01 DIAGNOSIS — I48.91 UNSPECIFIED ATRIAL FIBRILLATION: ICD-10-CM

## 2023-08-01 LAB
INR PPP: 2.6 RATIO
POCT-PROTHROMBIN TIME: 30.9 SECS
QUALITY CONTROL: YES

## 2023-08-01 PROCEDURE — 99211 OFF/OP EST MAY X REQ PHY/QHP: CPT

## 2023-08-01 PROCEDURE — 36416 COLLJ CAPILLARY BLOOD SPEC: CPT

## 2023-08-01 PROCEDURE — 85610 PROTHROMBIN TIME: CPT

## 2023-08-02 DIAGNOSIS — I48.91 UNSPECIFIED ATRIAL FIBRILLATION: ICD-10-CM

## 2023-08-02 DIAGNOSIS — Z79.01 LONG TERM (CURRENT) USE OF ANTICOAGULANTS: ICD-10-CM

## 2023-08-11 ENCOUNTER — APPOINTMENT (OUTPATIENT)
Dept: PULMONOLOGY | Facility: CLINIC | Age: 73
End: 2023-08-11

## 2023-08-17 NOTE — ED PROVIDER NOTE - WET READ LAUNCH FT
should start asa 81mg qd if h/h stable post procedure.  suspect he had previous stent in 2015  no active cardiac symptoms There are no Wet Read(s) to document. will restart asa 81mg qd if h/h stable post procedure.  suspect he had previous stent in 2015  no active cardiac symptoms

## 2023-08-29 ENCOUNTER — OUTPATIENT (OUTPATIENT)
Dept: OUTPATIENT SERVICES | Facility: HOSPITAL | Age: 73
LOS: 1 days | End: 2023-08-29
Payer: MEDICARE

## 2023-08-29 ENCOUNTER — APPOINTMENT (OUTPATIENT)
Dept: MEDICATION MANAGEMENT | Facility: CLINIC | Age: 73
End: 2023-08-29

## 2023-08-29 DIAGNOSIS — Z98.890 OTHER SPECIFIED POSTPROCEDURAL STATES: Chronic | ICD-10-CM

## 2023-08-29 DIAGNOSIS — I48.91 UNSPECIFIED ATRIAL FIBRILLATION: ICD-10-CM

## 2023-08-29 DIAGNOSIS — Z79.01 LONG TERM (CURRENT) USE OF ANTICOAGULANTS: ICD-10-CM

## 2023-08-29 LAB
INR PPP: 1.8 RATIO
POCT-PROTHROMBIN TIME: 21.9 SECS
QUALITY CONTROL: YES

## 2023-08-29 PROCEDURE — 85610 PROTHROMBIN TIME: CPT

## 2023-08-29 PROCEDURE — 99211 OFF/OP EST MAY X REQ PHY/QHP: CPT

## 2023-08-29 PROCEDURE — 36416 COLLJ CAPILLARY BLOOD SPEC: CPT

## 2023-08-30 DIAGNOSIS — I48.91 UNSPECIFIED ATRIAL FIBRILLATION: ICD-10-CM

## 2023-08-30 DIAGNOSIS — Z79.01 LONG TERM (CURRENT) USE OF ANTICOAGULANTS: ICD-10-CM

## 2023-09-07 ENCOUNTER — OUTPATIENT (OUTPATIENT)
Dept: OUTPATIENT SERVICES | Facility: HOSPITAL | Age: 73
LOS: 1 days | End: 2023-09-07
Payer: MEDICARE

## 2023-09-07 ENCOUNTER — APPOINTMENT (OUTPATIENT)
Dept: MEDICATION MANAGEMENT | Facility: CLINIC | Age: 73
End: 2023-09-07

## 2023-09-07 DIAGNOSIS — I48.91 UNSPECIFIED ATRIAL FIBRILLATION: ICD-10-CM

## 2023-09-07 DIAGNOSIS — Z98.890 OTHER SPECIFIED POSTPROCEDURAL STATES: Chronic | ICD-10-CM

## 2023-09-07 DIAGNOSIS — Z95.5 PRESENCE OF CORONARY ANGIOPLASTY IMPLANT AND GRAFT: Chronic | ICD-10-CM

## 2023-09-07 DIAGNOSIS — Z79.01 LONG TERM (CURRENT) USE OF ANTICOAGULANTS: ICD-10-CM

## 2023-09-07 LAB
INR PPP: 3.3 RATIO
POCT-PROTHROMBIN TIME: 40 SECS
QUALITY CONTROL: YES

## 2023-09-07 PROCEDURE — 99211 OFF/OP EST MAY X REQ PHY/QHP: CPT

## 2023-09-07 PROCEDURE — 85610 PROTHROMBIN TIME: CPT

## 2023-09-07 PROCEDURE — 36416 COLLJ CAPILLARY BLOOD SPEC: CPT

## 2023-09-08 DIAGNOSIS — I48.91 UNSPECIFIED ATRIAL FIBRILLATION: ICD-10-CM

## 2023-09-08 DIAGNOSIS — Z79.01 LONG TERM (CURRENT) USE OF ANTICOAGULANTS: ICD-10-CM

## 2023-09-12 ENCOUNTER — OUTPATIENT (OUTPATIENT)
Dept: OUTPATIENT SERVICES | Facility: HOSPITAL | Age: 73
LOS: 1 days | End: 2023-09-12
Payer: MEDICARE

## 2023-09-12 ENCOUNTER — APPOINTMENT (OUTPATIENT)
Dept: MEDICATION MANAGEMENT | Facility: CLINIC | Age: 73
End: 2023-09-12

## 2023-09-12 DIAGNOSIS — Z98.890 OTHER SPECIFIED POSTPROCEDURAL STATES: Chronic | ICD-10-CM

## 2023-09-12 DIAGNOSIS — Z79.01 LONG TERM (CURRENT) USE OF ANTICOAGULANTS: ICD-10-CM

## 2023-09-12 DIAGNOSIS — Z95.5 PRESENCE OF CORONARY ANGIOPLASTY IMPLANT AND GRAFT: Chronic | ICD-10-CM

## 2023-09-12 DIAGNOSIS — I48.91 UNSPECIFIED ATRIAL FIBRILLATION: ICD-10-CM

## 2023-09-12 LAB
INR PPP: 1.4 RATIO
POCT-PROTHROMBIN TIME: 16.3 SECS
QUALITY CONTROL: YES

## 2023-09-12 PROCEDURE — 99211 OFF/OP EST MAY X REQ PHY/QHP: CPT

## 2023-09-12 PROCEDURE — 36416 COLLJ CAPILLARY BLOOD SPEC: CPT

## 2023-09-12 PROCEDURE — 85610 PROTHROMBIN TIME: CPT

## 2023-09-13 DIAGNOSIS — Z79.01 LONG TERM (CURRENT) USE OF ANTICOAGULANTS: ICD-10-CM

## 2023-09-13 DIAGNOSIS — I48.91 UNSPECIFIED ATRIAL FIBRILLATION: ICD-10-CM

## 2023-09-18 ENCOUNTER — OUTPATIENT (OUTPATIENT)
Dept: OUTPATIENT SERVICES | Facility: HOSPITAL | Age: 73
LOS: 1 days | End: 2023-09-18
Payer: MEDICARE

## 2023-09-18 ENCOUNTER — APPOINTMENT (OUTPATIENT)
Dept: MEDICATION MANAGEMENT | Facility: CLINIC | Age: 73
End: 2023-09-18

## 2023-09-18 ENCOUNTER — LABORATORY RESULT (OUTPATIENT)
Age: 73
End: 2023-09-18

## 2023-09-18 ENCOUNTER — APPOINTMENT (OUTPATIENT)
Dept: HEMATOLOGY ONCOLOGY | Facility: CLINIC | Age: 73
End: 2023-09-18
Payer: MEDICARE

## 2023-09-18 VITALS
HEIGHT: 69 IN | SYSTOLIC BLOOD PRESSURE: 173 MMHG | BODY MASS INDEX: 31.4 KG/M2 | WEIGHT: 212 LBS | TEMPERATURE: 97.6 F | DIASTOLIC BLOOD PRESSURE: 75 MMHG | HEART RATE: 64 BPM

## 2023-09-18 DIAGNOSIS — Z95.5 PRESENCE OF CORONARY ANGIOPLASTY IMPLANT AND GRAFT: Chronic | ICD-10-CM

## 2023-09-18 DIAGNOSIS — C61 MALIGNANT NEOPLASM OF PROSTATE: ICD-10-CM

## 2023-09-18 DIAGNOSIS — Z86.2 PERSONAL HISTORY OF DISEASES OF THE BLOOD AND BLOOD-FORMING ORGANS AND CERTAIN DISORDERS INVOLVING THE IMMUNE MECHANISM: ICD-10-CM

## 2023-09-18 DIAGNOSIS — I48.91 UNSPECIFIED ATRIAL FIBRILLATION: ICD-10-CM

## 2023-09-18 DIAGNOSIS — Z98.890 OTHER SPECIFIED POSTPROCEDURAL STATES: Chronic | ICD-10-CM

## 2023-09-18 DIAGNOSIS — Z79.01 LONG TERM (CURRENT) USE OF ANTICOAGULANTS: ICD-10-CM

## 2023-09-18 LAB
HCT VFR BLD CALC: 34.7 %
HGB BLD-MCNC: 11.8 G/DL
INR PPP: 2.3 RATIO
MCHC RBC-ENTMCNC: 29.5 PG
MCHC RBC-ENTMCNC: 34 G/DL
MCV RBC AUTO: 86.8 FL
PLATELET # BLD AUTO: 177 K/UL
PMV BLD: 10.5 FL
POCT-PROTHROMBIN TIME: 28.2 SECS
QUALITY CONTROL: YES
RBC # BLD: 4 M/UL
RBC # FLD: 12.6 %
WBC # FLD AUTO: 5.35 K/UL

## 2023-09-18 PROCEDURE — 83550 IRON BINDING TEST: CPT

## 2023-09-18 PROCEDURE — 82746 ASSAY OF FOLIC ACID SERUM: CPT

## 2023-09-18 PROCEDURE — 99211 OFF/OP EST MAY X REQ PHY/QHP: CPT

## 2023-09-18 PROCEDURE — 86038 ANTINUCLEAR ANTIBODIES: CPT

## 2023-09-18 PROCEDURE — 85027 COMPLETE CBC AUTOMATED: CPT

## 2023-09-18 PROCEDURE — 85610 PROTHROMBIN TIME: CPT

## 2023-09-18 PROCEDURE — 86357 NK CELLS TOTAL COUNT: CPT

## 2023-09-18 PROCEDURE — 83540 ASSAY OF IRON: CPT

## 2023-09-18 PROCEDURE — 86359 T CELLS TOTAL COUNT: CPT

## 2023-09-18 PROCEDURE — 86360 T CELL ABSOLUTE COUNT/RATIO: CPT

## 2023-09-18 PROCEDURE — 87389 HIV-1 AG W/HIV-1&-2 AB AG IA: CPT

## 2023-09-18 PROCEDURE — 86355 B CELLS TOTAL COUNT: CPT

## 2023-09-18 PROCEDURE — 99214 OFFICE O/P EST MOD 30 MIN: CPT

## 2023-09-18 PROCEDURE — 82607 VITAMIN B-12: CPT

## 2023-09-18 PROCEDURE — 84165 PROTEIN E-PHORESIS SERUM: CPT

## 2023-09-18 PROCEDURE — 82728 ASSAY OF FERRITIN: CPT

## 2023-09-18 PROCEDURE — 36416 COLLJ CAPILLARY BLOOD SPEC: CPT

## 2023-09-18 RX ORDER — ASPIRIN 81 MG
81 TABLET, DELAYED RELEASE (ENTERIC COATED) ORAL
Refills: 0 | Status: COMPLETED | COMMUNITY
End: 2023-09-18

## 2023-09-18 RX ORDER — GLUC/MSM/COLGN2/HYAL/ANTIARTH3 375-375-20
TABLET ORAL
Refills: 0 | Status: COMPLETED | COMMUNITY
End: 2023-09-18

## 2023-09-18 RX ORDER — ENOXAPARIN SODIUM 100 MG/ML
100 INJECTION, SOLUTION SUBCUTANEOUS
Qty: 10 | Refills: 1 | Status: COMPLETED | COMMUNITY
Start: 2023-08-29 | End: 2023-09-18

## 2023-09-18 RX ORDER — OMEPRAZOLE 40 MG/1
40 CAPSULE, DELAYED RELEASE ORAL
Refills: 0 | Status: COMPLETED | COMMUNITY
End: 2023-09-18

## 2023-09-18 RX ORDER — ENOXAPARIN SODIUM 100 MG/ML
100 INJECTION, SOLUTION SUBCUTANEOUS
Qty: 10 | Refills: 1 | Status: COMPLETED | COMMUNITY
Start: 2022-10-05 | End: 2023-09-18

## 2023-09-18 RX ORDER — ENOXAPARIN SODIUM 100 MG/ML
100 INJECTION SUBCUTANEOUS
Qty: 10 | Refills: 0 | Status: COMPLETED | COMMUNITY
Start: 2021-09-10 | End: 2023-09-18

## 2023-09-18 RX ORDER — ATORVASTATIN CALCIUM 80 MG/1
80 TABLET, FILM COATED ORAL
Refills: 0 | Status: COMPLETED | COMMUNITY
End: 2023-09-18

## 2023-09-18 RX ORDER — CLOPIDOGREL 75 MG/1
75 TABLET, FILM COATED ORAL DAILY
Refills: 0 | Status: COMPLETED | COMMUNITY
End: 2023-09-18

## 2023-09-18 RX ORDER — CYCLOBENZAPRINE HYDROCHLORIDE 5 MG/1
5 TABLET, FILM COATED ORAL
Refills: 0 | Status: COMPLETED | COMMUNITY
End: 2023-09-18

## 2023-09-18 RX ORDER — LOSARTAN POTASSIUM AND HYDROCHLOROTHIAZIDE 12.5; 5 MG/1; MG/1
50-12.5 TABLET ORAL DAILY
Refills: 0 | Status: COMPLETED | COMMUNITY
End: 2023-09-18

## 2023-09-18 RX ORDER — HYDROCODONE BITARTRATE AND ACETAMINOPHEN 5; 300 MG/1; MG/1
TABLET ORAL
Refills: 0 | Status: COMPLETED | COMMUNITY
End: 2023-09-18

## 2023-09-18 RX ORDER — ENOXAPARIN SODIUM 100 MG/ML
100 INJECTION SUBCUTANEOUS
Qty: 5 | Refills: 0 | Status: COMPLETED | COMMUNITY
Start: 2021-10-29 | End: 2023-09-18

## 2023-09-19 DIAGNOSIS — Z79.01 LONG TERM (CURRENT) USE OF ANTICOAGULANTS: ICD-10-CM

## 2023-09-19 DIAGNOSIS — I48.91 UNSPECIFIED ATRIAL FIBRILLATION: ICD-10-CM

## 2023-09-19 DIAGNOSIS — C61 MALIGNANT NEOPLASM OF PROSTATE: ICD-10-CM

## 2023-09-19 LAB
FERRITIN SERPL-MCNC: 133 NG/ML
FOLATE SERPL-MCNC: >20 NG/ML
HIV1+2 AB SPEC QL IA.RAPID: NONREACTIVE
IRON SATN MFR SERPL: 22 %
IRON SERPL-MCNC: 61 UG/DL
TIBC SERPL-MCNC: 281 UG/DL
UIBC SERPL-MCNC: 220 UG/DL
VIT B12 SERPL-MCNC: 433 PG/ML

## 2023-09-25 ENCOUNTER — OUTPATIENT (OUTPATIENT)
Dept: OUTPATIENT SERVICES | Facility: HOSPITAL | Age: 73
LOS: 1 days | End: 2023-09-25
Payer: MEDICARE

## 2023-09-25 DIAGNOSIS — Z98.890 OTHER SPECIFIED POSTPROCEDURAL STATES: Chronic | ICD-10-CM

## 2023-09-25 DIAGNOSIS — R41.3 OTHER AMNESIA: ICD-10-CM

## 2023-09-25 DIAGNOSIS — Z00.8 ENCOUNTER FOR OTHER GENERAL EXAMINATION: ICD-10-CM

## 2023-09-25 DIAGNOSIS — Z95.5 PRESENCE OF CORONARY ANGIOPLASTY IMPLANT AND GRAFT: Chronic | ICD-10-CM

## 2023-09-25 PROCEDURE — 78831 RP LOCLZJ TUM SPECT 2 AREAS: CPT | Mod: 26

## 2023-09-25 PROCEDURE — A9521: CPT

## 2023-09-25 PROCEDURE — 78831 RP LOCLZJ TUM SPECT 2 AREAS: CPT

## 2023-09-26 ENCOUNTER — APPOINTMENT (OUTPATIENT)
Dept: MEDICATION MANAGEMENT | Facility: CLINIC | Age: 73
End: 2023-09-26

## 2023-09-26 ENCOUNTER — OUTPATIENT (OUTPATIENT)
Dept: OUTPATIENT SERVICES | Facility: HOSPITAL | Age: 73
LOS: 1 days | End: 2023-09-26
Payer: MEDICARE

## 2023-09-26 DIAGNOSIS — R41.3 OTHER AMNESIA: ICD-10-CM

## 2023-09-26 DIAGNOSIS — Z79.01 LONG TERM (CURRENT) USE OF ANTICOAGULANTS: ICD-10-CM

## 2023-09-26 DIAGNOSIS — I48.91 UNSPECIFIED ATRIAL FIBRILLATION: ICD-10-CM

## 2023-09-26 DIAGNOSIS — Z98.890 OTHER SPECIFIED POSTPROCEDURAL STATES: Chronic | ICD-10-CM

## 2023-09-26 DIAGNOSIS — Z95.5 PRESENCE OF CORONARY ANGIOPLASTY IMPLANT AND GRAFT: Chronic | ICD-10-CM

## 2023-09-26 LAB
INR PPP: 3.2 RATIO
POCT-PROTHROMBIN TIME: 38.4 SECS
QUALITY CONTROL: YES

## 2023-09-26 PROCEDURE — 85610 PROTHROMBIN TIME: CPT

## 2023-09-26 PROCEDURE — 36416 COLLJ CAPILLARY BLOOD SPEC: CPT

## 2023-09-26 PROCEDURE — 99211 OFF/OP EST MAY X REQ PHY/QHP: CPT

## 2023-09-27 DIAGNOSIS — I48.91 UNSPECIFIED ATRIAL FIBRILLATION: ICD-10-CM

## 2023-09-27 DIAGNOSIS — Z79.01 LONG TERM (CURRENT) USE OF ANTICOAGULANTS: ICD-10-CM

## 2023-10-02 ENCOUNTER — APPOINTMENT (OUTPATIENT)
Dept: HEMATOLOGY ONCOLOGY | Facility: CLINIC | Age: 73
End: 2023-10-02
Payer: MEDICARE

## 2023-10-02 ENCOUNTER — LABORATORY RESULT (OUTPATIENT)
Age: 73
End: 2023-10-02

## 2023-10-02 ENCOUNTER — OUTPATIENT (OUTPATIENT)
Dept: OUTPATIENT SERVICES | Facility: HOSPITAL | Age: 73
LOS: 1 days | End: 2023-10-02
Payer: MEDICARE

## 2023-10-02 VITALS
TEMPERATURE: 97.6 F | BODY MASS INDEX: 31.7 KG/M2 | WEIGHT: 214 LBS | HEART RATE: 73 BPM | HEIGHT: 69 IN | SYSTOLIC BLOOD PRESSURE: 190 MMHG | DIASTOLIC BLOOD PRESSURE: 76 MMHG

## 2023-10-02 DIAGNOSIS — Z95.5 PRESENCE OF CORONARY ANGIOPLASTY IMPLANT AND GRAFT: Chronic | ICD-10-CM

## 2023-10-02 DIAGNOSIS — Z87.19 PERSONAL HISTORY OF OTHER DISEASES OF THE DIGESTIVE SYSTEM: ICD-10-CM

## 2023-10-02 DIAGNOSIS — C61 MALIGNANT NEOPLASM OF PROSTATE: ICD-10-CM

## 2023-10-02 DIAGNOSIS — Z98.890 OTHER SPECIFIED POSTPROCEDURAL STATES: Chronic | ICD-10-CM

## 2023-10-02 DIAGNOSIS — D64.9 ANEMIA, UNSPECIFIED: ICD-10-CM

## 2023-10-02 LAB
ALBUMIN MFR SERPL ELPH: 62.9 %
ALBUMIN SERPL-MCNC: 4.2 G/DL
ALBUMIN/GLOB SERPL: 1.7 RATIO
ALPHA1 GLOB MFR SERPL ELPH: 3.6 %
ALPHA1 GLOB SERPL ELPH-MCNC: 0.2 G/DL
ALPHA2 GLOB MFR SERPL ELPH: 9 %
ALPHA2 GLOB SERPL ELPH-MCNC: 0.6 G/DL
ANA SER IF-ACNC: NEGATIVE
B-GLOBULIN MFR SERPL ELPH: 10.9 %
B-GLOBULIN SERPL ELPH-MCNC: 0.7 G/DL
DEPRECATED KAPPA LC FREE/LAMBDA SER: 1.98 RATIO
GAMMA GLOB FLD ELPH-MCNC: 0.9 G/DL
GAMMA GLOB MFR SERPL ELPH: 13.6 %
HCT VFR BLD CALC: 34.7 %
HGB BLD-MCNC: 11.8 G/DL
IGA SER QL IEP: 177 MG/DL
IGG SER QL IEP: 1040 MG/DL
IGM SER QL IEP: 47 MG/DL
INTERPRETATION SERPL IEP-IMP: NORMAL
KAPPA LC CSF-MCNC: 1.5 MG/DL
KAPPA LC SERPL-MCNC: 2.97 MG/DL
M PROTEIN SPEC IFE-MCNC: NORMAL
MCHC RBC-ENTMCNC: 30.1 PG
MCHC RBC-ENTMCNC: 34 G/DL
MCV RBC AUTO: 88.5 FL
PLATELET # BLD AUTO: 175 K/UL
PMV BLD: 9.9 FL
PROT SERPL-MCNC: 6.7 G/DL
PROT SERPL-MCNC: 6.7 G/DL
RBC # BLD: 3.92 M/UL
RBC # FLD: 12.5 %
WBC # FLD AUTO: 3.5 K/UL

## 2023-10-02 PROCEDURE — 99214 OFFICE O/P EST MOD 30 MIN: CPT

## 2023-10-02 PROCEDURE — 99214 OFFICE O/P EST MOD 30 MIN: CPT | Mod: 25

## 2023-10-02 PROCEDURE — 88184 FLOWCYTOMETRY/ TC 1 MARKER: CPT

## 2023-10-02 PROCEDURE — 86644 CMV ANTIBODY: CPT

## 2023-10-02 PROCEDURE — 86645 CMV ANTIBODY IGM: CPT

## 2023-10-02 PROCEDURE — 87205 SMEAR GRAM STAIN: CPT

## 2023-10-02 PROCEDURE — 87799 DETECT AGENT NOS DNA QUANT: CPT

## 2023-10-02 PROCEDURE — 86803 HEPATITIS C AB TEST: CPT

## 2023-10-02 PROCEDURE — 88189 FLOWCYTOMETRY/READ 16 & >: CPT

## 2023-10-02 PROCEDURE — 85027 COMPLETE CBC AUTOMATED: CPT

## 2023-10-02 PROCEDURE — 86704 HEP B CORE ANTIBODY TOTAL: CPT

## 2023-10-02 PROCEDURE — 88185 FLOWCYTOMETRY/TC ADD-ON: CPT

## 2023-10-02 PROCEDURE — 86706 HEP B SURFACE ANTIBODY: CPT

## 2023-10-02 PROCEDURE — 87340 HEPATITIS B SURFACE AG IA: CPT

## 2023-10-02 PROCEDURE — 86705 HEP B CORE ANTIBODY IGM: CPT

## 2023-10-03 DIAGNOSIS — C61 MALIGNANT NEOPLASM OF PROSTATE: ICD-10-CM

## 2023-10-04 ENCOUNTER — RESULT REVIEW (OUTPATIENT)
Age: 73
End: 2023-10-04

## 2023-10-04 ENCOUNTER — OUTPATIENT (OUTPATIENT)
Dept: OUTPATIENT SERVICES | Facility: HOSPITAL | Age: 73
LOS: 1 days | End: 2023-10-04
Payer: MEDICARE

## 2023-10-04 DIAGNOSIS — Z00.8 ENCOUNTER FOR OTHER GENERAL EXAMINATION: ICD-10-CM

## 2023-10-04 DIAGNOSIS — R91.1 SOLITARY PULMONARY NODULE: ICD-10-CM

## 2023-10-04 DIAGNOSIS — Z98.890 OTHER SPECIFIED POSTPROCEDURAL STATES: Chronic | ICD-10-CM

## 2023-10-04 DIAGNOSIS — Z95.5 PRESENCE OF CORONARY ANGIOPLASTY IMPLANT AND GRAFT: Chronic | ICD-10-CM

## 2023-10-04 PROCEDURE — 71250 CT THORAX DX C-: CPT

## 2023-10-04 PROCEDURE — 71250 CT THORAX DX C-: CPT | Mod: 26,MH

## 2023-10-05 ENCOUNTER — TRANSCRIPTION ENCOUNTER (OUTPATIENT)
Age: 73
End: 2023-10-05

## 2023-10-05 DIAGNOSIS — R91.1 SOLITARY PULMONARY NODULE: ICD-10-CM

## 2023-10-09 ENCOUNTER — APPOINTMENT (OUTPATIENT)
Dept: PULMONOLOGY | Facility: CLINIC | Age: 73
End: 2023-10-09
Payer: MEDICARE

## 2023-10-09 VITALS
DIASTOLIC BLOOD PRESSURE: 80 MMHG | HEART RATE: 77 BPM | OXYGEN SATURATION: 95 % | BODY MASS INDEX: 31.4 KG/M2 | HEIGHT: 69 IN | SYSTOLIC BLOOD PRESSURE: 130 MMHG | WEIGHT: 212 LBS

## 2023-10-09 PROCEDURE — 99213 OFFICE O/P EST LOW 20 MIN: CPT

## 2023-10-11 ENCOUNTER — APPOINTMENT (OUTPATIENT)
Dept: PULMONOLOGY | Facility: HOSPITAL | Age: 73
End: 2023-10-11
Payer: MEDICARE

## 2023-10-11 ENCOUNTER — OUTPATIENT (OUTPATIENT)
Dept: OUTPATIENT SERVICES | Facility: HOSPITAL | Age: 73
LOS: 1 days | End: 2023-10-11
Payer: MEDICARE

## 2023-10-11 DIAGNOSIS — Z98.890 OTHER SPECIFIED POSTPROCEDURAL STATES: Chronic | ICD-10-CM

## 2023-10-11 DIAGNOSIS — Z95.5 PRESENCE OF CORONARY ANGIOPLASTY IMPLANT AND GRAFT: Chronic | ICD-10-CM

## 2023-10-11 DIAGNOSIS — R06.02 SHORTNESS OF BREATH: ICD-10-CM

## 2023-10-11 LAB
CMV IGG SERPL QL: <0.2 U/ML
CMV IGG SERPL-IMP: NEGATIVE
CMV IGM SERPL QL: <8 AU/ML
CMV IGM SERPL QL: NEGATIVE
EBV DNA SERPL NAA+PROBE-ACNC: NOT DETECTED IU/ML
EBVPCR LOG: NOT DETECTED LOG10IU/ML
HBV CORE IGG+IGM SER QL: NONREACTIVE
HBV CORE IGM SER QL: NONREACTIVE
HBV SURFACE AB SER QL: REACTIVE
HBV SURFACE AG SER QL: NONREACTIVE
HCV AB SER QL: NONREACTIVE
HCV S/CO RATIO: 0.12 S/CO

## 2023-10-11 PROCEDURE — 94729 DIFFUSING CAPACITY: CPT | Mod: 26

## 2023-10-11 PROCEDURE — 94727 GAS DIL/WSHOT DETER LNG VOL: CPT | Mod: 26

## 2023-10-11 PROCEDURE — 94060 EVALUATION OF WHEEZING: CPT | Mod: 26

## 2023-10-11 PROCEDURE — 94729 DIFFUSING CAPACITY: CPT

## 2023-10-11 PROCEDURE — 94070 EVALUATION OF WHEEZING: CPT

## 2023-10-11 PROCEDURE — 94727 GAS DIL/WSHOT DETER LNG VOL: CPT

## 2023-10-11 PROCEDURE — 94664 DEMO&/EVAL PT USE INHALER: CPT

## 2023-10-12 DIAGNOSIS — R06.02 SHORTNESS OF BREATH: ICD-10-CM

## 2023-10-18 ENCOUNTER — APPOINTMENT (OUTPATIENT)
Dept: HEMATOLOGY ONCOLOGY | Facility: CLINIC | Age: 73
End: 2023-10-18

## 2023-10-18 ENCOUNTER — OUTPATIENT (OUTPATIENT)
Dept: OUTPATIENT SERVICES | Facility: HOSPITAL | Age: 73
LOS: 1 days | End: 2023-10-18

## 2023-10-18 ENCOUNTER — OUTPATIENT (OUTPATIENT)
Dept: OUTPATIENT SERVICES | Facility: HOSPITAL | Age: 73
LOS: 1 days | End: 2023-10-18
Payer: MEDICARE

## 2023-10-18 ENCOUNTER — LABORATORY RESULT (OUTPATIENT)
Age: 73
End: 2023-10-18

## 2023-10-18 ENCOUNTER — APPOINTMENT (OUTPATIENT)
Dept: MEDICATION MANAGEMENT | Facility: CLINIC | Age: 73
End: 2023-10-18

## 2023-10-18 DIAGNOSIS — Z98.890 OTHER SPECIFIED POSTPROCEDURAL STATES: Chronic | ICD-10-CM

## 2023-10-18 DIAGNOSIS — Z95.5 PRESENCE OF CORONARY ANGIOPLASTY IMPLANT AND GRAFT: Chronic | ICD-10-CM

## 2023-10-18 DIAGNOSIS — C43.4 MALIGNANT MELANOMA OF SCALP AND NECK: ICD-10-CM

## 2023-10-18 DIAGNOSIS — I48.91 UNSPECIFIED ATRIAL FIBRILLATION: ICD-10-CM

## 2023-10-18 DIAGNOSIS — Z79.01 LONG TERM (CURRENT) USE OF ANTICOAGULANTS: ICD-10-CM

## 2023-10-18 DIAGNOSIS — C61 MALIGNANT NEOPLASM OF PROSTATE: ICD-10-CM

## 2023-10-18 LAB
HCT VFR BLD CALC: 33.9 %
HGB BLD-MCNC: 11.5 G/DL
INR PPP: 2.8 RATIO
IRON SATN MFR SERPL: 24 %
IRON SERPL-MCNC: 72 UG/DL
MCHC RBC-ENTMCNC: 29.7 PG
MCHC RBC-ENTMCNC: 33.9 G/DL
MCV RBC AUTO: 87.6 FL
PLATELET # BLD AUTO: 187 K/UL
PMV BLD: 10.2 FL
POCT-PROTHROMBIN TIME: 33.8 SECS
QUALITY CONTROL: YES
RBC # BLD: 3.87 M/UL
RBC # FLD: 12.8 %
TIBC SERPL-MCNC: 294 UG/DL
UIBC SERPL-MCNC: 222 UG/DL
WBC # FLD AUTO: 4.11 K/UL

## 2023-10-18 PROCEDURE — 82728 ASSAY OF FERRITIN: CPT

## 2023-10-18 PROCEDURE — 99211 OFF/OP EST MAY X REQ PHY/QHP: CPT

## 2023-10-18 PROCEDURE — 83550 IRON BINDING TEST: CPT

## 2023-10-18 PROCEDURE — 86355 B CELLS TOTAL COUNT: CPT

## 2023-10-18 PROCEDURE — 86357 NK CELLS TOTAL COUNT: CPT

## 2023-10-18 PROCEDURE — 86360 T CELL ABSOLUTE COUNT/RATIO: CPT

## 2023-10-18 PROCEDURE — 36416 COLLJ CAPILLARY BLOOD SPEC: CPT

## 2023-10-18 PROCEDURE — 85027 COMPLETE CBC AUTOMATED: CPT

## 2023-10-18 PROCEDURE — 86359 T CELLS TOTAL COUNT: CPT

## 2023-10-18 PROCEDURE — 83540 ASSAY OF IRON: CPT

## 2023-10-18 PROCEDURE — 85610 PROTHROMBIN TIME: CPT

## 2023-10-19 DIAGNOSIS — C43.4 MALIGNANT MELANOMA OF SCALP AND NECK: ICD-10-CM

## 2023-10-19 DIAGNOSIS — I48.91 UNSPECIFIED ATRIAL FIBRILLATION: ICD-10-CM

## 2023-10-19 DIAGNOSIS — Z79.01 LONG TERM (CURRENT) USE OF ANTICOAGULANTS: ICD-10-CM

## 2023-10-19 LAB — FERRITIN SERPL-MCNC: 111 NG/ML

## 2023-10-30 ENCOUNTER — APPOINTMENT (OUTPATIENT)
Dept: PULMONOLOGY | Facility: CLINIC | Age: 73
End: 2023-10-30
Payer: MEDICARE

## 2023-10-30 PROCEDURE — 99213 OFFICE O/P EST LOW 20 MIN: CPT | Mod: 95

## 2023-11-07 ENCOUNTER — APPOINTMENT (OUTPATIENT)
Dept: PLASTIC SURGERY | Facility: CLINIC | Age: 73
End: 2023-11-07
Payer: MEDICARE

## 2023-11-07 PROCEDURE — 99212 OFFICE O/P EST SF 10 MIN: CPT

## 2023-11-15 ENCOUNTER — OUTPATIENT (OUTPATIENT)
Dept: OUTPATIENT SERVICES | Facility: HOSPITAL | Age: 73
LOS: 1 days | End: 2023-11-15
Payer: MEDICARE

## 2023-11-15 ENCOUNTER — APPOINTMENT (OUTPATIENT)
Dept: MEDICATION MANAGEMENT | Facility: CLINIC | Age: 73
End: 2023-11-15

## 2023-11-15 ENCOUNTER — NON-APPOINTMENT (OUTPATIENT)
Age: 73
End: 2023-11-15

## 2023-11-15 DIAGNOSIS — I48.91 UNSPECIFIED ATRIAL FIBRILLATION: ICD-10-CM

## 2023-11-15 DIAGNOSIS — Z79.01 LONG TERM (CURRENT) USE OF ANTICOAGULANTS: ICD-10-CM

## 2023-11-15 DIAGNOSIS — Z98.890 OTHER SPECIFIED POSTPROCEDURAL STATES: Chronic | ICD-10-CM

## 2023-11-15 LAB
INR PPP: 1.2 RATIO
POCT-PROTHROMBIN TIME: 14.6 SECS
QUALITY CONTROL: YES

## 2023-11-15 PROCEDURE — 99211 OFF/OP EST MAY X REQ PHY/QHP: CPT

## 2023-11-15 PROCEDURE — 85610 PROTHROMBIN TIME: CPT

## 2023-11-15 PROCEDURE — 36416 COLLJ CAPILLARY BLOOD SPEC: CPT

## 2023-11-16 DIAGNOSIS — I48.91 UNSPECIFIED ATRIAL FIBRILLATION: ICD-10-CM

## 2023-11-16 DIAGNOSIS — Z79.01 LONG TERM (CURRENT) USE OF ANTICOAGULANTS: ICD-10-CM

## 2023-11-17 ENCOUNTER — OUTPATIENT (OUTPATIENT)
Dept: OUTPATIENT SERVICES | Facility: HOSPITAL | Age: 73
LOS: 1 days | End: 2023-11-17
Payer: MEDICARE

## 2023-11-17 ENCOUNTER — APPOINTMENT (OUTPATIENT)
Dept: MEDICATION MANAGEMENT | Facility: CLINIC | Age: 73
End: 2023-11-17

## 2023-11-17 DIAGNOSIS — Z95.5 PRESENCE OF CORONARY ANGIOPLASTY IMPLANT AND GRAFT: Chronic | ICD-10-CM

## 2023-11-17 DIAGNOSIS — Z79.01 LONG TERM (CURRENT) USE OF ANTICOAGULANTS: ICD-10-CM

## 2023-11-17 DIAGNOSIS — Z98.890 OTHER SPECIFIED POSTPROCEDURAL STATES: Chronic | ICD-10-CM

## 2023-11-17 DIAGNOSIS — I48.91 UNSPECIFIED ATRIAL FIBRILLATION: ICD-10-CM

## 2023-11-17 LAB
INR PPP: 1.6 RATIO
POCT-PROTHROMBIN TIME: 19.6 SECS
QUALITY CONTROL: YES

## 2023-11-17 PROCEDURE — 99211 OFF/OP EST MAY X REQ PHY/QHP: CPT

## 2023-11-17 PROCEDURE — 36416 COLLJ CAPILLARY BLOOD SPEC: CPT

## 2023-11-17 PROCEDURE — 85610 PROTHROMBIN TIME: CPT

## 2023-11-18 DIAGNOSIS — I48.91 UNSPECIFIED ATRIAL FIBRILLATION: ICD-10-CM

## 2023-11-18 DIAGNOSIS — Z79.01 LONG TERM (CURRENT) USE OF ANTICOAGULANTS: ICD-10-CM

## 2023-11-24 ENCOUNTER — OUTPATIENT (OUTPATIENT)
Dept: OUTPATIENT SERVICES | Facility: HOSPITAL | Age: 73
LOS: 1 days | End: 2023-11-24
Payer: MEDICARE

## 2023-11-24 ENCOUNTER — APPOINTMENT (OUTPATIENT)
Dept: MEDICATION MANAGEMENT | Facility: CLINIC | Age: 73
End: 2023-11-24

## 2023-11-24 DIAGNOSIS — I48.91 UNSPECIFIED ATRIAL FIBRILLATION: ICD-10-CM

## 2023-11-24 DIAGNOSIS — Z98.890 OTHER SPECIFIED POSTPROCEDURAL STATES: Chronic | ICD-10-CM

## 2023-11-24 DIAGNOSIS — Z79.01 LONG TERM (CURRENT) USE OF ANTICOAGULANTS: ICD-10-CM

## 2023-11-24 DIAGNOSIS — Z95.5 PRESENCE OF CORONARY ANGIOPLASTY IMPLANT AND GRAFT: Chronic | ICD-10-CM

## 2023-11-24 LAB
INR PPP: 2.4 RATIO
POCT-PROTHROMBIN TIME: 29.1 SECS
QUALITY CONTROL: YES

## 2023-11-24 PROCEDURE — 99211 OFF/OP EST MAY X REQ PHY/QHP: CPT

## 2023-11-24 PROCEDURE — 36416 COLLJ CAPILLARY BLOOD SPEC: CPT

## 2023-11-24 PROCEDURE — 85610 PROTHROMBIN TIME: CPT

## 2023-11-25 DIAGNOSIS — I48.91 UNSPECIFIED ATRIAL FIBRILLATION: ICD-10-CM

## 2023-11-25 DIAGNOSIS — Z79.01 LONG TERM (CURRENT) USE OF ANTICOAGULANTS: ICD-10-CM

## 2023-12-05 LAB
CD16+CD56+ CELLS # BLD: 239 /UL
CD16+CD56+ CELLS NFR BLD: 50 %
CD19 CELLS NFR BLD: 60 /UL
CD3 CELLS # BLD: 162 /UL
CD3 CELLS NFR BLD: 34 %
CD3+CD4+ CELLS # BLD: 111 /UL
CD3+CD4+ CELLS NFR BLD: 23 %
CD3+CD4+ CELLS/CD3+CD8+ CLL SPEC: 3 RATIO
CD3+CD8+ CELLS # SPEC: 37 /UL
CD3+CD8+ CELLS NFR BLD: 8 %
CELLS.CD3-CD19+/CELLS IN BLOOD: 13 %

## 2023-12-15 ENCOUNTER — OUTPATIENT (OUTPATIENT)
Dept: OUTPATIENT SERVICES | Facility: HOSPITAL | Age: 73
LOS: 1 days | End: 2023-12-15
Payer: MEDICARE

## 2023-12-15 ENCOUNTER — RESULT REVIEW (OUTPATIENT)
Age: 73
End: 2023-12-15

## 2023-12-15 DIAGNOSIS — Z95.5 PRESENCE OF CORONARY ANGIOPLASTY IMPLANT AND GRAFT: Chronic | ICD-10-CM

## 2023-12-15 DIAGNOSIS — Z00.8 ENCOUNTER FOR OTHER GENERAL EXAMINATION: ICD-10-CM

## 2023-12-15 DIAGNOSIS — Z98.890 OTHER SPECIFIED POSTPROCEDURAL STATES: Chronic | ICD-10-CM

## 2023-12-15 DIAGNOSIS — R91.1 SOLITARY PULMONARY NODULE: ICD-10-CM

## 2023-12-15 PROCEDURE — 71250 CT THORAX DX C-: CPT | Mod: 26

## 2023-12-15 PROCEDURE — 71250 CT THORAX DX C-: CPT

## 2023-12-16 DIAGNOSIS — R91.1 SOLITARY PULMONARY NODULE: ICD-10-CM

## 2023-12-18 ENCOUNTER — APPOINTMENT (OUTPATIENT)
Dept: MEDICATION MANAGEMENT | Facility: CLINIC | Age: 73
End: 2023-12-18

## 2023-12-18 ENCOUNTER — OUTPATIENT (OUTPATIENT)
Dept: OUTPATIENT SERVICES | Facility: HOSPITAL | Age: 73
LOS: 1 days | End: 2023-12-18
Payer: MEDICARE

## 2023-12-18 DIAGNOSIS — I48.91 UNSPECIFIED ATRIAL FIBRILLATION: ICD-10-CM

## 2023-12-18 DIAGNOSIS — Z95.5 PRESENCE OF CORONARY ANGIOPLASTY IMPLANT AND GRAFT: Chronic | ICD-10-CM

## 2023-12-18 DIAGNOSIS — Z79.01 LONG TERM (CURRENT) USE OF ANTICOAGULANTS: ICD-10-CM

## 2023-12-18 DIAGNOSIS — Z98.890 OTHER SPECIFIED POSTPROCEDURAL STATES: Chronic | ICD-10-CM

## 2023-12-18 LAB
INR PPP: 1.9 RATIO
POCT-PROTHROMBIN TIME: 22.9 SECS
QUALITY CONTROL: YES

## 2023-12-18 PROCEDURE — 36416 COLLJ CAPILLARY BLOOD SPEC: CPT

## 2023-12-18 PROCEDURE — 99211 OFF/OP EST MAY X REQ PHY/QHP: CPT

## 2023-12-18 PROCEDURE — 85610 PROTHROMBIN TIME: CPT

## 2023-12-19 DIAGNOSIS — I48.91 UNSPECIFIED ATRIAL FIBRILLATION: ICD-10-CM

## 2023-12-19 DIAGNOSIS — Z79.01 LONG TERM (CURRENT) USE OF ANTICOAGULANTS: ICD-10-CM

## 2023-12-20 ENCOUNTER — APPOINTMENT (OUTPATIENT)
Dept: PULMONOLOGY | Facility: CLINIC | Age: 73
End: 2023-12-20

## 2023-12-20 ENCOUNTER — APPOINTMENT (OUTPATIENT)
Dept: PULMONOLOGY | Facility: CLINIC | Age: 73
End: 2023-12-20
Payer: MEDICARE

## 2023-12-20 PROCEDURE — 99213 OFFICE O/P EST LOW 20 MIN: CPT | Mod: 95

## 2023-12-20 NOTE — HISTORY OF PRESENT ILLNESS
[Follow-Up - Routine Clinic] : a routine clinic follow-up of [None] : No associated symptoms are reported [BiPAP] : BiPAP [Good Compliance] : good compliance with treatment [Good Tolerance] : good tolerance of treatment [Good Symptom Control] : good symptom control [Shortness of Breath] : Shortness of Breath [Home] : at home, [unfilled] , at the time of the visit. [Medical Office: ([facility])___] : at the medical office located in  [Verbal consent obtained from patient] : the patient, [unfilled] [de-identified] : Machine broken

## 2023-12-20 NOTE — ASSESSMENT
[FreeTextEntry1] : DWIGHT on BiPAP 11/7 cm H2O. HO Afib SP COVID in November 2021. HO Melanoma sp resection. Small RUL lung nodules PET negative twice.  Stable on repeat CT Chest

## 2024-01-02 ENCOUNTER — APPOINTMENT (OUTPATIENT)
Dept: MEDICATION MANAGEMENT | Facility: CLINIC | Age: 74
End: 2024-01-02

## 2024-01-03 ENCOUNTER — APPOINTMENT (OUTPATIENT)
Dept: MEDICATION MANAGEMENT | Facility: CLINIC | Age: 74
End: 2024-01-03

## 2024-01-10 ENCOUNTER — OUTPATIENT (OUTPATIENT)
Dept: OUTPATIENT SERVICES | Facility: HOSPITAL | Age: 74
LOS: 1 days | End: 2024-01-10
Payer: MEDICARE

## 2024-01-10 ENCOUNTER — APPOINTMENT (OUTPATIENT)
Dept: HEMATOLOGY ONCOLOGY | Facility: CLINIC | Age: 74
End: 2024-01-10
Payer: MEDICARE

## 2024-01-10 ENCOUNTER — LABORATORY RESULT (OUTPATIENT)
Age: 74
End: 2024-01-10

## 2024-01-10 ENCOUNTER — APPOINTMENT (OUTPATIENT)
Dept: MEDICATION MANAGEMENT | Facility: CLINIC | Age: 74
End: 2024-01-10

## 2024-01-10 VITALS
RESPIRATION RATE: 16 BRPM | BODY MASS INDEX: 30.96 KG/M2 | HEART RATE: 75 BPM | HEIGHT: 69 IN | WEIGHT: 209 LBS | TEMPERATURE: 98.1 F | SYSTOLIC BLOOD PRESSURE: 154 MMHG | DIASTOLIC BLOOD PRESSURE: 83 MMHG

## 2024-01-10 DIAGNOSIS — Z79.01 LONG TERM (CURRENT) USE OF ANTICOAGULANTS: ICD-10-CM

## 2024-01-10 DIAGNOSIS — I48.91 UNSPECIFIED ATRIAL FIBRILLATION: ICD-10-CM

## 2024-01-10 DIAGNOSIS — Z98.890 OTHER SPECIFIED POSTPROCEDURAL STATES: Chronic | ICD-10-CM

## 2024-01-10 DIAGNOSIS — C61 MALIGNANT NEOPLASM OF PROSTATE: ICD-10-CM

## 2024-01-10 LAB
HCT VFR BLD CALC: 35.7 %
HGB BLD-MCNC: 12.7 G/DL
INR PPP: 2.1 RATIO
MCHC RBC-ENTMCNC: 29.9 PG
MCHC RBC-ENTMCNC: 35.6 G/DL
MCV RBC AUTO: 84 FL
PLATELET # BLD AUTO: 230 K/UL
PMV BLD: 10.2 FL
POCT-PROTHROMBIN TIME: 25.6 SECS
QUALITY CONTROL: YES
RBC # BLD: 4.25 M/UL
RBC # FLD: 12.9 %
WBC # FLD AUTO: 4.2 K/UL

## 2024-01-10 PROCEDURE — 99214 OFFICE O/P EST MOD 30 MIN: CPT

## 2024-01-10 PROCEDURE — 85610 PROTHROMBIN TIME: CPT

## 2024-01-10 PROCEDURE — 85027 COMPLETE CBC AUTOMATED: CPT

## 2024-01-10 PROCEDURE — 99211 OFF/OP EST MAY X REQ PHY/QHP: CPT

## 2024-01-10 PROCEDURE — 36416 COLLJ CAPILLARY BLOOD SPEC: CPT

## 2024-01-10 NOTE — END OF VISIT
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  He is asymptomatic.  It was associated with Crohn's disease but his Crohn's disease is not active therefore we cannot attribute it to Crohn's disease.  He does not really require any sort of intervention since he is asymptomatic but will start Bactrim for prophylaxis.  We will also repeat CD4 count just to confirm.  If doing well he will see us back in 3 months we also send off additional testing including viral serologies to complete work-up.  Did not recommend bone marrow biopsy at this time unlikely will have any findings. [] : Fellow [Time Spent: ___ minutes] : I have spent [unfilled] minutes of time on the encounter.

## 2024-01-11 DIAGNOSIS — I48.91 UNSPECIFIED ATRIAL FIBRILLATION: ICD-10-CM

## 2024-01-11 DIAGNOSIS — Z79.01 LONG TERM (CURRENT) USE OF ANTICOAGULANTS: ICD-10-CM

## 2024-01-14 NOTE — ASSESSMENT
[FreeTextEntry1] : #Chronic lymphopenia r/o idiopathic CD4+ lymphopenia -CD4 of 164 -patient has Crohn's disease and is not on treatment. There are case reports that pt with Crohn's disease treated for it has a resolution of the lymphopenia; however, he is asymptomatic from Crohn's and therefore, we won't have him be started on any Crohn's treatment -other workups including immunoglobulin levels, HIV, and JAYNA were negative -there are rare incidents where viral infections like hep B, hep C, CMV, and EBV can cause lymphopenia these came back negative -peripheral flow showed  The lymphoid immunophenotypic findings show T cells with normal CD4 to CD8 ratio and rare B cells. -repeat full T cell subset to confirm CD4+ lymphopenia, was 111 -we will start him on Bactrim DS MWF for PCP ppx (given CD4 < 200) - he is assymptomatic and does not require any additional measures at this time -will consider bone marrow biopsy in future if counts worsened at this time in my opinion unlikely will    #Mild normocytic anemia that started after ADT, likely from ADT -workup including CMP, B12, folate, iron studies, and myeloma labs were negative -he is on metformin, and it is suggested he takes Vitamin b12 tabs 500-1000 mcg daily -he has been on oral ion for years, not clear why, asked him to stop.  # Stage IA melanoma of the neck resected in 2021 as above, as well as multiple SCC and BCC he has light skin complexion -follows with dermatology and has a skin exam every 3 months -no indication for blood work or imaging unless he has new signs or symptoms  #Prostate Cancer [id number] Sydnee 8 prostate cancer with PSA of 4.15 This was diagnosed on 7/13/2022 -high risk -on ADT to complete about 2 years with Dr. 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 Phipps  RT in July 2024. He is up-to-date with his vaccine schedule

## 2024-01-14 NOTE — HISTORY OF PRESENT ILLNESS
[Disease: _____________________] : Disease: [unfilled] [AJCC Stage: ____] : AJCC Stage: [unfilled] [de-identified] : CC: I have Prostate cancer, skin cancers \par  \par  He is here at the request of Tiffani Munson \par  \par  \par  This is a 73 year old male with PMHx of HTN, MI 2006 s/p cardiac stents x4, NIDDM, Crohn's disease, disease limited to terminal ileum,  prostate Cancer s/p radiation,Afib,  Multiple prior SCC and BCC   0.6 mm melanoma in 6/28/2021 and he underwent   excision of posterior neck melanoma on 7/16/2021 and erma resection 11/2021\par  Path:\par  \par  7/16/2021\par  \par  Final Diagnosis\par  A. One (1) outside H&E slide for review (right post auricular, slide\par  -[id number]):\par  - Superficial fragment of squamous cell carcinoma.\par  - The carcinoma extends to the deep edge of the specimen.\par  \par  B. One (1) outside H&E slide for review (posterior neck, slide -[id number]\par  B):\par  - Malignant melanoma, measuring at least 0.6 mm in thickness.\par  - The lesion extends to the deep and lateral margins.\par  \par  Note: As per outside pathology report, the CAP approved case summary for\par  melanoma of the skin is as follows:\par  - Histologic Type: NOS\par  - Maximal tumor thickness: At least 0.6 mm\par  - Ulceration: Absent\par  - Surgical margin: Not applicable\par  - Mitotic Index: Less than 1 mitosis / mm2\par  - Micro satellitosis: Absent\par  - Lymphovascular invasion: Absent\par  - Tumor regression: Absent\par  - Pathology Staging: pT1a.\par  \par  \par  \par  11/2021\par  \par  Specimen(s) Submitted\par  1  Left cervical sentinel lymph node\par  2  Right supraclavicular sentinel lymph node\par  \par  Final Diagnosis\par  1. Left cervical sentinel lymph node, dissection:\par  - Five lymph nodes, no melanoma seen (0/5).\par  2. Right supraclavicular sentinel lymph node, dissection:\par  \par  - Nine lymph nodes, no melanoma seen (0/9).\par  \par  He also has been following with  Jarrett Phipps for pulmonary nodule\par  Review of imaging\par  -5/30/2023: PET.CT scan . Since July 16, 2021, no definite foci of pathologic FDG uptake to suggest biologic tumor activity.\par  -6/21/2023 Since  5/30/2023\par  stable clustered nodules right upper lobe-largest approximately 7 mm (302-95) likely related to small airway disease\par  Follow-up low-dose CT 3 months time to assess stability suggested.\par  \par  \par  He was also found to have  [id number] [place] 8 prostate cancer with PSA of 4.15 This was diagnosed on 7/13/2022 and 5 out of 6 cores in the right prostate lobe with positive Sydnee score 4+4 = 8 with cancer involving 40 to 70% of each positive core.  He also had a PSMA PET on 8/30/2022 that did not show any metastatic disease.\par  \par  He was seen by Dr. Do in [facility] regarding possible radical prostatectomy.  \par  \par  He was started on ADT with Dr. Church in 10/2023 as per aptient  and underwent radition treatment with Dr. Major that he completed in late December 2022. Most PSA is undetectable. He was also started on Prolia  April 2023.\par  \par  His Next colonoscopy is in August 2023\par  \par  He also had history of recurrent venous thromboembolism, Firs one was 2010 left fomoral and was started on Coumaind for one year, HE stopped it and had a second clot was in the right groin and was than back on Coumadin. He also had left arm clot, maybe provoked we are not sure. \par  \par  \par  Mom had blood clot during pregnancy.\par  \par  He sees dermatology every 3 months. \par  He also had recurrent bowel obstructions from Chrons disease [de-identified] : 9/18/2023 He is here for follow up. He was seen by Sondra and Germline testing was negative.  Ever since he had proton therapy he started to develop anemia. He also started Eligard that time  9/27/22 Hgb 14 5/1/2023 HGb 12.3 8/21/23   WBC 3.7Hgb 11.5, MCV 89.3 Plts 181 UA negaive for blood.. Diff  Testosterone 10. He is on Eligard   Just had Colonsoscopy 9/2023  He states he has been lymphopenic for some time  10/2/23  Mr. Barrett is here for a follow-up regarding his melanoma and prostate CA and to review his lymphopenia workup results. His full T cell subset showed a low CD4 count of 164 as well as CD3 and CD8 as well. Other tests, including HIV, vitamin level, and immunoelectrophoresis, were essentially negative. He has no new complaint to offer today.   1/10/2024 He is here for follow up. CBC today si stable with mild anemia and lymhopenia.

## 2024-01-14 NOTE — CONSULT LETTER
[Dear  ___] : Dear  [unfilled], [Consult Letter:] : I had the pleasure of evaluating your patient, [unfilled]. [Please see my note below.] : Please see my note below. [Consult Closing:] : Thank you very much for allowing me to participate in the care of this patient.  If you have any questions, please do not hesitate to contact me. [Sincerely,] : Sincerely, [DrHusam  ___] : Dr. WHEATLEY [DrHusam ___] : Dr. WHEATLEY [FreeTextEntry3] : Ted

## 2024-02-08 ENCOUNTER — OUTPATIENT (OUTPATIENT)
Dept: OUTPATIENT SERVICES | Facility: HOSPITAL | Age: 74
LOS: 1 days | End: 2024-02-08
Payer: MEDICARE

## 2024-02-08 ENCOUNTER — APPOINTMENT (OUTPATIENT)
Dept: MEDICATION MANAGEMENT | Facility: CLINIC | Age: 74
End: 2024-02-08

## 2024-02-08 DIAGNOSIS — I48.91 UNSPECIFIED ATRIAL FIBRILLATION: ICD-10-CM

## 2024-02-08 DIAGNOSIS — Z95.5 PRESENCE OF CORONARY ANGIOPLASTY IMPLANT AND GRAFT: Chronic | ICD-10-CM

## 2024-02-08 DIAGNOSIS — Z79.01 LONG TERM (CURRENT) USE OF ANTICOAGULANTS: ICD-10-CM

## 2024-02-08 DIAGNOSIS — Z98.890 OTHER SPECIFIED POSTPROCEDURAL STATES: Chronic | ICD-10-CM

## 2024-02-08 LAB
INR PPP: 2.3 RATIO
POCT-PROTHROMBIN TIME: 27 SECS
QUALITY CONTROL: YES

## 2024-02-08 PROCEDURE — 36416 COLLJ CAPILLARY BLOOD SPEC: CPT

## 2024-02-08 PROCEDURE — 85610 PROTHROMBIN TIME: CPT

## 2024-02-08 PROCEDURE — 99211 OFF/OP EST MAY X REQ PHY/QHP: CPT

## 2024-02-09 DIAGNOSIS — I48.91 UNSPECIFIED ATRIAL FIBRILLATION: ICD-10-CM

## 2024-02-09 DIAGNOSIS — Z79.01 LONG TERM (CURRENT) USE OF ANTICOAGULANTS: ICD-10-CM

## 2024-02-19 NOTE — ASSESSMENT
[FreeTextEntry1] : 70 yo M with MM of posterior neck now POD#8 s/p excision of posterior neck melanoma. Doing well. \par \par - dressing changed\par - awaiting pathology\par - post-op instructions discussed and all questions answered \par - f/u next week for suture removal and path review \par \par \par Due to COVID 19, pre-visit patient instructions were explained to the patient and their symptoms were checked upon arrival.  \par Masks were used by the health care providers and staff and the examination room was cleaned after the patient visit was completed.\par \par  no

## 2024-02-23 ENCOUNTER — OUTPATIENT (OUTPATIENT)
Dept: OUTPATIENT SERVICES | Facility: HOSPITAL | Age: 74
LOS: 1 days | End: 2024-02-23
Payer: MEDICARE

## 2024-02-23 ENCOUNTER — APPOINTMENT (OUTPATIENT)
Dept: MEDICATION MANAGEMENT | Facility: CLINIC | Age: 74
End: 2024-02-23

## 2024-02-23 DIAGNOSIS — Z79.01 LONG TERM (CURRENT) USE OF ANTICOAGULANTS: ICD-10-CM

## 2024-02-23 DIAGNOSIS — Z98.890 OTHER SPECIFIED POSTPROCEDURAL STATES: Chronic | ICD-10-CM

## 2024-02-23 DIAGNOSIS — Z95.5 PRESENCE OF CORONARY ANGIOPLASTY IMPLANT AND GRAFT: Chronic | ICD-10-CM

## 2024-02-23 DIAGNOSIS — I48.91 UNSPECIFIED ATRIAL FIBRILLATION: ICD-10-CM

## 2024-02-23 LAB
INR PPP: 1.1 RATIO
POCT-PROTHROMBIN TIME: 13 SECS
QUALITY CONTROL: YES

## 2024-02-23 PROCEDURE — 85610 PROTHROMBIN TIME: CPT

## 2024-02-23 PROCEDURE — 99211 OFF/OP EST MAY X REQ PHY/QHP: CPT

## 2024-02-23 PROCEDURE — 36416 COLLJ CAPILLARY BLOOD SPEC: CPT

## 2024-02-24 DIAGNOSIS — I48.91 UNSPECIFIED ATRIAL FIBRILLATION: ICD-10-CM

## 2024-02-24 DIAGNOSIS — Z79.01 LONG TERM (CURRENT) USE OF ANTICOAGULANTS: ICD-10-CM

## 2024-03-07 ENCOUNTER — APPOINTMENT (OUTPATIENT)
Dept: MEDICATION MANAGEMENT | Facility: CLINIC | Age: 74
End: 2024-03-07

## 2024-04-07 ENCOUNTER — INPATIENT (INPATIENT)
Facility: HOSPITAL | Age: 74
LOS: 1 days | Discharge: HOME | DRG: 386 | End: 2024-04-09
Attending: STUDENT IN AN ORGANIZED HEALTH CARE EDUCATION/TRAINING PROGRAM | Admitting: STUDENT IN AN ORGANIZED HEALTH CARE EDUCATION/TRAINING PROGRAM
Payer: MEDICARE

## 2024-04-07 VITALS
SYSTOLIC BLOOD PRESSURE: 202 MMHG | DIASTOLIC BLOOD PRESSURE: 108 MMHG | OXYGEN SATURATION: 96 % | HEART RATE: 90 BPM | TEMPERATURE: 98 F | RESPIRATION RATE: 19 BRPM

## 2024-04-07 DIAGNOSIS — K56.609 UNSPECIFIED INTESTINAL OBSTRUCTION, UNSPECIFIED AS TO PARTIAL VERSUS COMPLETE OBSTRUCTION: ICD-10-CM

## 2024-04-07 DIAGNOSIS — Z95.5 PRESENCE OF CORONARY ANGIOPLASTY IMPLANT AND GRAFT: Chronic | ICD-10-CM

## 2024-04-07 DIAGNOSIS — Z98.890 OTHER SPECIFIED POSTPROCEDURAL STATES: Chronic | ICD-10-CM

## 2024-04-07 LAB
ALBUMIN SERPL ELPH-MCNC: 4.6 G/DL — SIGNIFICANT CHANGE UP (ref 3.5–5.2)
ALP SERPL-CCNC: 69 U/L — SIGNIFICANT CHANGE UP (ref 30–115)
ALT FLD-CCNC: 24 U/L — SIGNIFICANT CHANGE UP (ref 0–41)
ANION GAP SERPL CALC-SCNC: 15 MMOL/L — HIGH (ref 7–14)
APPEARANCE UR: CLEAR — SIGNIFICANT CHANGE UP
AST SERPL-CCNC: 24 U/L — SIGNIFICANT CHANGE UP (ref 0–41)
BACTERIA # UR AUTO: NEGATIVE /HPF — SIGNIFICANT CHANGE UP
BASOPHILS # BLD AUTO: 0.04 K/UL — SIGNIFICANT CHANGE UP (ref 0–0.2)
BASOPHILS NFR BLD AUTO: 0.6 % — SIGNIFICANT CHANGE UP (ref 0–1)
BILIRUB SERPL-MCNC: 0.5 MG/DL — SIGNIFICANT CHANGE UP (ref 0.2–1.2)
BILIRUB UR-MCNC: NEGATIVE — SIGNIFICANT CHANGE UP
BUN SERPL-MCNC: 17 MG/DL — SIGNIFICANT CHANGE UP (ref 10–20)
CALCIUM SERPL-MCNC: 9.7 MG/DL — SIGNIFICANT CHANGE UP (ref 8.4–10.4)
CAST: 0 /LPF — SIGNIFICANT CHANGE UP (ref 0–4)
CHLORIDE SERPL-SCNC: 103 MMOL/L — SIGNIFICANT CHANGE UP (ref 98–110)
CO2 SERPL-SCNC: 22 MMOL/L — SIGNIFICANT CHANGE UP (ref 17–32)
COLOR SPEC: YELLOW — SIGNIFICANT CHANGE UP
CREAT SERPL-MCNC: 0.9 MG/DL — SIGNIFICANT CHANGE UP (ref 0.7–1.5)
DIFF PNL FLD: NEGATIVE — SIGNIFICANT CHANGE UP
EGFR: 91 ML/MIN/1.73M2 — SIGNIFICANT CHANGE UP
EOSINOPHIL # BLD AUTO: 0.03 K/UL — SIGNIFICANT CHANGE UP (ref 0–0.7)
EOSINOPHIL NFR BLD AUTO: 0.4 % — SIGNIFICANT CHANGE UP (ref 0–8)
GLUCOSE SERPL-MCNC: 159 MG/DL — HIGH (ref 70–99)
GLUCOSE UR QL: >=1000 MG/DL
HCT VFR BLD CALC: 40.6 % — LOW (ref 42–52)
HGB BLD-MCNC: 14.2 G/DL — SIGNIFICANT CHANGE UP (ref 14–18)
IMM GRANULOCYTES NFR BLD AUTO: 0.6 % — HIGH (ref 0.1–0.3)
KETONES UR-MCNC: 15 MG/DL
LACTATE SERPL-SCNC: 1.2 MMOL/L — SIGNIFICANT CHANGE UP (ref 0.7–2)
LEUKOCYTE ESTERASE UR-ACNC: NEGATIVE — SIGNIFICANT CHANGE UP
LIDOCAIN IGE QN: 38 U/L — SIGNIFICANT CHANGE UP (ref 7–60)
LYMPHOCYTES # BLD AUTO: 0.34 K/UL — LOW (ref 1.2–3.4)
LYMPHOCYTES # BLD AUTO: 4.8 % — LOW (ref 20.5–51.1)
MCHC RBC-ENTMCNC: 30.3 PG — SIGNIFICANT CHANGE UP (ref 27–31)
MCHC RBC-ENTMCNC: 35 G/DL — SIGNIFICANT CHANGE UP (ref 32–37)
MCV RBC AUTO: 86.6 FL — SIGNIFICANT CHANGE UP (ref 80–94)
MONOCYTES # BLD AUTO: 0.46 K/UL — SIGNIFICANT CHANGE UP (ref 0.1–0.6)
MONOCYTES NFR BLD AUTO: 6.5 % — SIGNIFICANT CHANGE UP (ref 1.7–9.3)
NEUTROPHILS # BLD AUTO: 6.14 K/UL — SIGNIFICANT CHANGE UP (ref 1.4–6.5)
NEUTROPHILS NFR BLD AUTO: 87.1 % — HIGH (ref 42.2–75.2)
NITRITE UR-MCNC: NEGATIVE — SIGNIFICANT CHANGE UP
NRBC # BLD: 0 /100 WBCS — SIGNIFICANT CHANGE UP (ref 0–0)
PH UR: 7 — SIGNIFICANT CHANGE UP (ref 5–8)
PLATELET # BLD AUTO: 214 K/UL — SIGNIFICANT CHANGE UP (ref 130–400)
PMV BLD: 11 FL — HIGH (ref 7.4–10.4)
POTASSIUM SERPL-MCNC: 4.1 MMOL/L — SIGNIFICANT CHANGE UP (ref 3.5–5)
POTASSIUM SERPL-SCNC: 4.1 MMOL/L — SIGNIFICANT CHANGE UP (ref 3.5–5)
PROT SERPL-MCNC: 7.2 G/DL — SIGNIFICANT CHANGE UP (ref 6–8)
PROT UR-MCNC: 30 MG/DL
RBC # BLD: 4.69 M/UL — LOW (ref 4.7–6.1)
RBC # FLD: 12.3 % — SIGNIFICANT CHANGE UP (ref 11.5–14.5)
RBC CASTS # UR COMP ASSIST: 0 /HPF — SIGNIFICANT CHANGE UP (ref 0–4)
SODIUM SERPL-SCNC: 140 MMOL/L — SIGNIFICANT CHANGE UP (ref 135–146)
SP GR SPEC: 1.03 — SIGNIFICANT CHANGE UP (ref 1–1.03)
SQUAMOUS # UR AUTO: 0 /HPF — SIGNIFICANT CHANGE UP (ref 0–5)
UROBILINOGEN FLD QL: 0.2 MG/DL — SIGNIFICANT CHANGE UP (ref 0.2–1)
WBC # BLD: 7.05 K/UL — SIGNIFICANT CHANGE UP (ref 4.8–10.8)
WBC # FLD AUTO: 7.05 K/UL — SIGNIFICANT CHANGE UP (ref 4.8–10.8)
WBC UR QL: 0 /HPF — SIGNIFICANT CHANGE UP (ref 0–5)

## 2024-04-07 PROCEDURE — 85025 COMPLETE CBC W/AUTO DIFF WBC: CPT

## 2024-04-07 PROCEDURE — 71045 X-RAY EXAM CHEST 1 VIEW: CPT | Mod: 26

## 2024-04-07 PROCEDURE — 74177 CT ABD & PELVIS W/CONTRAST: CPT | Mod: 26,MC

## 2024-04-07 PROCEDURE — 80053 COMPREHEN METABOLIC PANEL: CPT

## 2024-04-07 PROCEDURE — 99222 1ST HOSP IP/OBS MODERATE 55: CPT | Mod: GC

## 2024-04-07 PROCEDURE — 36415 COLL VENOUS BLD VENIPUNCTURE: CPT

## 2024-04-07 PROCEDURE — 83735 ASSAY OF MAGNESIUM: CPT

## 2024-04-07 PROCEDURE — 74021 RADEX ABDOMEN 3+ VIEWS: CPT

## 2024-04-07 PROCEDURE — 82962 GLUCOSE BLOOD TEST: CPT

## 2024-04-07 PROCEDURE — 74018 RADEX ABDOMEN 1 VIEW: CPT

## 2024-04-07 PROCEDURE — 83036 HEMOGLOBIN GLYCOSYLATED A1C: CPT

## 2024-04-07 PROCEDURE — 86803 HEPATITIS C AB TEST: CPT

## 2024-04-07 PROCEDURE — 93005 ELECTROCARDIOGRAM TRACING: CPT

## 2024-04-07 PROCEDURE — 99285 EMERGENCY DEPT VISIT HI MDM: CPT | Mod: FS

## 2024-04-07 RX ORDER — IOHEXOL 300 MG/ML
30 INJECTION, SOLUTION INTRAVENOUS ONCE
Refills: 0 | Status: COMPLETED | OUTPATIENT
Start: 2024-04-07 | End: 2024-04-07

## 2024-04-07 RX ORDER — ONDANSETRON 8 MG/1
4 TABLET, FILM COATED ORAL ONCE
Refills: 0 | Status: COMPLETED | OUTPATIENT
Start: 2024-04-07 | End: 2024-04-07

## 2024-04-07 RX ORDER — MORPHINE SULFATE 50 MG/1
4 CAPSULE, EXTENDED RELEASE ORAL ONCE
Refills: 0 | Status: DISCONTINUED | OUTPATIENT
Start: 2024-04-07 | End: 2024-04-07

## 2024-04-07 RX ORDER — SODIUM CHLORIDE 9 MG/ML
500 INJECTION INTRAMUSCULAR; INTRAVENOUS; SUBCUTANEOUS ONCE
Refills: 0 | Status: COMPLETED | OUTPATIENT
Start: 2024-04-07 | End: 2024-04-07

## 2024-04-07 RX ORDER — SODIUM CHLORIDE 9 MG/ML
1000 INJECTION, SOLUTION INTRAVENOUS ONCE
Refills: 0 | Status: COMPLETED | OUTPATIENT
Start: 2024-04-07 | End: 2024-04-07

## 2024-04-07 RX ADMIN — SODIUM CHLORIDE 500 MILLILITER(S): 9 INJECTION INTRAMUSCULAR; INTRAVENOUS; SUBCUTANEOUS at 16:50

## 2024-04-07 RX ADMIN — IOHEXOL 30 MILLILITER(S): 300 INJECTION, SOLUTION INTRAVENOUS at 16:50

## 2024-04-07 RX ADMIN — MORPHINE SULFATE 4 MILLIGRAM(S): 50 CAPSULE, EXTENDED RELEASE ORAL at 20:09

## 2024-04-07 RX ADMIN — SODIUM CHLORIDE 1000 MILLILITER(S): 9 INJECTION, SOLUTION INTRAVENOUS at 22:43

## 2024-04-07 RX ADMIN — ONDANSETRON 4 MILLIGRAM(S): 8 TABLET, FILM COATED ORAL at 16:50

## 2024-04-07 NOTE — ED ADULT TRIAGE NOTE - CHIEF COMPLAINT QUOTE
history of obstructions, pt complains of abdominal pain history of obstructions, pt complains of abdominal pain and vomiting

## 2024-04-07 NOTE — CONSULT NOTE ADULT - ASSESSMENT
ASSESSMENT:  Patient is a 72 y/o male with PMH Crohns, SBO, HTN, DM, Afib on Eliquis, Factor V deficiency CAD s/p stents who presents to the ED complaining of abdominal pain for one day.  Surgery consulted for SBO 2/2 Crohn's stricture    PLAN:  -No acute surgical intervention  -Recommend NGT if vomits  -Recommend GI consult and evaluation  -Recommend medical management for partial SBO 2/2 Crohns--consider steroids  -Obstructive Series in AM  -Surgery following    [id number]    Above plan discussed with Attending Surgeon Dr. Matson  , patient, patient family, and Primary team  04-07-24 @ 22:10

## 2024-04-07 NOTE — ED PROVIDER NOTE - OBJECTIVE STATEMENT
74 yo male with a pmh of crohns, htn, cad, afib, dm, and sbo presents c/o abdominal pain/n/v that started today. pt notes cramping pain throughout his abdomen and had one episodes of NBNB emesis. pt denies any other symptoms including fevers, chill, headache, recent illness/travel, cough, chest pain, or SOB.

## 2024-04-07 NOTE — ED PROVIDER NOTE - CLINICAL SUMMARY MEDICAL DECISION MAKING FREE TEXT BOX
Patient remained hemodynamically stable during the course of ED stay. Patient was evaluated by surgery and as recommended is admitted to Medicine.   Discussed with patient about the results of the diagnostic studies. Discussed with admitting physician and MAR, patient is admitted to Medicine for further evaluation and care.

## 2024-04-07 NOTE — CONSULT NOTE ADULT - SUBJECTIVE AND OBJECTIVE BOX
GENERAL SURGERY CONSULT NOTE    Patient: FERNANDA HAGAN , 73y (54)Male   MRN: [id number]  Location: [facility] ED  Visit: 24 Emergency  Date: 24 @ 22:10    HPI: Patient is a 74 y/o male with PMH Crohns, SBO, HTN, DM, Afib on Eliquis, Factor V deficiency CAD s/p stents who presents to the ED complaining of abdominal pain for one day. The patient reports that yesterday he ate a pear afterwhich he began to feel nauseous and had dry heaves with one episode of vomiting clear fluid. The patient reports that he has passed a BM today that was normal. The patient reports that he is not currently nauseous or vomiting however reports that his abdomen feels more distended. The patient reports that this has happened to him numerous amounts of times in the past and would be treated with NGT and medical therapy including steroids. The patient reports that his Crohns has been in remission and therefore he reports he is not taking any medications for it at this time.       PAST MEDICAL & SURGICAL HISTORY:  Coronary artery disease  4 STENTS      Diabetes mellitus      Hypertension, unspecified type      Factor V Leiden      Afib      Skin cancer      Crohn's disease      DWIGHT treated with BiPAP      Essential tremor  HANDS R>L      History of coronary angioplasty with insertion of stent      History of Mohs surgery for squamous cell carcinoma of skin          Home Medications:  amLODIPine 5 mg oral tablet: 1 tab(s) orally 2 times a day (2021 08:51)  Centrum oral tablet: 1 tab(s) orally once a day (2021 08:51)  Co-Q10 100 mg oral capsule: 1 cap(s) orally once a day (2021 08:51)  ferrous sulfate: 65 milligram(s) orally once a day (2021 08:51)  Fish Oil oral capsule:  (2021 08:51)  glipiZIDE 2.5 mg oral tablet, extended release: 1 tab(s) orally once a day (2021 08:51)  Klor-Con 10 mEq oral tablet, extended release: 2 tab(s) orally 2 times a day (2021 08:51)  Lovenox 30 mg/0.3 mL injectable solution:  (2021 08:51)  Lutein: 25 milligram(s) orally once a day (:51)  metFORMIN 750 mg oral tablet, extended release: 1 tab(s) orally once a day (:51)  Plavix 75 mg oral tablet: 1 tab(s) orally once a day (:51)  primidone 50 mg oral tablet: 2 tab(s) orally 2 times a day (:)  Probiotic Formula oral capsule: 1 cap(s) orally once a day ()  rosuvastatin 20 mg oral tablet: orally once a day (at bedtime) ()  sertraline 50 mg oral tablet: 1 tab(s) orally once a day (at bedtime) ()  tamsulosin 0.4 mg oral capsule: 1 cap(s) orally once a day ()  telmisartan 80 mg oral tablet: 1 tab(s) orally once a day (:)  trospium 60 mg oral capsule, extended release: 1 cap(s) orally once a day (in the morning) (:)  vitamin D-calcium (as carbonate) 1000 intl units (25 mcg)-90 mg oral tablet: 1 tab(s) orally once a day ()  warfarin 5 mg oral tablet:  TAKES 2.5MG ()  ZEAXANTHIN: 5 milligram(s)  once a day (2021 08:03)        VITALS:  T(F): 99.7 (24 @ 21:33), Max: 99.7 (24 @ 21:33)  HR: 73 (24 @ 21:33) (73 - 90)  BP: 162/74 (24 @ 21:33) (162/74 - 202/108)  RR: 19 (24 @ 15:57) (19 - 19)  SpO2: 97% (24 @ 21:33) (96% - 97%)    PHYSICAL EXAM:  General: NAD, AAOx3, calm and cooperative  Cardiac: RRR S1, S2, no Murmurs, rubs or gallops  Respiratory: CTAB  Abdomen: Soft, minimally distended, non-tender, no rebound, no guarding.      MEDICATIONS  (STANDING):  lactated ringers Bolus 1000 milliLiter(s) IV Bolus once    MEDICATIONS  (PRN):      LAB/STUDIES:                        14.2   7.05  )-----------( 214      ( 2024 17:09 )             40.6     04-07    140  |  103  |  17  ----------------------------<  159<H>  4.1   |  22  |  0.9    Ca    9.7      2024 17:09    TPro  7.2  /  Alb  4.6  /  TBili  0.5  /  DBili  x   /  AST  24  /  ALT  24  /  AlkPhos  69  04-07      LIVER FUNCTIONS - ( 2024 17:09 )  Alb: 4.6 g/dL / Pro: 7.2 g/dL / ALK PHOS: 69 U/L / ALT: 24 U/L / AST: 24 U/L / GGT: x           Urinalysis Basic - ( 2024 19:15 )    Color: Yellow / Appearance: Clear / S.029 / pH: x  Gluc: x / Ketone: 15 mg/dL  / Bili: Negative / Urobili: 0.2 mg/dL   Blood: x / Protein: 30 mg/dL / Nitrite: Negative   Leuk Esterase: Negative / RBC: 0 /HPF / WBC 0 /HPF   Sq Epi: x / Non Sq Epi: 0 /HPF / Bacteria: Negative /HPF    < from: CT Abdomen and Pelvis w/ Oral Cont and w/ IV Cont (24 @ 18:38) >    Small bowel obstruction with transition zone in the ileum where there is   circumferential wall thickening as well as some luminal stenosis.   Interloop ascites. No evidence of pneumatosis or pneumoperitoneum.    < end of copied text >                IMAGING:

## 2024-04-07 NOTE — ED PROVIDER NOTE - ATTENDING APP SHARED VISIT CONTRIBUTION OF CARE
74 yo male with a pmh of crohns, htn, cad, afib, dm, and multiple SBOs presents c/o abdominal pain/n/v that started today. denies fevers. last bowel movement last night, not passing gas. no dysuria. no chest pain or SOB.     CONSTITUTIONAL: Well-developed; well-nourished; in no acute distress.   SKIN: warm, dry  HEAD: Normocephalic; atraumatic.  EYES: PERRL, EOMI, no conjunctival erythema  ENT: No nasal discharge; airway clear.  NECK: Supple; non tender.  CARD: S1, S2 normal; no murmurs, gallops, or rubs. Regular rate and rhythm.   RESP: No wheezes, rales or rhonchi.  ABD: soft distended. nontender. no cva tenderness,.   EXT: Normal ROM.  No clubbing, cyanosis or edema.   NEURO: Alert, oriented, grossly unremarkable  PSYCH: Cooperative, appropriate.

## 2024-04-08 LAB
A1C WITH ESTIMATED AVERAGE GLUCOSE RESULT: 6.7 % — HIGH (ref 4–5.6)
ESTIMATED AVERAGE GLUCOSE: 146 MG/DL — HIGH (ref 68–114)
GLUCOSE BLDC GLUCOMTR-MCNC: 135 MG/DL — HIGH (ref 70–99)
GLUCOSE BLDC GLUCOMTR-MCNC: 138 MG/DL — HIGH (ref 70–99)
GLUCOSE BLDC GLUCOMTR-MCNC: 159 MG/DL — HIGH (ref 70–99)
GLUCOSE BLDC GLUCOMTR-MCNC: 172 MG/DL — HIGH (ref 70–99)
HCV AB S/CO SERPL IA: 0.04 COI — SIGNIFICANT CHANGE UP
HCV AB SERPL-IMP: SIGNIFICANT CHANGE UP

## 2024-04-08 PROCEDURE — 99223 1ST HOSP IP/OBS HIGH 75: CPT

## 2024-04-08 PROCEDURE — 99222 1ST HOSP IP/OBS MODERATE 55: CPT | Mod: AI

## 2024-04-08 PROCEDURE — 74021 RADEX ABDOMEN 3+ VIEWS: CPT | Mod: 26

## 2024-04-08 PROCEDURE — 93010 ELECTROCARDIOGRAM REPORT: CPT

## 2024-04-08 RX ORDER — SODIUM CHLORIDE 9 MG/ML
1000 INJECTION, SOLUTION INTRAVENOUS
Refills: 0 | Status: DISCONTINUED | OUTPATIENT
Start: 2024-04-08 | End: 2024-04-09

## 2024-04-08 RX ORDER — ENOXAPARIN SODIUM 100 MG/ML
90 INJECTION SUBCUTANEOUS EVERY 12 HOURS
Refills: 0 | Status: DISCONTINUED | OUTPATIENT
Start: 2024-04-08 | End: 2024-04-09

## 2024-04-08 RX ORDER — DEXTROSE 10 % IN WATER 10 %
125 INTRAVENOUS SOLUTION INTRAVENOUS ONCE
Refills: 0 | Status: DISCONTINUED | OUTPATIENT
Start: 2024-04-08 | End: 2024-04-09

## 2024-04-08 RX ORDER — AMLODIPINE BESYLATE 2.5 MG/1
5 TABLET ORAL DAILY
Refills: 0 | Status: DISCONTINUED | OUTPATIENT
Start: 2024-04-08 | End: 2024-04-09

## 2024-04-08 RX ORDER — GLUCAGON INJECTION, SOLUTION 0.5 MG/.1ML
1 INJECTION, SOLUTION SUBCUTANEOUS ONCE
Refills: 0 | Status: DISCONTINUED | OUTPATIENT
Start: 2024-04-08 | End: 2024-04-09

## 2024-04-08 RX ORDER — FERROUS SULFATE 325(65) MG
65 TABLET ORAL
Qty: 0 | Refills: 0 | DISCHARGE

## 2024-04-08 RX ORDER — ONDANSETRON 8 MG/1
4 TABLET, FILM COATED ORAL EVERY 6 HOURS
Refills: 0 | Status: DISCONTINUED | OUTPATIENT
Start: 2024-04-08 | End: 2024-04-09

## 2024-04-08 RX ORDER — LOSARTAN POTASSIUM 100 MG/1
50 TABLET, FILM COATED ORAL DAILY
Refills: 0 | Status: DISCONTINUED | OUTPATIENT
Start: 2024-04-08 | End: 2024-04-09

## 2024-04-08 RX ORDER — ROSUVASTATIN CALCIUM 5 MG/1
0 TABLET ORAL
Qty: 0 | Refills: 0 | DISCHARGE

## 2024-04-08 RX ORDER — DEXTROSE 50 % IN WATER 50 %
15 SYRINGE (ML) INTRAVENOUS ONCE
Refills: 0 | Status: DISCONTINUED | OUTPATIENT
Start: 2024-04-08 | End: 2024-04-09

## 2024-04-08 RX ORDER — SERTRALINE 25 MG/1
50 TABLET, FILM COATED ORAL AT BEDTIME
Refills: 0 | Status: DISCONTINUED | OUTPATIENT
Start: 2024-04-08 | End: 2024-04-09

## 2024-04-08 RX ORDER — L.ACIDOPH/B.ANIMALIS/B.LONGUM 15B CELL
1 CAPSULE ORAL
Qty: 0 | Refills: 0 | DISCHARGE

## 2024-04-08 RX ORDER — LABETALOL HCL 100 MG
100 TABLET ORAL
Refills: 0 | Status: DISCONTINUED | OUTPATIENT
Start: 2024-04-08 | End: 2024-04-09

## 2024-04-08 RX ORDER — WARFARIN SODIUM 2.5 MG/1
1 TABLET ORAL
Qty: 0 | Refills: 0 | DISCHARGE

## 2024-04-08 RX ORDER — LEVETIRACETAM 250 MG/1
500 TABLET, FILM COATED ORAL EVERY 12 HOURS
Refills: 0 | Status: DISCONTINUED | OUTPATIENT
Start: 2024-04-08 | End: 2024-04-08

## 2024-04-08 RX ORDER — DEXTROSE 50 % IN WATER 50 %
12.5 SYRINGE (ML) INTRAVENOUS ONCE
Refills: 0 | Status: DISCONTINUED | OUTPATIENT
Start: 2024-04-08 | End: 2024-04-09

## 2024-04-08 RX ORDER — INSULIN LISPRO 100/ML
VIAL (ML) SUBCUTANEOUS
Refills: 0 | Status: DISCONTINUED | OUTPATIENT
Start: 2024-04-08 | End: 2024-04-09

## 2024-04-08 RX ORDER — LABETALOL HCL 100 MG
5 TABLET ORAL ONCE
Refills: 0 | Status: DISCONTINUED | OUTPATIENT
Start: 2024-04-08 | End: 2024-04-08

## 2024-04-08 RX ORDER — CLOPIDOGREL BISULFATE 75 MG/1
1 TABLET, FILM COATED ORAL
Qty: 0 | Refills: 0 | DISCHARGE

## 2024-04-08 RX ORDER — SULFASALAZINE 500 MG
500 TABLET ORAL DAILY
Refills: 0 | Status: DISCONTINUED | OUTPATIENT
Start: 2024-04-08 | End: 2024-04-08

## 2024-04-08 RX ORDER — DEXTROSE 50 % IN WATER 50 %
25 SYRINGE (ML) INTRAVENOUS ONCE
Refills: 0 | Status: DISCONTINUED | OUTPATIENT
Start: 2024-04-08 | End: 2024-04-09

## 2024-04-08 RX ORDER — PANTOPRAZOLE SODIUM 20 MG/1
40 TABLET, DELAYED RELEASE ORAL
Refills: 0 | Status: DISCONTINUED | OUTPATIENT
Start: 2024-04-08 | End: 2024-04-09

## 2024-04-08 RX ORDER — LOSARTAN POTASSIUM 100 MG/1
50 TABLET, FILM COATED ORAL DAILY
Refills: 0 | Status: DISCONTINUED | OUTPATIENT
Start: 2024-04-08 | End: 2024-04-08

## 2024-04-08 RX ORDER — TAMSULOSIN HYDROCHLORIDE 0.4 MG/1
0.4 CAPSULE ORAL AT BEDTIME
Refills: 0 | Status: DISCONTINUED | OUTPATIENT
Start: 2024-04-08 | End: 2024-04-09

## 2024-04-08 RX ORDER — ENOXAPARIN SODIUM 100 MG/ML
0 INJECTION SUBCUTANEOUS
Qty: 0 | Refills: 0 | DISCHARGE

## 2024-04-08 RX ORDER — PRIMIDONE 250 MG/1
2 TABLET ORAL
Qty: 0 | Refills: 0 | DISCHARGE

## 2024-04-08 RX ADMIN — AMLODIPINE BESYLATE 5 MILLIGRAM(S): 2.5 TABLET ORAL at 09:41

## 2024-04-08 RX ADMIN — SERTRALINE 50 MILLIGRAM(S): 25 TABLET, FILM COATED ORAL at 21:33

## 2024-04-08 RX ADMIN — TAMSULOSIN HYDROCHLORIDE 0.4 MILLIGRAM(S): 0.4 CAPSULE ORAL at 21:33

## 2024-04-08 RX ADMIN — ENOXAPARIN SODIUM 90 MILLIGRAM(S): 100 INJECTION SUBCUTANEOUS at 06:07

## 2024-04-08 RX ADMIN — Medication 1: at 11:55

## 2024-04-08 RX ADMIN — LOSARTAN POTASSIUM 50 MILLIGRAM(S): 100 TABLET, FILM COATED ORAL at 09:41

## 2024-04-08 RX ADMIN — Medication 60 MILLIGRAM(S): at 01:11

## 2024-04-08 NOTE — PATIENT PROFILE ADULT - DO YOU LACK THE NECESSARY SUPPORT TO HELP YOU COPE WITH LIFE CHALLENGES?
"Anesthesia Post Evaluation    Patient: Deirdre Yanez    Procedure(s) Performed: Procedure(s) (LRB):  COLONOSCOPY (N/A)    Final Anesthesia Type: MAC  Patient location during evaluation: PACU  Patient participation: Yes- Able to Participate  Level of consciousness: awake and alert and oriented  Post-procedure vital signs: reviewed and stable  Pain management: adequate  Airway patency: patent  PONV status at discharge: No PONV  Anesthetic complications: no      Cardiovascular status: blood pressure returned to baseline  Respiratory status: unassisted, room air and spontaneous ventilation  Hydration status: euvolemic  Follow-up not needed.        Visit Vitals  BP (!) 159/74 (BP Location: Left arm, Patient Position: Lying)   Pulse 64   Resp 20   Ht 5' 9" (1.753 m)   Wt 99.8 kg (220 lb)   SpO2 98%   Breastfeeding? No   BMI 32.49 kg/m²       Pain/Markel Score: Markel Score: 9 (1/10/2019 11:45 AM)        "
no

## 2024-04-08 NOTE — PROGRESS NOTE ADULT - ASSESSMENT
ASSESSMENT:  Patient is a 72 y/o male with PMH Crohns, SBO, HTN, DM, Afib on Eliquis, Factor V deficiency CAD s/p stents who presents to the ED complaining of abdominal pain for one day.  Surgery consulted for SBO 2/2 Crohn's stricture    PLAN:  -No acute surgical intervention  -Recommend NGT if vomits  -Recommend GI consult and evaluation  -Recommend medical management for partial SBO 2/2 Crohns--consider steroids  -Obstructive Series in AM  -Surgery following    [id number] ASSESSMENT:  Patient is a 74 y/o male with PMH Crohns, SBO, HTN, DM, Afib on Eliquis, Factor V deficiency CAD s/p stents who presents to the ED complaining of abdominal pain for one day.  Surgery consulted for SBO 2/2 Crohn's stricture    PLAN:  -No acute surgical intervention  -Recommend NGT if vomits  -Recommend GI consult and evaluation  -Recommend medical management for partial SBO 2/2 Crohns--consider steroids  -Obstructive Series in AM  -Surgery sign off, recall prn    [id number]

## 2024-04-08 NOTE — H&P ADULT - ATTENDING COMMENTS
72 y/o male with PMH Crohns, SBO, HTN, DM, prostate CA, Afib on Eliquis, Factor V deficiency CAD s/p 4 stents who presents to the ED complaining of abdominal pain for one day. The patient reports that yesterday he ate a potatoes and a pear afterwhich he began to feel nauseous and had dry heaves. last BM saterday. today awoke at 6 am with abdominal pain, tried to wait it out, though continued and presented to ED. In triage had an episode of vomiting.  The patient reports that his Crohns has been in remission and therefore he reports he is not taking any medications for it at this time. 5th episode of obstruction last one about 8-10 years on no IBD meds now as per patient.    Vital Signs Last 24 Hrs  T(F): 96.6 (08 Apr 2024 16:07), Max: 99.7 (07 Apr 2024 21:33)  HR: 65 (08 Apr 2024 16:07) (65 - 73)  BP: 151/71 (08 Apr 2024 16:07) (151/71 - 185/85)  RR: 18 (08 Apr 2024 16:07) (18 - 18)  SpO2: 96% (08 Apr 2024 16:07) (94% - 97%)    PHYSICAL EXAM:  GENERAL: NAD, well-groomed, well-developed  HEAD:  Atraumatic, Normocephalic  EYES: EOMI, conjunctiva and sclera clear  ENMT: Moist mucous membranes, Good dentition, no thrush  NECK: Supple, No JVD  CHEST/LUNG: Clear to auscultation bilaterally, good air entry, non-labored breathing  HEART: RRR; S1/S2, No murmur  ABDOMEN: Soft, +tender, Nondistended; Bowel sounds present  VASCULAR: Normal pulses, Normal capillary refill  EXTREMITIES: No calf tenderness, No cyanosis, No edema  LYMPH: Normal; No lymphadenopathy noted  SKIN: Warm, Intact  PSYCH: Normal mood, Normal affect  NERVOUS SYSTEM:  A/O x3, Good concentration; CN 2-12 intact, No focal deficits    #SBO  #Crohns  - CT abd/pelv with oral / IV con-sbo at TI   - surgery and GI consults appreciated  - clear diet started 4/8  - solumedrol 60mg qd IV  - PPI   - PRn zofran  - GI rec plan for 6-8wks of steroids once SBO resolves     #htn  #DM  #afib  #cad  -  home amlodipine, labetalol, losartan restarted  - slidding scale for insulin  - flomax restarted  - eliquis restarted     dvt ppx: lovenox  gi ppx: protonix   diet: clear liquids

## 2024-04-08 NOTE — H&P ADULT - ASSESSMENT
surgery reccomending:  -No acute surgical intervention  -Recommend NGT if vomits  -Recommend GI consult and evaluation  -Recommend medical management for partial SBO 2/2 Crohns--consider steroids  -Obstructive Series in AM  -Surgery following    [id number]    5th episode of obstruction last one about 8-10 years on no meds now as per patient. Prior fu at [facility].  Pain for 24 hours one episode vomiting,  feeling better now.  Exam patient comfortable in no discomfort  Abd soft mild distension no guarding or rebound tenderness..  CT images reviewed some fecalization of small bowel also prior to distal ileum.   Medical evaluation and GI will follow.    72 y/o male with PMH Crohns, SBO, HTN, DM, Afib on Eliquis, Factor V deficiency CAD s/p 4 stents who presents to the ED complaining of abdominal pain for one day. The patient reports that yesterday he ate a potatoes and a pear afterwhich he began to feel nauseous and had dry heaves. last BM saterday. today awoke at 6 am with abdominal pain, tried to wait it out, though continued and presented to ED. In triage had an episode of vomiting.  The patient reports that his Crohns has been in remission and therefore he reports he is not taking any medications for it at this time. 5th episode of obstruction last one about 8-10 years on no IBD meds now as per patient.      #SBO  #Crohns  - CT abd/pelv with oral / IV con:  Small bowel obstruction with transition zone in the ileum where there is   circumferential wall thickening as well as some luminal stenosis.   Interloop ascites. No evidence of pneumatosis or pneumoperitoneum.  - X ray abdomen obstructive series ordered for AM  - AG 15, glc 159, bili 0.5, alk 69, ast 24, alt 24,lipase 38, lactate 1.2  - if has another episode of vomiting place NG tube to suction  - start steroids  - strict NPO  - will hold all oral meds and give IV meds to substitute. (instead of Eliquis will give Lovenox; holding home bp meds if needed give IV meds for bp and HR; will give IV keppra while holding primidone; resume all oral meds when appropriate).  - GI consult   - surgery following  - if any worsening abdominal pain / distention / clinical picture call surgery stat (try [phone])    #htn  #DM  #afib  #cad  -  will hold all oral meds and give IV meds to substitute. (instead of Eliquis will give Lovenox; holding home bp meds if needed give IV meds for bp and HR)  - resume all oral meds when appropriate  - insulin sliding scale    dvt ppx: lovenox  gi ppx: not indicated  diet: strict npo 74 y/o male with PMH Crohns, SBO, HTN, DM, Afib on Eliquis, Factor V deficiency CAD s/p 4 stents who presents to the ED complaining of abdominal pain for one day. The patient reports that yesterday he ate a potatoes and a pear afterwhich he began to feel nauseous and had dry heaves. last BM saterday. today awoke at 6 am with abdominal pain, tried to wait it out, though continued and presented to ED. In triage had an episode of vomiting.  The patient reports that his Crohns has been in remission and therefore he reports he is not taking any medications for it at this time. 5th episode of obstruction last one about 8-10 years on no IBD meds now as per patient.      #SBO  #Crohns  - CT abd/pelv with oral / IV con:  Small bowel obstruction with transition zone in the ileum where there is   circumferential wall thickening as well as some luminal stenosis.   Interloop ascites. No evidence of pneumatosis or pneumoperitoneum.  - X ray abdomen obstructive series ordered for AM  - daily abdominal imaging   - AG 15, glc 159, bili 0.5, alk 69, ast 24, alt 24,lipase 38, lactate 1.2  - if has another episode of vomiting place NG tube to suction  - start steroids  - strict NPO  - will hold all oral meds and give IV meds to substitute. (instead of Eliquis give Lovenox; holding home bp meds if needed give IV meds for bp and HR; will give IV keppra while holding primidone; resume all oral meds when appropriate).  - GI consult   - surgery following  - if any worsening abdominal pain / distention / clinical picture call surgery stat (try [phone])    #htn  #DM  #afib  #cad  -  will hold all oral meds and give IV meds to substitute. (instead of Eliquis give Lovenox; holding home bp meds if needed give IV meds for bp and HR)  - weight not documented. I sent provider to rn to document weight. when weight documented please order Lovenox therapeutic for patient while off Eliquis thank you.  - resume all oral meds when appropriate  - insulin sliding scale    dvt ppx: lovenox  gi ppx: not indicated  diet: strict npo 74 y/o male with PMH Crohns, SBO, HTN, DM, Afib on Eliquis, Factor V deficiency CAD s/p 4 stents who presents to the ED complaining of abdominal pain for one day. The patient reports that yesterday he ate a potatoes and a pear afterwhich he began to feel nauseous and had dry heaves. last BM saterday. today awoke at 6 am with abdominal pain, tried to wait it out, though continued and presented to ED. In triage had an episode of vomiting.  The patient reports that his Crohns has been in remission and therefore he reports he is not taking any medications for it at this time. 5th episode of obstruction last one about 8-10 years on no IBD meds now as per patient.      #SBO  #Crohns  - CT abd/pelv with oral / IV con:  Small bowel obstruction with transition zone in the ileum where there is   circumferential wall thickening as well as some luminal stenosis.   Interloop ascites. No evidence of pneumatosis or pneumoperitoneum.  - X ray abdomen obstructive series ordered for AM  - daily abdominal imaging   - AG 15, glc 159, bili 0.5, alk 69, ast 24, alt 24,lipase 38, lactate 1.2  - if has another episode of vomiting place NG tube to suction  - start steroids  - strict NPO  - will hold all oral meds, consider giving IV meds to substitute. (instead of Eliquis give Lovenox; holding home bp meds if needed give IV meds for bp and HR; consider giving IV keppra while holding primidone; resume all oral meds when appropriate).  - GI consult   - surgery following  - avoid opioids and other meds that can cause constipation   - if any worsening abdominal pain / distention / clinical picture call surgery stat (try [phone])    #htn  #DM  #afib  #cad  -  will hold all oral meds and give IV meds to substitute. (instead of Eliquis give Lovenox; holding home bp meds if needed give IV meds for bp and HR)  - weight not documented. I sent provider to rn to document weight. when weight documented please order Lovenox therapeutic for patient while off Eliquis thank you.  - resume all oral meds when appropriate  - insulin sliding scale    dvt ppx: lovenox  gi ppx: not indicated  diet: strict npo

## 2024-04-08 NOTE — H&P ADULT - NSHPPHYSICALEXAM_GEN_ALL_CORE
LOS: 1d    VITALS:   T(C): 37.6 (04-07-24 @ 21:33), Max: 37.6 (04-07-24 @ 21:33)  HR: 73 (04-07-24 @ 21:33) (73 - 90)  BP: 162/74 (04-07-24 @ 21:33) (162/74 - 202/108)  RR: 19 (04-07-24 @ 15:57) (19 - 19)  SpO2: 97% (04-07-24 @ 21:33) (96% - 97%)    GENERAL: NAD, lying in bed  HEAD:  Atraumatic, Normocephalic  EYES: EOMI, conjunctiva and sclera clear  ENT: Moist mucous membranes  NECK: Supple  CHEST/LUNG: Clear to auscultation bilaterally; No rales, rhonchi, wheezing, or rubs. Unlabored respirations  HEART: Regular rate and rhythm; No murmurs, rubs, or gallops  ABDOMEN: bowel sounds present. soft mild distension no guarding or rebound tenderness.  EXTREMITIES:no edema   NERVOUS SYSTEM:  A&Ox3,  SKIN: No rashes or lesions

## 2024-04-08 NOTE — CONSULT NOTE ADULT - ASSESSMENT
72 y/o male with PMH Crohn's SBO, HTN, DM, Afib on Eliquis, Factor V deficiency CAD s/p 4 stents who presents to the ED complaining of abdominal pain for one day.     # SBO:  # Reported H/O Crohn's :  - patient is hemodynamically stable  - exam is benign, passing gas   - labs reassuring  - CT reviewed - SBO at TI  - last colon 6 months ago per pt  - no signs of infectious process     recommendations:  - can start clear liquid diet today  - started on solumedrol 60 mg once daily IV per team, can continue for now  - PPI while on steroids  - will plan for 6-8 weeks of steroids once SBO completely resolves - will need to follow up with primary GI as outpatient to assess for starting biologic treatment   72 y/o male with PMH Crohn's SBO, HTN, DM, Afib on Eliquis, Factor V deficiency CAD s/p 4 stents who presents to the ED complaining of abdominal pain for one day.     # SBO:  # Reported H/O Crohn's :  - patient is hemodynamically stable  - exam is benign, passing gas   - labs reassuring  - CT reviewed - SBO at TI  - last colon 6 months ago per pt  - no signs of infectious process     recommendations:  - can start clear liquid diet today  - started on solumedrol 60 mg once daily IV per team, can continue for now  - PPI while on steroids  - will plan for 6-8 weeks of steroids once SBO completely resolves - will need to follow up with primary GI as outpatient to assess for starting biologic treatment otherwise f/up at [facility] if needed

## 2024-04-08 NOTE — CONSULT NOTE ADULT - ATTENDING COMMENTS
Patient seen and examined with surgery team in ED and discussed management plans with patient. 5th episode of obstruction last one about 8-10 years on no meds now as per patient. Prior fu at [facility].  Pain for 24 hours one episode vomiting,  feeling better now.  Exam patient comfortable in no discomfort  Abd soft mild distension no guarding or rebound tenderness..  CT images reviewed some fecalization of small bowel also prior to distal ileum.   Medical evaluation and GI will follow.
I edited the note

## 2024-04-08 NOTE — H&P ADULT - NSHPLABSRESULTS_GEN_ALL_CORE
14.2   7.05  )-----------( 214      ( 07 Apr 2024 17:09 )             40.6       04-07    140  |  103  |  17  ----------------------------<  159<H>  4.1   |  22  |  0.9    Ca    9.7      07 Apr 2024 17:09    TPro  7.2  /  Alb  4.6  /  TBili  0.5  /  DBili  x   /  AST  24  /  ALT  24  /  AlkPhos  69  04-07

## 2024-04-08 NOTE — H&P ADULT - HISTORY OF PRESENT ILLNESS
HPI: Patient is a 72 y/o male with PMH Crohns, SBO, HTN, DM, Afib on Eliquis, Factor V deficiency CAD s/p stents who presents to the ED complaining of abdominal pain for one day. The patient reports that yesterday he ate a pear afterwhich he began to feel nauseous and had dry heaves with one episode of vomiting clear fluid. The patient reports that he has passed a BM today that was normal. The patient reports that he is not currently nauseous or vomiting however reports that his abdomen feels more distended. The patient reports that this has happened to him numerous amounts of times in the past and would be treated with NGT and medical therapy including steroids. The patient reports that his Crohns has been in remission and therefore he reports he is not taking any medications for it at this time.     wbc 7.05, hg 14.2, neutro 87.1, gran 0.6, AG 15, glc 159, bili 0.5, alk 69, ast 24, alt 24,lipase 38, lactate 1.2  UA: ketone 15, protein 30, glc > 1000    CT abd/pelv with oral / IV con:  Small bowel obstruction with transition zone in the ileum where there is   circumferential wall thickening as well as some luminal stenosis.   Interloop ascites. No evidence of pneumatosis or pneumoperitoneum.    CXR wet read: no acute path    received 1.5 L bolus, X ray abdomen obstructive series ordered for AM    74 y/o male with PMH Crohns, SBO, HTN, DM, Afib on Eliquis, Factor V deficiency CAD s/p 4 stents who presents to the ED complaining of abdominal pain for one day. The patient reports that yesterday he ate a potatoes and a pear afterwhich he began to feel nauseous and had dry heaves. last BM saterday. today awoke at 6 am with abdominal pain, tried to wait it out, though continued and presented to ED. In triage had an episode of vomiting.  The patient reports that his Crohns has been in remission and therefore he reports he is not taking any medications for it at this time. 5th episode of obstruction last one about 8-10 years on no IBD meds now as per patient.    wbc 7.05, hg 14.2, neutro 87.1, gran 0.6, AG 15, glc 159, bili 0.5, alk 69, ast 24, alt 24,lipase 38, lactate 1.2  UA: ketone 15, protein 30, glc > 1000    CT abd/pelv with oral / IV con:  Small bowel obstruction with transition zone in the ileum where there is   circumferential wall thickening as well as some luminal stenosis.   Interloop ascites. No evidence of pneumatosis or pneumoperitoneum.    CXR wet read: no acute path    received 1.5 L bolus, X ray abdomen obstructive series ordered for AM 72 y/o male with PMH Crohns, SBO, HTN, DM, Afib on Eliquis, Factor V deficiency CAD s/p 4 stents who presents to the ED complaining of abdominal pain for one day. The patient reports that yesterday he ate a potatoes and a pear after which he began to feel nauseous and had dry heaves. last BM Saturday today awoke at 6 am with abdominal pain, tried to wait it out, though continued and presented to ED. In triage had an episode of vomiting.  The patient reports that his Crohns has been in remission and therefore he reports he is not taking any medications for it at this time. 5th episode of obstruction last one about 8-10 years on no IBD meds now as per patient.    wbc 7.05, hg 14.2, neutro 87.1, gran 0.6, AG 15, glc 159, bili 0.5, alk 69, ast 24, alt 24,lipase 38, lactate 1.2  UA: ketone 15, protein 30, glc > 1000    CT abd/pelv with oral / IV con:  Small bowel obstruction with transition zone in the ileum where there is   circumferential wall thickening as well as some luminal stenosis.   Interloop ascites. No evidence of pneumatosis or pneumoperitoneum.    CXR wet read: no acute path    received 1.5 L bolus, X ray abdomen obstructive series ordered for AM no

## 2024-04-08 NOTE — CONSULT NOTE ADULT - SUBJECTIVE AND OBJECTIVE BOX
Gastroenterology Consultation:    Patient is a 73y old  Male who presents with a chief complaint of       Admitted on: 04-07-24      HPI:  74 y/o male with PMH Crohns, SBO, HTN, DM, Afib on Eliquis, Factor V deficiency CAD s/p 4 stents who presents to the ED complaining of abdominal pain for one day. The patient reports that yesterday he ate a potatoes and a pear afterwhich he began to feel nauseous and had dry heaves. last BM saterday. today awoke at 6 am with abdominal pain, tried to wait it out, though continued and presented to ED. In triage had an episode of vomiting.  The patient reports that his Crohns has been in remission and therefore he reports he is not taking any medications for it at this time. 5th episode of obstruction last one about 8-10 years on no IBD meds now as per patient. blood work is grossly reassuring in ER. CT with SBO. GI is called for evaluation. collateral history obtained from patient indicating he follows with Dr. Hedrick at [facility] in [place]. diagnosed with Crohn's around 20 years ago. and obtted for observation with no biliogical ttt given symptoms were mild and he has history of prostate cancer and skin cancer?. last colonoscopy was 6 months ago with small area of inflammation per the patient.            PAST MEDICAL & SURGICAL HISTORY:  Coronary artery disease  4 STENTS      Diabetes mellitus      Hypertension, unspecified type      Factor V Leiden      Afib      Skin cancer      Crohn's disease      DWIGHT treated with BiPAP      Essential tremor  HANDS R>L      History of coronary angioplasty with insertion of stent      History of Mohs surgery for squamous cell carcinoma of skin            FAMILY HISTORY:  FH: factor V Leiden mutation (Sibling)        Social History:  Tobacco:  Alcohol:  Drugs:    Home Medications:  amLODIPine 5 mg oral tablet: 1 tab(s) orally 2 times a day (01 Nov 2021 08:51)  Centrum oral tablet: 1 tab(s) orally once a day (01 Nov 2021 08:51)  Co-Q10 100 mg oral capsule: 1 cap(s) orally once a day (01 Nov 2021 08:51)  Eliquis 5 mg oral tablet: 1 tab(s) orally 2 times a day (08 Apr 2024 01:42)  Farxiga 10 mg oral tablet: 1 tab(s) orally once a day (08 Apr 2024 01:42)  Fish Oil oral capsule:  (01 Nov 2021 08:51)  Klor-Con 10 mEq oral tablet, extended release: 2 tab(s) orally 2 times a day (01 Nov 2021 08:51)  labetalol 100 mg oral tablet: 1 tab(s) orally 2 times a day (08 Apr 2024 01:42)  Lutein: 25 milligram(s) orally once a day (01 Nov 2021 08:51)  metFORMIN 750 mg oral tablet, extended release: 1 tab(s) orally once a day (01 Nov 2021 08:51)  primidone 50 mg oral tablet: 1 tab(s) orally 2 times a day (08 Apr 2024 01:42)  sertraline 50 mg oral tablet: 1 tab(s) orally once a day (at bedtime) (01 Nov 2021 08:51)  solifenacin 5 mg oral tablet: 1 tab(s) orally once a day (08 Apr 2024 01:42)  tamsulosin 0.4 mg oral capsule: 1 cap(s) orally once a day (01 Nov 2021 08:51)  telmisartan 80 mg oral tablet: 1 tab(s) orally once a day (01 Nov 2021 08:51)  trospium 60 mg oral capsule, extended release: 1 cap(s) orally once a day (in the morning) (01 Nov 2021 08:51)  vitamin D-calcium (as carbonate) 1000 intl units (25 mcg)-90 mg oral tablet: 1 tab(s) orally once a day (01 Nov 2021 08:51)  ZEAXANTHIN: 5 milligram(s)  once a day (01 Nov 2021 08:03)  Zypitamag 2 mg oral tablet: 1 tab(s) orally once a day (08 Apr 2024 01:42)        MEDICATIONS  (STANDING):  amLODIPine   Tablet 5 milliGRAM(s) Oral daily  dextrose 10% Bolus 125 milliLiter(s) IV Bolus once  dextrose 5%. 1000 milliLiter(s) (100 mL/Hr) IV Continuous <Continuous>  dextrose 5%. 1000 milliLiter(s) (50 mL/Hr) IV Continuous <Continuous>  dextrose 50% Injectable 25 Gram(s) IV Push once  dextrose 50% Injectable 12.5 Gram(s) IV Push once  enoxaparin Injectable 90 milliGRAM(s) SubCutaneous every 12 hours  glucagon  Injectable 1 milliGRAM(s) IntraMuscular once  insulin lispro (ADMELOG) corrective regimen sliding scale   SubCutaneous three times a day before meals  losartan 50 milliGRAM(s) Oral daily  methylPREDNISolone sodium succinate Injectable 60 milliGRAM(s) IV Push daily    MEDICATIONS  (PRN):  dextrose Oral Gel 15 Gram(s) Oral once PRN Blood Glucose LESS THAN 70 milliGRAM(s)/deciliter  ondansetron Injectable 4 milliGRAM(s) IV Push every 6 hours PRN Nausea and/or Vomiting      Allergies  No Known Drug Allergies  Originally Entered as [HIVES] reaction to [blood] (Unknown)      Review of Systems:   Constitutional:  No Fever, No Chills  ENT/Mouth:  No Hearing Changes,  No Difficulty Swallowing  Eyes:  No Eye Pain, No Vision Changes  Cardiovascular:  No Chest Pain, No Palpitations  Respiratory:  No Cough, No Dyspnea  Gastrointestinal:  As described in HPI  Musculoskeletal:  No Joint Swelling, No Back Pain  Skin:  No Skin Lesions, No Jaundice  Neuro:  No Syncope, No Dizziness  Heme/Lymph:  No Bruising, No Bleeding.          Physical Examination:  T(C): 36.2 (04-08-24 @ 08:19), Max: 37.6 (04-07-24 @ 21:33)  HR: 70 (04-08-24 @ 08:19) (70 - 90)  BP: 161/70 (04-08-24 @ 08:19) (161/70 - 202/108)  RR: 18 (04-08-24 @ 08:19) (18 - 19)  SpO2: 95% (04-08-24 @ 08:19) (94% - 97%)  Height (cm): 175.3 (04-08-24 @ 02:20)  Weight (kg): 91.626 (04-08-24 @ 02:20)        GENERAL: AAOx3, no acute distress.  HEAD:  Atraumatic, Normocephalic  EYES: conjunctiva and sclera clear  NECK: Supple, no JVD or thyromegaly  CHEST/LUNG: Clear to auscultation bilaterally; No wheeze, rhonchi, or rales  HEART: Regular rate and rhythm; normal S1, S2, No murmurs.  ABDOMEN: Soft, nontender, nondistended; Bowel sounds present  NEUROLOGY: No asterixis or tremor.   SKIN: Intact, no jaundice        Data:                        14.2   7.05  )-----------( 214      ( 07 Apr 2024 17:09 )             40.6     Hgb Trend:  14.2  04-07-24 @ 17:09      04-07    140  |  103  |  17  ----------------------------<  159<H>  4.1   |  22  |  0.9    Ca    9.7      07 Apr 2024 17:09    TPro  7.2  /  Alb  4.6  /  TBili  0.5  /  DBili  x   /  AST  24  /  ALT  24  /  AlkPhos  69  04-07    Liver panel trend:  TBili 0.5   /   AST 24   /   ALT 24   /   AlkP 69   /   Tptn 7.2   /   Alb 4.6    /   DBili --      04-07              Radiology:  CT Abdomen and Pelvis w/ Oral Cont and w/ IV Cont:   ACC: [id number] EXAM:  CT ABDOMEN AND PELVIS OC IC   ORDERED BY: CHARLY FRAZIER     PROCEDURE DATE:  04/07/2024          INTERPRETATION:  CLINICAL STATEMENT: Abdominal pain, Crohn's disease,   melanoma      TECHNIQUE: Contiguous axial CT images were obtained from the lower chest   to the pubic symphysis following administration of 95 cc Omnipaque 350   intravenous contrast.  Oral contrast was administered.  Reformatted   images in the coronal and sagittal planes were acquired.    COMPARISON CT:1/24/2023 CT of the abdomen and pelvis and PET CT dated   5/30/2023        FINDINGS:    LOWER CHEST: There is mild bibasilar subsegmental atelectasis. Coronary   atherosclerosis    HEPATOBILIARY: Apparent hepatic steatosis..  No evidence of intra or   extrahepatic biliary dilatation. There is unchanged questionable   circumferential wall thickening of the gallbladder. Please correlate with   site of pain..    SPLEEN: Unremarkable.    PANCREAS: Unremarkable.    ADRENAL GLANDS: Unremarkable.    KIDNEYS: Symmetric pattern of renal enhancement. No evidence of a mass,   hydronephrosis or hydroureter.    ABDOMINOPELVIC NODES: No enlarged abdominal or pelvic lymph nodes.    PELVIC ORGANS: Enlarged prostate gland with metallic densities within. In   addition, there is decreased heterogeneous enhancement of the right   seminal vesicle.    PERITONEUM/MESENTERY/BOWEL: There are several dilated loops of small   bowel with interloop ascites. The dilated small bowel loops measure up to   4 cm transverse. The transition zone is in the ileum where there is   circumferential wall thickening and some stenosis as well as   sacralization of small bowel contents. No evidence of pneumatosis.  Oral   contrast has reached the proximal aspect of the obstructed small bowel   loops. Appendix is not definitively identified.    BONES/SOFT TISSUES: Chronic fracture of the left 11th rib. Degenerative   changes of the spine and both hips.    OTHER: Mild atherosclerotic calcifications of normal caliber aorta.      IMPRESSION:      Small bowel obstruction with transition zone in the ileum where there is   circumferential wall thickening as well as some luminal stenosis.   Interloop ascites. No evidence of pneumatosis or pneumoperitoneum.    --- End of Report ---            LYLA STRATTON MD; Attending Radiologist  This document has been electronically signed. Apr 7 2024  8:55PM (04-07-24 @ 18:38)       Gastroenterology Consultation:    Patient is a 73y old  Male who presents with a chief complaint of abdominal pain       Admitted on: 04-07-24      HPI:  74 y/o male with PMH Crohns, SBO, HTN, DM, Afib on Eliquis, Factor V deficiency CAD s/p 4 stents who presents to the ED complaining of abdominal pain for one day. The patient reports that yesterday he ate a potatoes and a pear after which he began to feel nauseous and had dry heaves. last BM saterday. today awoke at 6 am with abdominal pain, tried to wait it out, though continued and presented to ED. In triage had an episode of vomiting.  The patient reports that his Crohns has been in remission and therefore he reports he is not taking any medications for it at this time. 5th episode of obstruction last one about 8-10 years on no IBD meds now as per patient. blood work is grossly reassuring in ER. CT with SBO. GI is called for evaluation. collateral history obtained from patient indicating he follows with Dr. Hedrick at [facility] in [place]. diagnosed with Crohn's around 20 years ago. and obtted for observation with no biological ttt given symptoms were mild and he has history of prostate cancer and skin cancer?. last colonoscopy was 6 months ago with small area of inflammation per the patient. no hospitalization for past 10 years.           PAST MEDICAL & SURGICAL HISTORY:  Coronary artery disease  4 STENTS      Diabetes mellitus      Hypertension, unspecified type      Factor V Leiden      Afib      Skin cancer      Crohn's disease      DWIGHT treated with BiPAP      Essential tremor  HANDS R>L      History of coronary angioplasty with insertion of stent      History of Mohs surgery for squamous cell carcinoma of skin            FAMILY HISTORY:  FH: factor V Leiden mutation (Sibling)        Social History:  Tobacco: denies   Alcohol: denies   Drugs: denies     Home Medications:  amLODIPine 5 mg oral tablet: 1 tab(s) orally 2 times a day (01 Nov 2021 08:51)  Centrum oral tablet: 1 tab(s) orally once a day (01 Nov 2021 08:51)  Co-Q10 100 mg oral capsule: 1 cap(s) orally once a day (01 Nov 2021 08:51)  Eliquis 5 mg oral tablet: 1 tab(s) orally 2 times a day (08 Apr 2024 01:42)  Farxiga 10 mg oral tablet: 1 tab(s) orally once a day (08 Apr 2024 01:42)  Fish Oil oral capsule:  (01 Nov 2021 08:51)  Klor-Con 10 mEq oral tablet, extended release: 2 tab(s) orally 2 times a day (01 Nov 2021 08:51)  labetalol 100 mg oral tablet: 1 tab(s) orally 2 times a day (08 Apr 2024 01:42)  Lutein: 25 milligram(s) orally once a day (01 Nov 2021 08:51)  metFORMIN 750 mg oral tablet, extended release: 1 tab(s) orally once a day (01 Nov 2021 08:51)  primidone 50 mg oral tablet: 1 tab(s) orally 2 times a day (08 Apr 2024 01:42)  sertraline 50 mg oral tablet: 1 tab(s) orally once a day (at bedtime) (01 Nov 2021 08:51)  solifenacin 5 mg oral tablet: 1 tab(s) orally once a day (08 Apr 2024 01:42)  tamsulosin 0.4 mg oral capsule: 1 cap(s) orally once a day (01 Nov 2021 08:51)  telmisartan 80 mg oral tablet: 1 tab(s) orally once a day (01 Nov 2021 08:51)  trospium 60 mg oral capsule, extended release: 1 cap(s) orally once a day (in the morning) (01 Nov 2021 08:51)  vitamin D-calcium (as carbonate) 1000 intl units (25 mcg)-90 mg oral tablet: 1 tab(s) orally once a day (01 Nov 2021 08:51)  ZEAXANTHIN: 5 milligram(s)  once a day (01 Nov 2021 08:03)  Zypitamag 2 mg oral tablet: 1 tab(s) orally once a day (08 Apr 2024 01:42)        MEDICATIONS  (STANDING):  amLODIPine   Tablet 5 milliGRAM(s) Oral daily  dextrose 10% Bolus 125 milliLiter(s) IV Bolus once  dextrose 5%. 1000 milliLiter(s) (100 mL/Hr) IV Continuous <Continuous>  dextrose 5%. 1000 milliLiter(s) (50 mL/Hr) IV Continuous <Continuous>  dextrose 50% Injectable 25 Gram(s) IV Push once  dextrose 50% Injectable 12.5 Gram(s) IV Push once  enoxaparin Injectable 90 milliGRAM(s) SubCutaneous every 12 hours  glucagon  Injectable 1 milliGRAM(s) IntraMuscular once  insulin lispro (ADMELOG) corrective regimen sliding scale   SubCutaneous three times a day before meals  losartan 50 milliGRAM(s) Oral daily  methylPREDNISolone sodium succinate Injectable 60 milliGRAM(s) IV Push daily    MEDICATIONS  (PRN):  dextrose Oral Gel 15 Gram(s) Oral once PRN Blood Glucose LESS THAN 70 milliGRAM(s)/deciliter  ondansetron Injectable 4 milliGRAM(s) IV Push every 6 hours PRN Nausea and/or Vomiting      Allergies  No Known Drug Allergies  Originally Entered as [HIVES] reaction to [blood] (Unknown)      Review of Systems:   Constitutional:  No Fever, No Chills  ENT/Mouth:  No Hearing Changes,  No Difficulty Swallowing  Eyes:  No Eye Pain, No Vision Changes  Cardiovascular:  No Chest Pain, No Palpitations  Respiratory:  No Cough, No Dyspnea  Gastrointestinal:  As described in HPI  Musculoskeletal:  No Joint Swelling, No Back Pain  Skin:  No Skin Lesions, No Jaundice  Neuro:  No Syncope, No Dizziness  Heme/Lymph:  No Bruising, No Bleeding.          Physical Examination:  T(C): 36.2 (04-08-24 @ 08:19), Max: 37.6 (04-07-24 @ 21:33)  HR: 70 (04-08-24 @ 08:19) (70 - 90)  BP: 161/70 (04-08-24 @ 08:19) (161/70 - 202/108)  RR: 18 (04-08-24 @ 08:19) (18 - 19)  SpO2: 95% (04-08-24 @ 08:19) (94% - 97%)  Height (cm): 175.3 (04-08-24 @ 02:20)  Weight (kg): 91.626 (04-08-24 @ 02:20)        GENERAL: AAOx3, no acute distress.  HEAD:  Atraumatic, Normocephalic  EYES: conjunctiva and sclera clear  NECK: Supple, no JVD or thyromegaly  CHEST/LUNG: Clear to auscultation bilaterally; No wheeze, rhonchi, or rales  HEART: Regular rate and rhythm; normal S1, S2, No murmurs.  ABDOMEN: Soft, nontender, nondistended; Bowel sounds present  NEUROLOGY: No asterixis or tremor.   SKIN: Intact, no jaundice        Data:                        14.2   7.05  )-----------( 214      ( 07 Apr 2024 17:09 )             40.6     Hgb Trend:  14.2  04-07-24 @ 17:09      04-07    140  |  103  |  17  ----------------------------<  159<H>  4.1   |  22  |  0.9    Ca    9.7      07 Apr 2024 17:09    TPro  7.2  /  Alb  4.6  /  TBili  0.5  /  DBili  x   /  AST  24  /  ALT  24  /  AlkPhos  69  04-07    Liver panel trend:  TBili 0.5   /   AST 24   /   ALT 24   /   AlkP 69   /   Tptn 7.2   /   Alb 4.6    /   DBili --      04-07              Radiology:  CT Abdomen and Pelvis w/ Oral Cont and w/ IV Cont:   ACC: [id number] EXAM:  CT ABDOMEN AND PELVIS OC IC   ORDERED BY: CHARLY FRAZIER     PROCEDURE DATE:  04/07/2024          INTERPRETATION:  CLINICAL STATEMENT: Abdominal pain, Crohn's disease,   melanoma      TECHNIQUE: Contiguous axial CT images were obtained from the lower chest   to the pubic symphysis following administration of 95 cc Omnipaque 350   intravenous contrast.  Oral contrast was administered.  Reformatted   images in the coronal and sagittal planes were acquired.    COMPARISON CT:1/24/2023 CT of the abdomen and pelvis and PET CT dated   5/30/2023        FINDINGS:    LOWER CHEST: There is mild bibasilar subsegmental atelectasis. Coronary   atherosclerosis    HEPATOBILIARY: Apparent hepatic steatosis..  No evidence of intra or   extrahepatic biliary dilatation. There is unchanged questionable   circumferential wall thickening of the gallbladder. Please correlate with   site of pain..    SPLEEN: Unremarkable.    PANCREAS: Unremarkable.    ADRENAL GLANDS: Unremarkable.    KIDNEYS: Symmetric pattern of renal enhancement. No evidence of a mass,   hydronephrosis or hydroureter.    ABDOMINOPELVIC NODES: No enlarged abdominal or pelvic lymph nodes.    PELVIC ORGANS: Enlarged prostate gland with metallic densities within. In   addition, there is decreased heterogeneous enhancement of the right   seminal vesicle.    PERITONEUM/MESENTERY/BOWEL: There are several dilated loops of small   bowel with interloop ascites. The dilated small bowel loops measure up to   4 cm transverse. The transition zone is in the ileum where there is   circumferential wall thickening and some stenosis as well as   sacralization of small bowel contents. No evidence of pneumatosis.  Oral   contrast has reached the proximal aspect of the obstructed small bowel   loops. Appendix is not definitively identified.    BONES/SOFT TISSUES: Chronic fracture of the left 11th rib. Degenerative   changes of the spine and both hips.    OTHER: Mild atherosclerotic calcifications of normal caliber aorta.      IMPRESSION:      Small bowel obstruction with transition zone in the ileum where there is   circumferential wall thickening as well as some luminal stenosis.   Interloop ascites. No evidence of pneumatosis or pneumoperitoneum.    --- End of Report ---            LYLA STRATTON MD; Attending Radiologist  This document has been electronically signed. Apr 7 2024  8:55PM (04-07-24 @ 18:38)

## 2024-04-09 ENCOUNTER — TRANSCRIPTION ENCOUNTER (OUTPATIENT)
Age: 74
End: 2024-04-09

## 2024-04-09 VITALS
HEART RATE: 77 BPM | DIASTOLIC BLOOD PRESSURE: 93 MMHG | SYSTOLIC BLOOD PRESSURE: 139 MMHG | RESPIRATION RATE: 18 BRPM | TEMPERATURE: 97 F

## 2024-04-09 LAB
ALBUMIN SERPL ELPH-MCNC: 4.2 G/DL — SIGNIFICANT CHANGE UP (ref 3.5–5.2)
ALP SERPL-CCNC: 62 U/L — SIGNIFICANT CHANGE UP (ref 30–115)
ALT FLD-CCNC: 22 U/L — SIGNIFICANT CHANGE UP (ref 0–41)
ANION GAP SERPL CALC-SCNC: 15 MMOL/L — HIGH (ref 7–14)
AST SERPL-CCNC: 25 U/L — SIGNIFICANT CHANGE UP (ref 0–41)
BASOPHILS # BLD AUTO: 0.05 K/UL — SIGNIFICANT CHANGE UP (ref 0–0.2)
BASOPHILS NFR BLD AUTO: 1.1 % — HIGH (ref 0–1)
BILIRUB SERPL-MCNC: 0.5 MG/DL — SIGNIFICANT CHANGE UP (ref 0.2–1.2)
BUN SERPL-MCNC: 20 MG/DL — SIGNIFICANT CHANGE UP (ref 10–20)
CALCIUM SERPL-MCNC: 8.6 MG/DL — SIGNIFICANT CHANGE UP (ref 8.4–10.5)
CHLORIDE SERPL-SCNC: 104 MMOL/L — SIGNIFICANT CHANGE UP (ref 98–110)
CO2 SERPL-SCNC: 24 MMOL/L — SIGNIFICANT CHANGE UP (ref 17–32)
CREAT SERPL-MCNC: 0.9 MG/DL — SIGNIFICANT CHANGE UP (ref 0.7–1.5)
EGFR: 91 ML/MIN/1.73M2 — SIGNIFICANT CHANGE UP
EOSINOPHIL # BLD AUTO: 0.16 K/UL — SIGNIFICANT CHANGE UP (ref 0–0.7)
EOSINOPHIL NFR BLD AUTO: 3.4 % — SIGNIFICANT CHANGE UP (ref 0–8)
GLUCOSE BLDC GLUCOMTR-MCNC: 161 MG/DL — HIGH (ref 70–99)
GLUCOSE BLDC GLUCOMTR-MCNC: 228 MG/DL — HIGH (ref 70–99)
GLUCOSE BLDC GLUCOMTR-MCNC: 255 MG/DL — HIGH (ref 70–99)
GLUCOSE SERPL-MCNC: 133 MG/DL — HIGH (ref 70–99)
HCT VFR BLD CALC: 36.4 % — LOW (ref 42–52)
HGB BLD-MCNC: 12.5 G/DL — LOW (ref 14–18)
IMM GRANULOCYTES NFR BLD AUTO: 0.2 % — SIGNIFICANT CHANGE UP (ref 0.1–0.3)
LYMPHOCYTES # BLD AUTO: 0.57 K/UL — LOW (ref 1.2–3.4)
LYMPHOCYTES # BLD AUTO: 12.2 % — LOW (ref 20.5–51.1)
MAGNESIUM SERPL-MCNC: 2.2 MG/DL — SIGNIFICANT CHANGE UP (ref 1.8–2.4)
MCHC RBC-ENTMCNC: 30.2 PG — SIGNIFICANT CHANGE UP (ref 27–31)
MCHC RBC-ENTMCNC: 34.3 G/DL — SIGNIFICANT CHANGE UP (ref 32–37)
MCV RBC AUTO: 87.9 FL — SIGNIFICANT CHANGE UP (ref 80–94)
MONOCYTES # BLD AUTO: 0.59 K/UL — SIGNIFICANT CHANGE UP (ref 0.1–0.6)
MONOCYTES NFR BLD AUTO: 12.6 % — HIGH (ref 1.7–9.3)
NEUTROPHILS # BLD AUTO: 3.29 K/UL — SIGNIFICANT CHANGE UP (ref 1.4–6.5)
NEUTROPHILS NFR BLD AUTO: 70.5 % — SIGNIFICANT CHANGE UP (ref 42.2–75.2)
NRBC # BLD: 0 /100 WBCS — SIGNIFICANT CHANGE UP (ref 0–0)
PLATELET # BLD AUTO: 170 K/UL — SIGNIFICANT CHANGE UP (ref 130–400)
PMV BLD: 11.5 FL — HIGH (ref 7.4–10.4)
POTASSIUM SERPL-MCNC: 3.5 MMOL/L — SIGNIFICANT CHANGE UP (ref 3.5–5)
POTASSIUM SERPL-SCNC: 3.5 MMOL/L — SIGNIFICANT CHANGE UP (ref 3.5–5)
PROT SERPL-MCNC: 6.5 G/DL — SIGNIFICANT CHANGE UP (ref 6–8)
RBC # BLD: 4.14 M/UL — LOW (ref 4.7–6.1)
RBC # FLD: 12.7 % — SIGNIFICANT CHANGE UP (ref 11.5–14.5)
SODIUM SERPL-SCNC: 143 MMOL/L — SIGNIFICANT CHANGE UP (ref 135–146)
WBC # BLD: 4.67 K/UL — LOW (ref 4.8–10.8)
WBC # FLD AUTO: 4.67 K/UL — LOW (ref 4.8–10.8)

## 2024-04-09 PROCEDURE — 99233 SBSQ HOSP IP/OBS HIGH 50: CPT

## 2024-04-09 PROCEDURE — 74018 RADEX ABDOMEN 1 VIEW: CPT | Mod: 26

## 2024-04-09 PROCEDURE — 99239 HOSP IP/OBS DSCHRG MGMT >30: CPT

## 2024-04-09 RX ORDER — PANTOPRAZOLE SODIUM 20 MG/1
1 TABLET, DELAYED RELEASE ORAL
Qty: 56 | Refills: 0
Start: 2024-04-09 | End: 2024-06-03

## 2024-04-09 RX ORDER — APIXABAN 2.5 MG/1
5 TABLET, FILM COATED ORAL EVERY 12 HOURS
Refills: 0 | Status: DISCONTINUED | OUTPATIENT
Start: 2024-04-09 | End: 2024-04-09

## 2024-04-09 RX ORDER — PITAVASTATIN CALCIUM 1.04 MG/1
1 TABLET, FILM COATED ORAL
Refills: 0 | DISCHARGE

## 2024-04-09 RX ORDER — POTASSIUM CHLORIDE 20 MEQ
2 PACKET (EA) ORAL
Qty: 0 | Refills: 0 | DISCHARGE

## 2024-04-09 RX ADMIN — ENOXAPARIN SODIUM 90 MILLIGRAM(S): 100 INJECTION SUBCUTANEOUS at 06:31

## 2024-04-09 RX ADMIN — Medication 100 MILLIGRAM(S): at 06:27

## 2024-04-09 RX ADMIN — LOSARTAN POTASSIUM 50 MILLIGRAM(S): 100 TABLET, FILM COATED ORAL at 06:26

## 2024-04-09 RX ADMIN — AMLODIPINE BESYLATE 5 MILLIGRAM(S): 2.5 TABLET ORAL at 06:27

## 2024-04-09 RX ADMIN — Medication 60 MILLIGRAM(S): at 06:28

## 2024-04-09 RX ADMIN — PANTOPRAZOLE SODIUM 40 MILLIGRAM(S): 20 TABLET, DELAYED RELEASE ORAL at 06:28

## 2024-04-09 RX ADMIN — Medication 1: at 08:47

## 2024-04-09 RX ADMIN — Medication 2: at 11:46

## 2024-04-09 NOTE — DISCHARGE NOTE PROVIDER - NSDCMRMEDTOKEN_GEN_ALL_CORE_FT
amLODIPine 5 mg oral tablet: 1 tab(s) orally 2 times a day  Centrum oral tablet: 1 tab(s) orally once a day  Co-Q10 100 mg oral capsule: 1 cap(s) orally once a day  Eliquis 5 mg oral tablet: 1 tab(s) orally 2 times a day  Farxiga 10 mg oral tablet: 1 tab(s) orally once a day  Fish Oil oral capsule:   Klor-Con 10 mEq oral tablet, extended release: 2 tab(s) orally 2 times a day  labetalol 100 mg oral tablet: 1 tab(s) orally 2 times a day  Lutein: 25 milligram(s) orally once a day  metFORMIN 750 mg oral tablet, extended release: 1 tab(s) orally once a day  primidone 50 mg oral tablet: 1 tab(s) orally 2 times a day  sertraline 50 mg oral tablet: 1 tab(s) orally once a day (at bedtime)  solifenacin 5 mg oral tablet: 1 tab(s) orally once a day  tamsulosin 0.4 mg oral capsule: 1 cap(s) orally once a day  telmisartan 80 mg oral tablet: 1 tab(s) orally once a day  trospium 60 mg oral capsule, extended release: 1 cap(s) orally once a day (in the morning)  vitamin D-calcium (as carbonate) 1000 intl units (25 mcg)-90 mg oral tablet: 1 tab(s) orally once a day  ZEAXANTHIN: 5 milligram(s)  once a day  Zypitamag 2 mg oral tablet: 1 tab(s) orally once a day   amLODIPine 5 mg oral tablet: 1 tab(s) orally 2 times a day  Centrum oral tablet: 1 tab(s) orally once a day  Co-Q10 100 mg oral capsule: 1 cap(s) orally once a day  Eliquis 5 mg oral tablet: 1 tab(s) orally 2 times a day  Farxiga 10 mg oral tablet: 1 tab(s) orally once a day  Fish Oil oral capsule:   Klor-Con 10 mEq oral tablet, extended release: 2 tab(s) orally 2 times a day  labetalol 100 mg oral tablet: 1 tab(s) orally 2 times a day  Lutein: 25 milligram(s) orally once a day  metFORMIN 750 mg oral tablet, extended release: 1 tab(s) orally once a day  pantoprazole 40 mg oral delayed release tablet: 1 tab(s) orally once a day (before a meal)  predniSONE 10 mg oral tablet: 1 tab(s) orally once a day Start by taking 4 tabs daily x1 week, then take 3.5 tabs daily x 1 week, then 3 tabs daily for 1 week, then 2.5 tabs daily for 1 week, then 2 tabs daily for 1 week, then 1.5 tabs daily x 1week, then 1 tab daily x 1 week and stop  primidone 50 mg oral tablet: 1 tab(s) orally 2 times a day  sertraline 50 mg oral tablet: 1 tab(s) orally once a day (at bedtime)  solifenacin 5 mg oral tablet: 1 tab(s) orally once a day  tamsulosin 0.4 mg oral capsule: 1 cap(s) orally once a day  telmisartan 80 mg oral tablet: 1 tab(s) orally once a day  trospium 60 mg oral capsule, extended release: 1 cap(s) orally once a day (in the morning)  vitamin D-calcium (as carbonate) 1000 intl units (25 mcg)-90 mg oral tablet: 1 tab(s) orally once a day  ZEAXANTHIN: 5 milligram(s)  once a day  Zypitamag 2 mg oral tablet: 1 tab(s) orally once a day   amLODIPine 5 mg oral tablet: 1 tab(s) orally 2 times a day  Centrum oral tablet: 1 tab(s) orally once a day  Co-Q10 100 mg oral capsule: 1 cap(s) orally once a day  Eliquis 5 mg oral tablet: 1 tab(s) orally 2 times a day  Farxiga 10 mg oral tablet: 1 tab(s) orally once a day  Fish Oil oral capsule:   labetalol 100 mg oral tablet: 1 tab(s) orally 2 times a day  Lutein: 25 milligram(s) orally once a day  metFORMIN 750 mg oral tablet, extended release: 1 tab(s) orally once a day  pantoprazole 40 mg oral delayed release tablet: 1 tab(s) orally once a day (before a meal)  predniSONE 10 mg oral tablet: 1 tab(s) orally once a day Start by taking 4 tabs daily x1 week, then take 3.5 tabs daily x 1 week, then 3 tabs daily for 1 week, then 2.5 tabs daily for 1 week, then 2 tabs daily for 1 week, then 1.5 tabs daily x 1week, then 1 tab daily x 1 week and stop  primidone 50 mg oral tablet: 1 tab(s) orally 2 times a day  sertraline 50 mg oral tablet: 1 tab(s) orally once a day (at bedtime)  solifenacin 5 mg oral tablet: 1 tab(s) orally once a day  tamsulosin 0.4 mg oral capsule: 1 cap(s) orally once a day  telmisartan 80 mg oral tablet: 1 tab(s) orally once a day  trospium 60 mg oral capsule, extended release: 1 cap(s) orally once a day (in the morning)  vitamin D-calcium (as carbonate) 1000 intl units (25 mcg)-90 mg oral tablet: 1 tab(s) orally once a day  ZEAXANTHIN: 5 milligram(s)  once a day

## 2024-04-09 NOTE — DISCHARGE NOTE PROVIDER - ATTENDING DISCHARGE PHYSICAL EXAMINATION:
Vital Signs Last 24 Hrs  T(F): 97.3 (09 Apr 2024 08:00), Max: 97.3 (09 Apr 2024 08:00)  HR: 77 (09 Apr 2024 08:00) (63 - 77)  BP: 139/93 (09 Apr 2024 08:00) (139/93 - 166/74)  RR: 18 (09 Apr 2024 08:00) (18 - 18)  SpO2: 96% (08 Apr 2024 16:07) (96% - 96%)    PHYSICAL EXAM:  GENERAL: NAD, well-groomed, well-developed  HEAD:  Atraumatic, Normocephalic  EYES: EOMI, conjunctiva and sclera clear  ENMT: Moist mucous membranes, Good dentition, no thrush  NECK: Supple, No JVD  CHEST/LUNG: Clear to auscultation bilaterally, good air entry, non-labored breathing  HEART: RRR; S1/S2, No murmur  ABDOMEN: Soft, Nontender, Nondistended; Bowel sounds present  VASCULAR: Normal pulses, Normal capillary refill  EXTREMITIES: No calf tenderness, No cyanosis, No edema  LYMPH: Normal; No lymphadenopathy noted  SKIN: Warm, Intact  PSYCH: Normal mood, Normal affect  NERVOUS SYSTEM:  A/O x3, Good concentration; CN 2-12 intact, No focal deficits

## 2024-04-09 NOTE — DISCHARGE NOTE NURSING/CASE MANAGEMENT/SOCIAL WORK - NSDCPEPT PROEDMA_GEN_ALL_CORE
----- Message from Danial Daley, DO sent at 3/29/2024 10:22 PM CDT -----  Good news stress test is normal!  Keep things as is.   She has had quite a bit of things transpire since last visit. Perhaps move follow up to earlier date.   July.   Ralph    Yes

## 2024-04-09 NOTE — DISCHARGE NOTE PROVIDER - CARE PROVIDER_API CALL
Darrin Bowden  Internal Medicine  [address], [place] [address]  Phone: [phone]  Fax: [phone]  Follow Up Time: 1 week    Gina Das  Gastroenterology  [address] [place] [id number]  Phone: [phone]  Fax: [phone]  Follow Up Time: 2 weeks

## 2024-04-09 NOTE — DISCHARGE NOTE NURSING/CASE MANAGEMENT/SOCIAL WORK - NSDCPEFALRISK_GEN_ALL_CORE
For information on Fall & Injury Prevention, visit: [email or web address] OR  [email or web address] OR  https://www.cdc.gov/steadi/patient.html

## 2024-04-09 NOTE — PROGRESS NOTE ADULT - SUBJECTIVE AND OBJECTIVE BOX
GENERAL SURGERY PROGRESS NOTE     FERNANDA HAGAN  73y  Male  Hospital day :2d    T(F): 97.5 (24 @ 02:20), Max: 99.7 (24 @ 21:33)  HR: 71 (24 @ 02:20) (71 - 90)  BP: 185/85 (24 @ 02:20) (162/74 - 202/108)  ABP: --  ABP(mean): --  RR: 18 (24 @ 02:20) (18 - 19)  SpO2: 94% (24 @ 02:20) (94% - 97%)    DIET/FLUIDS: dextrose 10% Bolus 125 milliLiter(s) IV Bolus once  dextrose 5%. 1000 milliLiter(s) IV Continuous <Continuous>  dextrose 5%. 1000 milliLiter(s) IV Continuous <Continuous>    PHYSICAL EXAM:  General: NAD, AAOx3, calm and cooperative  Cardiac: RRR S1, S2, no Murmurs, rubs or gallops  Respiratory: CTAB  Abdomen: Soft, minimally distended, non-tender, no rebound, no guarding.      LABS  Labs:               14.2   7.05  )-----------( 214      ( 2024 17:09 )             40.6       Auto Neutrophil %: 87.1 % (24 @ 17:09)  Auto Immature Granulocyte %: 0.6 % (24 @ 17:09)        140  |  103  |  17  ----------------------------<  159<H>  4.1   |  22  |  0.9      Calcium: 9.7 mg/dL (24 @ 17:09)      LFTs:             7.2  | 0.5  | 24       ------------------[69      ( 2024 17:09 )  4.6  | x    | 24          Lipase:38     Amylase:x         Lactate, Blood: 1.2 mmol/L (24 @ 17:09)    Urinalysis Basic - ( 2024 19:15 )    Color: Yellow / Appearance: Clear / S.029 / pH: x  Gluc: x / Ketone: 15 mg/dL  / Bili: Negative / Urobili: 0.2 mg/dL   Blood: x / Protein: 30 mg/dL / Nitrite: Negative   Leuk Esterase: Negative / RBC: 0 /HPF / WBC 0 /HPF   Sq Epi: x / Non Sq Epi: 0 /HPF / Bacteria: Negative /HPF            RADIOLOGY & ADDITIONAL TESTS:  < from: CT Abdomen and Pelvis w/ Oral Cont and w/ IV Cont (24 @ 18:38) >  IMPRESSION:  Small bowel obstruction with transition zone in the ileum where there is   circumferential wall thickening as well as some luminal stenosis.   Interloop ascites. No evidence of pneumatosis or pneumoperitoneum.  
Gastroenterology progress note:     Patient is a 73y old  Male who presents with a chief complaint of abd pain (08 Apr 2024 08:43)       Admitted on: 04-07-24    We are following the patient for: SBO       Interval History:    No acute events overnight.   tolerating clears  passing gas and bm  denies pain or vomiting       PAST MEDICAL & SURGICAL HISTORY:  Coronary artery disease  4 STENTS      Diabetes mellitus      Hypertension, unspecified type      Factor V Leiden      Afib      Skin cancer      Crohn's disease      DWIGHT treated with BiPAP      Essential tremor  HANDS R>L      History of coronary angioplasty with insertion of stent      History of Mohs surgery for squamous cell carcinoma of skin          MEDICATIONS  (STANDING):  amLODIPine   Tablet 5 milliGRAM(s) Oral daily  apixaban 5 milliGRAM(s) Oral every 12 hours  dextrose 10% Bolus 125 milliLiter(s) IV Bolus once  dextrose 5%. 1000 milliLiter(s) (100 mL/Hr) IV Continuous <Continuous>  dextrose 5%. 1000 milliLiter(s) (50 mL/Hr) IV Continuous <Continuous>  dextrose 50% Injectable 25 Gram(s) IV Push once  dextrose 50% Injectable 12.5 Gram(s) IV Push once  glucagon  Injectable 1 milliGRAM(s) IntraMuscular once  insulin lispro (ADMELOG) corrective regimen sliding scale   SubCutaneous three times a day before meals  labetalol 100 milliGRAM(s) Oral two times a day  losartan 50 milliGRAM(s) Oral daily  methylPREDNISolone sodium succinate Injectable 60 milliGRAM(s) IV Push daily  pantoprazole    Tablet 40 milliGRAM(s) Oral before breakfast  sertraline 50 milliGRAM(s) Oral at bedtime  tamsulosin 0.4 milliGRAM(s) Oral at bedtime    MEDICATIONS  (PRN):  dextrose Oral Gel 15 Gram(s) Oral once PRN Blood Glucose LESS THAN 70 milliGRAM(s)/deciliter  ondansetron Injectable 4 milliGRAM(s) IV Push every 6 hours PRN Nausea and/or Vomiting      Allergies  No Known Drug Allergies  Originally Entered as [HIVES] reaction to [blood] (Unknown)      Review of Systems:   Cardiovascular:  No Chest Pain, No Palpitations  Respiratory:  No Cough, No Dyspnea  Gastrointestinal:  As described in HPI  Skin:  No Skin Lesions, No Jaundice  Neuro:  No Syncope, No Dizziness    Physical Examination:  T(C): 36.3 (04-09-24 @ 08:00), Max: 36.3 (04-09-24 @ 08:00)  HR: 77 (04-09-24 @ 08:00) (63 - 77)  BP: 139/93 (04-09-24 @ 08:00) (139/93 - 166/74)  RR: 18 (04-09-24 @ 08:00) (18 - 18)  SpO2: 96% (04-08-24 @ 16:07) (96% - 96%)        GENERAL: AAOx3, no acute distress.  HEAD:  Atraumatic, Normocephalic  EYES: conjunctiva and sclera clear  NECK: Supple, no JVD or thyromegaly  CHEST/LUNG: Clear to auscultation bilaterally; No wheeze, rhonchi, or rales  HEART: Regular rate and rhythm; normal S1, S2, No murmurs.  ABDOMEN: Soft, nontender, nondistended; Bowel sounds present  NEUROLOGY: No asterixis or tremor.   SKIN: Intact, no jaundice     Data:                        14.2   7.05  )-----------( 214      ( 07 Apr 2024 17:09 )             40.6     Hgb trend:  14.2  04-07-24 @ 17:09      04-07    140  |  103  |  17  ----------------------------<  159<H>  4.1   |  22  |  0.9    Ca    9.7      07 Apr 2024 17:09    TPro  7.2  /  Alb  4.6  /  TBili  0.5  /  DBili  x   /  AST  24  /  ALT  24  /  AlkPhos  69  04-07    Liver panel trend:  TBili 0.5   /   AST 24   /   ALT 24   /   AlkP 69   /   Tptn 7.2   /   Alb 4.6    /   DBili --      04-07             Radiology:

## 2024-04-09 NOTE — PROGRESS NOTE ADULT - ASSESSMENT
72 y/o male with PMH Crohn's SBO, HTN, DM, Afib on Eliquis, Factor V deficiency CAD s/p 4 stents who presents to the ED complaining of abdominal pain for one day.     # SBO:  # Reported H/O Crohn's :  - patient is hemodynamically stable  - exam is benign, passing gas   - labs reassuring  - CT reviewed - SBO at TI  - last colon 6 months ago per pt  - no signs of infectious process   - 4/9: tolerating clears, passing gas and bm, denies pain or vomiting     recommendations:  - advance diet as tolerated   - switch to prednisone 40 mg po once daily and taper by 5 mg weekly over the course of 8 weeks   - PPI while on steroids  - will need to follow up with primary GI as outpatient to assess for starting biologic treatment otherwise f/up at the [facility] if needed ( located at [address] [place] [address]. Phone Number: [phone])    discussed with patient and medical team

## 2024-04-09 NOTE — DISCHARGE NOTE PROVIDER - CARE PROVIDERS DIRECT ADDRESSES
,[email or web address]

## 2024-04-09 NOTE — DISCHARGE NOTE PROVIDER - NSDCCPCAREPLAN_GEN_ALL_CORE_FT
PRINCIPAL DISCHARGE DIAGNOSIS  Diagnosis: SBO (small bowel obstruction)  Assessment and Plan of Treatment: You were evaluated in the hospital for small Bowel Obstruction. A bowel obstruction is a partial or complete blockage of your small intestine or large intestine (bowels). The most common causes include scar tissue forming after abdominal surgery, hernias and colon cancer. A bowel obstruction is a medical emergency that requires immediate care. Crohn's disease can also lead to the bowel becoming narrow due to inflammation and cause an obstruction. You will be discharged with steorid taper for 8 weeks and you should be taking anti-acid we will prescribe while on these medications.  After discharge, you will need to:   - Follow up with your primary care doctor within 1-2 weeks  - Follow up with Gastroenterologist within 1-2 weeks  - Take all the medications you were discharged with, unless otherwise instructed by your healthcare provider(s).   Please follow up with your providers by calling them to make an appointment so that you can see them in 1-2weeks; bring your paperwork from this hospital stay to that visit. You can access your visit information by signing up for an account for the patient portal at [email or web address]   Seek immediate medical attention if you develop fevers, chills, chest pain, shortness of breath, nausea and vomiting, abdominal pain, passing out, weakness or numbness or tingling on one side of your body, or any other concerning signs or symptoms.

## 2024-04-09 NOTE — DISCHARGE NOTE PROVIDER - HOSPITAL COURSE
74 y/o male with PMH Crohns, SBO, HTN, DM, Afib on Eliquis, Factor V deficiency CAD s/p 4 stents who presents to the ED complaining of abdominal pain for one day. The patient reports that yesterday he ate a potatoes and a pear after which he began to feel nauseous and had dry heaves. last BM Saturday today awoke at 6 am with abdominal pain, tried to wait it out, though continued and presented to ED. In triage had an episode of vomiting.  The patient reports that his Crohns has been in remission and therefore he reports he is not taking any medications for it at this time. 5th episode of obstruction last one about 8-10 years on no IBD meds now as per patient.    wbc 7.05, hg 14.2, neutro 87.1, gran 0.6, AG 15, glc 159, bili 0.5, alk 69, ast 24, alt 24,lipase 38, lactate 1.2  UA: ketone 15, protein 30, glc > 1000    CT abd/pelv with oral / IV con:  Small bowel obstruction with transition zone in the ileum where there is   circumferential wall thickening as well as some luminal stenosis.   Interloop ascites. No evidence of pneumatosis or pneumoperitoneum.    CXR wet read: no acute path    received 1.5 L bolus, X ray abdomen obstructive series ordered for AM    Patient was admitted for further management. Evaluated by surgery team and said likely diagnosis is SBO secondary to Crohn's disease stricture formation in the ileum, steroids recommended and patient started on solumedrol 60daily. GI also evaluated patient and recommended to advance diet and continue with steroids. Patient currently tolerating, having bowel movements and is cleared for discharge. will be discharged with prednisone taper. prednisone 40 mg po once daily and taper by 5 mg weekly over the course of 8 weeks   continue with PPI while on steroids. will need to follow up with primary GI as outpatient to assess for starting biologic treatment    #htn  #DM  #afib  #cad  -  home amlodipine, labetalol, losartan restarted  - slidding scale for insulin  - flomax restarted  - eliquis restarted     Patient medically stable for discharge. 74 y/o male with PMH Crohns, SBO, HTN, DM, Afib on Eliquis, Factor V deficiency CAD s/p 4 stents who presents to the ED complaining of abdominal pain for one day. The patient reports that yesterday he ate a potatoes and a pear after which he began to feel nauseous and had dry heaves. last BM Saturday today awoke at 6 am with abdominal pain, tried to wait it out, though continued and presented to ED. In triage had an episode of vomiting.  The patient reports that his Crohns has been in remission and therefore he reports he is not taking any medications for it at this time. 5th episode of obstruction last one about 8-10 years on no IBD meds now as per patient.    wbc 7.05, hg 14.2, neutro 87.1, gran 0.6, AG 15, glc 159, bili 0.5, alk 69, ast 24, alt 24,lipase 38, lactate 1.2  UA: ketone 15, protein 30, glc > 1000    CT abd/pelv with oral / IV con:  Small bowel obstruction with transition zone in the ileum where there is   circumferential wall thickening as well as some luminal stenosis.   Interloop ascites. No evidence of pneumatosis or pneumoperitoneum.    CXR wet read: no acute path    received 1.5 L bolus, X ray abdomen obstructive series ordered for AM    Patient was admitted for further management. Evaluated by surgery team and said likely diagnosis is SBO secondary to Crohn's disease stricture formation in the ileum, steroids recommended and patient started on solumedrol 60daily. GI also evaluated patient and recommended to advance diet and continue with steroids. Patient currently tolerating, having bowel movements and is cleared for discharge. will be discharged with prednisone taper. prednisone 40 mg po once daily and taper by 5 mg weekly over the course of 8 weeks   continue with PPI while on steroids. will need to follow up with primary GI as outpatient to assess for starting biologic treatment    #htn  #DM  #afib  #cad  -  home amlodipine, labetalol, losartan restarted  - slidding scale for insulin, c/w home med on discharge  - flomax restarted  - eliquis restarted     Patient medically stable for discharge.

## 2024-04-09 NOTE — DISCHARGE NOTE PROVIDER - NSDCFUSCHEDAPPT_GEN_ALL_CORE_FT
[facility] Phys[facility]  PULMMED [address]  Scheduled Appointment: 04/15/2024    Raymond Phipps  [facility]  PULED [address]  Scheduled Appointment: 04/22/2024    Sameer Maya  [facility] Physician [facility]  ENDOCRIN [address]  Scheduled Appointment: 04/29/2024    Mil Marie  [facility]  PLASTICSUR [address]  Scheduled Appointment: 05/07/2024

## 2024-04-09 NOTE — DISCHARGE NOTE NURSING/CASE MANAGEMENT/SOCIAL WORK - PATIENT PORTAL LINK FT
You can access the FollowMyHealth Patient Portal offered by [facility] by registering at the following website: [email or web address]. By joining [facility]’s FollowMyHealth portal, you will also be able to view your health information using other applications (apps) compatible with our system.

## 2024-04-09 NOTE — PHARMACOTHERAPY INTERVENTION NOTE - COMMENTS
- patient is being discharged on eliquis 5mg BID; would  patient on fall prevention while on anticoagulation   - would closely monitor for side effects of prolonged steroid taper outpatient (including delirium/psychotropic effects)   - Would advise caution if discharging on multiple anticholinergics to avoid urinary retention (Trospium + Solifenacin)      - patient is being discharged on eliquis 5mg BID; would  patient on fall prevention while on anticoagulation   - would closely monitor for side effects of prolonged steroid taper outpatient (including delirium/psychotropic effects)   - Would advise caution if discharging on multiple anticholinergics to avoid urinary retention (Trospium + Solifenacin)   - Reconsider discharging patient on the multiple supplements that he's taking at home (fish oil, multivitamin, Vit D, Zexanthin)   - patient is being discharged on eliquis 5mg BID; would  patient on fall prevention while on anticoagulation   - would closely monitor for side effects of prolonged steroid taper outpatient (including delirium/psychotropic effects)   - Would advise caution if discharging on multiple anticholinergics to avoid urinary retention (Trospium + Solifenacin)   - Reconsider discharging patient on the multiple supplements that he's taking at home (fish oil, multivitamin, Vit D, Zexanthin, Lutein)

## 2024-04-09 NOTE — DISCHARGE NOTE PROVIDER - PROVIDER TOKENS
PROVIDER:[TOKEN:[[id number]],FOLLOWUP:[1 week]],PROVIDER:[TOKEN:[[id number]],FOLLOWUP:[2 weeks]]

## 2024-04-15 ENCOUNTER — APPOINTMENT (OUTPATIENT)
Dept: PULMONOLOGY | Facility: CLINIC | Age: 74
End: 2024-04-15
Payer: MEDICARE

## 2024-04-15 PROCEDURE — 94727 GAS DIL/WSHOT DETER LNG VOL: CPT

## 2024-04-15 PROCEDURE — 94729 DIFFUSING CAPACITY: CPT

## 2024-04-15 PROCEDURE — 94010 BREATHING CAPACITY TEST: CPT

## 2024-04-16 DIAGNOSIS — Z85.828 PERSONAL HISTORY OF OTHER MALIGNANT NEOPLASM OF SKIN: ICD-10-CM

## 2024-04-16 DIAGNOSIS — G47.33 OBSTRUCTIVE SLEEP APNEA (ADULT) (PEDIATRIC): ICD-10-CM

## 2024-04-16 DIAGNOSIS — E11.9 TYPE 2 DIABETES MELLITUS WITHOUT COMPLICATIONS: ICD-10-CM

## 2024-04-16 DIAGNOSIS — Z99.89 DEPENDENCE ON OTHER ENABLING MACHINES AND DEVICES: ICD-10-CM

## 2024-04-16 DIAGNOSIS — Z95.5 PRESENCE OF CORONARY ANGIOPLASTY IMPLANT AND GRAFT: ICD-10-CM

## 2024-04-16 DIAGNOSIS — I48.91 UNSPECIFIED ATRIAL FIBRILLATION: ICD-10-CM

## 2024-04-16 DIAGNOSIS — I25.10 ATHEROSCLEROTIC HEART DISEASE OF NATIVE CORONARY ARTERY WITHOUT ANGINA PECTORIS: ICD-10-CM

## 2024-04-16 DIAGNOSIS — Z79.01 LONG TERM (CURRENT) USE OF ANTICOAGULANTS: ICD-10-CM

## 2024-04-16 DIAGNOSIS — D68.2 HEREDITARY DEFICIENCY OF OTHER CLOTTING FACTORS: ICD-10-CM

## 2024-04-16 DIAGNOSIS — K50.012 CROHN'S DISEASE OF SMALL INTESTINE WITH INTESTINAL OBSTRUCTION: ICD-10-CM

## 2024-04-16 DIAGNOSIS — I10 ESSENTIAL (PRIMARY) HYPERTENSION: ICD-10-CM

## 2024-04-16 DIAGNOSIS — R18.8 OTHER ASCITES: ICD-10-CM

## 2024-04-16 DIAGNOSIS — Z79.84 LONG TERM (CURRENT) USE OF ORAL HYPOGLYCEMIC DRUGS: ICD-10-CM

## 2024-04-16 DIAGNOSIS — Z85.46 PERSONAL HISTORY OF MALIGNANT NEOPLASM OF PROSTATE: ICD-10-CM

## 2024-04-26 ENCOUNTER — OUTPATIENT (OUTPATIENT)
Dept: OUTPATIENT SERVICES | Facility: HOSPITAL | Age: 74
LOS: 1 days | End: 2024-04-26
Payer: MEDICARE

## 2024-04-26 ENCOUNTER — RESULT REVIEW (OUTPATIENT)
Age: 74
End: 2024-04-26

## 2024-04-26 DIAGNOSIS — Z95.5 PRESENCE OF CORONARY ANGIOPLASTY IMPLANT AND GRAFT: Chronic | ICD-10-CM

## 2024-04-26 DIAGNOSIS — C43.4 MALIGNANT MELANOMA OF SCALP AND NECK: ICD-10-CM

## 2024-04-26 DIAGNOSIS — Z98.890 OTHER SPECIFIED POSTPROCEDURAL STATES: Chronic | ICD-10-CM

## 2024-04-26 LAB — GLUCOSE BLDC GLUCOMTR-MCNC: 128 MG/DL — HIGH (ref 70–99)

## 2024-04-26 PROCEDURE — 82962 GLUCOSE BLOOD TEST: CPT

## 2024-04-26 PROCEDURE — 78816 PET IMAGE W/CT FULL BODY: CPT | Mod: PI

## 2024-04-26 PROCEDURE — 78816 PET IMAGE W/CT FULL BODY: CPT | Mod: 26,PI,MH

## 2024-04-26 PROCEDURE — A9552: CPT

## 2024-04-27 DIAGNOSIS — C43.4 MALIGNANT MELANOMA OF SCALP AND NECK: ICD-10-CM

## 2024-04-29 ENCOUNTER — APPOINTMENT (OUTPATIENT)
Dept: ENDOCRINOLOGY | Facility: CLINIC | Age: 74
End: 2024-04-29
Payer: MEDICARE

## 2024-04-29 VITALS
WEIGHT: 198 LBS | OXYGEN SATURATION: 98 % | BODY MASS INDEX: 29.33 KG/M2 | RESPIRATION RATE: 18 BRPM | DIASTOLIC BLOOD PRESSURE: 76 MMHG | HEIGHT: 69 IN | HEART RATE: 77 BPM | SYSTOLIC BLOOD PRESSURE: 130 MMHG

## 2024-04-29 DIAGNOSIS — Z86.79 PERSONAL HISTORY OF OTHER DISEASES OF THE CIRCULATORY SYSTEM: ICD-10-CM

## 2024-04-29 DIAGNOSIS — Z86.39 PERSONAL HISTORY OF OTHER ENDOCRINE, NUTRITIONAL AND METABOLIC DISEASE: ICD-10-CM

## 2024-04-29 DIAGNOSIS — Z86.2 PERSONAL HISTORY OF DISEASES OF THE BLOOD AND BLOOD-FORMING ORGANS AND CERTAIN DISORDERS INVOLVING THE IMMUNE MECHANISM: ICD-10-CM

## 2024-04-29 DIAGNOSIS — E11.49 TYPE 2 DIABETES MELLITUS WITH OTHER DIABETIC NEUROLOGICAL COMPLICATION: ICD-10-CM

## 2024-04-29 DIAGNOSIS — I10 ESSENTIAL (PRIMARY) HYPERTENSION: ICD-10-CM

## 2024-04-29 DIAGNOSIS — Z79.01 LONG TERM (CURRENT) USE OF ANTICOAGULANTS: ICD-10-CM

## 2024-04-29 DIAGNOSIS — Z78.9 OTHER SPECIFIED HEALTH STATUS: ICD-10-CM

## 2024-04-29 DIAGNOSIS — Z82.49 FAMILY HISTORY OF ISCHEMIC HEART DISEASE AND OTHER DISEASES OF THE CIRCULATORY SYSTEM: ICD-10-CM

## 2024-04-29 DIAGNOSIS — Z86.69 PERSONAL HISTORY OF OTHER DISEASES OF THE NERVOUS SYSTEM AND SENSE ORGANS: ICD-10-CM

## 2024-04-29 DIAGNOSIS — Z85.46 PERSONAL HISTORY OF MALIGNANT NEOPLASM OF PROSTATE: ICD-10-CM

## 2024-04-29 DIAGNOSIS — Z87.19 PERSONAL HISTORY OF OTHER DISEASES OF THE DIGESTIVE SYSTEM: ICD-10-CM

## 2024-04-29 DIAGNOSIS — Z85.820 PERSONAL HISTORY OF MALIGNANT MELANOMA OF SKIN: ICD-10-CM

## 2024-04-29 DIAGNOSIS — E11.65 TYPE 2 DIABETES MELLITUS WITH HYPERGLYCEMIA: ICD-10-CM

## 2024-04-29 PROCEDURE — 99204 OFFICE O/P NEW MOD 45 MIN: CPT

## 2024-04-29 RX ORDER — SOLIFENACIN SUCCINATE 5 MG/1
5 TABLET ORAL
Refills: 0 | Status: ACTIVE | COMMUNITY

## 2024-04-29 RX ORDER — TELMISARTAN 80 MG/1
80 TABLET ORAL
Refills: 0 | Status: ACTIVE | COMMUNITY

## 2024-04-29 RX ORDER — DENOSUMAB 60 MG/ML
60 INJECTION SUBCUTANEOUS
Refills: 0 | Status: ACTIVE | COMMUNITY

## 2024-04-29 RX ORDER — ERTUGLIFLOZIN 5 MG/1
5 TABLET, FILM COATED ORAL
Refills: 0 | Status: COMPLETED | COMMUNITY
Start: 2022-02-08 | End: 2024-04-29

## 2024-04-29 RX ORDER — TADALAFIL 5 MG/1
5 TABLET ORAL
Refills: 0 | Status: ACTIVE | COMMUNITY

## 2024-04-29 RX ORDER — GLIPIZIDE 2.5 MG/1
TABLET ORAL
Refills: 0 | Status: COMPLETED | COMMUNITY
End: 2024-04-29

## 2024-04-29 RX ORDER — LABETALOL HYDROCHLORIDE 5 MG/ML
5 INJECTION INTRAVENOUS
Refills: 0 | Status: ACTIVE | COMMUNITY

## 2024-04-29 RX ORDER — TROSPIUM CHLORIDE 60 MG/1
60 CAPSULE, EXTENDED RELEASE ORAL
Refills: 0 | Status: ACTIVE | COMMUNITY

## 2024-04-29 RX ORDER — DAPAGLIFLOZIN 10 MG/1
10 TABLET, FILM COATED ORAL
Refills: 0 | Status: ACTIVE | COMMUNITY

## 2024-04-29 RX ORDER — APIXABAN 5 MG/1
5 TABLET, FILM COATED ORAL
Refills: 0 | Status: ACTIVE | COMMUNITY

## 2024-04-29 RX ORDER — SULFAMETHOXAZOLE AND TRIMETHOPRIM 800; 160 MG/1; MG/1
800-160 TABLET ORAL DAILY
Qty: 50 | Refills: 3 | Status: COMPLETED | COMMUNITY
Start: 2024-01-10 | End: 2024-04-29

## 2024-04-29 NOTE — DATA REVIEWED
[FreeTextEntry1] : 3/28/24 QUEST CHOL 189, HDL 41, TRIG 253, , GLUCOSE 157, HGBA1C 6.7, MICROALBUMIN 17,

## 2024-04-29 NOTE — PHYSICAL EXAM
[Alert] : alert [Well Nourished] : well nourished [Healthy Appearance] : healthy appearance [No Acute Distress] : no acute distress [Well Developed] : well developed [Normal Sclera/Conjunctiva] : normal sclera/conjunctiva [EOMI] : extra ocular movement intact [PERRL] : pupils equal, round and reactive to light [No Proptosis] : no proptosis [Normal Outer Ear/Nose] : the ears and nose were normal in appearance [Normal Hearing] : hearing was normal [Supple] : the neck was supple [Thyroid Not Enlarged] : the thyroid was not enlarged [No Respiratory Distress] : no respiratory distress [No Accessory Muscle Use] : no accessory muscle use [Normal Rate and Effort] : normal respiratory rate and effort [Clear to Auscultation] : lungs were clear to auscultation bilaterally [Normal S1, S2] : normal S1 and S2 [Normal Rate] : heart rate was normal [Regular Rhythm] : with a regular rhythm [Carotids Normal] : carotid pulses were normal with no bruits [No Edema] : no peripheral edema [Pedal Pulses Normal] : the pedal pulses are present [Normal Bowel Sounds] : normal bowel sounds [Not Tender] : non-tender [Not Distended] : not distended [Soft] : abdomen soft [Normal Anterior Cervical Nodes] : no anterior cervical lymphadenopathy [Normal Posterior Cervical Nodes] : no posterior cervical lymphadenopathy [No CVA Tenderness] : no ~M costovertebral angle tenderness [No Spinal Tenderness] : no spinal tenderness [Spine Straight] : spine straight [Kyphosis] : no kyphosis present [Scoliosis] : no scoliosis [No Stigmata of Cushings Syndrome] : no stigmata of Cushings Syndrome [Normal Gait] : normal gait [Normal Strength/Tone] : muscle strength and tone were normal [No Rash] : no rash [Acanthosis Nigricans] : no acanthosis nigricans [Foot Ulcers] : no foot ulcers [Right foot was examined, including] : right foot ~C was examined, including visual inspection with sensory and pulse exams [Left foot was examined, including] : left foot ~C was examined, including visual inspection with sensory and pulse exams [Delayed in the Right Toes] : normal in the toes [Normal] : normal [Full ROM] : with full range of motion [Delayed in the Left Toes] : normal in the toes [Diminished Throughout Both Feet] : normal tactile sensation with monofilament testing throughout both feet [#1 Diminished] : number 1 was normal [#2 Diminished] : number 2 was normal [#3 Diminished] : number 3 was normal [#4 Diminished] : number 4 was normal [#5 Diminished] : number 5 was normal [#6 Diminished] : number 6 was normal [#7 Diminished] : number 7 was normal [#8 Diminished] : number 8 was normal [#9 Diminished] : number 9 was normal [#10 Diminished] : number 10 was normal [Normal Reflexes] : deep tendon reflexes were 2+ and symmetric [No Tremors] : no tremors [Oriented x3] : oriented to person, place, and time [de-identified] : overweight, diabetes,

## 2024-04-29 NOTE — HISTORY OF PRESENT ILLNESS
[Hypoglycemia] : Patient is not hypoglycemic. [FreeTextEntry9] : 130-073 [FreeTextEntry1] : 1-3 x a day

## 2024-04-29 NOTE — ASSESSMENT
[Diabetes Foot Care] : diabetes foot care [Long Term Vascular Complications] : long term vascular complications of diabetes [Carbohydrate Consistent Diet] : carbohydrate consistent diet [Importance of Diet and Exercise] : importance of diet and exercise to improve glycemic control, achieve weight loss and improve cardiovascular health [Exercise/Effect on Glucose] : exercise/effect on glucose [Hypoglycemia Management] : hypoglycemia management [Retinopathy Screening] : Patient was referred to ophthalmology for retinopathy screening [Weight Loss] : weight loss [Diabetic Medications] : Risks and benefits of diabetic medications were discussed [FreeTextEntry4] : 1800 valerie ada diet [FreeTextEntry1] : DM2 farxiga 10 mg daily  Metformin 750 mg bid

## 2024-05-07 ENCOUNTER — APPOINTMENT (OUTPATIENT)
Dept: PLASTIC SURGERY | Facility: CLINIC | Age: 74
End: 2024-05-07
Payer: MEDICARE

## 2024-05-07 PROCEDURE — 99212 OFFICE O/P EST SF 10 MIN: CPT

## 2024-05-07 NOTE — ASSESSMENT
[FreeTextEntry1] : 74 yo M with malignant melanoma of posterior neck, s/p excision 8/9/21. Doing well.  Initial biopsy 0.6 mm, excision 0.6 mm = TOTAL 1.2 mm  s/p SLN Bx bilateral neck, doing well with resolving ecchymoses due to warfarin  Now has recent biopsy of right hand that cannot r/o underlying early evolving melanoma in situ (atypical proliferation of melanocytes)  11/8/2022since I saw last diagnosed pt was diagnosed w prostate ca and currently in middle of proton beam therapy in [place]--tolerating well thusfar   re my prior txs: -posterior neck incision fine--no recurrent or suspicious areas of pigmentation -B/L incision (from SLNB) fine--no masses (path was negative) -right hand incision fine--no suspicious pigmentation cont derm f/u (sees Bonnie q3 months)  5/9/2023 since I saw last he saw Marcin--everything fine, f/u in 6 months postrior nack, B/L neck and rigth hand all fine--no issues or suspicious pigmentation cont derm surveillance  suggest screening PET scan (prior 7/2021 (normal))  f/u 6 months, pt will valerie after receive PET scan  to see Bershadskiy for med onc had seen oncologist at [facility] in [facility].  11/7/2023 as above doing well 2 yrs s/p excisiono f posterior neck melnaoma and B/L neck CLNB no recurrent pigmentation post neck, no soft tissue findings B/L neck dorsal right hand lesion fine (excision Aug 2022)  Chaloub following for 7mm leion seen in left lung on CT  my plan: f/u in 6 monhts rw repeat PET scan cont derm surveillance q 3 months pt happy ovrall no issues  5/7/24  PET Scan reviewed with the pt: new intense abnormal FDG uptake coregistering with 1.2 x 0.9 cm soft tissue density in the right posterior neck suspicious for biologic tumor activity.   In retrospect, on previous study this area was 0.6 x 0.6 cm and not FDG avid.  No other sites of abnormal FDG uptake. The right upper lobe tree-in-bud nodules are not FDG avid  5/7/2024 as above to have cystoscopy caty [facility] next week PET reviewed w pt and again w Dr Feldman  on exam posterior neck melanoma excision site fine (me) right postauricular SCC excision site fine (Nic) 3cm posterior to the superior aspect of R postauricular scar there is a subdermal finding--approx 1.5cm prior B/L neck LNB sites fine  discussed indication for surgical remvoal of area--PET findings right in between two cancers discussed that may be an active cyst (acc to Francia w PET scan) and that bx of this lesion is not indicated  suggest proceed with direct of involved area  Photos taken with patient permission. seen w wife explained rationale to get clearance from PMD and Duvurri  all ?s asnwered

## 2024-05-07 NOTE — PHYSICAL EXAM
Quality 226: Preventive Care And Screening: Tobacco Use: Screening And Cessation Intervention: Patient screened for tobacco use and is an ex/non-smoker [de-identified] : NAD Quality 130: Documentation Of Current Medications In The Medical Record: Current Medications Documented [de-identified] : excessive sun damage w sig scattered actinic changes Detail Level: Detailed [de-identified] : posterior neck scar healed, nontender, well-healed, no evidence of recurrence\par  bilateral anterior neck with healing incisions, swelling resolving, no palpable fluid collection Quality 431: Preventive Care And Screening: Unhealthy Alcohol Use - Screening: Patient not identified as an unhealthy alcohol user when screened for unhealthy alcohol use using a systematic screening method [de-identified] : right dorsal hand incision healing well with minimal swelling, no erythema or fluid collection

## 2024-05-07 NOTE — HISTORY OF PRESENT ILLNESS
[FreeTextEntry1] : 71 yr old male with PMHx of HTN, MI 2006 s/p cardiac stents x4, NIDDM, Crohn's disease seen w wife for tx option re melanoma posterior neck. Pt with multiple prior SCC and BCC (seven--scattered throughout body).   6/28/2021--right postauricualr SCC-- Dr. Lucero to address 0.6mm melanoma--this is reason for referral  nonsmoker  Interval hx (8/17/21). Pt presents today POD#8 s/p excision of posterior neck melanoma. Doing well with no significant pain, f/c or bleeding.   Interval hx (8/25/21). Pt presents today POD#16 s/p excision of posterior neck melanoma. Offers no new complaints. Denies any fever, chills or drainage form the surgical site.   Interval hx (9/29/21): Pt presents today 6 weeks s/p excision of posterior neck melanoma. C/o mild sensitivity to posterior scalp that has been improving. Denies pain or f/c.  Interval hx (10/14/21). Patient presents today 8 weeks post-op for f/u to discuss treatment plan.   Interval hx (11/8/21): Pt presents today POD #7 s/p SLN Bx bilateral neck. C/o discomfort and bruising that is slowly resolving. Denies f/c or bleeding.  Interval hx (11/16/21): Pt presents today POD #15 s/p SLN Bx bilateral neck (negative). Doing well with resolving neck and chest bruising and swelling. Denies any f/c or bleeding.   Interval hx (6/16/22): Pt presents today 8 months s/p SLN Bx bilateral neck (negative). Doing well.   Interval hx (9/1/22). Patient here today POD#6 s/p excision of right dorsal hand skin lesion. Doing well with no significant pain, f/c or drainage.   Interval hx (9/7/22). Patient here today POD#12 s/p excision of right dorsal hand skin lesion. Doing well with no significant complaints.   Interval hx (5/7/24): Pt is here for his 6 month follow up. Had PET CT completed last week.

## 2024-05-08 ENCOUNTER — OUTPATIENT (OUTPATIENT)
Dept: OUTPATIENT SERVICES | Facility: HOSPITAL | Age: 74
LOS: 1 days | End: 2024-05-08
Payer: MEDICARE

## 2024-05-08 ENCOUNTER — RESULT REVIEW (OUTPATIENT)
Age: 74
End: 2024-05-08

## 2024-05-08 DIAGNOSIS — Z00.8 ENCOUNTER FOR OTHER GENERAL EXAMINATION: ICD-10-CM

## 2024-05-08 DIAGNOSIS — R91.1 SOLITARY PULMONARY NODULE: ICD-10-CM

## 2024-05-08 DIAGNOSIS — Z95.5 PRESENCE OF CORONARY ANGIOPLASTY IMPLANT AND GRAFT: Chronic | ICD-10-CM

## 2024-05-08 DIAGNOSIS — Z98.890 OTHER SPECIFIED POSTPROCEDURAL STATES: Chronic | ICD-10-CM

## 2024-05-08 PROCEDURE — 71250 CT THORAX DX C-: CPT

## 2024-05-08 PROCEDURE — 71250 CT THORAX DX C-: CPT | Mod: 26

## 2024-05-09 DIAGNOSIS — R91.1 SOLITARY PULMONARY NODULE: ICD-10-CM

## 2024-05-10 ENCOUNTER — OUTPATIENT (OUTPATIENT)
Dept: OUTPATIENT SERVICES | Facility: HOSPITAL | Age: 74
LOS: 1 days | End: 2024-05-10
Payer: MEDICARE

## 2024-05-10 VITALS
SYSTOLIC BLOOD PRESSURE: 151 MMHG | TEMPERATURE: 98 F | DIASTOLIC BLOOD PRESSURE: 70 MMHG | WEIGHT: 199.08 LBS | RESPIRATION RATE: 16 BRPM | HEART RATE: 70 BPM | HEIGHT: 69 IN | OXYGEN SATURATION: 97 %

## 2024-05-10 DIAGNOSIS — Z95.5 PRESENCE OF CORONARY ANGIOPLASTY IMPLANT AND GRAFT: Chronic | ICD-10-CM

## 2024-05-10 DIAGNOSIS — Z98.890 OTHER SPECIFIED POSTPROCEDURAL STATES: Chronic | ICD-10-CM

## 2024-05-10 DIAGNOSIS — Z01.818 ENCOUNTER FOR OTHER PREPROCEDURAL EXAMINATION: ICD-10-CM

## 2024-05-10 DIAGNOSIS — Z98.49 CATARACT EXTRACTION STATUS, UNSPECIFIED EYE: Chronic | ICD-10-CM

## 2024-05-10 DIAGNOSIS — R22.1 LOCALIZED SWELLING, MASS AND LUMP, NECK: ICD-10-CM

## 2024-05-10 LAB
A1C WITH ESTIMATED AVERAGE GLUCOSE RESULT: 7.5 % — HIGH (ref 4–5.6)
ALBUMIN SERPL ELPH-MCNC: 4.4 G/DL — SIGNIFICANT CHANGE UP (ref 3.5–5.2)
ALP SERPL-CCNC: 72 U/L — SIGNIFICANT CHANGE UP (ref 30–115)
ALT FLD-CCNC: 22 U/L — SIGNIFICANT CHANGE UP (ref 0–41)
ANION GAP SERPL CALC-SCNC: 15 MMOL/L — HIGH (ref 7–14)
APTT BLD: 31.3 SEC — SIGNIFICANT CHANGE UP (ref 27–39.2)
AST SERPL-CCNC: 16 U/L — SIGNIFICANT CHANGE UP (ref 0–41)
BASOPHILS # BLD AUTO: 0.04 K/UL — SIGNIFICANT CHANGE UP (ref 0–0.2)
BASOPHILS NFR BLD AUTO: 0.6 % — SIGNIFICANT CHANGE UP (ref 0–1)
BILIRUB SERPL-MCNC: 0.3 MG/DL — SIGNIFICANT CHANGE UP (ref 0.2–1.2)
BUN SERPL-MCNC: 15 MG/DL — SIGNIFICANT CHANGE UP (ref 10–20)
CALCIUM SERPL-MCNC: 9.1 MG/DL — SIGNIFICANT CHANGE UP (ref 8.4–10.5)
CHLORIDE SERPL-SCNC: 102 MMOL/L — SIGNIFICANT CHANGE UP (ref 98–110)
CO2 SERPL-SCNC: 26 MMOL/L — SIGNIFICANT CHANGE UP (ref 17–32)
CREAT SERPL-MCNC: 0.9 MG/DL — SIGNIFICANT CHANGE UP (ref 0.7–1.5)
EGFR: 91 ML/MIN/1.73M2 — SIGNIFICANT CHANGE UP
EOSINOPHIL # BLD AUTO: 0.08 K/UL — SIGNIFICANT CHANGE UP (ref 0–0.7)
EOSINOPHIL NFR BLD AUTO: 1.3 % — SIGNIFICANT CHANGE UP (ref 0–8)
ESTIMATED AVERAGE GLUCOSE: 169 MG/DL — HIGH (ref 68–114)
GLUCOSE SERPL-MCNC: 147 MG/DL — HIGH (ref 70–99)
HCT VFR BLD CALC: 36.7 % — LOW (ref 42–52)
HGB BLD-MCNC: 12.9 G/DL — LOW (ref 14–18)
IMM GRANULOCYTES NFR BLD AUTO: 1.1 % — HIGH (ref 0.1–0.3)
INR BLD: 1.02 RATIO — SIGNIFICANT CHANGE UP (ref 0.65–1.3)
LYMPHOCYTES # BLD AUTO: 0.51 K/UL — LOW (ref 1.2–3.4)
LYMPHOCYTES # BLD AUTO: 8.2 % — LOW (ref 20.5–51.1)
MCHC RBC-ENTMCNC: 30.5 PG — SIGNIFICANT CHANGE UP (ref 27–31)
MCHC RBC-ENTMCNC: 35.1 G/DL — SIGNIFICANT CHANGE UP (ref 32–37)
MCV RBC AUTO: 86.8 FL — SIGNIFICANT CHANGE UP (ref 80–94)
MONOCYTES # BLD AUTO: 0.47 K/UL — SIGNIFICANT CHANGE UP (ref 0.1–0.6)
MONOCYTES NFR BLD AUTO: 7.6 % — SIGNIFICANT CHANGE UP (ref 1.7–9.3)
NEUTROPHILS # BLD AUTO: 5.04 K/UL — SIGNIFICANT CHANGE UP (ref 1.4–6.5)
NEUTROPHILS NFR BLD AUTO: 81.2 % — HIGH (ref 42.2–75.2)
NRBC # BLD: 0 /100 WBCS — SIGNIFICANT CHANGE UP (ref 0–0)
PLATELET # BLD AUTO: 180 K/UL — SIGNIFICANT CHANGE UP (ref 130–400)
PMV BLD: 10.5 FL — HIGH (ref 7.4–10.4)
POTASSIUM SERPL-MCNC: 3.6 MMOL/L — SIGNIFICANT CHANGE UP (ref 3.5–5)
POTASSIUM SERPL-SCNC: 3.6 MMOL/L — SIGNIFICANT CHANGE UP (ref 3.5–5)
PROT SERPL-MCNC: 6.5 G/DL — SIGNIFICANT CHANGE UP (ref 6–8)
PROTHROM AB SERPL-ACNC: 11.6 SEC — SIGNIFICANT CHANGE UP (ref 9.95–12.87)
RBC # BLD: 4.23 M/UL — LOW (ref 4.7–6.1)
RBC # FLD: 12.8 % — SIGNIFICANT CHANGE UP (ref 11.5–14.5)
SODIUM SERPL-SCNC: 143 MMOL/L — SIGNIFICANT CHANGE UP (ref 135–146)
WBC # BLD: 6.21 K/UL — SIGNIFICANT CHANGE UP (ref 4.8–10.8)
WBC # FLD AUTO: 6.21 K/UL — SIGNIFICANT CHANGE UP (ref 4.8–10.8)

## 2024-05-10 PROCEDURE — 99214 OFFICE O/P EST MOD 30 MIN: CPT | Mod: 25

## 2024-05-10 PROCEDURE — 93010 ELECTROCARDIOGRAM REPORT: CPT

## 2024-05-10 PROCEDURE — 83036 HEMOGLOBIN GLYCOSYLATED A1C: CPT

## 2024-05-10 PROCEDURE — 85610 PROTHROMBIN TIME: CPT

## 2024-05-10 PROCEDURE — 36415 COLL VENOUS BLD VENIPUNCTURE: CPT

## 2024-05-10 PROCEDURE — 80053 COMPREHEN METABOLIC PANEL: CPT

## 2024-05-10 PROCEDURE — 93005 ELECTROCARDIOGRAM TRACING: CPT

## 2024-05-10 PROCEDURE — 85730 THROMBOPLASTIN TIME PARTIAL: CPT

## 2024-05-10 PROCEDURE — 85025 COMPLETE CBC W/AUTO DIFF WBC: CPT

## 2024-05-10 NOTE — H&P PST ADULT - BP NONINVASIVE SYSTOLIC (MM HG)
Lin from [facility] she has a bed available. Patient talking on the phone with Lin,  at this time.   151

## 2024-05-10 NOTE — H&P PST ADULT - REASON FOR ADMISSION
Case Type: OP Block TimeSuite: CASProceduralist: Mil Marie  Confirmed Surgery DateTime: 05- - 0:00PAST DateTime: 05- - 17:15Procedure: EXCISION RIGHT POSTERIOR NECK SUBDERMAL MASS  ERP?: UnavailableLaterality: N/ALength of Procedure: 90 Minutes  Anesthesia Type: General

## 2024-05-10 NOTE — H&P PST ADULT - NSICDXPASTSURGICALHX_GEN_ALL_CORE_FT
PAST SURGICAL HISTORY:  H/O basal cell carcinoma excision     H/O melanoma excision     History of coronary angioplasty with insertion of stent     History of Mohs surgery for squamous cell carcinoma of skin     Status post cataract extraction and insertion of intraocular lens

## 2024-05-10 NOTE — H&P PST ADULT - HISTORY OF PRESENT ILLNESS
73y Male presents today for presurgical testing for  EXCISION RIGHT POSTERIOR NECK SUBDERMAL MASS. Per Sx note dated 5/7/24 "PMHx of HTN, MI 2006 s/p cardiac stents x4, NIDDM, Crohn's disease seen w wife for tx option re melanoma posterior neck.  Pt with multiple prior SCC and BCC (seven--scattered throughout body).....  5/7/24  ?  PET Scan reviewed with the pt: new intense abnormal FDG uptake coregistering with 1.2 x 0.9 cm soft tissue density in the right posterior neck suspicious for biologic tumor activity. In retrospect, on previous study this area was 0.6 x 0.6 cm and not FDG avid. No other sites of abnormal FDG uptake. The right upper lobe tree-in-bud nodules are not FDG avid  ?  5/7/2024  as above  to have cystoscopy w Ranjit next week  PET reviewed w pt and again w Dr Feldman  ?  on exam posterior neck melanoma excision site fine (me)  right postauricular SCC excision site fine (Nic)  3cm posterior to the superior aspect of R postauricular scar there is a subdermal finding--approx 1.5cm  prior B/L neck LNB sites fine  ?  discussed indication for surgical remvoal of area--PET findings right in between two cancers  discussed that may be an active cyst (acc to Francia w PET scan) and that bx of this lesion is not indicated  ?  suggest proceed with direct of involved area  ?  Photos taken with patient permission.  seen w wife  explained rationale  to get clearance from PMD and Duvurri".  Patient states the above is true and accurate. he offers no other complaints at this time, now for scheduled procedure.   ?  ?  Patient denies any CP, palpitations, SOB, cough, or dysuria. No recent URI or UTI.  Stated exercise tolerance is FOS 3  DWIGHT screen reviewed    Patient denies any recent personal exposure to COVID19. Denies any sick contacts. Patient denies travel within the past 30 days. Patient was instructed to quarantine until after procedure.    Anesthesia Alert  NO--Difficult Airway - Class III  NO--History of neck surgery or radiation  NO--Limited ROM of neck  NO--History of Malignant hyperthermia  NO--Personal or family history of Pseudocholinesterase deficiency.  NO--Prior Anesthesia Complication  NO--Latex Allergy  NO--Loose teeth  NO--History of Rheumatoid Arthritis  YES--DWIGHT - USES BIPAP  YES--Bleeding risk - DAILY ELIQUIS  NO--Other_____    [employer] Activity Status Index (DASI) from [employer]  on 5/10/2024  ** All calculations should be rechecked by clinician prior to use **    RESULT SUMMARY:  58.2 points  The higher the score (maximum 58.2), the higher the functional status.    9.89 METs        INPUTS:  Take care of self —> 2.75 = Yes  Walk indoors —> 1.75 = Yes  Walk 1&ndash;2 blocks on level ground —> 2.75 = Yes  Climb a flight of stairs or walk up a hill —> 5.5 = Yes  Run a short distance —> 8 = Yes  Do light work around the house —> 2.7 = Yes  Do moderate work around the house —> 3.5 = Yes  Do heavy work around the house —> 8 = Yes  Do yardwork —> 4.5 = Yes  Have sexual relations —> 5.25 = Yes  Participate in moderate recreational activities —> 6 = Yes  Participate in strenuous sports —> 7.5 = Yes    Revised Cardiac Risk Index for Pre-Operative Risk from [employer]  on 5/10/2024  ** All calculations should be rechecked by clinician prior to use **    RESULT SUMMARY:  0 points  Class I Risk    3.9 %  30-day risk of death, MI, or cardiac arrest    From [place] 2017. These numbers are higher than those from the original study (Dmitriy 1999). See Evidence for details.      INPUTS:  Elevated-risk surgery —> 0 = No  History of ischemic heart disease —> 0 = No  History of congestive heart failure —> 0 = No  History of cerebrovascular disease —> 0 = No  Pre-operative treatment with insulin —> 0 = No  Pre-operative creatinine >2 mg/dL / 176.8 µmol/L —> 0 = No    Patient states that this is their complete medical history and list of medications.  Patient verbalized understanding of instructions given during this visit and was given the opportunity to ask questions and have them answered. They were instructed to follow up with their surgeon/surgeon's office with any questions regarding their procedure.

## 2024-05-10 NOTE — H&P PST ADULT - NSICDXPASTMEDICALHX_GEN_ALL_CORE_FT
PAST MEDICAL HISTORY:  Afib     CA skin, basal cell     Coronary artery disease 4 STENTS    Crohn's disease     Deep vein thrombosis (DVT)     Diabetes mellitus     Essential tremor HANDS R>L    Factor II deficiency     Factor V Leiden     H/O blood transfusion reaction     H/O neuropathy     H/O therapeutic radiation     Hypertension, unspecified type     DWIGHT treated with BiPAP     Prostate cancer     Skin cancer     Skin melanoma
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
## 2024-05-11 DIAGNOSIS — Z01.818 ENCOUNTER FOR OTHER PREPROCEDURAL EXAMINATION: ICD-10-CM

## 2024-05-11 DIAGNOSIS — R22.1 LOCALIZED SWELLING, MASS AND LUMP, NECK: ICD-10-CM

## 2024-05-17 NOTE — ASU PATIENT PROFILE, ADULT - NSICDXPASTMEDICALHX_GEN_ALL_CORE_FT
PAST MEDICAL HISTORY:  Afib     CA skin, basal cell     Coronary artery disease 4 STENTS    Crohn's disease     Deep vein thrombosis (DVT)     Diabetes mellitus     Essential tremor HANDS R>L    Factor II deficiency     Factor V Leiden     H/O blood transfusion reaction     H/O neuropathy     H/O therapeutic radiation     Hypertension, unspecified type     DWIGHT treated with BiPAP     Prostate cancer     Skin cancer     Skin melanoma      PAST MEDICAL HISTORY:  2019 novel coronavirus disease (COVID-19) 2022    Afib     CA skin, basal cell     Coronary artery disease 4 STENTS    Crohn's disease     Deep vein thrombosis (DVT)     Diabetes mellitus     Essential tremor HANDS R>L    Factor II deficiency     Factor V Leiden     H/O blood transfusion reaction     H/O neuropathy     H/O therapeutic radiation     Hypertension, unspecified type     Lymphocyte disorder     DWIGHT treated with BiPAP     Prostate cancer     Skin cancer     Skin melanoma

## 2024-05-17 NOTE — ASU PATIENT PROFILE, ADULT - NSICDXPASTSURGICALHX_GEN_ALL_CORE_FT
PAST SURGICAL HISTORY:  H/O basal cell carcinoma excision     H/O melanoma excision     History of coronary angioplasty with insertion of stent     History of Mohs surgery for squamous cell carcinoma of skin     Status post cataract extraction and insertion of intraocular lens      PAST SURGICAL HISTORY:  H/O basal cell carcinoma excision     H/O melanoma excision     History of coronary angioplasty with insertion of stent     History of Mohs surgery for squamous cell carcinoma of skin     Status post cataract extraction and insertion of intraocular lens ou

## 2024-05-17 NOTE — ASU PATIENT PROFILE, ADULT - TOBACCO USE
P.T. EVAL AND TREAT COMPLETE, PT CURRENTLY REQUIRES SBA FOR BED MOBILITY, CGA FOR TF'S, MOD/YEYO FOR SHORT DISTANCE GAIT WITH RW   Never smoker

## 2024-05-20 ENCOUNTER — APPOINTMENT (OUTPATIENT)
Dept: PLASTIC SURGERY | Facility: AMBULATORY SURGERY CENTER | Age: 74
End: 2024-05-20
Payer: MEDICARE

## 2024-05-20 ENCOUNTER — OUTPATIENT (OUTPATIENT)
Dept: OUTPATIENT SERVICES | Facility: HOSPITAL | Age: 74
LOS: 1 days | Discharge: HOME | End: 2024-05-20
Payer: MEDICARE

## 2024-05-20 ENCOUNTER — RESULT REVIEW (OUTPATIENT)
Age: 74
End: 2024-05-20

## 2024-05-20 ENCOUNTER — TRANSCRIPTION ENCOUNTER (OUTPATIENT)
Age: 74
End: 2024-05-20

## 2024-05-20 VITALS
WEIGHT: 199.08 LBS | RESPIRATION RATE: 17 BRPM | DIASTOLIC BLOOD PRESSURE: 67 MMHG | HEIGHT: 69 IN | SYSTOLIC BLOOD PRESSURE: 145 MMHG | HEART RATE: 58 BPM | OXYGEN SATURATION: 98 % | TEMPERATURE: 98 F

## 2024-05-20 VITALS
HEART RATE: 64 BPM | SYSTOLIC BLOOD PRESSURE: 144 MMHG | DIASTOLIC BLOOD PRESSURE: 84 MMHG | TEMPERATURE: 98 F | RESPIRATION RATE: 16 BRPM | OXYGEN SATURATION: 97 %

## 2024-05-20 DIAGNOSIS — Z98.890 OTHER SPECIFIED POSTPROCEDURAL STATES: Chronic | ICD-10-CM

## 2024-05-20 DIAGNOSIS — I48.91 UNSPECIFIED ATRIAL FIBRILLATION: ICD-10-CM

## 2024-05-20 DIAGNOSIS — G47.33 OBSTRUCTIVE SLEEP APNEA (ADULT) (PEDIATRIC): ICD-10-CM

## 2024-05-20 DIAGNOSIS — I10 ESSENTIAL (PRIMARY) HYPERTENSION: ICD-10-CM

## 2024-05-20 DIAGNOSIS — C77.0 SECONDARY AND UNSPECIFIED MALIGNANT NEOPLASM OF LYMPH NODES OF HEAD, FACE AND NECK: ICD-10-CM

## 2024-05-20 DIAGNOSIS — Z85.828 PERSONAL HISTORY OF OTHER MALIGNANT NEOPLASM OF SKIN: ICD-10-CM

## 2024-05-20 DIAGNOSIS — Z85.46 PERSONAL HISTORY OF MALIGNANT NEOPLASM OF PROSTATE: ICD-10-CM

## 2024-05-20 DIAGNOSIS — Z92.3 PERSONAL HISTORY OF IRRADIATION: ICD-10-CM

## 2024-05-20 DIAGNOSIS — Z86.718 PERSONAL HISTORY OF OTHER VENOUS THROMBOSIS AND EMBOLISM: ICD-10-CM

## 2024-05-20 DIAGNOSIS — Z95.5 PRESENCE OF CORONARY ANGIOPLASTY IMPLANT AND GRAFT: ICD-10-CM

## 2024-05-20 DIAGNOSIS — Z95.5 PRESENCE OF CORONARY ANGIOPLASTY IMPLANT AND GRAFT: Chronic | ICD-10-CM

## 2024-05-20 DIAGNOSIS — R22.1 LOCALIZED SWELLING, MASS AND LUMP, NECK: ICD-10-CM

## 2024-05-20 DIAGNOSIS — Z85.820 PERSONAL HISTORY OF MALIGNANT MELANOMA OF SKIN: ICD-10-CM

## 2024-05-20 DIAGNOSIS — I25.10 ATHEROSCLEROTIC HEART DISEASE OF NATIVE CORONARY ARTERY WITHOUT ANGINA PECTORIS: ICD-10-CM

## 2024-05-20 DIAGNOSIS — Z79.84 LONG TERM (CURRENT) USE OF ORAL HYPOGLYCEMIC DRUGS: ICD-10-CM

## 2024-05-20 DIAGNOSIS — Z98.49 CATARACT EXTRACTION STATUS, UNSPECIFIED EYE: Chronic | ICD-10-CM

## 2024-05-20 DIAGNOSIS — Z79.01 LONG TERM (CURRENT) USE OF ANTICOAGULANTS: ICD-10-CM

## 2024-05-20 DIAGNOSIS — L98.9 DISORDER OF THE SKIN AND SUBCUTANEOUS TISSUE, UNSPECIFIED: ICD-10-CM

## 2024-05-20 LAB — GLUCOSE BLDC GLUCOMTR-MCNC: 164 MG/DL — HIGH (ref 70–99)

## 2024-05-20 PROCEDURE — 88342 IMHCHEM/IMCYTCHM 1ST ANTB: CPT | Mod: 26

## 2024-05-20 PROCEDURE — 82962 GLUCOSE BLOOD TEST: CPT

## 2024-05-20 PROCEDURE — 88341 IMHCHEM/IMCYTCHM EA ADD ANTB: CPT | Mod: 26

## 2024-05-20 PROCEDURE — 88305 TISSUE EXAM BY PATHOLOGIST: CPT | Mod: 26

## 2024-05-20 PROCEDURE — 88341 IMHCHEM/IMCYTCHM EA ADD ANTB: CPT

## 2024-05-20 PROCEDURE — 88305 TISSUE EXAM BY PATHOLOGIST: CPT

## 2024-05-20 PROCEDURE — 14040 TIS TRNFR F/C/C/M/N/A/G/H/F: CPT

## 2024-05-20 PROCEDURE — 88342 IMHCHEM/IMCYTCHM 1ST ANTB: CPT

## 2024-05-20 PROCEDURE — 21555 EXC NECK LES SC < 3 CM: CPT

## 2024-05-20 RX ORDER — UBIDECARENONE 100 MG
1 CAPSULE ORAL
Qty: 0 | Refills: 0 | DISCHARGE

## 2024-05-20 RX ORDER — POTASSIUM CHLORIDE 20 MEQ
2 PACKET (EA) ORAL
Refills: 0 | DISCHARGE

## 2024-05-20 RX ORDER — SERTRALINE 25 MG/1
1 TABLET, FILM COATED ORAL
Qty: 0 | Refills: 0 | DISCHARGE

## 2024-05-20 RX ORDER — AMLODIPINE BESYLATE 2.5 MG/1
1 TABLET ORAL
Qty: 0 | Refills: 0 | DISCHARGE

## 2024-05-20 RX ORDER — OXYCODONE AND ACETAMINOPHEN 5; 325 MG/1; MG/1
1 TABLET ORAL EVERY 4 HOURS
Refills: 0 | Status: DISCONTINUED | OUTPATIENT
Start: 2024-05-20 | End: 2024-05-20

## 2024-05-20 RX ORDER — APIXABAN 2.5 MG/1
1 TABLET, FILM COATED ORAL
Refills: 0 | DISCHARGE

## 2024-05-20 RX ORDER — LUTEIN 20 MG
25 CAPSULE ORAL
Qty: 0 | Refills: 0 | DISCHARGE

## 2024-05-20 RX ORDER — TROSPIUM CHLORIDE 20 MG/1
1 TABLET, FILM COATED ORAL
Refills: 0 | DISCHARGE

## 2024-05-20 RX ORDER — OMEGA-3 ACID ETHYL ESTERS 1 G
0 CAPSULE ORAL
Qty: 0 | Refills: 0 | DISCHARGE

## 2024-05-20 RX ORDER — TADALAFIL 10 MG/1
1 TABLET, FILM COATED ORAL
Refills: 0 | DISCHARGE

## 2024-05-20 RX ORDER — MULTIVIT-MIN/FERROUS GLUCONATE 9 MG/15 ML
1 LIQUID (ML) ORAL
Qty: 0 | Refills: 0 | DISCHARGE

## 2024-05-20 RX ORDER — SOLIFENACIN SUCCINATE 10 MG/1
1 TABLET ORAL
Refills: 0 | DISCHARGE

## 2024-05-20 RX ORDER — TAMSULOSIN HYDROCHLORIDE 0.4 MG/1
1 CAPSULE ORAL
Refills: 0 | DISCHARGE

## 2024-05-20 RX ORDER — ONDANSETRON 8 MG/1
4 TABLET, FILM COATED ORAL ONCE
Refills: 0 | Status: DISCONTINUED | OUTPATIENT
Start: 2024-05-20 | End: 2024-05-20

## 2024-05-20 RX ORDER — METFORMIN HYDROCHLORIDE 850 MG/1
1 TABLET ORAL
Refills: 0 | DISCHARGE

## 2024-05-20 RX ORDER — PITAVASTATIN CALCIUM 1.04 MG/1
1 TABLET, FILM COATED ORAL
Refills: 0 | DISCHARGE

## 2024-05-20 RX ORDER — TELMISARTAN 20 MG/1
1 TABLET ORAL
Qty: 0 | Refills: 0 | DISCHARGE

## 2024-05-20 RX ORDER — DAPAGLIFLOZIN 10 MG/1
1 TABLET, FILM COATED ORAL
Refills: 0 | DISCHARGE

## 2024-05-20 RX ORDER — SODIUM CHLORIDE 9 MG/ML
1000 INJECTION, SOLUTION INTRAVENOUS
Refills: 0 | Status: DISCONTINUED | OUTPATIENT
Start: 2024-05-20 | End: 2024-05-20

## 2024-05-20 RX ORDER — MORPHINE SULFATE 50 MG/1
2 CAPSULE, EXTENDED RELEASE ORAL
Refills: 0 | Status: DISCONTINUED | OUTPATIENT
Start: 2024-05-20 | End: 2024-05-20

## 2024-05-20 RX ORDER — LABETALOL HCL 100 MG
1 TABLET ORAL
Refills: 0 | DISCHARGE

## 2024-05-20 RX ORDER — TRAMADOL HYDROCHLORIDE 50 MG/1
1 TABLET ORAL
Qty: 10 | Refills: 0
Start: 2024-05-20 | End: 2024-05-22

## 2024-05-20 RX ORDER — PRIMIDONE 250 MG/1
1 TABLET ORAL
Refills: 0 | DISCHARGE

## 2024-05-20 NOTE — BRIEF OPERATIVE NOTE - NSICDXBRIEFPROCEDURE_GEN_ALL_CORE_FT
PROCEDURES:  Excision of deep tissue mass of neck 20-May-2024 10:37:07 right posterior neck Svetlana Lee

## 2024-05-20 NOTE — ASU DISCHARGE PLAN (ADULT/PEDIATRIC) - ASU DC SPECIAL INSTRUCTIONSFT
Keep surgical site clean and dry.   Do not remove any dressings or get them wet.   Sleep on your back with head elevated to reduce swelling.   Do not be alarmed by bruising, swelling and minor bleeding in the area, that's expected.   May apply ice pack on-and-off for the first 24 hours to reduce swelling.   Rest, no lifting.

## 2024-05-20 NOTE — ASU DISCHARGE PLAN (ADULT/PEDIATRIC) - PAIN MANAGEMENT
Doxycycline, Tramadol for severe pain/Take over the counter pain medication/Prescriptions electronically submitted to pharmacy from [facility]

## 2024-05-20 NOTE — ASU DISCHARGE PLAN (ADULT/PEDIATRIC) - CARE PROVIDER_API CALL
Mil Marie  Plastic Surgery  [address] [place] [id number]  Phone: [phone]  Fax: [phone]  Follow Up Time:

## 2024-05-20 NOTE — ASU PREOP CHECKLIST - BP NONINVASIVE DIASTOLIC (MM HG)
I would like to increase his Cardizem to 180 mg PO BID for increased HR control. I have sent this RX to [facility]. [facility]

## 2024-05-20 NOTE — BRIEF OPERATIVE NOTE - NSEVIDENCEINFORABS_GEN_ALL_CORE
Please notify the patient that 3 replacement prescriptions have been sent to his pharmacy. I assume at least one of them is covered. He should fill whichever prescription is the cheapest.   No

## 2024-05-20 NOTE — CHART NOTE - NSCHARTNOTEFT_GEN_A_CORE
ANESTHESIA to PACU NOTE      ____ Intubated  TV:______       Rate: ______      FiO2: ______    _x___ Patent Airway    __x__ Full return of protective reflexes    ____ Full recovery from anesthesia / sedation to baseline status    Vitals:  HR 60  /72  RR 14  O2sat. 96%  Temp: 36.5C      Mental Status:  __x__ Awake   ___x__ Alert   _____ Drowsy   _____ Sedated    Nausea/Vomiting: ____ Yes, See Post - Op Orders      __x__ No    Pain Scale (0-10): _____    Treatment: __x__ None    ____ See Post - Op/PCA Orders    Post - Operative Fluids:   ____ Oral   _x___ See Post - Op Orders    Plan:  Discharge to:   __x__Home       _____Floor      _____Critical Care    _____ Other:_________________    Comments: s/p general anesthesia with LMA. No anesthesia complications. Pt's condition is stable in PACU. Full report is given to PACU RN.

## 2024-05-22 NOTE — BRIEF OPERATIVE NOTE - NSICDXBRIEFPROCEDURE_GEN_ALL_CORE_FT
Yes
PROCEDURES:  Lymph node excision 01-Nov-2021 12:30:05 sentinel lymph node biopsy of neck Arlene Ariza

## 2024-05-23 ENCOUNTER — APPOINTMENT (OUTPATIENT)
Dept: PULMONOLOGY | Facility: CLINIC | Age: 74
End: 2024-05-23
Payer: MEDICARE

## 2024-05-23 DIAGNOSIS — G47.33 OBSTRUCTIVE SLEEP APNEA (ADULT) (PEDIATRIC): ICD-10-CM

## 2024-05-23 DIAGNOSIS — R06.02 SHORTNESS OF BREATH: ICD-10-CM

## 2024-05-23 DIAGNOSIS — R91.1 SOLITARY PULMONARY NODULE: ICD-10-CM

## 2024-05-23 PROCEDURE — 99213 OFFICE O/P EST LOW 20 MIN: CPT

## 2024-05-23 NOTE — HISTORY OF PRESENT ILLNESS
[Follow-Up - Routine Clinic] : a routine clinic follow-up of [None] : No associated symptoms are reported [BiPAP] : BiPAP [Good Compliance] : good compliance with treatment [Good Tolerance] : good tolerance of treatment [Good Symptom Control] : good symptom control [de-identified] : Machine broken  [Shortness of Breath] : Shortness of Breath [Home] : at home, [unfilled] , at the time of the visit. [Medical Office: ([facility])___] : at the medical office located in  [Verbal consent obtained from patient] : the patient, [unfilled]

## 2024-05-28 ENCOUNTER — APPOINTMENT (OUTPATIENT)
Dept: PLASTIC SURGERY | Facility: CLINIC | Age: 74
End: 2024-05-28
Payer: MEDICARE

## 2024-05-28 DIAGNOSIS — D48.7 NEOPLASM OF UNCERTAIN BEHAVIOR OF OTHER SPECIFIED SITES: ICD-10-CM

## 2024-05-28 PROBLEM — Z86.69 PERSONAL HISTORY OF OTHER DISEASES OF THE NERVOUS SYSTEM AND SENSE ORGANS: Chronic | Status: ACTIVE | Noted: 2024-05-10

## 2024-05-28 PROBLEM — C44.91 BASAL CELL CARCINOMA OF SKIN, UNSPECIFIED: Chronic | Status: ACTIVE | Noted: 2024-05-10

## 2024-05-28 PROBLEM — D68.2 HEREDITARY DEFICIENCY OF OTHER CLOTTING FACTORS: Chronic | Status: ACTIVE | Noted: 2024-05-10

## 2024-05-28 PROBLEM — C61 MALIGNANT NEOPLASM OF PROSTATE: Chronic | Status: ACTIVE | Noted: 2024-05-10

## 2024-05-28 PROBLEM — Z87.898 PERSONAL HISTORY OF OTHER SPECIFIED CONDITIONS: Chronic | Status: ACTIVE | Noted: 2024-05-10

## 2024-05-28 PROBLEM — D72.9 DISORDER OF WHITE BLOOD CELLS, UNSPECIFIED: Chronic | Status: ACTIVE | Noted: 2024-05-20

## 2024-05-28 PROBLEM — U07.1 COVID-19: Chronic | Status: ACTIVE | Noted: 2024-05-20

## 2024-05-28 PROBLEM — I82.409 ACUTE EMBOLISM AND THROMBOSIS OF UNSPECIFIED DEEP VEINS OF UNSPECIFIED LOWER EXTREMITY: Chronic | Status: ACTIVE | Noted: 2024-05-10

## 2024-05-28 PROBLEM — Z92.3 PERSONAL HISTORY OF IRRADIATION: Chronic | Status: ACTIVE | Noted: 2024-05-10

## 2024-05-28 PROBLEM — C43.9 MALIGNANT MELANOMA OF SKIN, UNSPECIFIED: Chronic | Status: ACTIVE | Noted: 2024-05-10

## 2024-05-28 PROCEDURE — 99024 POSTOP FOLLOW-UP VISIT: CPT

## 2024-05-28 NOTE — PHYSICAL EXAM
[de-identified] : NAD [de-identified] : excessive sun damage w sig scattered actinic changes [de-identified] : posterior neck incision healing well, c/d/i, no erythema or fluid collection,  prior scar healed, nontender, well-healed, no evidence of recurrence  [de-identified] : nonlabored breathing,  [de-identified] : right dorsal hand incision healing well with minimal swelling, no erythema or fluid collection

## 2024-05-28 NOTE — ASSESSMENT
[FreeTextEntry1] : 72 yo M with h/o malignant melanoma of posterior neck, s/p excision 8/9/21. Doing well. Initial biopsy 0.6 mm, excision 0.6 mm = TOTAL 1.2 mm s/p SLN Bx bilateral neck, doing well,  Now has recent biopsy of right hand that cannot r/o underlying early evolving melanoma in situ (atypical proliferation of melanocytes)   re my prior txs: -posterior neck incision fine--no recurrent or suspicious areas of pigmentation -B/L incision (from SLNB) fine--no masses (path was negative) -right hand incision fine--no suspicious pigmentation cont derm f/u (sees Bonnie q3 months)   PET Scan reviewed with the pt: new intense abnormal FDG uptake with 1.2 x 0.9 cm soft tissue density in the right posterior neck suspicious for biologic tumor activity.   In retrospect, on previous study this area was 0.6 x 0.6 cm and not FDG avid.  No other sites of abnormal FDG uptake. The right upper lobe tree-in-bud nodules are not FDG avid  5/7/2024 PET reviewed w pt and again w Dr Feldman  on exam posterior neck melanoma excision site fine (me) right postauricular SCC excision site fine (Nic) 3cm posterior to the superior aspect of R postauricular scar there is a subdermal finding--approx 1.5cm prior B/L neck LNB sites fine  discussed indication for surgical removal of area--PET findings right in between two cancers   Now POD#8 s/p excision of right posterior neck mass. Doing well.   - every other suture removed and dressing changed - may shower, no submerging - awaiting pathology - post-op instructions reviewed and all his questions were answered - f/u next week for removal of remaining sutures and path

## 2024-05-28 NOTE — HISTORY OF PRESENT ILLNESS
[FreeTextEntry1] : 71 yr old male with PMHx of HTN, MI 2006 s/p cardiac stents x4, NIDDM, Crohn's disease seen w wife for tx option re melanoma posterior neck. Pt with multiple prior SCC and BCC (seven--scattered throughout body).   6/28/2021--right postauricualr SCC-- Dr. Lucero to address 0.6mm melanoma--this is reason for referral  nonsmoker  Interval hx (8/17/21). Pt presents today POD#8 s/p excision of posterior neck melanoma. Doing well with no significant pain, f/c or bleeding.   Interval hx (8/25/21). Pt presents today POD#16 s/p excision of posterior neck melanoma. Offers no new complaints. Denies any fever, chills or drainage form the surgical site.   Interval hx (9/29/21): Pt presents today 6 weeks s/p excision of posterior neck melanoma. C/o mild sensitivity to posterior scalp that has been improving. Denies pain or f/c.  Interval hx (10/14/21). Patient presents today 8 weeks post-op for f/u to discuss treatment plan.   Interval hx (11/8/21): Pt presents today POD #7 s/p SLN Bx bilateral neck. C/o discomfort and bruising that is slowly resolving. Denies f/c or bleeding.  Interval hx (11/16/21): Pt presents today POD #15 s/p SLN Bx bilateral neck (negative). Doing well with resolving neck and chest bruising and swelling. Denies any f/c or bleeding.   Interval hx (6/16/22): Pt presents today 8 months s/p SLN Bx bilateral neck (negative). Doing well.   Interval hx (9/1/22). Patient here today POD#6 s/p excision of right dorsal hand skin lesion. Doing well with no significant pain, f/c or drainage.   Interval hx (9/7/22). Patient here today POD#12 s/p excision of right dorsal hand skin lesion. Doing well with no significant complaints.   Interval hx (5/7/24): Pt is here for his 6 month follow up. Had PET CT completed last week.   Interval hx (5/28/24). Pt presents today POD#8 s/p excision of right posterior neck mass. Doing well with no significant pain, f/c or drainage. Completed all prescribed medications.

## 2024-05-30 ENCOUNTER — APPOINTMENT (OUTPATIENT)
Dept: VASCULAR SURGERY | Facility: CLINIC | Age: 74
End: 2024-05-30
Payer: MEDICARE

## 2024-05-30 VITALS
SYSTOLIC BLOOD PRESSURE: 112 MMHG | WEIGHT: 197 LBS | OXYGEN SATURATION: 98 % | DIASTOLIC BLOOD PRESSURE: 72 MMHG | BODY MASS INDEX: 29.18 KG/M2 | HEIGHT: 69 IN | HEART RATE: 65 BPM

## 2024-05-30 PROCEDURE — 99203 OFFICE O/P NEW LOW 30 MIN: CPT

## 2024-05-30 PROCEDURE — 93925 LOWER EXTREMITY STUDY: CPT

## 2024-05-30 PROCEDURE — G2211 COMPLEX E/M VISIT ADD ON: CPT

## 2024-05-30 NOTE — PHYSICAL EXAM
[JVD] : no jugular venous distention  [Normal Breath Sounds] : Normal breath sounds [Normal Heart Sounds] : normal heart sounds [2+] : left 2+ [1+] : left 1+ [Ankle Swelling (On Exam)] : not present [Varicose Veins Of Lower Extremities] : not present [] : not present [de-identified] : Patient had lots of pigmentation and moles all over his skin.

## 2024-05-30 NOTE — CONSULT LETTER
Left foot pain no trauma   [Dear  ___] : Dear  [unfilled], [Courtesy Letter:] : I had the pleasure of seeing your patient, [unfilled], in my office today. [Please see my note below.] : Please see my note below. [Sincerely,] : Sincerely,

## 2024-05-30 NOTE — REASON FOR VISIT
[Initial Evaluation] : an initial evaluation [FreeTextEntry1] : Patient is being seen today for discoloration in lower extremities.

## 2024-05-30 NOTE — HISTORY OF PRESENT ILLNESS
[FreeTextEntry1] : 73 years old male who is referred for evaluation for PAD by his endocrinologist. He has history of DM for over 10 years (controlled with Hb A1C of 6) also has some neuropathy, HTN and HLP- which are also controlled medically. He had CAD and 3 coronary stents, last in 2014 (not on DAPT anymore), history of DVT (on Eliquis), history of Melanoma and skin BCC and SCC (over 15-20 mole removal), and history of prostate CA, and Crohn's disease (no surgery). His father had DM and lost both legs. He has a carotid duplex in OSH in Jan 2024, which shows <50% stenosis bilaterally.  Patient does not have claudication, rest pain or ulceration in his feet.

## 2024-05-30 NOTE — ASSESSMENT
[FreeTextEntry1] : 73 years old male who is referred for evaluation for PAD by his endocrinologist. He has history of DM for over 10 years (controlled with Hb A1C of 6) also has some neuropathy, HTN and HLP- which are also controlled medically. He had CAD and 3 coronary stents, last in 2014 (not on DAPT anymore), history of DVT (on Eliquis), history of Melanoma and skin BCC and SCC (over 15-20 mole removal), and history of prostate CA, and Crohn's disease (no surgery). His father had DM and lost both legs. He has a carotid duplex in OSH in Jan 2024, which shows <50% stenosis bilaterally. Patient does not have claudication, rest pain or ulceration in his feet. His pulses are palpable (PT stronger than DP). B/L LE arterial duplex shows normal flow and no sign of occlusion.   Reassured the patient and explained about importance of BMT which he is doing. FU in one year with my partner, Dr Du.

## 2024-05-31 LAB — SURGICAL PATHOLOGY STUDY: SIGNIFICANT CHANGE UP

## 2024-06-04 ENCOUNTER — APPOINTMENT (OUTPATIENT)
Dept: PLASTIC SURGERY | Facility: CLINIC | Age: 74
End: 2024-06-04
Payer: MEDICARE

## 2024-06-04 PROCEDURE — 99024 POSTOP FOLLOW-UP VISIT: CPT

## 2024-06-04 NOTE — PHYSICAL EXAM
[de-identified] : NAD [de-identified] : excessive sun damage w sig scattered actinic changes [de-identified] : posterior neck incision healing well, c/d/i, no erythema or fluid collection,  prior scar healed, nontender, well-healed, no evidence of recurrence  [de-identified] : nonlabored breathing,  [de-identified] : right dorsal hand incision healing well with minimal swelling, no erythema or fluid collection

## 2024-06-04 NOTE — ASSESSMENT
[FreeTextEntry1] : 74 yo M with h/o malignant melanoma of posterior neck, s/p excision 8/9/21. Doing well. Initial biopsy 0.6 mm, excision 0.6 mm = TOTAL 1.2 mm s/p SLN Bx bilateral neck, doing well,  Now has recent biopsy of right hand that cannot r/o underlying early evolving melanoma in situ (atypical proliferation of melanocytes)  re my prior txs: -posterior neck incision fine--no recurrent or suspicious areas of pigmentation -B/L incision (from SLNB) fine--no masses (path was negative) -right hand incision fine--no suspicious pigmentation cont derm f/u (sees Bonnie q3 months)   PET Scan reviewed with the pt: new intense abnormal FDG uptake with 1.2 x 0.9 cm soft tissue density in the right posterior neck suspicious for biologic tumor activity.   In retrospect, on previous study this area was 0.6 x 0.6 cm and not FDG avid.  No other sites of abnormal FDG uptake. The right upper lobe tree-in-bud nodules are not FDG avid  5/7/2024 PET reviewed w pt and again w Dr Feldman  on exam posterior neck melanoma excision site fine (me) right postauricular SCC excision site fine (Nic) 3cm posterior to the superior aspect of R postauricular scar there is a subdermal finding--approx 1.5cm prior B/L neck LNB sites fine  discussed indication for surgical removal of area--PET findings right in between two cancers  Now POD#15 s/p excision of right posterior neck mass. Doing well.   6/4/2024 right posterior neck incision fine sutures removed  path--single Ln involved by metastatic melanoma  to see Dinh next week to discuss tx for recent excision posterior neck metastatic melanoma  recent bx May 2024--right forearm artypical intraepidermal melanocystioc proliferation  on exam this area is approx 8mm x 1.8cm, rought, healing bx site  suggest excision of this area in office  Regarding the procedure, we discussed scarring, poor wound healing, bleeding, infection, need for additional surgery, and dissatisfaction with the outcome.  Also discussed possibility of keloid and/or hypertrophic scar formation as well as recurrence.  All questions were answered and risks understood.  f/u in 2 wks for wound check on right posterio scalp

## 2024-06-04 NOTE — HISTORY OF PRESENT ILLNESS
[FreeTextEntry1] : 71 yr old male with PMHx of HTN, MI 2006 s/p cardiac stents x4, NIDDM, Crohn's disease seen w wife for tx option re melanoma posterior neck. Pt with multiple prior SCC and BCC (seven--scattered throughout body).   6/28/2021--right postauricualr SCC-- Dr. Lucero to address 0.6mm melanoma--this is reason for referral  nonsmoker  Interval hx (8/17/21). Pt presents today POD#8 s/p excision of posterior neck melanoma. Doing well with no significant pain, f/c or bleeding.   Interval hx (8/25/21). Pt presents today POD#16 s/p excision of posterior neck melanoma. Offers no new complaints. Denies any fever, chills or drainage form the surgical site.   Interval hx (9/29/21): Pt presents today 6 weeks s/p excision of posterior neck melanoma. C/o mild sensitivity to posterior scalp that has been improving. Denies pain or f/c.  Interval hx (10/14/21). Patient presents today 8 weeks post-op for f/u to discuss treatment plan.   Interval hx (11/8/21): Pt presents today POD #7 s/p SLN Bx bilateral neck. C/o discomfort and bruising that is slowly resolving. Denies f/c or bleeding.  Interval hx (11/16/21): Pt presents today POD #15 s/p SLN Bx bilateral neck (negative). Doing well with resolving neck and chest bruising and swelling. Denies any f/c or bleeding.   Interval hx (6/16/22): Pt presents today 8 months s/p SLN Bx bilateral neck (negative). Doing well.   Interval hx (9/1/22). Patient here today POD#6 s/p excision of right dorsal hand skin lesion. Doing well with no significant pain, f/c or drainage.   Interval hx (9/7/22). Patient here today POD#12 s/p excision of right dorsal hand skin lesion. Doing well with no significant complaints.   Interval hx (5/7/24): Pt is here for his 6 month follow up. Had PET CT completed last week.   Interval hx (5/28/24). Pt presents today POD#8 s/p excision of right posterior neck mass. Doing well with no significant pain, f/c or drainage. Completed all prescribed medications.  Interval hx (6/4/24): pt here POD# 15 s/p excision of right posterior neck mass.

## 2024-06-10 ENCOUNTER — OUTPATIENT (OUTPATIENT)
Dept: OUTPATIENT SERVICES | Facility: HOSPITAL | Age: 74
LOS: 1 days | End: 2024-06-10
Payer: MEDICARE

## 2024-06-10 ENCOUNTER — APPOINTMENT (OUTPATIENT)
Age: 74
End: 2024-06-10
Payer: MEDICARE

## 2024-06-10 VITALS
BODY MASS INDEX: 30.45 KG/M2 | DIASTOLIC BLOOD PRESSURE: 72 MMHG | HEIGHT: 68.5 IN | OXYGEN SATURATION: 96 % | WEIGHT: 203.25 LBS | HEART RATE: 66 BPM | RESPIRATION RATE: 16 BRPM | TEMPERATURE: 98.3 F | SYSTOLIC BLOOD PRESSURE: 129 MMHG

## 2024-06-10 DIAGNOSIS — R91.1 SOLITARY PULMONARY NODULE: ICD-10-CM

## 2024-06-10 DIAGNOSIS — Z98.890 OTHER SPECIFIED POSTPROCEDURAL STATES: Chronic | ICD-10-CM

## 2024-06-10 DIAGNOSIS — Z95.5 PRESENCE OF CORONARY ANGIOPLASTY IMPLANT AND GRAFT: Chronic | ICD-10-CM

## 2024-06-10 DIAGNOSIS — Z98.49 CATARACT EXTRACTION STATUS, UNSPECIFIED EYE: Chronic | ICD-10-CM

## 2024-06-10 PROCEDURE — 99215 OFFICE O/P EST HI 40 MIN: CPT

## 2024-06-10 PROCEDURE — G2211 COMPLEX E/M VISIT ADD ON: CPT

## 2024-06-11 DIAGNOSIS — R91.1 SOLITARY PULMONARY NODULE: ICD-10-CM

## 2024-06-12 NOTE — PHYSICAL EXAM
[Fully active, able to carry on all pre-disease performance without restriction] : Status 0 - Fully active, able to carry on all pre-disease performance without restriction [Normal] : no peripheral adenopathy appreciated [de-identified] : He has an old scar and a new scar now in the right neck area  [de-identified] : He has freckles, light tone skin

## 2024-06-12 NOTE — ASSESSMENT
[FreeTextEntry1] : #Chronic lymphopenia r/o idiopathic CD4+ lymphopenia -CD4 of 164 -patient has Crohn's disease and is not on treatment. There are case reports that pt with Crohn's disease treated for it has a resolution of the lymphopenia; however, he is asymptomatic from Crohn's and therefore, we won't have him be started on any Crohn's treatment -other workups including immunoglobulin levels, HIV, and JAYNA were negative -there are rare incidents where viral infections like hep B, hep C, CMV, and EBV can cause lymphopenia these came back negative -peripheral flow showed  The lymphoid immunophenotypic findings show T cells with normal CD4 to CD8 ratio and rare B cells. -repeat full T cell subset to confirm CD4+ lymphopenia, was 111 - on Bactrim DS MWF for PCP ppx (given CD4 < 200) - he is assymptomatic and does not require any additional measures at this time -will consider bone marrow biopsy in future if counts worsened at this time in my opinion unlikely will   -Most recent CBC from May 10 showed a low blood cell count of 6.2 hemoglobin 12.9 platelets 180 absolute leukocyte count of 0.51  #Mild normocytic anemia that started after ADT, likely from ADT -workup including CMP, B12, folate, iron studies, and myeloma labs were negative -he is on metformin, and it is suggested he takes Vitamin b12 tabs 500-1000 mcg daily -he has been on oral ion for years, not clear why, asked him to stop. -CBC is stable  # Stage IA melanoma of the neck resected in 2021 as above, as well as multiple SCC and BCC he has light skin complexion -follows with dermatology and has a skin exam every 3 months -no indication for blood work or imaging unless he has new signs or symptoms -on 5/2024 found to have recurrence  in node granted area in the neck as higher than his initial procedure vs new primary?   Plan -given erma disease s.p resection, eh is at risk for reoccurrence. He would benefit from adjuvant treatment specifically immunotherapy or BRAF directed therapy if he indeed has a BRAF mutation.  In general I would just proceed with nivolumab for 1 year but given his history of Crohn's disease and recent bowel obstruction although from the available imaging it is possible there is some scarring there/stricture but not active disease nevertheless immunotherapy can pose a significant risk of reactivation.  I therefore recommended we wait for BRAF mutation status and if it is positive I suggested we offer him a year of Dabrafenib plus Trametinib.  Some information was provided for him to read about him some the side effects discussed if there is no mutation then I will speak to his gastroenterologist to decide the risk of Crohn's activation if deemed extremely high we may have to just offer him observation although from what I could gather I do not believe it is active at this time   #Prostate Cancer [id number] [place] 8 prostate cancer with PSA of 4.15 This was diagnosed on 7/13/2022 -high risk -on ADT to complete about 2 years with Dr. 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

## 2024-06-12 NOTE — HISTORY OF PRESENT ILLNESS
[Disease: _____________________] : Disease: [unfilled] [AJCC Stage: ____] : AJCC Stage: [unfilled] [de-identified] : CC: I have Prostate cancer, skin cancers \par  \par  He is here at the request of Tiffani Munson \par  \par  \par  This is a 73 year old male with PMHx of HTN, MI 2006 s/p cardiac stents x4, NIDDM, Crohn's disease, disease limited to terminal ileum,  prostate Cancer s/p radiation,Afib,  Multiple prior SCC and BCC   0.6 mm melanoma in 6/28/2021 and he underwent   excision of posterior neck melanoma on 7/16/2021 and erma resection 11/2021\par  Path:\par  \par  7/16/2021\par  \par  Final Diagnosis\par  A. One (1) outside H&E slide for review (right post auricular, slide\par  -[id number]):\par  - Superficial fragment of squamous cell carcinoma.\par  - The carcinoma extends to the deep edge of the specimen.\par  \par  B. One (1) outside H&E slide for review (posterior neck, slide -[id number]\par  B):\par  - Malignant melanoma, measuring at least 0.6 mm in thickness.\par  - The lesion extends to the deep and lateral margins.\par  \par  Note: As per outside pathology report, the CAP approved case summary for\par  melanoma of the skin is as follows:\par  - Histologic Type: NOS\par  - Maximal tumor thickness: At least 0.6 mm\par  - Ulceration: Absent\par  - Surgical margin: Not applicable\par  - Mitotic Index: Less than 1 mitosis / mm2\par  - Micro satellitosis: Absent\par  - Lymphovascular invasion: Absent\par  - Tumor regression: Absent\par  - Pathology Staging: pT1a.\par  \par  \par  \par  11/2021\par  \par  Specimen(s) Submitted\par  1  Left cervical sentinel lymph node\par  2  Right supraclavicular sentinel lymph node\par  \par  Final Diagnosis\par  1. Left cervical sentinel lymph node, dissection:\par  - Five lymph nodes, no melanoma seen (0/5).\par  2. Right supraclavicular sentinel lymph node, dissection:\par  \par  - Nine lymph nodes, no melanoma seen (0/9).\par  \par  He also has been following with  Jarrett Phipps for pulmonary nodule\par  Review of imaging\par  -5/30/2023: PET.CT scan . Since July 16, 2021, no definite foci of pathologic FDG uptake to suggest biologic tumor activity.\par  -6/21/2023 Since  5/30/2023\par  stable clustered nodules right upper lobe-largest approximately 7 mm (302-95) likely related to small airway disease\par  Follow-up low-dose CT 3 months time to assess stability suggested.\par  \par  \par  He was also found to have  [id number] [place] 8 prostate cancer with PSA of 4.15 This was diagnosed on 7/13/2022 and 5 out of 6 cores in the right prostate lobe with positive Sydnee score 4+4 = 8 with cancer involving 40 to 70% of each positive core.  He also had a PSMA PET on 8/30/2022 that did not show any metastatic disease.\par  \par  He was seen by Dr. Do in [facility] regarding possible radical prostatectomy.  \par  \par  He was started on ADT with Dr. Church in 10/2023 as per aptient  and underwent radition treatment with Dr. Major that he completed in late December 2022. Most PSA is undetectable. He was also started on Prolia  April 2023.\par  \par  His Next colonoscopy is in August 2023\par  \par  He also had history of recurrent venous thromboembolism, Firs one was 2010 left fomoral and was started on Coumaind for one year, HE stopped it and had a second clot was in the right groin and was than back on Coumadin. He also had left arm clot, maybe provoked we are not sure. \par  \par  \par  Mom had blood clot during pregnancy.\par  \par  He sees dermatology every 3 months. \par  He also had recurrent bowel obstructions from Chrons disease [de-identified] : 9/18/2023 He is here for follow up. He was seen by Sondra and Germline testing was negative.  Ever since he had proton therapy he started to develop anemia. He also started Eligard that time  9/27/22 Hgb 14 5/1/2023 HGb 12.3 8/21/23   WBC 3.7Hgb 11.5, MCV 89.3 Plts 181 UA negaive for blood.. Diff  Testosterone 10. He is on Eligard   Just had Colonsoscopy 9/2023  He states he has been lymphopenic for some time  10/2/23  Mr. Barrett is here for a follow-up regarding his melanoma and prostate CA and to review his lymphopenia workup results. His full T cell subset showed a low CD4 count of 164 as well as CD3 and CD8 as well. Other tests, including HIV, vitamin level, and immunoelectrophoresis, were essentially negative. He has no new complaint to offer today.   1/10/2024 He is here for follow up. CBC today si stable with mild anemia and lymhopenia.  6/10/2024 He is here for follow up. 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 In retrospect, on previous study this area was 0.6 x 0.6 cm and not FDG avid. Physical examination correlation suggested  He than had resection by Dr. Marie  Final Diagnosis Right posterior neck subdermal lesion: -  One lymph node, involved by metastatic melanoma (1/1), (see Comment).  The tumor cells are positive for   Vimentin  and Melanoma markers (SOX-10,  Melan-A/focal, S-100/focal rare stain).  The tumor cells are negative for AE1/AE3, synaptophysin, CD34, CD45, Desmin, HMB45.  Lymphoid marker CD45 highlights lymph node architecture.  Single-Gene-Next generation sequencing study for BRAF is pending, and the result will follow as an addendum.   He was also admitted for bowel obstruction In April.  He has had 4 bowel obstructions in his life due to chrons. He gets consipated and at times soft bowel movement. His GI is  Dr. Evans Hedrick    His PET.CT did ot show any activity in the terminal illimu. His CT showed at the time of bowel obstruction: Small bowel obstruction with transition zone in the ileum where there is circumferential wall thickening as well as some luminal stenosis. Interloop ascites. No evidence of pneumatosis or pneumoperitoneum

## 2024-06-18 ENCOUNTER — APPOINTMENT (OUTPATIENT)
Dept: PLASTIC SURGERY | Facility: CLINIC | Age: 74
End: 2024-06-18
Payer: MEDICARE

## 2024-06-18 PROCEDURE — 99024 POSTOP FOLLOW-UP VISIT: CPT

## 2024-06-18 NOTE — PHYSICAL EXAM
[de-identified] : NAD [de-identified] : excessive sun damage w sig scattered actinic changes [de-identified] : posterior neck incision healing well, c/d/i, no erythema or fluid collection,  prior scar healed, nontender, well-healed, no evidence of recurrence  [de-identified] : nonlabored breathing,  [de-identified] : right dorsal hand incision healing well with minimal swelling, no erythema or fluid collection

## 2024-06-18 NOTE — ASSESSMENT
[FreeTextEntry1] : 72 yo M with h/o malignant melanoma of posterior neck, s/p excision 8/9/21. Doing well. Initial biopsy 0.6 mm, excision 0.6 mm = TOTAL 1.2 mm s/p SLN Bx bilateral neck, doing well,  Now has recent biopsy of right hand that cannot r/o underlying early evolving melanoma in situ (atypical proliferation of melanocytes)  re my prior txs: -posterior neck incision fine--no recurrent or suspicious areas of pigmentation -B/L incision (from SLNB) fine--no masses (path was negative) -right hand incision fine--no suspicious pigmentation cont derm f/u (sees Bonnie q3 months)   PET Scan reviewed with the pt: new intense abnormal FDG uptake with 1.2 x 0.9 cm soft tissue density in the right posterior neck suspicious for biologic tumor activity.   In retrospect, on previous study this area was 0.6 x 0.6 cm and not FDG avid.  No other sites of abnormal FDG uptake. The right upper lobe tree-in-bud nodules are not FDG avid  5/7/2024 PET reviewed w pt and again w Dr Feldman  on exam posterior neck melanoma excision site fine (me) right postauricular SCC excision site fine (Nic) 3cm posterior to the superior aspect of R postauricular scar there is a subdermal finding--approx 1.5cm prior B/L neck LNB sites fine  discussed indication for surgical removal of area--PET findings right in between two cancers  Now POD#15 s/p excision of right posterior neck mass. Doing well.   6/4/2024 right posterior neck incision fine sutures removed  path--single Ln involved by metastatic melanoma  to see Dinh next week to discuss tx for recent excision posterior neck metastatic melanoma  recent bx May 2024--right forearm artypical intraepidermal melanocystioc proliferation  on exam this area is approx 8mm x 1.8cm, rought, healing bx site  suggest excision of this area in office  Regarding the procedure, we discussed scarring, poor wound healing, bleeding, infection, need for additional surgery, and dissatisfaction with the outcome.  Also discussed possibility of keloid and/or hypertrophic scar formation as well as recurrence.  All questions were answered and risks understood.  f/u in 2 wks for wound check on right posterio scalp  d/w Dr CLARKE--likely to styart immunotherapy next week  f/u in 3 months  scar fine no PRS issues     
aub  stable  no acute gyn emergency  will perform oupt evaluation and management  given precautions

## 2024-06-24 ENCOUNTER — OUTPATIENT (OUTPATIENT)
Dept: OUTPATIENT SERVICES | Facility: HOSPITAL | Age: 74
LOS: 1 days | End: 2024-06-24
Payer: MEDICARE

## 2024-06-24 ENCOUNTER — APPOINTMENT (OUTPATIENT)
Age: 74
End: 2024-06-24
Payer: MEDICARE

## 2024-06-24 VITALS
DIASTOLIC BLOOD PRESSURE: 71 MMHG | WEIGHT: 205 LBS | RESPIRATION RATE: 16 BRPM | HEIGHT: 68 IN | BODY MASS INDEX: 31.07 KG/M2 | TEMPERATURE: 97.2 F | SYSTOLIC BLOOD PRESSURE: 133 MMHG | HEART RATE: 80 BPM

## 2024-06-24 DIAGNOSIS — Z98.49 CATARACT EXTRACTION STATUS, UNSPECIFIED EYE: Chronic | ICD-10-CM

## 2024-06-24 DIAGNOSIS — Z98.890 OTHER SPECIFIED POSTPROCEDURAL STATES: Chronic | ICD-10-CM

## 2024-06-24 DIAGNOSIS — C43.4 MALIGNANT MELANOMA OF SCALP AND NECK: ICD-10-CM

## 2024-06-24 DIAGNOSIS — Z95.5 PRESENCE OF CORONARY ANGIOPLASTY IMPLANT AND GRAFT: Chronic | ICD-10-CM

## 2024-06-24 DIAGNOSIS — D72.810 LYMPHOCYTOPENIA: ICD-10-CM

## 2024-06-24 DIAGNOSIS — R91.1 SOLITARY PULMONARY NODULE: ICD-10-CM

## 2024-06-24 PROCEDURE — 99214 OFFICE O/P EST MOD 30 MIN: CPT

## 2024-06-24 PROCEDURE — G2211 COMPLEX E/M VISIT ADD ON: CPT

## 2024-06-24 RX ORDER — SULFAMETHOXAZOLE AND TRIMETHOPRIM 800; 160 MG/1; MG/1
800-160 TABLET ORAL DAILY
Qty: 50 | Refills: 3 | Status: COMPLETED | COMMUNITY
Start: 2024-06-24 | End: 2024-06-24

## 2024-06-24 RX ORDER — PREDNISONE 20 MG/1
20 TABLET ORAL DAILY
Qty: 120 | Refills: 0 | Status: ACTIVE | COMMUNITY
Start: 2024-06-24 | End: 1900-01-01

## 2024-06-26 ENCOUNTER — OUTPATIENT (OUTPATIENT)
Dept: OUTPATIENT SERVICES | Facility: HOSPITAL | Age: 74
LOS: 1 days | End: 2024-06-26
Payer: MEDICARE

## 2024-06-26 ENCOUNTER — APPOINTMENT (OUTPATIENT)
Age: 74
End: 2024-06-26

## 2024-06-26 ENCOUNTER — LABORATORY RESULT (OUTPATIENT)
Age: 74
End: 2024-06-26

## 2024-06-26 ENCOUNTER — NON-APPOINTMENT (OUTPATIENT)
Age: 74
End: 2024-06-26

## 2024-06-26 VITALS — SYSTOLIC BLOOD PRESSURE: 136 MMHG | HEART RATE: 77 BPM | TEMPERATURE: 98 F | DIASTOLIC BLOOD PRESSURE: 78 MMHG

## 2024-06-26 DIAGNOSIS — R91.1 SOLITARY PULMONARY NODULE: ICD-10-CM

## 2024-06-26 DIAGNOSIS — Z98.49 CATARACT EXTRACTION STATUS, UNSPECIFIED EYE: Chronic | ICD-10-CM

## 2024-06-26 DIAGNOSIS — Z98.890 OTHER SPECIFIED POSTPROCEDURAL STATES: Chronic | ICD-10-CM

## 2024-06-26 DIAGNOSIS — Z95.5 PRESENCE OF CORONARY ANGIOPLASTY IMPLANT AND GRAFT: Chronic | ICD-10-CM

## 2024-06-26 PROCEDURE — 96413 CHEMO IV INFUSION 1 HR: CPT

## 2024-06-26 PROCEDURE — 80053 COMPREHEN METABOLIC PANEL: CPT

## 2024-06-26 PROCEDURE — 36415 COLL VENOUS BLD VENIPUNCTURE: CPT

## 2024-06-26 PROCEDURE — 84443 ASSAY THYROID STIM HORMONE: CPT

## 2024-06-26 PROCEDURE — 85027 COMPLETE CBC AUTOMATED: CPT

## 2024-06-26 RX ORDER — PEMBROLIZUMAB 50 MG/2ML
200 INJECTION, POWDER, LYOPHILIZED, FOR SOLUTION INTRAVENOUS ONCE
Refills: 0 | Status: COMPLETED | OUTPATIENT
Start: 2024-06-26 | End: 2024-06-26

## 2024-06-26 RX ADMIN — PEMBROLIZUMAB 200 MILLIGRAM(S): 50 INJECTION, POWDER, LYOPHILIZED, FOR SOLUTION INTRAVENOUS at 15:53

## 2024-06-26 RX ADMIN — PEMBROLIZUMAB 200 MILLIGRAM(S): 50 INJECTION, POWDER, LYOPHILIZED, FOR SOLUTION INTRAVENOUS at 16:28

## 2024-06-27 LAB
ALBUMIN SERPL ELPH-MCNC: 4.5 G/DL
ALP BLD-CCNC: 83 U/L
ALT SERPL-CCNC: 18 U/L
ANION GAP SERPL CALC-SCNC: 14 MMOL/L
AST SERPL-CCNC: 20 U/L
BILIRUB SERPL-MCNC: 0.4 MG/DL
BUN SERPL-MCNC: 15 MG/DL
CALCIUM SERPL-MCNC: 8.8 MG/DL
CHLORIDE SERPL-SCNC: 101 MMOL/L
CO2 SERPL-SCNC: 23 MMOL/L
CREAT SERPL-MCNC: 0.8 MG/DL
EGFR: 94 ML/MIN/1.73M2
GLUCOSE SERPL-MCNC: 233 MG/DL
HCT VFR BLD CALC: 33.1 %
HGB BLD-MCNC: 11.6 G/DL
MCHC RBC-ENTMCNC: 30.1 PG
MCHC RBC-ENTMCNC: 35 G/DL
MCV RBC AUTO: 85.8 FL
PLATELET # BLD AUTO: 147 K/UL
PMV BLD: 11.3 FL
POTASSIUM SERPL-SCNC: 3.4 MMOL/L
PROT SERPL-MCNC: 6.3 G/DL
RBC # BLD: 3.86 M/UL
RBC # FLD: 13.5 %
SODIUM SERPL-SCNC: 138 MMOL/L
TSH SERPL-ACNC: 1.07 UIU/ML
WBC # FLD AUTO: 4.78 K/UL

## 2024-07-03 ENCOUNTER — RESULT REVIEW (OUTPATIENT)
Age: 74
End: 2024-07-03

## 2024-07-03 ENCOUNTER — OUTPATIENT (OUTPATIENT)
Dept: OUTPATIENT SERVICES | Facility: HOSPITAL | Age: 74
LOS: 1 days | End: 2024-07-03
Payer: MEDICARE

## 2024-07-03 DIAGNOSIS — Z00.8 ENCOUNTER FOR OTHER GENERAL EXAMINATION: ICD-10-CM

## 2024-07-03 DIAGNOSIS — Z95.5 PRESENCE OF CORONARY ANGIOPLASTY IMPLANT AND GRAFT: Chronic | ICD-10-CM

## 2024-07-03 DIAGNOSIS — Z98.890 OTHER SPECIFIED POSTPROCEDURAL STATES: Chronic | ICD-10-CM

## 2024-07-03 DIAGNOSIS — Z98.49 CATARACT EXTRACTION STATUS, UNSPECIFIED EYE: Chronic | ICD-10-CM

## 2024-07-03 DIAGNOSIS — C43.20 MALIGNANT MELANOMA OF UNSPECIFIED EAR AND EXTERNAL AURICULAR CANAL: ICD-10-CM

## 2024-07-03 PROCEDURE — A9579: CPT

## 2024-07-03 PROCEDURE — 70553 MRI BRAIN STEM W/O & W/DYE: CPT

## 2024-07-03 PROCEDURE — 70553 MRI BRAIN STEM W/O & W/DYE: CPT | Mod: 26

## 2024-07-04 DIAGNOSIS — C43.20 MALIGNANT MELANOMA OF UNSPECIFIED EAR AND EXTERNAL AURICULAR CANAL: ICD-10-CM

## 2024-07-12 ENCOUNTER — APPOINTMENT (OUTPATIENT)
Dept: HEMATOLOGY ONCOLOGY | Facility: CLINIC | Age: 74
End: 2024-07-12

## 2024-07-17 ENCOUNTER — LABORATORY RESULT (OUTPATIENT)
Age: 74
End: 2024-07-17

## 2024-07-17 ENCOUNTER — APPOINTMENT (OUTPATIENT)
Age: 74
End: 2024-07-17
Payer: MEDICARE

## 2024-07-17 VITALS
HEART RATE: 73 BPM | WEIGHT: 206 LBS | BODY MASS INDEX: 31.22 KG/M2 | DIASTOLIC BLOOD PRESSURE: 71 MMHG | TEMPERATURE: 98.2 F | OXYGEN SATURATION: 98 % | HEIGHT: 68 IN | SYSTOLIC BLOOD PRESSURE: 130 MMHG | RESPIRATION RATE: 17 BRPM

## 2024-07-17 DIAGNOSIS — C43.4 MALIGNANT MELANOMA OF SCALP AND NECK: ICD-10-CM

## 2024-07-17 DIAGNOSIS — Z51.11 ENCOUNTER FOR ANTINEOPLASTIC CHEMOTHERAPY: ICD-10-CM

## 2024-07-17 DIAGNOSIS — Z51.12 ENCOUNTER FOR ANTINEOPLASTIC CHEMOTHERAPY: ICD-10-CM

## 2024-07-17 DIAGNOSIS — Z51.81 ENCOUNTER FOR THERAPEUTIC DRUG LVL MONITORING: ICD-10-CM

## 2024-07-17 DIAGNOSIS — C61 MALIGNANT NEOPLASM OF PROSTATE: ICD-10-CM

## 2024-07-17 LAB
HCT VFR BLD CALC: 33.9 %
HGB BLD-MCNC: 11.8 G/DL
MCHC RBC-ENTMCNC: 29.4 PG
MCHC RBC-ENTMCNC: 34.8 G/DL
MCV RBC AUTO: 84.5 FL
PLATELET # BLD AUTO: 174 K/UL
PMV BLD: 10.5 FL
RBC # BLD: 4.01 M/UL
RBC # FLD: 12.9 %
WBC # FLD AUTO: 5.41 K/UL

## 2024-07-17 PROCEDURE — G2211 COMPLEX E/M VISIT ADD ON: CPT

## 2024-07-17 PROCEDURE — 99214 OFFICE O/P EST MOD 30 MIN: CPT

## 2024-07-18 LAB
ALBUMIN SERPL ELPH-MCNC: 4.3 G/DL
ALP BLD-CCNC: 88 U/L
ALT SERPL-CCNC: 20 U/L
ANION GAP SERPL CALC-SCNC: 12 MMOL/L
AST SERPL-CCNC: 22 U/L
BILIRUB SERPL-MCNC: 0.4 MG/DL
BUN SERPL-MCNC: 21 MG/DL
CALCIUM SERPL-MCNC: 9.1 MG/DL
CHLORIDE SERPL-SCNC: 102 MMOL/L
CO2 SERPL-SCNC: 24 MMOL/L
CREAT SERPL-MCNC: 0.9 MG/DL
EGFR: 91 ML/MIN/1.73M2
GLUCOSE SERPL-MCNC: 193 MG/DL
POTASSIUM SERPL-SCNC: 3.9 MMOL/L
PROT SERPL-MCNC: 6.8 G/DL
SODIUM SERPL-SCNC: 138 MMOL/L
TSH SERPL-ACNC: 0.73 UIU/ML

## 2024-07-22 ENCOUNTER — APPOINTMENT (OUTPATIENT)
Dept: NEUROLOGY | Facility: CLINIC | Age: 74
End: 2024-07-22

## 2024-07-23 LAB — MAGNESIUM SERPL-MCNC: 1.7 MG/DL

## 2024-07-23 NOTE — ASU DISCHARGE PLAN (ADULT/PEDIATRIC) - "IF YOU OR YOUR GUARDIAN/FAMILY IS A SMOKER, IT IS IMPORTANT FOR YOUR HEALTH TO STOP SMOKING. PLEASE BE AWARE THAT SECOND HAND SMOKE IS ALSO HARMFUL."
Patient arrives complaining of left lower abdominal pain that started about 3 am. No meds at home. Denies any bowel or bladder symptoms. Has vomited x2.      Triage Assessment (Adult)       Row Name 07/23/24 0639          Triage Assessment    Airway WDL WDL        Respiratory WDL    Respiratory WDL WDL        Skin Circulation/Temperature WDL    Skin Circulation/Temperature WDL WDL        Cardiac WDL    Cardiac WDL WDL        Peripheral/Neurovascular WDL    Peripheral Neurovascular WDL WDL        Cognitive/Neuro/Behavioral WDL    Cognitive/Neuro/Behavioral WDL WDL                      Statement Selected

## 2024-07-26 ENCOUNTER — APPOINTMENT (OUTPATIENT)
Dept: PLASTIC SURGERY | Facility: CLINIC | Age: 74
End: 2024-07-26

## 2024-07-26 PROBLEM — D48.5 NEOPLASM OF UNCERTAIN BEHAVIOR OF SKIN: Status: ACTIVE | Noted: 2024-07-26

## 2024-07-26 PROCEDURE — 11403 EXC TR-EXT B9+MARG 2.1-3CM: CPT | Mod: 58

## 2024-07-26 PROCEDURE — 13121 CMPLX RPR S/A/L 2.6-7.5 CM: CPT | Mod: 58

## 2024-07-26 NOTE — PROCEDURE
[FreeTextEntry6] : Patient is a 74 year old male with a re-excision of  right proximal forearm skin lesion measuring approximately 3x2 cm.    The area was prepped and draped in the usual fashion.  Local anesthetic was administered using 1% lidocaine with epinephrine.  Lesion was sharply excised.  Area was irrigated copiously.  Complex wound closure was performed in layers.  The wound measured approximately 4  cm.  Sterile dressing applied.    Patient tolerated procedure well and understands post-op instructions.  Sutures Used: 3-0 nilon, 3-0 monocryl

## 2024-07-31 ENCOUNTER — APPOINTMENT (OUTPATIENT)
Dept: PLASTIC SURGERY | Facility: CLINIC | Age: 74
End: 2024-07-31
Payer: MEDICARE

## 2024-07-31 DIAGNOSIS — D48.5 NEOPLASM OF UNCERTAIN BEHAVIOR OF SKIN: ICD-10-CM

## 2024-07-31 PROCEDURE — 99024 POSTOP FOLLOW-UP VISIT: CPT

## 2024-07-31 NOTE — PHYSICAL EXAM
[de-identified] : NAD [de-identified] : excessive sun damage w sig scattered actinic changes [de-identified] : posterior neck incision healing well, c/d/i, no erythema or fluid collection,  prior scar healed, nontender, well-healed, no evidence of recurrence  [de-identified] : nonlabored breathing,  [de-identified] : right dorsal hand incision healing well with minimal swelling, no erythema or fluid collection  [de-identified] : Right proximal forearm incision is CDI, no open areas/drainage/cellulitis/erythema. No fluid collection. Skin edges intact with nylons in place.

## 2024-07-31 NOTE — HISTORY OF PRESENT ILLNESS
[FreeTextEntry1] : 73 y/o male with PMHx of HTN, MI 2006 s/p cardiac stents x4, NIDDM, Crohn's disease seen w wife for tx option re melanoma posterior neck. Pt with multiple prior SCC and BCC (seven--scattered throughout body).   6/28/2021--right postauricualr SCC-- Dr. Lucero to address 0.6mm melanoma--this is reason for referral  nonsmoker  Interval hx (8/17/21). Pt presents today POD#8 s/p excision of posterior neck melanoma. Doing well with no significant pain, f/c or bleeding.   Interval hx (8/25/21). Pt presents today POD#16 s/p excision of posterior neck melanoma. Offers no new complaints. Denies any fever, chills or drainage form the surgical site.   Interval hx (9/29/21): Pt presents today 6 weeks s/p excision of posterior neck melanoma. C/o mild sensitivity to posterior scalp that has been improving. Denies pain or f/c.  Interval hx (10/14/21). Patient presents today 8 weeks post-op for f/u to discuss treatment plan.   Interval hx (11/8/21): Pt presents today POD #7 s/p SLN Bx bilateral neck. C/o discomfort and bruising that is slowly resolving. Denies f/c or bleeding.  Interval hx (11/16/21): Pt presents today POD #15 s/p SLN Bx bilateral neck (negative). Doing well with resolving neck and chest bruising and swelling. Denies any f/c or bleeding.   Interval hx (6/16/22): Pt presents today 8 months s/p SLN Bx bilateral neck (negative). Doing well.   Interval hx (9/1/22). Patient here today POD#6 s/p excision of right dorsal hand skin lesion. Doing well with no significant pain, f/c or drainage.   Interval hx (9/7/22). Patient here today POD#12 s/p excision of right dorsal hand skin lesion. Doing well with no significant complaints.   Interval hx (5/7/24): Pt is here for his 6 month follow up. Had PET CT completed last week.   Interval hx (5/28/24). Pt presents today POD#8 s/p excision of right posterior neck mass. Doing well with no significant pain, f/c or drainage. Completed all prescribed medications.  Interval hx (6/4/24): pt here POD# 15 s/p excision of right posterior neck mass.  Interval hx (7/31/24): Pt is POD# 5 s/p re-excision of right proximal forearm skin lesion. Here for bandage change. Some fatigue but otherwise feels fine. No fever/chills/drainage. Pt started Keytruda 1 month ago, continues to f/u with HemeOnc regularly. MRI 7/3/24 neg for intracranial dz. Hx Chrons dx with multiple bowel obstructions, has Prednisone on standby if needed post-immunotherapy tx. Continues on Apixiban BID (hx afib & unprovoked b/l LE DVT)

## 2024-07-31 NOTE — ASSESSMENT
[FreeTextEntry1] : 73 yo M with h/o HTN, MI 2006 s/p cardiac stents x4, .A.fib, prostate cancer, unprovoked DVTs, NIDDM, Crohn's disease & multiple cutaneous malignancies (s/p excision of R posteauricular SCC by  most recently)  Presented to [facility] for surgical excision of malignant melanoma of posterior neck.   S/p excision of posterior neck MM with b/l neck SLNB- Aug 2021 s/p excision of R hand dorsum lesion Aug 2022    Pt is now POD# 5 s/p re-excision of right proximal forearm skin lesion. Here for bandage change.   - Bandages changed - Pathology still pending - Wound care reviewed: abx ointment daily  - s/s of infection reviewed - continue routine derm follow ups () - Immunotherapy per heme onc - Anticoagulation as advised  - f/u next week for suture removal and path review

## 2024-08-07 ENCOUNTER — OUTPATIENT (OUTPATIENT)
Dept: OUTPATIENT SERVICES | Facility: HOSPITAL | Age: 74
LOS: 1 days | End: 2024-08-07
Payer: MEDICARE

## 2024-08-07 ENCOUNTER — APPOINTMENT (OUTPATIENT)
Age: 74
End: 2024-08-07

## 2024-08-07 ENCOUNTER — LABORATORY RESULT (OUTPATIENT)
Age: 74
End: 2024-08-07

## 2024-08-07 VITALS — HEART RATE: 77 BPM | SYSTOLIC BLOOD PRESSURE: 137 MMHG | DIASTOLIC BLOOD PRESSURE: 80 MMHG | TEMPERATURE: 97 F

## 2024-08-07 DIAGNOSIS — Z51.11 ENCOUNTER FOR ANTINEOPLASTIC CHEMOTHERAPY: ICD-10-CM

## 2024-08-07 DIAGNOSIS — Z95.5 PRESENCE OF CORONARY ANGIOPLASTY IMPLANT AND GRAFT: Chronic | ICD-10-CM

## 2024-08-07 DIAGNOSIS — Z98.890 OTHER SPECIFIED POSTPROCEDURAL STATES: Chronic | ICD-10-CM

## 2024-08-07 DIAGNOSIS — Z98.49 CATARACT EXTRACTION STATUS, UNSPECIFIED EYE: Chronic | ICD-10-CM

## 2024-08-07 PROBLEM — Z86.2 HISTORY OF ANEMIA: Status: RESOLVED | Noted: 2023-09-18 | Resolved: 2024-08-07

## 2024-08-07 PROCEDURE — G2211 COMPLEX E/M VISIT ADD ON: CPT

## 2024-08-07 PROCEDURE — 83735 ASSAY OF MAGNESIUM: CPT

## 2024-08-07 PROCEDURE — 84443 ASSAY THYROID STIM HORMONE: CPT

## 2024-08-07 PROCEDURE — 99214 OFFICE O/P EST MOD 30 MIN: CPT

## 2024-08-07 PROCEDURE — 85027 COMPLETE CBC AUTOMATED: CPT

## 2024-08-07 PROCEDURE — 96413 CHEMO IV INFUSION 1 HR: CPT

## 2024-08-07 PROCEDURE — 80053 COMPREHEN METABOLIC PANEL: CPT

## 2024-08-07 PROCEDURE — 36415 COLL VENOUS BLD VENIPUNCTURE: CPT

## 2024-08-07 RX ORDER — PEMBROLIZUMAB 25 MG/ML
200 INJECTION, SOLUTION INTRAVENOUS ONCE
Refills: 0 | Status: COMPLETED | OUTPATIENT
Start: 2024-08-07 | End: 2024-08-07

## 2024-08-07 RX ADMIN — PEMBROLIZUMAB 200 MILLIGRAM(S): 25 INJECTION, SOLUTION INTRAVENOUS at 16:21

## 2024-08-07 RX ADMIN — PEMBROLIZUMAB 200 MILLIGRAM(S): 25 INJECTION, SOLUTION INTRAVENOUS at 15:51

## 2024-08-08 DIAGNOSIS — Z51.11 ENCOUNTER FOR ANTINEOPLASTIC CHEMOTHERAPY: ICD-10-CM

## 2024-08-14 ENCOUNTER — APPOINTMENT (OUTPATIENT)
Dept: PLASTIC SURGERY | Facility: CLINIC | Age: 74
End: 2024-08-14
Payer: COMMERCIAL

## 2024-08-14 DIAGNOSIS — D48.5 NEOPLASM OF UNCERTAIN BEHAVIOR OF SKIN: ICD-10-CM

## 2024-08-14 PROCEDURE — 99024 POSTOP FOLLOW-UP VISIT: CPT

## 2024-08-14 NOTE — HISTORY OF PRESENT ILLNESS
[Disease: _____________________] : Disease: [unfilled] [AJCC Stage: ____] : AJCC Stage: [unfilled] [de-identified] : CC: I have Prostate cancer, skin cancer.  He is here at the request of Mil Munson  This is a 73-year-old male with PMHx of HTN, MI 2006 s/p cardiac stents x4, NIDDM, Crohn's disease, disease limited to terminal ileum, prostate Cancer s/p radiation, Afib, Multiple prior SCC and BCC 0.6 mm melanoma in 6/28/2021 and he underwent excision of posterior neck melanoma on 7/16/2021 and erma resection 11/2021 Path: 7/16/2021 Final Diagnosis A. One (1) outside H&E slide for review (right post auricular, slide [id number]): - Superficial fragment of squamous cell carcinoma. - The carcinoma extends to the deep edge of the specimen. B. One (1) outside H&E slide for review (posterior neck, slide [id number]): - Malignant melanoma, measuring at least 0.6 mm in thickness. - The lesion extends to the deep and lateral margins.  Note: As per outside pathology report, the CAP approved case summary for melanoma of the skin is as follows: - Histologic Type: NOS - Maximal tumor thickness: At least 0.6 mm - Ulceration: Absent - Surgical margin: Not applicable - Mitotic Index: Less than 1 mitosis / mm2 - Micro satellitosis: Absent - Lymphovascular invasion: Absent - Tumor regression: Absent - Pathology Staging: pT1a.  11/2021 Specimen(s) Submitted 1  Left cervical sentinel lymph node 2  Right supraclavicular sentinel lymph node Final Diagnosis 1. Left cervical sentinel lymph node, dissection: - Five lymph nodes, no melanoma seen (0/5). 2. Right supraclavicular sentinel lymph node, dissection: - Nine lymph nodes, no melanoma seen (0/9).  He also has been following with Jarrett Pihpps for pulmonary nodule Review of imaging - 05/30/2023 PET/CT scan Since July 16, 2021, no definite foci of pathologic FDG uptake to suggest biologic tumor activity. - 06/21/2023 Since 5/30/2023 stable clustered nodules right upper lobe-largest approximately 7 mm (302-95) likely related to small airway disease. Follow-up low-dose CT 3 months-time to assess stability suggested.  He was also found to have [id number] Sydnee 8 prostate cancer with PSA of 4.15 This was diagnosed on 7/13/2022 and 5 out of 6 cores in the right prostate lobe with positive Sydnee score 4+4 = 8 with cancer involving 40 to 70% of each positive core. He also had a PSMA PET on 8/30/2022 that did not show any metastatic disease.  He was seen by Dr. Do in [facility] regarding possible radical prostatectomy.    He was started on ADT with Dr. Church in 10/2023 as per patient and underwent radiation treatment with Dr. Major that he completed in late December 2022. Most PSA is undetectable. He was also started on Prolia April 2023.  His Next colonoscopy is in August 2023  He also had history of recurrent venous thromboembolism, Firs one was 2010 left femoral and was started on Coumadin for one year, He stopped it and had a second clot was in the right groin and was than back on Coumadin. He also had left arm clot, maybe provoked we are not sure.   Mom had blood clot during pregnancy.  He sees dermatology every 3 months.  He also had recurrent bowel obstructions from Crohn's disease. [de-identified] : 9/18/2023 He is here for follow up. He was seen by Sondra and Germline testing was negative. Ever since he had proton therapy he started to develop anemia. He also started Eligard that time. 9/27/22 Hgb 14 5/1/2023 Hgb 12.3 8/21/23 WBC 3.7Hgb 11.5, MCV 89.3  UA negative for blood. Diff  Testosterone 10. He is on Eligard. Just had Colonoscopy 9/2023 He states he has been lymphopenia for some time.  10/2/23  Mr. Barrett is here for a follow-up regarding his melanoma and prostate CA and to review his lymphopenia workup results. His full T cell subset showed a low CD4 count of 164 as well as CD3 and CD8 as well. Other tests, including HIV, vitamin level, and immunoelectrophoresis, were essentially negative. He has no new complaint to offer today.   1/10/2024 He is here for follow up. CBC today si stable with mild anemia and lymhopenia.  6/10/2024 He is here for follow up. 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 In retrospect, on previous study this area was 0.6 x 0.6 cm and not FDG avid. Physical examination correlation suggested He than had resection by Dr. Marie  Final Diagnosis Right posterior neck subdermal lesion: -  One lymph node, involved by metastatic melanoma (1/1), (see Comment). The tumor cells are positive for   Vimentin  and Melanoma markers (SOX-10,  Melan-A/focal, S-100/focal rare stain). The tumor cells are negative for AE1/AE3, synaptophysin, CD34, CD45, Desmin, HMB45. Lymphoid marker CD45 highlights lymph node architecture. Single-Gene-Next generation sequencing study for BRAF is pending, and the result will follow as an addendum. He was also admitted for bowel obstruction In April.  He has had 4 bowel obstructions in his life due to Crohn's. He gets constipated and at times soft bowel movement. His GI is  Dr. Evans Hedrick   His PET.CT did not show any activity in the terminal ilium. His CT showed at the time of bowel obstruction: Small bowel obstruction with transition zone in the ileum where there is circumferential wall thickening as well as some luminal stenosis. Interloop ascites. No evidence of pneumatosis or pneumoperitoneum  6/24/24 He is here for follow up. His NGS came back negative for mutations. I spoke with Dr. Evans Hedrick and he agreed that Sunil does not have any active Chorns, and his obtrusions are due to Stricture. He had no objections to start adjuvant immunotherapy and will monitor his closely.  07/17/2024 accompanied by his wife; Reports feeling well at the baseline of his health status, no changes in chronic conditions or new complaints since the last visit. Just with mild fatigue and is on constipation side now.  08/07/2024 accompanied by wife; Reports feeling well at the baseline of his health status, no changes in chronic conditions or new complaints since the last visit.

## 2024-08-14 NOTE — PHYSICAL EXAM
[Fully active, able to carry on all pre-disease performance without restriction] : Status 0 - Fully active, able to carry on all pre-disease performance without restriction [Normal] : affect appropriate [de-identified] : He has an old scar and a new scar now in the right neck area  [de-identified] : He has freckles, light tone skin.

## 2024-08-14 NOTE — HISTORY OF PRESENT ILLNESS
[Disease: _____________________] : Disease: [unfilled] [AJCC Stage: ____] : AJCC Stage: [unfilled] [de-identified] : CC: I have Prostate cancer, skin cancer.  He is here at the request of Mil Munson  This is a 73-year-old male with PMHx of HTN, MI 2006 s/p cardiac stents x4, NIDDM, Crohn's disease, disease limited to terminal ileum, prostate Cancer s/p radiation, Afib, Multiple prior SCC and BCC 0.6 mm melanoma in 6/28/2021 and he underwent excision of posterior neck melanoma on 7/16/2021 and erma resection 11/2021 Path: 7/16/2021 Final Diagnosis A. One (1) outside H&E slide for review (right post auricular, slide [id number]): - Superficial fragment of squamous cell carcinoma. - The carcinoma extends to the deep edge of the specimen. B. One (1) outside H&E slide for review (posterior neck, slide [id number]): - Malignant melanoma, measuring at least 0.6 mm in thickness. - The lesion extends to the deep and lateral margins.  Note: As per outside pathology report, the CAP approved case summary for melanoma of the skin is as follows: - Histologic Type: NOS - Maximal tumor thickness: At least 0.6 mm - Ulceration: Absent - Surgical margin: Not applicable - Mitotic Index: Less than 1 mitosis / mm2 - Micro satellitosis: Absent - Lymphovascular invasion: Absent - Tumor regression: Absent - Pathology Staging: pT1a.  11/2021 Specimen(s) Submitted 1  Left cervical sentinel lymph node 2  Right supraclavicular sentinel lymph node Final Diagnosis 1. Left cervical sentinel lymph node, dissection: - Five lymph nodes, no melanoma seen (0/5). 2. Right supraclavicular sentinel lymph node, dissection: - Nine lymph nodes, no melanoma seen (0/9).  He also has been following with Jarrett Phipps for pulmonary nodule Review of imaging - 05/30/2023 PET/CT scan Since July 16, 2021, no definite foci of pathologic FDG uptake to suggest biologic tumor activity. - 06/21/2023 Since 5/30/2023 stable clustered nodules right upper lobe-largest approximately 7 mm (302-95) likely related to small airway disease. Follow-up low-dose CT 3 months-time to assess stability suggested.  He was also found to have [id number] Sydnee 8 prostate cancer with PSA of 4.15 This was diagnosed on 7/13/2022 and 5 out of 6 cores in the right prostate lobe with positive Sydnee score 4+4 = 8 with cancer involving 40 to 70% of each positive core. He also had a PSMA PET on 8/30/2022 that did not show any metastatic disease.  He was seen by Dr. Do in [facility] regarding possible radical prostatectomy.    He was started on ADT with Dr. Church in 10/2023 as per patient and underwent radiation treatment with Dr. Major that he completed in late December 2022. Most PSA is undetectable. He was also started on Prolia April 2023.  His Next colonoscopy is in August 2023  He also had history of recurrent venous thromboembolism, Firs one was 2010 left femoral and was started on Coumadin for one year, He stopped it and had a second clot was in the right groin and was than back on Coumadin. He also had left arm clot, maybe provoked we are not sure.   Mom had blood clot during pregnancy.  He sees dermatology every 3 months.  He also had recurrent bowel obstructions from Crohn's disease. [de-identified] : 9/18/2023 He is here for follow up. He was seen by Sondra and Germline testing was negative. Ever since he had proton therapy he started to develop anemia. He also started Eligard that time. 9/27/22 Hgb 14 5/1/2023 Hgb 12.3 8/21/23 WBC 3.7Hgb 11.5, MCV 89.3  UA negative for blood. Diff  Testosterone 10. He is on Eligard. Just had Colonoscopy 9/2023 He states he has been lymphopenia for some time.  10/2/23  Mr. Barrett is here for a follow-up regarding his melanoma and prostate CA and to review his lymphopenia workup results. His full T cell subset showed a low CD4 count of 164 as well as CD3 and CD8 as well. Other tests, including HIV, vitamin level, and immunoelectrophoresis, were essentially negative. He has no new complaint to offer today.   1/10/2024 He is here for follow up. CBC today si stable with mild anemia and lymhopenia.  6/10/2024 He is here for follow up. 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 In retrospect, on previous study this area was 0.6 x 0.6 cm and not FDG avid. Physical examination correlation suggested He than had resection by Dr. Marie  Final Diagnosis Right posterior neck subdermal lesion: -  One lymph node, involved by metastatic melanoma (1/1), (see Comment). The tumor cells are positive for   Vimentin  and Melanoma markers (SOX-10,  Melan-A/focal, S-100/focal rare stain). The tumor cells are negative for AE1/AE3, synaptophysin, CD34, CD45, Desmin, HMB45. Lymphoid marker CD45 highlights lymph node architecture. Single-Gene-Next generation sequencing study for BRAF is pending, and the result will follow as an addendum. He was also admitted for bowel obstruction In April.  He has had 4 bowel obstructions in his life due to Crohn's. He gets constipated and at times soft bowel movement. His GI is  Dr. Evans Hedrick   His PET.CT did not show any activity in the terminal ilium. His CT showed at the time of bowel obstruction: Small bowel obstruction with transition zone in the ileum where there is circumferential wall thickening as well as some luminal stenosis. Interloop ascites. No evidence of pneumatosis or pneumoperitoneum  6/24/24 He is here for follow up. His NGS came back negative for mutations. I spoke with Dr. Evans Hedrick and he agreed that Sunil does not have any active Chorns, and his obtrusions are due to Stricture. He had no objections to start adjuvant immunotherapy and will monitor his closely.  07/17/2024 accompanied by his wife; Reports feeling well at the baseline of his health status, no changes in chronic conditions or new complaints since the last visit. Just with mild fatigue and is on constipation side now.  08/07/2024 accompanied by wife; Reports feeling well at the baseline of his health status, no changes in chronic conditions or new complaints since the last visit.

## 2024-08-14 NOTE — ASSESSMENT
[Curative] : Goals of care discussed with patient: Curative [FreeTextEntry1] : # Melanoma of the neck Stage IA - 2021 resected. - multiple SCC and BCC he has light skin complexion. - follows with dermatology and has a skin exam every 3 months. - no indication for blood work or imaging unless he has new signs or symptoms. 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 In retrospect, on previous study this area was 0.6 x 0.6 cm and not FDG avid. Physical examination correlation suggested. No other sites of abnormal FDG uptake. The right upper lobe tree-in-bud nodules are not FDG avid - 05/2024 found to have recurrence in node granted area in the neck as higher than his initial procedure vs new primary?  - NGS negative. - 06/26/2024 started Keytruda 200 mg IV Q21D for 18 cycles. - 07/03/2024 MR HEAD w/w/o IVC - 1. No MRI evidence of intracranial metastatic disease. 2. Mild/moderate chronic microvascular changes. 3. 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  # Prostate Cancer [id number] Sydnee 8 prostate cancer with PSA of 4.15 This was diagnosed on 7/13/2022 -high risk -on ADT to complete about 2 years with Dr. Church and s/p radiation that he completed in December of 2022 -on Prolia and Ambrosio and D -he is being monitored with PSA  -genetic testing was negative  # Chronic lymphopenia r/o idiopathic CD4+ lymphopenia. - CD4 of 164. - patient has Crohn's disease and is not on treatment. There are case reports that pt with Crohn's disease treated for it has a resolution of the lymphopenia; however, he is asymptomatic from Crohn's and therefore, we won't have him be started on any Crohn's treatment. - other workups including immunoglobulin levels, HIV, and JAYNA were negative. - there are rare incidents where viral infections like hep B, hep C, CMV, and EBV can cause lymphopenia these came back negative. - peripheral flow showed - The lymphoid immunophenotypic findings show T cells with normal CD4 to CD8 ratio and rare B cells. - repeat full T cell subset to confirm CD4+ lymphopenia, was 111. - he is asymptomatic and does not require any additional measures at this time. - will consider bone marrow biopsy in future if counts worsened at this time in my opinion unlikely will . - 05/10/2024 CBC showed a low blood cell count of 6.2 hemoglobin 12.9 platelets 180 absolute leukocyte count of 0.51. - he never took the Bactrim for PPX and since antibiotics can decrease efficacy of immunotherapy we decided to hold off for now. - will see if immunotherapy will worsen Lymphocytes.  # Mild normocytic anemia that started after ADT, likely from ADT - CBC stable. - workup including CMP, B12, folate, iron studies, and myeloma labs were negative. - he is on Metformin, and it is suggested he takes Vitamin B12 tabs 500-1000 mcg daily. - he has been on oral ion for years, not clear why, asked him to stop.  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 - needs indefinite anticontagion. - was on Coumadin. - now on Apixiban 5 mg BID moving forward.  # Pulmonary nodule - most recent PET/CT no findings  08/07/2024 Labs reviewed and results discussed with the patient and his wife - remains clinically stable to continue current management. All questions were answered to satisfaction.  PLAN: - continue Keytruda 200 mg IV Q21D for 18 cycles - C3/18 GIVE TODAY. - He is aware of side effects such as flare of his IBD and will take Prednisone 80 mg daily if has more than 4 BMs a day and will call me and call his GI. - he can take Imodium if mild diarrhea and no abdominal pain or bleeding. - continue BM regiment. - repeat PET/CT every 6 months for next 2-3 years - 10/2024. - F/U with Dr. Evans Hedrick -[phone]  - Labs today: CBC CMP TSH Mg. RTC in 3 weeks with CBC CMP TSH Mg and same day treatment.

## 2024-08-14 NOTE — END OF VISIT
[FreeTextEntry3] : I was physically present for the key portions of the evaluation and management service provided.  I agree with the history and physical, and plan which I have reviewed and edited where appropriate.  Sunil is here for follow-up he remains on adjuvant Keytruda no issues with his IBD.  He is due for repeat PET scan in October 2024 blood work was done today he will see us back in 3 weeks

## 2024-08-14 NOTE — ASSESSMENT
[FreeTextEntry1] : 75 yo M with h/o HTN, MI 2006 s/p cardiac stents x4, .A.fib, prostate cancer, unprovoked DVTs, NIDDM, Crohn's disease & multiple cutaneous malignancies (s/p excision of R posteauricular SCC by  most recently)  Presented to [facility] for surgical excision of malignant melanoma of posterior neck.   S/p excision of posterior neck MM with b/l neck SLNB- Aug 2021 s/p excision of R hand dorsum lesion Aug 2022    Pt is now 3 weeks s/p re-excision of right proximal forearm skin lesion.  Doing well   - d/c sutures, steris placed, aquaphor once they fall off - s/s of infection reviewed - continue routine derm follow ups () - Immunotherapy per heme onc - Anticoagulation as advised  - Pathology back and reviewed, however official diagnosis unclear, NOS. Path dept currently closed, will d/w  and reach out to path for clarification if necessary - f/u 2 weeks with

## 2024-08-14 NOTE — REVIEW OF SYSTEMS
[Fever] : no fever [Chills] : no chills [Chest Pain] : no chest pain [Shortness Of Breath] : no shortness of breath [Abdominal Pain] : no abdominal pain [As Noted in HPI] : as noted in HPI [Negative] : ENT

## 2024-08-14 NOTE — HISTORY OF PRESENT ILLNESS
[Disease: _____________________] : Disease: [unfilled] [AJCC Stage: ____] : AJCC Stage: [unfilled] [de-identified] : CC: I have Prostate cancer, skin cancer.  He is here at the request of Mil Munson  This is a 73-year-old male with PMHx of HTN, MI 2006 s/p cardiac stents x4, NIDDM, Crohn's disease, disease limited to terminal ileum, prostate Cancer s/p radiation, Afib, Multiple prior SCC and BCC 0.6 mm melanoma in 6/28/2021 and he underwent excision of posterior neck melanoma on 7/16/2021 and erma resection 11/2021 Path: 7/16/2021 Final Diagnosis A. One (1) outside H&E slide for review (right post auricular, slide [id number]): - Superficial fragment of squamous cell carcinoma. - The carcinoma extends to the deep edge of the specimen. B. One (1) outside H&E slide for review (posterior neck, slide [id number]): - Malignant melanoma, measuring at least 0.6 mm in thickness. - The lesion extends to the deep and lateral margins.  Note: As per outside pathology report, the CAP approved case summary for melanoma of the skin is as follows: - Histologic Type: NOS - Maximal tumor thickness: At least 0.6 mm - Ulceration: Absent - Surgical margin: Not applicable - Mitotic Index: Less than 1 mitosis / mm2 - Micro satellitosis: Absent - Lymphovascular invasion: Absent - Tumor regression: Absent - Pathology Staging: pT1a.  11/2021 Specimen(s) Submitted 1  Left cervical sentinel lymph node 2  Right supraclavicular sentinel lymph node Final Diagnosis 1. Left cervical sentinel lymph node, dissection: - Five lymph nodes, no melanoma seen (0/5). 2. Right supraclavicular sentinel lymph node, dissection: - Nine lymph nodes, no melanoma seen (0/9).  He also has been following with Jarrett Phipps for pulmonary nodule Review of imaging - 05/30/2023 PET/CT scan Since July 16, 2021, no definite foci of pathologic FDG uptake to suggest biologic tumor activity. - 06/21/2023 Since 5/30/2023 stable clustered nodules right upper lobe-largest approximately 7 mm (302-95) likely related to small airway disease. Follow-up low-dose CT 3 months-time to assess stability suggested.  He was also found to have [id number] Sydnee 8 prostate cancer with PSA of 4.15 This was diagnosed on 7/13/2022 and 5 out of 6 cores in the right prostate lobe with positive Sydnee score 4+4 = 8 with cancer involving 40 to 70% of each positive core. He also had a PSMA PET on 8/30/2022 that did not show any metastatic disease.  He was seen by Dr. Do in [facility] regarding possible radical prostatectomy.    He was started on ADT with Dr. Church in 10/2023 as per patient and underwent radiation treatment with Dr. Major that he completed in late December 2022. Most PSA is undetectable. He was also started on Prolia April 2023.  His Next colonoscopy is in August 2023  He also had history of recurrent venous thromboembolism, Firs one was 2010 left femoral and was started on Coumadin for one year, He stopped it and had a second clot was in the right groin and was than back on Coumadin. He also had left arm clot, maybe provoked we are not sure.   Mom had blood clot during pregnancy.  He sees dermatology every 3 months.  He also had recurrent bowel obstructions from Crohn's disease. [de-identified] : 9/18/2023 He is here for follow up. He was seen by Sondra and Germline testing was negative. Ever since he had proton therapy he started to develop anemia. He also started Eligard that time. 9/27/22 Hgb 14 5/1/2023 Hgb 12.3 8/21/23 WBC 3.7Hgb 11.5, MCV 89.3  UA negative for blood. Diff  Testosterone 10. He is on Eligard. Just had Colonoscopy 9/2023 He states he has been lymphopenia for some time.  10/2/23  Mr. Barrett is here for a follow-up regarding his melanoma and prostate CA and to review his lymphopenia workup results. His full T cell subset showed a low CD4 count of 164 as well as CD3 and CD8 as well. Other tests, including HIV, vitamin level, and immunoelectrophoresis, were essentially negative. He has no new complaint to offer today.   1/10/2024 He is here for follow up. CBC today si stable with mild anemia and lymhopenia.  6/10/2024 He is here for follow up. 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 In retrospect, on previous study this area was 0.6 x 0.6 cm and not FDG avid. Physical examination correlation suggested He than had resection by Dr. Marie  Final Diagnosis Right posterior neck subdermal lesion: -  One lymph node, involved by metastatic melanoma (1/1), (see Comment). The tumor cells are positive for   Vimentin  and Melanoma markers (SOX-10,  Melan-A/focal, S-100/focal rare stain). The tumor cells are negative for AE1/AE3, synaptophysin, CD34, CD45, Desmin, HMB45. Lymphoid marker CD45 highlights lymph node architecture. Single-Gene-Next generation sequencing study for BRAF is pending, and the result will follow as an addendum. He was also admitted for bowel obstruction In April.  He has had 4 bowel obstructions in his life due to Crohn's. He gets constipated and at times soft bowel movement. His GI is  Dr. Evans Hedrick   His PET.CT did not show any activity in the terminal ilium. His CT showed at the time of bowel obstruction: Small bowel obstruction with transition zone in the ileum where there is circumferential wall thickening as well as some luminal stenosis. Interloop ascites. No evidence of pneumatosis or pneumoperitoneum  6/24/24 He is here for follow up. His NGS came back negative for mutations. I spoke with Dr. Evans Hedrick and he agreed that Sunil does not have any active Chorns, and his obtrusions are due to Stricture. He had no objections to start adjuvant immunotherapy and will monitor his closely.  07/17/2024 accompanied by his wife; Reports feeling well at the baseline of his health status, no changes in chronic conditions or new complaints since the last visit. Just with mild fatigue and is on constipation side now.  08/07/2024 accompanied by wife; Reports feeling well at the baseline of his health status, no changes in chronic conditions or new complaints since the last visit.

## 2024-08-14 NOTE — HISTORY OF PRESENT ILLNESS
[FreeTextEntry1] : 73 y/o male with PMHx of HTN, MI 2006 s/p cardiac stents x4, NIDDM, Crohn's disease seen w wife for tx option re melanoma posterior neck. Pt with multiple prior SCC and BCC (seven--scattered throughout body).   6/28/2021--right postauricualr SCC-- Dr. Lucero to address 0.6mm melanoma--this is reason for referral  nonsmoker  Interval hx (8/17/21). Pt presents today POD#8 s/p excision of posterior neck melanoma. Doing well with no significant pain, f/c or bleeding.   Interval hx (8/25/21). Pt presents today POD#16 s/p excision of posterior neck melanoma. Offers no new complaints. Denies any fever, chills or drainage form the surgical site.   Interval hx (9/29/21): Pt presents today 6 weeks s/p excision of posterior neck melanoma. C/o mild sensitivity to posterior scalp that has been improving. Denies pain or f/c.  Interval hx (10/14/21). Patient presents today 8 weeks post-op for f/u to discuss treatment plan.   Interval hx (11/8/21): Pt presents today POD #7 s/p SLN Bx bilateral neck. C/o discomfort and bruising that is slowly resolving. Denies f/c or bleeding.  Interval hx (11/16/21): Pt presents today POD #15 s/p SLN Bx bilateral neck (negative). Doing well with resolving neck and chest bruising and swelling. Denies any f/c or bleeding.   Interval hx (6/16/22): Pt presents today 8 months s/p SLN Bx bilateral neck (negative). Doing well.   Interval hx (9/1/22). Patient here today POD#6 s/p excision of right dorsal hand skin lesion. Doing well with no significant pain, f/c or drainage.   Interval hx (9/7/22). Patient here today POD#12 s/p excision of right dorsal hand skin lesion. Doing well with no significant complaints.   Interval hx (5/7/24): Pt is here for his 6 month follow up. Had PET CT completed last week.   Interval hx (5/28/24). Pt presents today POD#8 s/p excision of right posterior neck mass. Doing well with no significant pain, f/c or drainage. Completed all prescribed medications.  Interval hx (6/4/24): pt here POD# 15 s/p excision of right posterior neck mass.  Interval hx (7/31/24): Pt is POD# 5 s/p re-excision of right proximal forearm skin lesion. Here for bandage change. Some fatigue but otherwise feels fine. No fever/chills/drainage. Pt started Keytruda 1 month ago, continues to f/u with HemeOnc regularly. MRI 7/3/24 neg for intracranial dz. Hx Chrons dx with multiple bowel obstructions, has Prednisone on standby if needed post-immunotherapy tx. Continues on Apixiban BID (hx afib & unprovoked b/l LE DVT)  Interval hx (8/14/24): Pt is almost 3 weeks s/p re-excision of right proximal forearm skin lesion. Pt was diagnosed with Covid last week, reports he is 15 days from his onset of symptoms and feeling much better. Denies f/c/cp/sob or issues with incision site.

## 2024-08-14 NOTE — ASSESSMENT
[Curative] : Goals of care discussed with patient: Curative [FreeTextEntry1] : # Melanoma of the neck Stage IA - 2021 resected. - multiple SCC and BCC he has light skin complexion. - follows with dermatology and has a skin exam every 3 months. - no indication for blood work or imaging unless he has new signs or symptoms. 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 In retrospect, on previous study this area was 0.6 x 0.6 cm and not FDG avid. Physical examination correlation suggested. No other sites of abnormal FDG uptake. The right upper lobe tree-in-bud nodules are not FDG avid - 05/2024 found to have recurrence in node granted area in the neck as higher than his initial procedure vs new primary?  - NGS negative. - 06/26/2024 started Keytruda 200 mg IV Q21D for 18 cycles. - 07/03/2024 MR HEAD w/w/o IVC - 1. No MRI evidence of intracranial metastatic disease. 2. Mild/moderate chronic microvascular changes. 3. 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  # Prostate Cancer [id number] Sydnee 8 prostate cancer with PSA of 4.15 This was diagnosed on 7/13/2022 -high risk -on ADT to complete about 2 years with Dr. Church and s/p radiation that he completed in December of 2022 -on Prolia and Ambrosio and D -he is being monitored with PSA  -genetic testing was negative  # Chronic lymphopenia r/o idiopathic CD4+ lymphopenia. - CD4 of 164. - patient has Crohn's disease and is not on treatment. There are case reports that pt with Crohn's disease treated for it has a resolution of the lymphopenia; however, he is asymptomatic from Crohn's and therefore, we won't have him be started on any Crohn's treatment. - other workups including immunoglobulin levels, HIV, and JAYNA were negative. - there are rare incidents where viral infections like hep B, hep C, CMV, and EBV can cause lymphopenia these came back negative. - peripheral flow showed - The lymphoid immunophenotypic findings show T cells with normal CD4 to CD8 ratio and rare B cells. - repeat full T cell subset to confirm CD4+ lymphopenia, was 111. - he is asymptomatic and does not require any additional measures at this time. - will consider bone marrow biopsy in future if counts worsened at this time in my opinion unlikely will . - 05/10/2024 CBC showed a low blood cell count of 6.2 hemoglobin 12.9 platelets 180 absolute leukocyte count of 0.51. - he never took the Bactrim for PPX and since antibiotics can decrease efficacy of immunotherapy we decided to hold off for now. - will see if immunotherapy will worsen Lymphocytes.  # Mild normocytic anemia that started after ADT, likely from ADT - CBC stable. - workup including CMP, B12, folate, iron studies, and myeloma labs were negative. - he is on Metformin, and it is suggested he takes Vitamin B12 tabs 500-1000 mcg daily. - he has been on oral ion for years, not clear why, asked him to stop.  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 - needs indefinite anticontagion. - was on Coumadin. - now on Apixiban 5 mg BID moving forward.  # Pulmonary nodule - most recent PET/CT no findings  08/07/2024 Labs reviewed and results discussed with the patient and his wife - remains clinically stable to continue current management. All questions were answered to satisfaction.  PLAN: - continue Keytruda 200 mg IV Q21D for 18 cycles - C3/18 GIVE TODAY. - He is aware of side effects such as flare of his IBD and will take Prednisone 80 mg daily if has more than 4 BMs a day and will call me and call his GI. - he can take Imodium if mild diarrhea and no abdominal pain or bleeding. - continue BM regiment. - repeat PET/CT every 6 months for next 2-3 years - 10/2024. - F/U with Dr. Evans Hedrick -[phone]  - Labs today: CBC CMP TSH Mg. RTC in 3 weeks with CBC CMP TSH Mg and same day treatment.

## 2024-08-14 NOTE — PHYSICAL EXAM
[Fully active, able to carry on all pre-disease performance without restriction] : Status 0 - Fully active, able to carry on all pre-disease performance without restriction [Normal] : affect appropriate [de-identified] : He has an old scar and a new scar now in the right neck area  [de-identified] : He has freckles, light tone skin.

## 2024-08-14 NOTE — CONSULT LETTER
Advised pt, lab orders were faxed to [facility].   [Dear  ___] : Dear  [unfilled], [Consult Letter:] : I had the pleasure of evaluating your patient, [unfilled]. [Please see my note below.] : Please see my note below. [Consult Closing:] : Thank you very much for allowing me to participate in the care of this patient.  If you have any questions, please do not hesitate to contact me. [Sincerely,] : Sincerely, [DrHusam  ___] : Dr. WHEATLEY [DrHusam ___] : Dr. WHEATLEY [FreeTextEntry3] : Ted

## 2024-08-14 NOTE — PHYSICAL EXAM
[Fully active, able to carry on all pre-disease performance without restriction] : Status 0 - Fully active, able to carry on all pre-disease performance without restriction [Normal] : affect appropriate [de-identified] : He has an old scar and a new scar now in the right neck area  [de-identified] : He has freckles, light tone skin.

## 2024-08-14 NOTE — PHYSICAL EXAM
[de-identified] : NAD [de-identified] : excessive sun damage w sig scattered actinic changes [de-identified] : posterior neck incision well healed    [de-identified] : nonlabored breathing,  [de-identified] : right dorsal hand incision healing well with minimal swelling, no erythema or fluid collection  [de-identified] : Right proximal forearm incision is CDI, no open areas/drainage/cellulitis/erythema. No fluid collection. Skin edges intact with nylons in place.

## 2024-08-14 NOTE — ASSESSMENT
[Curative] : Goals of care discussed with patient: Curative [FreeTextEntry1] : # Melanoma of the neck Stage IA - 2021 resected. - multiple SCC and BCC he has light skin complexion. - follows with dermatology and has a skin exam every 3 months. - no indication for blood work or imaging unless he has new signs or symptoms. 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 In retrospect, on previous study this area was 0.6 x 0.6 cm and not FDG avid. Physical examination correlation suggested. No other sites of abnormal FDG uptake. The right upper lobe tree-in-bud nodules are not FDG avid - 05/2024 found to have recurrence in node granted area in the neck as higher than his initial procedure vs new primary?  - NGS negative. - 06/26/2024 started Keytruda 200 mg IV Q21D for 18 cycles. - 07/03/2024 MR HEAD w/w/o IVC - 1. No MRI evidence of intracranial metastatic disease. 2. Mild/moderate chronic microvascular changes. 3. 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  # Prostate Cancer [id number] Sydnee 8 prostate cancer with PSA of 4.15 This was diagnosed on 7/13/2022 -high risk -on ADT to complete about 2 years with Dr. Church and s/p radiation that he completed in December of 2022 -on Prolia and Ambrosio and D -he is being monitored with PSA  -genetic testing was negative  # Chronic lymphopenia r/o idiopathic CD4+ lymphopenia. - CD4 of 164. - patient has Crohn's disease and is not on treatment. There are case reports that pt with Crohn's disease treated for it has a resolution of the lymphopenia; however, he is asymptomatic from Crohn's and therefore, we won't have him be started on any Crohn's treatment. - other workups including immunoglobulin levels, HIV, and JAYNA were negative. - there are rare incidents where viral infections like hep B, hep C, CMV, and EBV can cause lymphopenia these came back negative. - peripheral flow showed - The lymphoid immunophenotypic findings show T cells with normal CD4 to CD8 ratio and rare B cells. - repeat full T cell subset to confirm CD4+ lymphopenia, was 111. - he is asymptomatic and does not require any additional measures at this time. - will consider bone marrow biopsy in future if counts worsened at this time in my opinion unlikely will . - 05/10/2024 CBC showed a low blood cell count of 6.2 hemoglobin 12.9 platelets 180 absolute leukocyte count of 0.51. - he never took the Bactrim for PPX and since antibiotics can decrease efficacy of immunotherapy we decided to hold off for now. - will see if immunotherapy will worsen Lymphocytes.  # Mild normocytic anemia that started after ADT, likely from ADT - CBC stable. - workup including CMP, B12, folate, iron studies, and myeloma labs were negative. - he is on Metformin, and it is suggested he takes Vitamin B12 tabs 500-1000 mcg daily. - he has been on oral ion for years, not clear why, asked him to stop.  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 - needs indefinite anticontagion. - was on Coumadin. - now on Apixiban 5 mg BID moving forward.  # Pulmonary nodule - most recent PET/CT no findings  08/07/2024 Labs reviewed and results discussed with the patient and his wife - remains clinically stable to continue current management. All questions were answered to satisfaction.  PLAN: - continue Keytruda 200 mg IV Q21D for 18 cycles - C3/18 GIVE TODAY. - He is aware of side effects such as flare of his IBD and will take Prednisone 80 mg daily if has more than 4 BMs a day and will call me and call his GI. - he can take Imodium if mild diarrhea and no abdominal pain or bleeding. - continue BM regiment. - repeat PET/CT every 6 months for next 2-3 years - 10/2024. - F/U with Dr. Evans Hedrick -[phone]  - Labs today: CBC CMP TSH Mg. RTC in 3 weeks with CBC CMP TSH Mg and same day treatment.

## 2024-08-28 ENCOUNTER — LABORATORY RESULT (OUTPATIENT)
Age: 74
End: 2024-08-28

## 2024-08-28 ENCOUNTER — APPOINTMENT (OUTPATIENT)
Age: 74
End: 2024-08-28

## 2024-08-28 ENCOUNTER — OUTPATIENT (OUTPATIENT)
Dept: OUTPATIENT SERVICES | Facility: HOSPITAL | Age: 74
LOS: 1 days | End: 2024-08-28
Payer: MEDICARE

## 2024-08-28 VITALS
WEIGHT: 199 LBS | SYSTOLIC BLOOD PRESSURE: 124 MMHG | OXYGEN SATURATION: 96 % | TEMPERATURE: 97.9 F | DIASTOLIC BLOOD PRESSURE: 58 MMHG | RESPIRATION RATE: 16 BRPM | HEART RATE: 73 BPM | BODY MASS INDEX: 30.16 KG/M2 | HEIGHT: 68 IN

## 2024-08-28 VITALS — TEMPERATURE: 98 F | SYSTOLIC BLOOD PRESSURE: 124 MMHG | HEART RATE: 73 BPM | DIASTOLIC BLOOD PRESSURE: 58 MMHG

## 2024-08-28 DIAGNOSIS — Z98.49 CATARACT EXTRACTION STATUS, UNSPECIFIED EYE: Chronic | ICD-10-CM

## 2024-08-28 DIAGNOSIS — E83.42 HYPOMAGNESEMIA: ICD-10-CM

## 2024-08-28 DIAGNOSIS — Z98.890 OTHER SPECIFIED POSTPROCEDURAL STATES: Chronic | ICD-10-CM

## 2024-08-28 DIAGNOSIS — Z95.5 PRESENCE OF CORONARY ANGIOPLASTY IMPLANT AND GRAFT: Chronic | ICD-10-CM

## 2024-08-28 DIAGNOSIS — Z51.11 ENCOUNTER FOR ANTINEOPLASTIC CHEMOTHERAPY: ICD-10-CM

## 2024-08-28 DIAGNOSIS — C43.4 MALIGNANT MELANOMA OF SCALP AND NECK: ICD-10-CM

## 2024-08-28 DIAGNOSIS — Z79.01 LONG TERM (CURRENT) USE OF ANTICOAGULANTS: ICD-10-CM

## 2024-08-28 DIAGNOSIS — Z51.12 ENCOUNTER FOR ANTINEOPLASTIC CHEMOTHERAPY: ICD-10-CM

## 2024-08-28 LAB
HCT VFR BLD CALC: 38.3 %
HGB BLD-MCNC: 13.1 G/DL
MCHC RBC-ENTMCNC: 29.4 PG
MCHC RBC-ENTMCNC: 34.2 G/DL
MCV RBC AUTO: 85.9 FL
PLATELET # BLD AUTO: 191 K/UL
PMV BLD: 10.5 FL
RBC # BLD: 4.46 M/UL
RBC # FLD: 12.4 %
WBC # FLD AUTO: 6.49 K/UL

## 2024-08-28 PROCEDURE — 84443 ASSAY THYROID STIM HORMONE: CPT

## 2024-08-28 PROCEDURE — 36415 COLL VENOUS BLD VENIPUNCTURE: CPT

## 2024-08-28 PROCEDURE — 99214 OFFICE O/P EST MOD 30 MIN: CPT

## 2024-08-28 PROCEDURE — 83735 ASSAY OF MAGNESIUM: CPT

## 2024-08-28 PROCEDURE — 85610 PROTHROMBIN TIME: CPT

## 2024-08-28 PROCEDURE — 96413 CHEMO IV INFUSION 1 HR: CPT

## 2024-08-28 PROCEDURE — 85027 COMPLETE CBC AUTOMATED: CPT

## 2024-08-28 PROCEDURE — 85730 THROMBOPLASTIN TIME PARTIAL: CPT

## 2024-08-28 PROCEDURE — 80053 COMPREHEN METABOLIC PANEL: CPT

## 2024-08-28 RX ORDER — PEMBROLIZUMAB 25 MG/ML
200 INJECTION, SOLUTION INTRAVENOUS ONCE
Refills: 0 | Status: COMPLETED | OUTPATIENT
Start: 2024-08-28 | End: 2024-08-28

## 2024-08-28 RX ADMIN — PEMBROLIZUMAB 200 MILLIGRAM(S): 25 INJECTION, SOLUTION INTRAVENOUS at 15:55

## 2024-08-28 RX ADMIN — PEMBROLIZUMAB 200 MILLIGRAM(S): 25 INJECTION, SOLUTION INTRAVENOUS at 16:25

## 2024-08-29 ENCOUNTER — APPOINTMENT (OUTPATIENT)
Dept: PLASTIC SURGERY | Facility: CLINIC | Age: 74
End: 2024-08-29
Payer: COMMERCIAL

## 2024-08-29 PROCEDURE — 99212 OFFICE O/P EST SF 10 MIN: CPT | Mod: 24

## 2024-08-29 NOTE — ASSESSMENT
[FreeTextEntry1] : 75 yo M with h/o HTN, MI 2006 s/p cardiac stents x4, .A.fib, prostate cancer, unprovoked DVTs, NIDDM, Crohn's disease & multiple cutaneous malignancies (s/p excision of R posteauricular SCC by  most recently)  Presented to [facility] for surgical excision of malignant melanoma of posterior neck.   S/p excision of posterior neck MM with b/l neck SLNB- Aug 2021 s/p excision of R hand dorsum lesion Aug 2022    Pt is now 3 weeks s/p re-excision of right proximal forearm skin lesion.  Doing well   - d/c sutures, steris placed, aquaphor once they fall off - s/s of infection reviewed - continue routine derm follow ups () - Immunotherapy per heme onc - Anticoagulation as advised  - Pathology back and reviewed, however official diagnosis unclear, NOS. Path dept currently closed, will d/w  and reach out to path for clarification if necessary - f/u 2 weeks with   8/29/2024  doing well got Keytruda yesterday (tx 3 of 14)--every thrid Wednesday  all surgical sites fine, healing well  pt inquired re left index dorsal PIPJ lesion  c/w gangliono f extensor tendon  pt inquried re excsion (as catchesin pants pocket occasionally)  Regarding the procedure, we discussed scarring, poor wound healing, bleeding, infection, need for additional surgery, and dissatisfaction with the outcome.  Also discussed possibility of keloid and/or hypertrophic scar formation as well as recurrence.  All questions were answered, and risks understood.   Regarding the procedure, we discussed scarring, poor wound healing, bleeding, infection, need for additional surgery, and dissatisfaction with the outcome.  Also discussed possibility of keloid and/or hypertrophic scar formation as well as recurrence.  All questions were answered, and risks understood. hand

## 2024-08-29 NOTE — PHYSICAL EXAM
[de-identified] : NAD [de-identified] : excessive sun damage w sig scattered actinic changes [de-identified] : posterior neck incision well healed    [de-identified] : nonlabored breathing,  [de-identified] : right dorsal hand incision healing well with minimal swelling, no erythema or fluid collection  [de-identified] : Right proximal forearm incision is CDI, no open areas/drainage/cellulitis/erythema. No fluid collection. Skin edges intact with nylons in place.

## 2024-09-07 PROBLEM — E83.42 HYPOMAGNESEMIA: Status: ACTIVE | Noted: 2024-09-07

## 2024-09-07 LAB
ALBUMIN SERPL ELPH-MCNC: 4.6 G/DL
ALP BLD-CCNC: 88 U/L
ALT SERPL-CCNC: 19 U/L
ANION GAP SERPL CALC-SCNC: 16 MMOL/L
APTT BLD: 41.5 SEC
AST SERPL-CCNC: 23 U/L
BILIRUB SERPL-MCNC: 0.2 MG/DL
BUN SERPL-MCNC: 18 MG/DL
CALCIUM SERPL-MCNC: 9.2 MG/DL
CHLORIDE SERPL-SCNC: 101 MMOL/L
CO2 SERPL-SCNC: 24 MMOL/L
CREAT SERPL-MCNC: 0.9 MG/DL
EGFR: 91 ML/MIN/1.73M2
GLUCOSE SERPL-MCNC: 183 MG/DL
INR PPP: 1.15 RATIO
MAGNESIUM SERPL-MCNC: 1.6 MG/DL
POTASSIUM SERPL-SCNC: 3.3 MMOL/L
PROT SERPL-MCNC: 7 G/DL
PT BLD: 13.1 SEC
SODIUM SERPL-SCNC: 141 MMOL/L
TSH SERPL-ACNC: 0.96 UIU/ML

## 2024-09-07 RX ORDER — MAGNESIUM OXIDE 241.3 MG/1000MG
400 TABLET ORAL DAILY
Qty: 90 | Refills: 3 | Status: ACTIVE | COMMUNITY
Start: 2024-09-07 | End: 1900-01-01

## 2024-09-07 NOTE — PHYSICAL EXAM
[Fully active, able to carry on all pre-disease performance without restriction] : Status 0 - Fully active, able to carry on all pre-disease performance without restriction [Normal] : affect appropriate [de-identified] : He has an old scar and a new scar now in the right neck area  [de-identified] : light tone skin, freckles

## 2024-09-07 NOTE — ASSESSMENT
[Curative] : Goals of care discussed with patient: Curative [FreeTextEntry1] : # Melanoma of the neck Stage IA - 2021 resected. - multiple SCC and BCC he has light skin complexion. - follows with dermatology and has a skin exam every 3 months. - no indication for blood work or imaging unless he has new signs or symptoms. 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 In retrospect, on previous study this area was 0.6 x 0.6 cm and not FDG avid. Physical examination correlation suggested. No other sites of abnormal FDG uptake. The right upper lobe tree-in-bud nodules are not FDG avid - 05/2024 found to have recurrence in node granted area in the neck as higher than his initial procedure vs new primary?  - NGS negative. - 06/26/2024 started Keytruda 200 mg IV Q21D for 18 cycles. - 07/03/2024 MR HEAD w/w/o IVC - 1. No MRI evidence of intracranial metastatic disease. 2. Mild/moderate chronic microvascular changes. 3. 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  # Prostate Cancer [id number] Sydnee 8 prostate cancer with PSA of 4.15 This was diagnosed on 7/13/2022 -high risk -on ADT to complete about 2 years with Dr. Church and s/p radiation that he completed in December of 2022 -on Prolia and Ambrosio and D -he is being monitored with PSA  -genetic testing was negative  # Chronic lymphopenia r/o idiopathic CD4+ lymphopenia. - CD4 of 164. - patient has Crohn's disease and is not on treatment. There are case reports that pt with Crohn's disease treated for it has a resolution of the lymphopenia; however, he is asymptomatic from Crohn's and therefore, we won't have him be started on any Crohn's treatment. - other workups including immunoglobulin levels, HIV, and JAYNA were negative. - there are rare incidents where viral infections like hep B, hep C, CMV, and EBV can cause lymphopenia these came back negative. - peripheral flow showed - The lymphoid immunophenotypic findings show T cells with normal CD4 to CD8 ratio and rare B cells. - repeat full T cell subset to confirm CD4+ lymphopenia, was 111. - he is asymptomatic and does not require any additional measures at this time. - will consider bone marrow biopsy in future if counts worsened at this time in my opinion unlikely will . - 05/10/2024 CBC showed a low blood cell count of 6.2 hemoglobin 12.9 platelets 180 absolute leukocyte count of 0.51. - he never took the Bactrim for PPX and since antibiotics can decrease efficacy of immunotherapy we decided to hold off for now. - will see if immunotherapy will worsen Lymphocytes.  # Mild normocytic anemia that started after ADT, likely from ADT - CBC stable. - workup including CMP, B12, folate, iron studies, and myeloma labs were negative. - he is on Metformin, and it is suggested he takes Vitamin B12 tabs 500-1000 mcg daily. - he has been on oral ion for years, not clear why, asked him to stop.  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 - needs indefinite anticontagion. - was on Coumadin. - now on Apixiban 5 mg BID moving forward.  # Pulmonary nodule - most recent PET/CT no findings  08/28/2024 Labs reviewed and results discussed with the patient; he has extremely poor venous access and agreed to PORT placement  - remains clinically stable to continue current management. All questions were answered to satisfaction.  PLAN: - continue Keytruda 200 mg IV Q21D for 18 cycles - C4/18 GIVE TODAY. - He is aware of side effects such as flare of his IBD and will take Prednisone 80 mg daily if has more than 4 BMs a day and will call me and call his GI. - he can take Imodium if mild diarrhea and no abdominal pain or bleeding. - continue BM regiment. - proceed with PORT placement. - HOLD Eliquis 1-2 days prior PORT placement. - repeat PET/CT every 6 months for next 2-3 years - 10/2024 - ordered. - F/U with Dr. Evans Hedrick -[phone]  - Labs today: CBC CMP TSH Mg. RTC in 3 weeks with CBC CMP TSH Mg and same day treatment.

## 2024-09-07 NOTE — HISTORY OF PRESENT ILLNESS
[Disease: _____________________] : Disease: [unfilled] [AJCC Stage: ____] : AJCC Stage: [unfilled] [de-identified] : CC: I have Prostate cancer, skin cancer.  He is here at the request of Mil Munson  This is a 73-year-old male with PMHx of HTN, MI 2006 s/p cardiac stents x4, NIDDM, Crohn's disease, disease limited to terminal ileum, prostate Cancer s/p radiation, Afib, Multiple prior SCC and BCC 0.6 mm melanoma in 6/28/2021 and he underwent excision of posterior neck melanoma on 7/16/2021 and erma resection 11/2021 Path: 7/16/2021 Final Diagnosis A. One (1) outside H&E slide for review (right post auricular, slide [id number]): - Superficial fragment of squamous cell carcinoma. - The carcinoma extends to the deep edge of the specimen. B. One (1) outside H&E slide for review (posterior neck, slide [id number]): - Malignant melanoma, measuring at least 0.6 mm in thickness. - The lesion extends to the deep and lateral margins.  Note: As per outside pathology report, the CAP approved case summary for melanoma of the skin is as follows: - Histologic Type: NOS - Maximal tumor thickness: At least 0.6 mm - Ulceration: Absent - Surgical margin: Not applicable - Mitotic Index: Less than 1 mitosis / mm2 - Micro satellitosis: Absent - Lymphovascular invasion: Absent - Tumor regression: Absent - Pathology Staging: pT1a.  11/2021 Specimen(s) Submitted 1  Left cervical sentinel lymph node 2  Right supraclavicular sentinel lymph node Final Diagnosis 1. Left cervical sentinel lymph node, dissection: - Five lymph nodes, no melanoma seen (0/5). 2. Right supraclavicular sentinel lymph node, dissection: - Nine lymph nodes, no melanoma seen (0/9).  He also has been following with Jarrett Phipps for pulmonary nodule Review of imaging - 05/30/2023 PET/CT scan Since July 16, 2021, no definite foci of pathologic FDG uptake to suggest biologic tumor activity. - 06/21/2023 Since 5/30/2023 stable clustered nodules right upper lobe-largest approximately 7 mm (302-95) likely related to small airway disease. Follow-up low-dose CT 3 months-time to assess stability suggested.  He was also found to have [id number] Sydnee 8 prostate cancer with PSA of 4.15 This was diagnosed on 7/13/2022 and 5 out of 6 cores in the right prostate lobe with positive Sydnee score 4+4 = 8 with cancer involving 40 to 70% of each positive core. He also had a PSMA PET on 8/30/2022 that did not show any metastatic disease.  He was seen by Dr. Do in [facility] regarding possible radical prostatectomy.    He was started on ADT with Dr. Church in 10/2023 as per patient and underwent radiation treatment with Dr. Major that he completed in late December 2022. Most PSA is undetectable. He was also started on Prolia April 2023.  His Next colonoscopy is in August 2023  He also had history of recurrent venous thromboembolism, Firs one was 2010 left femoral and was started on Coumadin for one year, He stopped it and had a second clot was in the right groin and was than back on Coumadin. He also had left arm clot, maybe provoked we are not sure.   Mom had blood clot during pregnancy.  He sees dermatology every 3 months.  He also had recurrent bowel obstructions from Crohn's disease. [de-identified] : 9/18/2023 He is here for follow up. He was seen by Sondra and Germline testing was negative. Ever since he had proton therapy he started to develop anemia. He also started Eligard that time. 9/27/22 Hgb 14 5/1/2023 Hgb 12.3 8/21/23 WBC 3.7Hgb 11.5, MCV 89.3  UA negative for blood. Diff  Testosterone 10. He is on Eligard. Just had Colonoscopy 9/2023 He states he has been lymphopenia for some time.  10/2/23  Mr. Barrett is here for a follow-up regarding his melanoma and prostate CA and to review his lymphopenia workup results. His full T cell subset showed a low CD4 count of 164 as well as CD3 and CD8 as well. Other tests, including HIV, vitamin level, and immunoelectrophoresis, were essentially negative. He has no new complaint to offer today.   1/10/2024 He is here for follow up. CBC today si stable with mild anemia and lymhopenia.  6/10/2024 He is here for follow up. 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 In retrospect, on previous study this area was 0.6 x 0.6 cm and not FDG avid. Physical examination correlation suggested He than had resection by Dr. Marie  Final Diagnosis Right posterior neck subdermal lesion: -  One lymph node, involved by metastatic melanoma (1/1), (see Comment). The tumor cells are positive for   Vimentin  and Melanoma markers (SOX-10,  Melan-A/focal, S-100/focal rare stain). The tumor cells are negative for AE1/AE3, synaptophysin, CD34, CD45, Desmin, HMB45. Lymphoid marker CD45 highlights lymph node architecture. Single-Gene-Next generation sequencing study for BRAF is pending, and the result will follow as an addendum. He was also admitted for bowel obstruction In April.  He has had 4 bowel obstructions in his life due to Crohn's. He gets constipated and at times soft bowel movement. His GI is  Dr. Evans Hedrick   His PET.CT did not show any activity in the terminal ilium. His CT showed at the time of bowel obstruction: Small bowel obstruction with transition zone in the ileum where there is circumferential wall thickening as well as some luminal stenosis. Interloop ascites. No evidence of pneumatosis or pneumoperitoneum  6/24/24 He is here for follow up. His NGS came back negative for mutations. I spoke with Dr. Evans Hedrick and he agreed that Sunil does not have any active Chorns, and his obtrusions are due to Stricture. He had no objections to start adjuvant immunotherapy and will monitor his closely.  07/17/2024 accompanied by his wife; Reports feeling well at the baseline of his health status, no changes in chronic conditions or new complaints since the last visit. Just with mild fatigue and is on constipation side now.  08/07/2024 accompanied by wife; Reports feeling well at the baseline of his health status, no changes in chronic conditions or new complaints since the last visit.  08/28/2024 Reports feeling well at the baseline of his health status, no changes in chronic conditions or new complaints since the last visit.

## 2024-09-07 NOTE — PHYSICAL EXAM
[Fully active, able to carry on all pre-disease performance without restriction] : Status 0 - Fully active, able to carry on all pre-disease performance without restriction [Normal] : affect appropriate [de-identified] : He has an old scar and a new scar now in the right neck area  [de-identified] : light tone skin, freckles

## 2024-09-07 NOTE — ASSESSMENT
[Curative] : Goals of care discussed with patient: Curative [FreeTextEntry1] : # Melanoma of the neck Stage IA - 2021 resected. - multiple SCC and BCC he has light skin complexion. - follows with dermatology and has a skin exam every 3 months. - no indication for blood work or imaging unless he has new signs or symptoms. 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 In retrospect, on previous study this area was 0.6 x 0.6 cm and not FDG avid. Physical examination correlation suggested. No other sites of abnormal FDG uptake. The right upper lobe tree-in-bud nodules are not FDG avid - 05/2024 found to have recurrence in node granted area in the neck as higher than his initial procedure vs new primary?  - NGS negative. - 06/26/2024 started Keytruda 200 mg IV Q21D for 18 cycles. - 07/03/2024 MR HEAD w/w/o IVC - 1. No MRI evidence of intracranial metastatic disease. 2. Mild/moderate chronic microvascular changes. 3. 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  # Prostate Cancer [id number] Sydnee 8 prostate cancer with PSA of 4.15 This was diagnosed on 7/13/2022 -high risk -on ADT to complete about 2 years with Dr. Church and s/p radiation that he completed in December of 2022 -on Prolia and Ambrosio and D -he is being monitored with PSA  -genetic testing was negative  # Chronic lymphopenia r/o idiopathic CD4+ lymphopenia. - CD4 of 164. - patient has Crohn's disease and is not on treatment. There are case reports that pt with Crohn's disease treated for it has a resolution of the lymphopenia; however, he is asymptomatic from Crohn's and therefore, we won't have him be started on any Crohn's treatment. - other workups including immunoglobulin levels, HIV, and JAYNA were negative. - there are rare incidents where viral infections like hep B, hep C, CMV, and EBV can cause lymphopenia these came back negative. - peripheral flow showed - The lymphoid immunophenotypic findings show T cells with normal CD4 to CD8 ratio and rare B cells. - repeat full T cell subset to confirm CD4+ lymphopenia, was 111. - he is asymptomatic and does not require any additional measures at this time. - will consider bone marrow biopsy in future if counts worsened at this time in my opinion unlikely will . - 05/10/2024 CBC showed a low blood cell count of 6.2 hemoglobin 12.9 platelets 180 absolute leukocyte count of 0.51. - he never took the Bactrim for PPX and since antibiotics can decrease efficacy of immunotherapy we decided to hold off for now. - will see if immunotherapy will worsen Lymphocytes.  # Mild normocytic anemia that started after ADT, likely from ADT - CBC stable. - workup including CMP, B12, folate, iron studies, and myeloma labs were negative. - he is on Metformin, and it is suggested he takes Vitamin B12 tabs 500-1000 mcg daily. - he has been on oral ion for years, not clear why, asked him to stop.  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 - needs indefinite anticontagion. - was on Coumadin. - now on Apixiban 5 mg BID moving forward.  # Pulmonary nodule - most recent PET/CT no findings  08/28/2024 Labs reviewed and results discussed with the patient; he has extremely poor venous access and agreed to PORT placement  - remains clinically stable to continue current management. All questions were answered to satisfaction.  PLAN: - continue Keytruda 200 mg IV Q21D for 18 cycles - C4/18 GIVE TODAY. - He is aware of side effects such as flare of his IBD and will take Prednisone 80 mg daily if has more than 4 BMs a day and will call me and call his GI. - he can take Imodium if mild diarrhea and no abdominal pain or bleeding. - continue BM regiment. - proceed with PORT placement. - HOLD Eliquis 1-2 days prior PORT placement. - repeat PET/CT every 6 months for next 2-3 years - 10/2024 - ordered. - F/U with Dr. Evans Hedrick -[phone]  - Labs today: CBC CMP TSH Mg. RTC in 3 weeks with CBC CMP TSH Mg and same day treatment.

## 2024-09-07 NOTE — HISTORY OF PRESENT ILLNESS
[Disease: _____________________] : Disease: [unfilled] [AJCC Stage: ____] : AJCC Stage: [unfilled] [de-identified] : CC: I have Prostate cancer, skin cancer.  He is here at the request of Mil Munson  This is a 73-year-old male with PMHx of HTN, MI 2006 s/p cardiac stents x4, NIDDM, Crohn's disease, disease limited to terminal ileum, prostate Cancer s/p radiation, Afib, Multiple prior SCC and BCC 0.6 mm melanoma in 6/28/2021 and he underwent excision of posterior neck melanoma on 7/16/2021 and erma resection 11/2021 Path: 7/16/2021 Final Diagnosis A. One (1) outside H&E slide for review (right post auricular, slide [id number]): - Superficial fragment of squamous cell carcinoma. - The carcinoma extends to the deep edge of the specimen. B. One (1) outside H&E slide for review (posterior neck, slide [id number]): - Malignant melanoma, measuring at least 0.6 mm in thickness. - The lesion extends to the deep and lateral margins.  Note: As per outside pathology report, the CAP approved case summary for melanoma of the skin is as follows: - Histologic Type: NOS - Maximal tumor thickness: At least 0.6 mm - Ulceration: Absent - Surgical margin: Not applicable - Mitotic Index: Less than 1 mitosis / mm2 - Micro satellitosis: Absent - Lymphovascular invasion: Absent - Tumor regression: Absent - Pathology Staging: pT1a.  11/2021 Specimen(s) Submitted 1  Left cervical sentinel lymph node 2  Right supraclavicular sentinel lymph node Final Diagnosis 1. Left cervical sentinel lymph node, dissection: - Five lymph nodes, no melanoma seen (0/5). 2. Right supraclavicular sentinel lymph node, dissection: - Nine lymph nodes, no melanoma seen (0/9).  He also has been following with Jarrett Phipps for pulmonary nodule Review of imaging - 05/30/2023 PET/CT scan Since July 16, 2021, no definite foci of pathologic FDG uptake to suggest biologic tumor activity. - 06/21/2023 Since 5/30/2023 stable clustered nodules right upper lobe-largest approximately 7 mm (302-95) likely related to small airway disease. Follow-up low-dose CT 3 months-time to assess stability suggested.  He was also found to have [id number] Sydnee 8 prostate cancer with PSA of 4.15 This was diagnosed on 7/13/2022 and 5 out of 6 cores in the right prostate lobe with positive Sydnee score 4+4 = 8 with cancer involving 40 to 70% of each positive core. He also had a PSMA PET on 8/30/2022 that did not show any metastatic disease.  He was seen by Dr. Do in [facility] regarding possible radical prostatectomy.    He was started on ADT with Dr. Church in 10/2023 as per patient and underwent radiation treatment with Dr. Major that he completed in late December 2022. Most PSA is undetectable. He was also started on Prolia April 2023.  His Next colonoscopy is in August 2023  He also had history of recurrent venous thromboembolism, Firs one was 2010 left femoral and was started on Coumadin for one year, He stopped it and had a second clot was in the right groin and was than back on Coumadin. He also had left arm clot, maybe provoked we are not sure.   Mom had blood clot during pregnancy.  He sees dermatology every 3 months.  He also had recurrent bowel obstructions from Crohn's disease. [de-identified] : 9/18/2023 He is here for follow up. He was seen by Sondra and Germline testing was negative. Ever since he had proton therapy he started to develop anemia. He also started Eligard that time. 9/27/22 Hgb 14 5/1/2023 Hgb 12.3 8/21/23 WBC 3.7Hgb 11.5, MCV 89.3  UA negative for blood. Diff  Testosterone 10. He is on Eligard. Just had Colonoscopy 9/2023 He states he has been lymphopenia for some time.  10/2/23  Mr. Barrett is here for a follow-up regarding his melanoma and prostate CA and to review his lymphopenia workup results. His full T cell subset showed a low CD4 count of 164 as well as CD3 and CD8 as well. Other tests, including HIV, vitamin level, and immunoelectrophoresis, were essentially negative. He has no new complaint to offer today.   1/10/2024 He is here for follow up. CBC today si stable with mild anemia and lymhopenia.  6/10/2024 He is here for follow up. 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 In retrospect, on previous study this area was 0.6 x 0.6 cm and not FDG avid. Physical examination correlation suggested He than had resection by Dr. Marie  Final Diagnosis Right posterior neck subdermal lesion: -  One lymph node, involved by metastatic melanoma (1/1), (see Comment). The tumor cells are positive for   Vimentin  and Melanoma markers (SOX-10,  Melan-A/focal, S-100/focal rare stain). The tumor cells are negative for AE1/AE3, synaptophysin, CD34, CD45, Desmin, HMB45. Lymphoid marker CD45 highlights lymph node architecture. Single-Gene-Next generation sequencing study for BRAF is pending, and the result will follow as an addendum. He was also admitted for bowel obstruction In April.  He has had 4 bowel obstructions in his life due to Crohn's. He gets constipated and at times soft bowel movement. His GI is  Dr. Evans Hedrick   His PET.CT did not show any activity in the terminal ilium. His CT showed at the time of bowel obstruction: Small bowel obstruction with transition zone in the ileum where there is circumferential wall thickening as well as some luminal stenosis. Interloop ascites. No evidence of pneumatosis or pneumoperitoneum  6/24/24 He is here for follow up. His NGS came back negative for mutations. I spoke with Dr. Evans Hedrick and he agreed that Sunil does not have any active Chorns, and his obtrusions are due to Stricture. He had no objections to start adjuvant immunotherapy and will monitor his closely.  07/17/2024 accompanied by his wife; Reports feeling well at the baseline of his health status, no changes in chronic conditions or new complaints since the last visit. Just with mild fatigue and is on constipation side now.  08/07/2024 accompanied by wife; Reports feeling well at the baseline of his health status, no changes in chronic conditions or new complaints since the last visit.  08/28/2024 Reports feeling well at the baseline of his health status, no changes in chronic conditions or new complaints since the last visit.

## 2024-09-07 NOTE — CONSULT LETTER
[Dear  ___] : Dear  [unfilled], [Consult Letter:] : I had the pleasure of evaluating your patient, [unfilled]. [Please see my note below.] : Please see my note below. [Consult Closing:] : Thank you very much for allowing me to participate in the care of this patient.  If you have any questions, please do not hesitate to contact me. [Sincerely,] : Sincerely, [DrHusam  ___] : Dr. WHEATLEY [DrHusam ___] : Dr. WHEATLEY [FreeTextEntry3] : Barbara

## 2024-09-12 ENCOUNTER — TRANSCRIPTION ENCOUNTER (OUTPATIENT)
Age: 74
End: 2024-09-12

## 2024-09-12 ENCOUNTER — OUTPATIENT (OUTPATIENT)
Dept: OUTPATIENT SERVICES | Facility: HOSPITAL | Age: 74
LOS: 1 days | Discharge: HOME | End: 2024-09-12
Payer: MEDICARE

## 2024-09-12 VITALS
HEART RATE: 57 BPM | WEIGHT: 199.96 LBS | RESPIRATION RATE: 18 BRPM | HEIGHT: 68 IN | DIASTOLIC BLOOD PRESSURE: 77 MMHG | OXYGEN SATURATION: 97 % | TEMPERATURE: 97 F | SYSTOLIC BLOOD PRESSURE: 177 MMHG

## 2024-09-12 VITALS
RESPIRATION RATE: 19 BRPM | TEMPERATURE: 97 F | DIASTOLIC BLOOD PRESSURE: 69 MMHG | SYSTOLIC BLOOD PRESSURE: 138 MMHG | HEART RATE: 70 BPM

## 2024-09-12 DIAGNOSIS — Z98.890 OTHER SPECIFIED POSTPROCEDURAL STATES: Chronic | ICD-10-CM

## 2024-09-12 DIAGNOSIS — C43.4 MALIGNANT MELANOMA OF SCALP AND NECK: ICD-10-CM

## 2024-09-12 DIAGNOSIS — C43.9 MALIGNANT MELANOMA OF SKIN, UNSPECIFIED: ICD-10-CM

## 2024-09-12 DIAGNOSIS — Z95.5 PRESENCE OF CORONARY ANGIOPLASTY IMPLANT AND GRAFT: Chronic | ICD-10-CM

## 2024-09-12 DIAGNOSIS — Z98.49 CATARACT EXTRACTION STATUS, UNSPECIFIED EYE: Chronic | ICD-10-CM

## 2024-09-12 LAB — GLUCOSE BLDC GLUCOMTR-MCNC: 225 MG/DL — HIGH (ref 70–99)

## 2024-09-12 PROCEDURE — C1788: CPT

## 2024-09-12 PROCEDURE — 36561 INSERT TUNNELED CV CATH: CPT | Mod: RT

## 2024-09-12 PROCEDURE — 77001 FLUOROGUIDE FOR VEIN DEVICE: CPT | Mod: 26

## 2024-09-12 PROCEDURE — 76937 US GUIDE VASCULAR ACCESS: CPT | Mod: 26

## 2024-09-12 PROCEDURE — 77001 FLUOROGUIDE FOR VEIN DEVICE: CPT

## 2024-09-12 PROCEDURE — 82962 GLUCOSE BLOOD TEST: CPT

## 2024-09-12 PROCEDURE — 76937 US GUIDE VASCULAR ACCESS: CPT

## 2024-09-12 PROCEDURE — C1769: CPT

## 2024-09-12 NOTE — PRE PROCEDURE NOTE - PRE PROCEDURE EVALUATION
73-year-old male with PMHx of HTN, MI 2006 s/p cardiac stents x4, NIDDM, Crohn's disease, disease limited to terminal ileum, prostate Cancer s/p radiation, Afib, Multiple prior SCC and BCC 0.6 mm melanoma in 6/28/2021 and he underwent excision of posterior neck melanoma on 7/16/2021 and erma resection 11/2021 on Keytruda therapy presents today for port catheter placement for continuation of treatment.    Plan for image guided port catheter placement with conscious sedation / anesthesia today 9/12    Patient with hx of factor II and V deficiency, Eliquis held 5 doses (last dose Monday morning)

## 2024-09-12 NOTE — ASU DISCHARGE PLAN (ADULT/PEDIATRIC) - NS MD DC FALL RISK RISK
For information on Fall & Injury Prevention, visit: [email or web address] OR  [email or web address] OR  https://www.cdc.gov/steadi/patient.html

## 2024-09-12 NOTE — PROGRESS NOTE ADULT - SUBJECTIVE AND OBJECTIVE BOX
PROCEDURE:  Venous port placement    Procedural Personnel  Attending(s): Dr. Giorgio Benitez Attending, Radiology  Fellow(s): None  Resident(s): None  Advanced practice provider(s): None    Pre-procedure diagnosis: Melanoma  Post-procedure diagnosis: Same  Indication(s): Administration of chemotherapy  Additional clinical history: None    Complications: No immediate complications.    IMPRESSION:    Technically successful insertion of right-sided power-injectable single-lumen tunneled chest port, with catheter tip in the expected location of the cavoatrial junction.    Plan:     The port may be used immediately.   _______________________________________________________________    PROCEDURE SUMMARY:  - Venous access with ultrasound guidance  - Tunneled port insertion under fluoroscopic guidance    Pre-procedure  History and imaging of central venous access reviewed (QCDR): Yes   Informed consent for the procedure was obtained and time-out was performed prior to the procedure.  Prophylactic antibiotic administered: Within 1 hour of procedure start time or 2 hours for vancomycin or fluoroquinolones    Preparation (MIPS): The site was prepared and draped using all elements of maximal sterile barrier technique including sterile gloves, sterile gown, cap, mask, large sterile sheet, sterile ultrasound probe cover, hand hygiene and cutaneous antisepsis with 2% chlorhexidine.   Medical reason for site preparation exception (MIPS): Not applicable    Anesthesia/sedation  Level of anesthesia/sedation: Monitored anesthesia care  Anesthesia/sedation administered by: Anesthesiology    Access  Local anesthesia was administered. The vessel was sonographically evaluated and judged appropriate for access. Real time ultrasound was used to visualize needle entry into the vessel and a permanent image was stored.  Vein accessed: Internal jugular vein  Access vein ultrasound findings: Patent  Access technique: 5F micropuncture set    Port placement  An incision was made at the upper chest, a pocket was created, and the catheter was tunneled subcutaneously to the venous access site and trimmed to appropriate length. The port was inserted into the pocket and the catheter was advanced via a peel-away sheath into the vein under fluoroscopic guidance. Catheter tip location was fluoroscopically verified and a permanent image was stored.  Port placed: Bard  Catheter size: 8 Occitan  Catheter tip position: Cavoatrial junction  Unique Device Identifier: Not available  Catheter flush: Heparin (100 units/mL)    Closure  Access site closure technique: Tissue adhesive  Incision closure technique: Absorbable suture and tissue adhesive  Sterile dressing(s) applied.  Patient discharged from procedure suite with device accessed: No    Additional Details  Additional description of procedure: None  Additional findings: None  Equipment details: None  Specimens removed: None  Estimated blood loss:  Less than 10 mL

## 2024-09-12 NOTE — ASU PATIENT PROFILE, ADULT - NSICDXPASTMEDICALHX_GEN_ALL_CORE_FT
PAST MEDICAL HISTORY:  2019 novel coronavirus disease (COVID-19) 2022    Afib     CA skin, basal cell     Coronary artery disease 4 STENTS    Crohn's disease     Deep vein thrombosis (DVT)     Diabetes mellitus     Essential tremor HANDS R>L    Factor II deficiency     Factor V Leiden     H/O blood transfusion reaction     H/O neuropathy     H/O therapeutic radiation     Hypertension, unspecified type     Lymphocyte disorder     DWIGHT treated with BiPAP     Prostate cancer     Skin cancer     Skin melanoma

## 2024-09-12 NOTE — ASU PATIENT PROFILE, ADULT - NSICDXPASTSURGICALHX_GEN_ALL_CORE_FT
PAST SURGICAL HISTORY:  H/O basal cell carcinoma excision     H/O melanoma excision     History of coronary angioplasty with insertion of stent     History of Mohs surgery for squamous cell carcinoma of skin     Status post cataract extraction and insertion of intraocular lens ou

## 2024-09-16 ENCOUNTER — APPOINTMENT (OUTPATIENT)
Dept: ENDOCRINOLOGY | Facility: CLINIC | Age: 74
End: 2024-09-16
Payer: MEDICARE

## 2024-09-16 VITALS
OXYGEN SATURATION: 98 % | HEIGHT: 68 IN | SYSTOLIC BLOOD PRESSURE: 124 MMHG | BODY MASS INDEX: 30.46 KG/M2 | WEIGHT: 201 LBS | DIASTOLIC BLOOD PRESSURE: 70 MMHG | HEART RATE: 72 BPM | RESPIRATION RATE: 18 BRPM

## 2024-09-16 DIAGNOSIS — E11.65 TYPE 2 DIABETES MELLITUS WITH HYPERGLYCEMIA: ICD-10-CM

## 2024-09-16 DIAGNOSIS — I10 ESSENTIAL (PRIMARY) HYPERTENSION: ICD-10-CM

## 2024-09-16 DIAGNOSIS — Z86.39 PERSONAL HISTORY OF OTHER ENDOCRINE, NUTRITIONAL AND METABOLIC DISEASE: ICD-10-CM

## 2024-09-16 DIAGNOSIS — G47.33 OBSTRUCTIVE SLEEP APNEA (ADULT) (PEDIATRIC): ICD-10-CM

## 2024-09-16 DIAGNOSIS — Z86.79 PERSONAL HISTORY OF OTHER DISEASES OF THE CIRCULATORY SYSTEM: ICD-10-CM

## 2024-09-16 DIAGNOSIS — E66.9 OBESITY, UNSPECIFIED: ICD-10-CM

## 2024-09-16 PROCEDURE — 99214 OFFICE O/P EST MOD 30 MIN: CPT

## 2024-09-16 RX ORDER — GLIMEPIRIDE 2 MG/1
2 TABLET ORAL
Qty: 90 | Refills: 2 | Status: ACTIVE | COMMUNITY
Start: 2024-09-16 | End: 1900-01-01

## 2024-09-16 RX ORDER — PEMBROLIZUMAB 25 MG/ML
INJECTION, SOLUTION INTRAVENOUS
Refills: 0 | Status: ACTIVE | COMMUNITY

## 2024-09-16 NOTE — DATA REVIEWED
[FreeTextEntry1] : 9/3/24 microalbumin 22, chol 189, hdl 38L, trig 299, ldl 111, glucose 193, gfr 91, hgba1c 7.6, tsh 0.77, ft4 1.1

## 2024-09-16 NOTE — HISTORY OF PRESENT ILLNESS
[Finger Stick] : Finger Stick: Yes [Hypoglycemia] : Patient is not hypoglycemic. [FreeTextEntry9] : 175-200 [FreeTextEntry1] : testing 1-2 x a day

## 2024-09-16 NOTE — PHYSICAL EXAM
[Alert] : alert [Well Nourished] : well nourished [Obese] : obese [No Acute Distress] : no acute distress [Well Developed] : well developed [Normal Sclera/Conjunctiva] : normal sclera/conjunctiva [EOMI] : extra ocular movement intact [PERRL] : pupils equal, round and reactive to light [No Proptosis] : no proptosis [Normal Outer Ear/Nose] : the ears and nose were normal in appearance [Normal Hearing] : hearing was normal [Supple] : the neck was supple [Thyroid Not Enlarged] : the thyroid was not enlarged [No Respiratory Distress] : no respiratory distress [No Accessory Muscle Use] : no accessory muscle use [Normal Rate and Effort] : normal respiratory rate and effort [Clear to Auscultation] : lungs were clear to auscultation bilaterally [Normal S1, S2] : normal S1 and S2 [Normal Rate] : heart rate was normal [Regular Rhythm] : with a regular rhythm [Carotids Normal] : carotid pulses were normal with no bruits [No Edema] : no peripheral edema [Pedal Pulses Normal] : the pedal pulses are present [Normal Bowel Sounds] : normal bowel sounds [Not Tender] : non-tender [Not Distended] : not distended [Soft] : abdomen soft [Normal Anterior Cervical Nodes] : no anterior cervical lymphadenopathy [No CVA Tenderness] : no ~M costovertebral angle tenderness [No Spinal Tenderness] : no spinal tenderness [Spine Straight] : spine straight [No Stigmata of Cushings Syndrome] : no stigmata of Cushings Syndrome [Normal Gait] : normal gait [Normal Strength/Tone] : muscle strength and tone were normal [No Rash] : no rash [Right foot was examined, including] : right foot ~C was examined, including visual inspection with sensory and pulse exams [Left foot was examined, including] : left foot ~C was examined, including visual inspection with sensory and pulse exams [Normal] : normal [Full ROM] : with full range of motion [Normal Reflexes] : deep tendon reflexes were 2+ and symmetric [No Tremors] : no tremors [Oriented x3] : oriented to person, place, and time [Normal Affect] : the affect was normal [Normal Mood] : the mood was normal [Kyphosis] : no kyphosis present [Scoliosis] : no scoliosis [Acanthosis Nigricans] : no acanthosis nigricans [Foot Ulcers] : no foot ulcers [Delayed in the Right Toes] : normal in the toes [Delayed in the Left Toes] : normal in the toes [Diminished Throughout Both Feet] : normal tactile sensation with monofilament testing throughout both feet [#1 Diminished] : number 1 was normal [#2 Diminished] : number 2 was normal [#3 Diminished] : number 3 was normal [#4 Diminished] : number 4 was normal [#5 Diminished] : number 5 was normal [#6 Diminished] : number 6 was normal [#7 Diminished] : number 7 was normal [#8 Diminished] : number 8 was normal [#9 Diminished] : number 9 was normal [#10 Diminished] : number 10 was normal [de-identified] : obesity, diabetes,

## 2024-09-16 NOTE — THERAPY
[Today's Date] : [unfilled] [BG Target = ____] : BG Target = [unfilled] [FreeTextEntry7] : farxiga 10 mg, Metformin 750 mg ER bid

## 2024-09-16 NOTE — ASSESSMENT
[Diabetes Foot Care] : diabetes foot care [Long Term Vascular Complications] : long term vascular complications of diabetes [Carbohydrate Consistent Diet] : carbohydrate consistent diet [Importance of Diet and Exercise] : importance of diet and exercise to improve glycemic control, achieve weight loss and improve cardiovascular health [Exercise/Effect on Glucose] : exercise/effect on glucose [Hypoglycemia Management] : hypoglycemia management [Ketone Testing] : ketone testing [Self Monitoring of Blood Glucose] : self monitoring of blood glucose [Retinopathy Screening] : Patient was referred to ophthalmology for retinopathy screening [Weight Loss] : weight loss [Diabetic Medications] : Risks and benefits of diabetic medications were discussed [FreeTextEntry4] : 1800 valerie ada diet [FreeTextEntry1] : 1. DM2 / Obesity Farxiga 10 mg daily Metformin 750 mg bid Glimepiride 2mg for dinner ( hold if glucose below 110)

## 2024-09-18 ENCOUNTER — APPOINTMENT (OUTPATIENT)
Age: 74
End: 2024-09-18

## 2024-09-18 ENCOUNTER — LABORATORY RESULT (OUTPATIENT)
Age: 74
End: 2024-09-18

## 2024-09-18 ENCOUNTER — OUTPATIENT (OUTPATIENT)
Dept: OUTPATIENT SERVICES | Facility: HOSPITAL | Age: 74
LOS: 1 days | End: 2024-09-18
Payer: MEDICARE

## 2024-09-18 DIAGNOSIS — Z86.03 PERSONAL HISTORY OF NEOPLASM OF UNCERTAIN BEHAVIOR: ICD-10-CM

## 2024-09-18 DIAGNOSIS — I82.90 ACUTE EMBOLISM AND THROMBOSIS OF UNSPECIFIED VEIN: ICD-10-CM

## 2024-09-18 DIAGNOSIS — R79.1 ABNORMAL COAGULATION PROFILE: ICD-10-CM

## 2024-09-18 DIAGNOSIS — E83.42 HYPOMAGNESEMIA: ICD-10-CM

## 2024-09-18 DIAGNOSIS — C61 MALIGNANT NEOPLASM OF PROSTATE: ICD-10-CM

## 2024-09-18 DIAGNOSIS — Z85.820 PERSONAL HISTORY OF MALIGNANT MELANOMA OF SKIN: ICD-10-CM

## 2024-09-18 DIAGNOSIS — Z51.12 ENCOUNTER FOR ANTINEOPLASTIC CHEMOTHERAPY: ICD-10-CM

## 2024-09-18 DIAGNOSIS — G89.18 OTHER ACUTE POSTPROCEDURAL PAIN: ICD-10-CM

## 2024-09-18 DIAGNOSIS — Z98.890 OTHER SPECIFIED POSTPROCEDURAL STATES: Chronic | ICD-10-CM

## 2024-09-18 DIAGNOSIS — C43.4 MALIGNANT MELANOMA OF SCALP AND NECK: ICD-10-CM

## 2024-09-18 DIAGNOSIS — Z51.11 ENCOUNTER FOR ANTINEOPLASTIC CHEMOTHERAPY: ICD-10-CM

## 2024-09-18 DIAGNOSIS — Z86.2 PERSONAL HISTORY OF DISEASES OF THE BLOOD AND BLOOD-FORMING ORGANS AND CERTAIN DISORDERS INVOLVING THE IMMUNE MECHANISM: ICD-10-CM

## 2024-09-18 DIAGNOSIS — Z98.49 CATARACT EXTRACTION STATUS, UNSPECIFIED EYE: Chronic | ICD-10-CM

## 2024-09-18 DIAGNOSIS — Z79.01 LONG TERM (CURRENT) USE OF ANTICOAGULANTS: ICD-10-CM

## 2024-09-18 DIAGNOSIS — Z95.5 PRESENCE OF CORONARY ANGIOPLASTY IMPLANT AND GRAFT: Chronic | ICD-10-CM

## 2024-09-18 LAB
HCT VFR BLD CALC: 37.7 %
HGB BLD-MCNC: 13.2 G/DL
MCHC RBC-ENTMCNC: 29.1 PG
MCHC RBC-ENTMCNC: 35 G/DL
MCV RBC AUTO: 83.2 FL
PLATELET # BLD AUTO: 174 K/UL
PMV BLD: 10.7 FL
RBC # BLD: 4.53 M/UL
RBC # FLD: 12.7 %
WBC # FLD AUTO: 5.77 K/UL

## 2024-09-18 PROCEDURE — 83735 ASSAY OF MAGNESIUM: CPT

## 2024-09-18 PROCEDURE — 99214 OFFICE O/P EST MOD 30 MIN: CPT

## 2024-09-18 PROCEDURE — G2211 COMPLEX E/M VISIT ADD ON: CPT

## 2024-09-18 PROCEDURE — 85027 COMPLETE CBC AUTOMATED: CPT

## 2024-09-18 PROCEDURE — 80053 COMPREHEN METABOLIC PANEL: CPT

## 2024-09-18 PROCEDURE — 96413 CHEMO IV INFUSION 1 HR: CPT

## 2024-09-18 PROCEDURE — 84443 ASSAY THYROID STIM HORMONE: CPT

## 2024-09-18 PROCEDURE — 36415 COLL VENOUS BLD VENIPUNCTURE: CPT

## 2024-09-18 RX ORDER — LIDOCAINE AND PRILOCAINE 25; 25 MG/G; MG/G
2.5-2.5 CREAM TOPICAL
Qty: 1 | Refills: 5 | Status: ACTIVE | COMMUNITY
Start: 2024-09-18 | End: 1900-01-01

## 2024-09-18 RX ORDER — PEMBROLIZUMAB 25 MG/ML
200 INJECTION, SOLUTION INTRAVENOUS ONCE
Refills: 0 | Status: COMPLETED | OUTPATIENT
Start: 2024-09-18 | End: 2024-09-18

## 2024-09-18 RX ADMIN — PEMBROLIZUMAB 200 MILLIGRAM(S): 25 INJECTION, SOLUTION INTRAVENOUS at 15:25

## 2024-09-18 RX ADMIN — PEMBROLIZUMAB 200 MILLIGRAM(S): 25 INJECTION, SOLUTION INTRAVENOUS at 14:54

## 2024-09-19 ENCOUNTER — APPOINTMENT (OUTPATIENT)
Dept: PLASTIC SURGERY | Facility: CLINIC | Age: 74
End: 2024-09-19

## 2024-09-19 DIAGNOSIS — C43.4 MALIGNANT MELANOMA OF SCALP AND NECK: ICD-10-CM

## 2024-09-19 LAB
ALBUMIN SERPL ELPH-MCNC: 4.6 G/DL
ALP BLD-CCNC: 85 U/L
ALT SERPL-CCNC: 19 U/L
ANION GAP SERPL CALC-SCNC: 14 MMOL/L
AST SERPL-CCNC: 18 U/L
BILIRUB SERPL-MCNC: 0.4 MG/DL
BUN SERPL-MCNC: 22 MG/DL
CALCIUM SERPL-MCNC: 9.4 MG/DL
CHLORIDE SERPL-SCNC: 102 MMOL/L
CO2 SERPL-SCNC: 27 MMOL/L
CREAT SERPL-MCNC: 0.9 MG/DL
EGFR: 91 ML/MIN/1.73M2
GLUCOSE SERPL-MCNC: 146 MG/DL
MAGNESIUM SERPL-MCNC: 2 MG/DL
POTASSIUM SERPL-SCNC: 3.8 MMOL/L
PROT SERPL-MCNC: 7 G/DL
SODIUM SERPL-SCNC: 143 MMOL/L
TSH SERPL-ACNC: 0.93 UIU/ML

## 2024-09-26 NOTE — ASSESSMENT
[Curative] : Goals of care discussed with patient: Curative [FreeTextEntry1] : # Melanoma of the neck Stage IA - 2021 resected. - multiple SCC and BCC he has light skin complexion. - follows with dermatology and has a skin exam every 3 months. - no indication for blood work or imaging unless he has new signs or symptoms. 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 In retrospect, on previous study this area was 0.6 x 0.6 cm and not FDG avid. Physical examination correlation suggested. No other sites of abnormal FDG uptake. The right upper lobe tree-in-bud nodules are not FDG avid - 05/2024 found to have recurrence in node granted area in the neck as higher than his initial procedure vs new primary?  - NGS negative. - 06/26/2024 started Keytruda 200 mg IV Q21D for 18 cycles. - 07/03/2024 MR HEAD w/w/o IVC - 1. No MRI evidence of intracranial metastatic disease. 2. Mild/moderate chronic microvascular changes. 3. 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  # Prostate Cancer [id number] Sydnee 8 prostate cancer - high risk - F/U by Dr. Church. - Diagnosed on 07/13/2022. - PSA of 4.15 - 12/2022 completed RT. - on ADT to complete about 2 years. - on Prolia and Ambrosio and D - genetic testing was negative.  # Chronic lymphopenia r/o idiopathic CD4+ lymphopenia. - CD4 of 164. - patient has Crohn's disease and is not on treatment. There are case reports that pt with Crohn's disease treated for it has a resolution of the lymphopenia; however, he is asymptomatic from Crohn's and therefore, we won't have him be started on any Crohn's treatment. - other workups including immunoglobulin levels, HIV, and JAYNA were negative. - there are rare incidents where viral infections like hep B, hep C, CMV, and EBV can cause lymphopenia these came back negative. - peripheral flow showed - The lymphoid immunophenotypic findings show T cells with normal CD4 to CD8 ratio and rare B cells. - repeat full T cell subset to confirm CD4+ lymphopenia, was 111. - he is asymptomatic and does not require any additional measures at this time. - will consider bone marrow biopsy in future if counts worsened at this time in my opinion unlikely will . - 05/10/2024 CBC showed a low blood cell count of 6.2 hemoglobin 12.9 platelets 180 absolute leukocyte count of 0.51. - he never took the Bactrim for PPX and since antibiotics can decrease efficacy of immunotherapy we decided to hold off for now. - will monitor if immunotherapy will worsen Lymphocytes.  # Mild normocytic anemia that started after ADT, likely from ADT - CBC stable. - workup including CMP, B12, folate, iron studies, and myeloma labs were negative. - he is on Metformin, and it is suggested he takes Vitamin B12 tabs 500-1000 mcg daily. - he has been on oral ion for years, not clear why, asked him to stop.  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 - needs indefinite anticontagion. - was on Coumadin. - now on Apixaban 5 mg BID moving forward.  # Pulmonary nodule - most recent PET/CT no findings  09/18/2024 Labs reviewed and results discussed with the patient - remains clinically stable to continue current management. All questions were answered to satisfaction.  PLAN: - continue Keytruda 200 mg IV Q21D for 18 cycles - C5/18 GIVE TODAY. - He is aware of side effects such as flare of his IBD and will take Prednisone 80 mg daily if has more than 4 BMs a day and will call us and call his GI. - continue Imodium if mild diarrhea and no abdominal pain or bleeding. - continue BM regiment. - continue Eliquis - hold 1-2 days prior procedures. - repeat PET/CT every 6 months for next 2-3 years - 10/2024 - ordered. - F/U with Dr. Evans Hedrick -[phone]  - Labs today: CBC CMP TSH Mg. RTC in 3 weeks with CBC CMP TSH Mg and same day treatment.

## 2024-09-26 NOTE — ASSESSMENT
[Curative] : Goals of care discussed with patient: Curative [FreeTextEntry1] : # Melanoma of the neck Stage IA - 2021 resected. - multiple SCC and BCC he has light skin complexion. - follows with dermatology and has a skin exam every 3 months. - no indication for blood work or imaging unless he has new signs or symptoms. 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 In retrospect, on previous study this area was 0.6 x 0.6 cm and not FDG avid. Physical examination correlation suggested. No other sites of abnormal FDG uptake. The right upper lobe tree-in-bud nodules are not FDG avid - 05/2024 found to have recurrence in node granted area in the neck as higher than his initial procedure vs new primary?  - NGS negative. - 06/26/2024 started Keytruda 200 mg IV Q21D for 18 cycles. - 07/03/2024 MR HEAD w/w/o IVC - 1. No MRI evidence of intracranial metastatic disease. 2. Mild/moderate chronic microvascular changes. 3. 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  # Prostate Cancer [id number] Sydnee 8 prostate cancer - high risk - F/U by Dr. Church. - Diagnosed on 07/13/2022. - PSA of 4.15 - 12/2022 completed RT. - on ADT to complete about 2 years. - on Prolia and Ambrosio and D - genetic testing was negative.  # Chronic lymphopenia r/o idiopathic CD4+ lymphopenia. - CD4 of 164. - patient has Crohn's disease and is not on treatment. There are case reports that pt with Crohn's disease treated for it has a resolution of the lymphopenia; however, he is asymptomatic from Crohn's and therefore, we won't have him be started on any Crohn's treatment. - other workups including immunoglobulin levels, HIV, and JAYNA were negative. - there are rare incidents where viral infections like hep B, hep C, CMV, and EBV can cause lymphopenia these came back negative. - peripheral flow showed - The lymphoid immunophenotypic findings show T cells with normal CD4 to CD8 ratio and rare B cells. - repeat full T cell subset to confirm CD4+ lymphopenia, was 111. - he is asymptomatic and does not require any additional measures at this time. - will consider bone marrow biopsy in future if counts worsened at this time in my opinion unlikely will . - 05/10/2024 CBC showed a low blood cell count of 6.2 hemoglobin 12.9 platelets 180 absolute leukocyte count of 0.51. - he never took the Bactrim for PPX and since antibiotics can decrease efficacy of immunotherapy we decided to hold off for now. - will monitor if immunotherapy will worsen Lymphocytes.  # Mild normocytic anemia that started after ADT, likely from ADT - CBC stable. - workup including CMP, B12, folate, iron studies, and myeloma labs were negative. - he is on Metformin, and it is suggested he takes Vitamin B12 tabs 500-1000 mcg daily. - he has been on oral ion for years, not clear why, asked him to stop.  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 - needs indefinite anticontagion. - was on Coumadin. - now on Apixaban 5 mg BID moving forward.  # Pulmonary nodule - most recent PET/CT no findings  09/18/2024 Labs reviewed and results discussed with the patient - remains clinically stable to continue current management. All questions were answered to satisfaction.  PLAN: - continue Keytruda 200 mg IV Q21D for 18 cycles - C5/18 GIVE TODAY. - He is aware of side effects such as flare of his IBD and will take Prednisone 80 mg daily if has more than 4 BMs a day and will call us and call his GI. - continue Imodium if mild diarrhea and no abdominal pain or bleeding. - continue BM regiment. - continue Eliquis - hold 1-2 days prior procedures. - repeat PET/CT every 6 months for next 2-3 years - 10/2024 - ordered. - F/U with Dr. Evans Hedrick -[phone]  - Labs today: CBC CMP TSH Mg. RTC in 3 weeks with CBC CMP TSH Mg and same day treatment.

## 2024-09-26 NOTE — ASSESSMENT
[Curative] : Goals of care discussed with patient: Curative [FreeTextEntry1] : # Melanoma of the neck Stage IA - 2021 resected. - multiple SCC and BCC he has light skin complexion. - follows with dermatology and has a skin exam every 3 months. - no indication for blood work or imaging unless he has new signs or symptoms. 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 In retrospect, on previous study this area was 0.6 x 0.6 cm and not FDG avid. Physical examination correlation suggested. No other sites of abnormal FDG uptake. The right upper lobe tree-in-bud nodules are not FDG avid - 05/2024 found to have recurrence in node granted area in the neck as higher than his initial procedure vs new primary?  - NGS negative. - 06/26/2024 started Keytruda 200 mg IV Q21D for 18 cycles. - 07/03/2024 MR HEAD w/w/o IVC - 1. No MRI evidence of intracranial metastatic disease. 2. Mild/moderate chronic microvascular changes. 3. 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  # Prostate Cancer [id number] Sydnee 8 prostate cancer - high risk - F/U by Dr. Church. - Diagnosed on 07/13/2022. - PSA of 4.15 - 12/2022 completed RT. - on ADT to complete about 2 years. - on Prolia and Ambrosio and D - genetic testing was negative.  # Chronic lymphopenia r/o idiopathic CD4+ lymphopenia. - CD4 of 164. - patient has Crohn's disease and is not on treatment. There are case reports that pt with Crohn's disease treated for it has a resolution of the lymphopenia; however, he is asymptomatic from Crohn's and therefore, we won't have him be started on any Crohn's treatment. - other workups including immunoglobulin levels, HIV, and JAYNA were negative. - there are rare incidents where viral infections like hep B, hep C, CMV, and EBV can cause lymphopenia these came back negative. - peripheral flow showed - The lymphoid immunophenotypic findings show T cells with normal CD4 to CD8 ratio and rare B cells. - repeat full T cell subset to confirm CD4+ lymphopenia, was 111. - he is asymptomatic and does not require any additional measures at this time. - will consider bone marrow biopsy in future if counts worsened at this time in my opinion unlikely will . - 05/10/2024 CBC showed a low blood cell count of 6.2 hemoglobin 12.9 platelets 180 absolute leukocyte count of 0.51. - he never took the Bactrim for PPX and since antibiotics can decrease efficacy of immunotherapy we decided to hold off for now. - will monitor if immunotherapy will worsen Lymphocytes.  # Mild normocytic anemia that started after ADT, likely from ADT - CBC stable. - workup including CMP, B12, folate, iron studies, and myeloma labs were negative. - he is on Metformin, and it is suggested he takes Vitamin B12 tabs 500-1000 mcg daily. - he has been on oral ion for years, not clear why, asked him to stop.  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 - needs indefinite anticontagion. - was on Coumadin. - now on Apixaban 5 mg BID moving forward.  # Pulmonary nodule - most recent PET/CT no findings  09/18/2024 Labs reviewed and results discussed with the patient - remains clinically stable to continue current management. All questions were answered to satisfaction.  PLAN: - continue Keytruda 200 mg IV Q21D for 18 cycles - C5/18 GIVE TODAY. - He is aware of side effects such as flare of his IBD and will take Prednisone 80 mg daily if has more than 4 BMs a day and will call us and call his GI. - continue Imodium if mild diarrhea and no abdominal pain or bleeding. - continue BM regiment. - continue Eliquis - hold 1-2 days prior procedures. - repeat PET/CT every 6 months for next 2-3 years - 10/2024 - ordered. - F/U with Dr. Evans Hedrick -[phone]  - Labs today: CBC CMP TSH Mg. RTC in 3 weeks with CBC CMP TSH Mg and same day treatment.

## 2024-09-26 NOTE — PHYSICAL EXAM
[Fully active, able to carry on all pre-disease performance without restriction] : Status 0 - Fully active, able to carry on all pre-disease performance without restriction [Normal] : affect appropriate [Restricted in physically strenuous activity but ambulatory and able to carry out work of a light or sedentary nature] : Status 1- Restricted in physically strenuous activity but ambulatory and able to carry out work of a light or sedentary nature, e.g., light house work, office work [de-identified] : He has an old scar and a new scar now in the right neck area  [de-identified] : PORT at right chest [de-identified] : light tone skin, freckles

## 2024-09-26 NOTE — END OF VISIT
[FreeTextEntry3] : I was physically present for the key portions of the evaluation and management service provided.  I agree with the history and physical, and plan which I have reviewed and edited where appropriate.  Sunil is here for follow-up he is doing well he continues on Keytruda for his adjuvant therapy for recurrent melanoma he is doing well pending a repeat PET scan in October no main issues with immune-mediated toxicity especially GI will continue to monitor blood work and go from there and he will see us back in 3 weeks

## 2024-09-26 NOTE — HISTORY OF PRESENT ILLNESS
[Disease: _____________________] : Disease: [unfilled] [AJCC Stage: ____] : AJCC Stage: [unfilled] [de-identified] : CC: I have Prostate cancer, skin cancer.  He is here at the request of Mil Munson  This is a 73-year-old male with PMHx of HTN, MI 2006 s/p cardiac stents x4, NIDDM, Crohn's disease, disease limited to terminal ileum, prostate Cancer s/p radiation, Afib, Multiple prior SCC and BCC 0.6 mm melanoma in 6/28/2021 and he underwent excision of posterior neck melanoma on 7/16/2021 and erma resection 11/2021 Path: 7/16/2021 Final Diagnosis A. One (1) outside H&E slide for review (right post auricular, slide [id number]): - Superficial fragment of squamous cell carcinoma. - The carcinoma extends to the deep edge of the specimen. B. One (1) outside H&E slide for review (posterior neck, slide [id number]): - Malignant melanoma, measuring at least 0.6 mm in thickness. - The lesion extends to the deep and lateral margins.  Note: As per outside pathology report, the CAP approved case summary for melanoma of the skin is as follows: - Histologic Type: NOS - Maximal tumor thickness: At least 0.6 mm - Ulceration: Absent - Surgical margin: Not applicable - Mitotic Index: Less than 1 mitosis / mm2 - Micro satellitosis: Absent - Lymphovascular invasion: Absent - Tumor regression: Absent - Pathology Staging: pT1a.  11/2021 Specimen(s) Submitted 1  Left cervical sentinel lymph node 2  Right supraclavicular sentinel lymph node Final Diagnosis 1. Left cervical sentinel lymph node, dissection: - Five lymph nodes, no melanoma seen (0/5). 2. Right supraclavicular sentinel lymph node, dissection: - Nine lymph nodes, no melanoma seen (0/9).  He also has been following with Jarrett Phipps for pulmonary nodule Review of imaging - 05/30/2023 PET/CT scan Since July 16, 2021, no definite foci of pathologic FDG uptake to suggest biologic tumor activity. - 06/21/2023 Since 5/30/2023 stable clustered nodules right upper lobe-largest approximately 7 mm (302-95) likely related to small airway disease. Follow-up low-dose CT 3 months-time to assess stability suggested.  He was also found to have [id number] Sydnee 8 prostate cancer with PSA of 4.15 This was diagnosed on 7/13/2022 and 5 out of 6 cores in the right prostate lobe with positive Sydnee score 4+4 = 8 with cancer involving 40 to 70% of each positive core. He also had a PSMA PET on 8/30/2022 that did not show any metastatic disease.  He was seen by Dr. Do in [facility] regarding possible radical prostatectomy.    He was started on ADT with Dr. Church in 10/2023 as per patient and underwent radiation treatment with Dr. Major that he completed in late December 2022. Most PSA is undetectable. He was also started on Prolia April 2023.  His Next colonoscopy is in August 2023  He also had history of recurrent venous thromboembolism, Firs one was 2010 left femoral and was started on Coumadin for one year, He stopped it and had a second clot was in the right groin and was than back on Coumadin. He also had left arm clot, maybe provoked we are not sure.   Mom had blood clot during pregnancy.  He sees dermatology every 3 months.  He also had recurrent bowel obstructions from Crohn's disease. [de-identified] : 9/18/2023 He is here for follow up. He was seen by Sondra and Germline testing was negative. Ever since he had proton therapy, he started to develop anemia. He also started Eligard that time. 9/27/22 Hgb 14 5/1/2023 Hgb 12.3 8/21/23 WBC 3.7Hgb 11.5, MCV 89.3  UA negative for blood. Diff  Testosterone 10. He is on Eligard. Just had Colonoscopy 9/2023 He states he has been lymphopenia for some time.  10/2/23  Mr. Barrett is here for a follow-up regarding his melanoma and prostate CA and to review his lymphopenia workup results. His full T cell subset showed a low CD4 count of 164 as well as CD3 and CD8 as well. Other tests, including HIV, vitamin level, and immunoelectrophoresis, were essentially negative. He has no new complaint to offer today.   1/10/2024 He is here for follow up. CBC today si stable with mild anemia and lymhopenia.  6/10/2024 He is here for follow up. 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 In retrospect, on previous study this area was 0.6 x 0.6 cm and not FDG avid. Physical examination correlation suggested He then had resection by Dr. Marie  Final Diagnosis Right posterior neck subdermal lesion: -  One lymph node, involved by metastatic melanoma (1/1), (see Comment). The tumor cells are positive for Vimentin and Melanoma markers (SOX-10, Melan-A/focal, S-100/focal rare stain). The tumor cells are negative for AE1/AE3, synaptophysin, CD34, CD45, Desmin, HMB45. Lymphoid marker CD45 highlights lymph node architecture. Single-Gene-Next generation sequencing study for BRAF is pending, and the result will follow as an addendum. He was also admitted for bowel obstruction In April.  He has had 4 bowel obstructions in his life due to Crohn's. He gets constipated and at times soft bowel movement. His GI is  Dr. Evans Hedrick   His PET.CT did not show any activity in the terminal ilium. His CT showed at the time of bowel obstruction: Small bowel obstruction with transition zone in the ileum where there is circumferential wall thickening as well as some luminal stenosis. Interloop ascites. No evidence of pneumatosis or pneumoperitoneum  6/24/24 He is here for follow up. His NGS came back negative for mutations. I spoke with Dr. Evans Hedrick and he agreed that Sunil does not have any active Chorns, and his obtrusions are due to Stricture. He had no objections to start adjuvant immunotherapy and will monitor his closely.  07/17/2024 accompanied by his wife; Reports feeling well at the baseline of his health status, no changes in chronic conditions or new complaints since the last visit. Just with mild fatigue and is on constipation side now.  08/07/2024 accompanied by wife; Reports feeling well at the baseline of his health status, no changes in chronic conditions or new complaints since the last visit.  08/28/2024 Reports feeling well at the baseline of his health status, no changes in chronic conditions or new complaints since the last visit.  09/18/2024 He recently recovered from COVID. Now fees well and is ready to continue his treat,emt. show

## 2024-09-26 NOTE — PHYSICAL EXAM
[Fully active, able to carry on all pre-disease performance without restriction] : Status 0 - Fully active, able to carry on all pre-disease performance without restriction [Normal] : affect appropriate [Restricted in physically strenuous activity but ambulatory and able to carry out work of a light or sedentary nature] : Status 1- Restricted in physically strenuous activity but ambulatory and able to carry out work of a light or sedentary nature, e.g., light house work, office work [de-identified] : He has an old scar and a new scar now in the right neck area  [de-identified] : PORT at right chest [de-identified] : light tone skin, freckles

## 2024-09-26 NOTE — HISTORY OF PRESENT ILLNESS
[Disease: _____________________] : Disease: [unfilled] [AJCC Stage: ____] : AJCC Stage: [unfilled] [de-identified] : CC: I have Prostate cancer, skin cancer.  He is here at the request of Mil Munson  This is a 73-year-old male with PMHx of HTN, MI 2006 s/p cardiac stents x4, NIDDM, Crohn's disease, disease limited to terminal ileum, prostate Cancer s/p radiation, Afib, Multiple prior SCC and BCC 0.6 mm melanoma in 6/28/2021 and he underwent excision of posterior neck melanoma on 7/16/2021 and erma resection 11/2021 Path: 7/16/2021 Final Diagnosis A. One (1) outside H&E slide for review (right post auricular, slide [id number]): - Superficial fragment of squamous cell carcinoma. - The carcinoma extends to the deep edge of the specimen. B. One (1) outside H&E slide for review (posterior neck, slide [id number]): - Malignant melanoma, measuring at least 0.6 mm in thickness. - The lesion extends to the deep and lateral margins.  Note: As per outside pathology report, the CAP approved case summary for melanoma of the skin is as follows: - Histologic Type: NOS - Maximal tumor thickness: At least 0.6 mm - Ulceration: Absent - Surgical margin: Not applicable - Mitotic Index: Less than 1 mitosis / mm2 - Micro satellitosis: Absent - Lymphovascular invasion: Absent - Tumor regression: Absent - Pathology Staging: pT1a.  11/2021 Specimen(s) Submitted 1  Left cervical sentinel lymph node 2  Right supraclavicular sentinel lymph node Final Diagnosis 1. Left cervical sentinel lymph node, dissection: - Five lymph nodes, no melanoma seen (0/5). 2. Right supraclavicular sentinel lymph node, dissection: - Nine lymph nodes, no melanoma seen (0/9).  He also has been following with Jarrett Phipps for pulmonary nodule Review of imaging - 05/30/2023 PET/CT scan Since July 16, 2021, no definite foci of pathologic FDG uptake to suggest biologic tumor activity. - 06/21/2023 Since 5/30/2023 stable clustered nodules right upper lobe-largest approximately 7 mm (302-95) likely related to small airway disease. Follow-up low-dose CT 3 months-time to assess stability suggested.  He was also found to have [id number] Sydnee 8 prostate cancer with PSA of 4.15 This was diagnosed on 7/13/2022 and 5 out of 6 cores in the right prostate lobe with positive Sydnee score 4+4 = 8 with cancer involving 40 to 70% of each positive core. He also had a PSMA PET on 8/30/2022 that did not show any metastatic disease.  He was seen by Dr. Do in [facility] regarding possible radical prostatectomy.    He was started on ADT with Dr. Church in 10/2023 as per patient and underwent radiation treatment with Dr. Major that he completed in late December 2022. Most PSA is undetectable. He was also started on Prolia April 2023.  His Next colonoscopy is in August 2023  He also had history of recurrent venous thromboembolism, Firs one was 2010 left femoral and was started on Coumadin for one year, He stopped it and had a second clot was in the right groin and was than back on Coumadin. He also had left arm clot, maybe provoked we are not sure.   Mom had blood clot during pregnancy.  He sees dermatology every 3 months.  He also had recurrent bowel obstructions from Crohn's disease. [de-identified] : 9/18/2023 He is here for follow up. He was seen by Sondra and Germline testing was negative. Ever since he had proton therapy, he started to develop anemia. He also started Eligard that time. 9/27/22 Hgb 14 5/1/2023 Hgb 12.3 8/21/23 WBC 3.7Hgb 11.5, MCV 89.3  UA negative for blood. Diff  Testosterone 10. He is on Eligard. Just had Colonoscopy 9/2023 He states he has been lymphopenia for some time.  10/2/23  Mr. Barrett is here for a follow-up regarding his melanoma and prostate CA and to review his lymphopenia workup results. His full T cell subset showed a low CD4 count of 164 as well as CD3 and CD8 as well. Other tests, including HIV, vitamin level, and immunoelectrophoresis, were essentially negative. He has no new complaint to offer today.   1/10/2024 He is here for follow up. CBC today si stable with mild anemia and lymhopenia.  6/10/2024 He is here for follow up. 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 In retrospect, on previous study this area was 0.6 x 0.6 cm and not FDG avid. Physical examination correlation suggested He then had resection by Dr. Marie  Final Diagnosis Right posterior neck subdermal lesion: -  One lymph node, involved by metastatic melanoma (1/1), (see Comment). The tumor cells are positive for Vimentin and Melanoma markers (SOX-10, Melan-A/focal, S-100/focal rare stain). The tumor cells are negative for AE1/AE3, synaptophysin, CD34, CD45, Desmin, HMB45. Lymphoid marker CD45 highlights lymph node architecture. Single-Gene-Next generation sequencing study for BRAF is pending, and the result will follow as an addendum. He was also admitted for bowel obstruction In April.  He has had 4 bowel obstructions in his life due to Crohn's. He gets constipated and at times soft bowel movement. His GI is  Dr. Evans Hedrick   His PET.CT did not show any activity in the terminal ilium. His CT showed at the time of bowel obstruction: Small bowel obstruction with transition zone in the ileum where there is circumferential wall thickening as well as some luminal stenosis. Interloop ascites. No evidence of pneumatosis or pneumoperitoneum  6/24/24 He is here for follow up. His NGS came back negative for mutations. I spoke with Dr. Evans Hedrick and he agreed that Sunil does not have any active Chorns, and his obtrusions are due to Stricture. He had no objections to start adjuvant immunotherapy and will monitor his closely.  07/17/2024 accompanied by his wife; Reports feeling well at the baseline of his health status, no changes in chronic conditions or new complaints since the last visit. Just with mild fatigue and is on constipation side now.  08/07/2024 accompanied by wife; Reports feeling well at the baseline of his health status, no changes in chronic conditions or new complaints since the last visit.  08/28/2024 Reports feeling well at the baseline of his health status, no changes in chronic conditions or new complaints since the last visit.  09/18/2024 He recently recovered from COVID. Now fees well and is ready to continue his treat,emt.

## 2024-09-26 NOTE — HISTORY OF PRESENT ILLNESS
[Disease: _____________________] : Disease: [unfilled] [AJCC Stage: ____] : AJCC Stage: [unfilled] [de-identified] : CC: I have Prostate cancer, skin cancer.  He is here at the request of Mil Munson  This is a 73-year-old male with PMHx of HTN, MI 2006 s/p cardiac stents x4, NIDDM, Crohn's disease, disease limited to terminal ileum, prostate Cancer s/p radiation, Afib, Multiple prior SCC and BCC 0.6 mm melanoma in 6/28/2021 and he underwent excision of posterior neck melanoma on 7/16/2021 and erma resection 11/2021 Path: 7/16/2021 Final Diagnosis A. One (1) outside H&E slide for review (right post auricular, slide [id number]): - Superficial fragment of squamous cell carcinoma. - The carcinoma extends to the deep edge of the specimen. B. One (1) outside H&E slide for review (posterior neck, slide [id number]): - Malignant melanoma, measuring at least 0.6 mm in thickness. - The lesion extends to the deep and lateral margins.  Note: As per outside pathology report, the CAP approved case summary for melanoma of the skin is as follows: - Histologic Type: NOS - Maximal tumor thickness: At least 0.6 mm - Ulceration: Absent - Surgical margin: Not applicable - Mitotic Index: Less than 1 mitosis / mm2 - Micro satellitosis: Absent - Lymphovascular invasion: Absent - Tumor regression: Absent - Pathology Staging: pT1a.  11/2021 Specimen(s) Submitted 1  Left cervical sentinel lymph node 2  Right supraclavicular sentinel lymph node Final Diagnosis 1. Left cervical sentinel lymph node, dissection: - Five lymph nodes, no melanoma seen (0/5). 2. Right supraclavicular sentinel lymph node, dissection: - Nine lymph nodes, no melanoma seen (0/9).  He also has been following with Jarrett Phipps for pulmonary nodule Review of imaging - 05/30/2023 PET/CT scan Since July 16, 2021, no definite foci of pathologic FDG uptake to suggest biologic tumor activity. - 06/21/2023 Since 5/30/2023 stable clustered nodules right upper lobe-largest approximately 7 mm (302-95) likely related to small airway disease. Follow-up low-dose CT 3 months-time to assess stability suggested.  He was also found to have [id number] Sydnee 8 prostate cancer with PSA of 4.15 This was diagnosed on 7/13/2022 and 5 out of 6 cores in the right prostate lobe with positive Sydnee score 4+4 = 8 with cancer involving 40 to 70% of each positive core. He also had a PSMA PET on 8/30/2022 that did not show any metastatic disease.  He was seen by Dr. Do in [facility] regarding possible radical prostatectomy.    He was started on ADT with Dr. Church in 10/2023 as per patient and underwent radiation treatment with Dr. Majro that he completed in late December 2022. Most PSA is undetectable. He was also started on Prolia April 2023.  His Next colonoscopy is in August 2023  He also had history of recurrent venous thromboembolism, Firs one was 2010 left femoral and was started on Coumadin for one year, He stopped it and had a second clot was in the right groin and was than back on Coumadin. He also had left arm clot, maybe provoked we are not sure.   Mom had blood clot during pregnancy.  He sees dermatology every 3 months.  He also had recurrent bowel obstructions from Crohn's disease. [de-identified] : 9/18/2023 He is here for follow up. He was seen by Sondra and Germline testing was negative. Ever since he had proton therapy, he started to develop anemia. He also started Eligard that time. 9/27/22 Hgb 14 5/1/2023 Hgb 12.3 8/21/23 WBC 3.7Hgb 11.5, MCV 89.3  UA negative for blood. Diff  Testosterone 10. He is on Eligard. Just had Colonoscopy 9/2023 He states he has been lymphopenia for some time.  10/2/23  Mr. Barrett is here for a follow-up regarding his melanoma and prostate CA and to review his lymphopenia workup results. His full T cell subset showed a low CD4 count of 164 as well as CD3 and CD8 as well. Other tests, including HIV, vitamin level, and immunoelectrophoresis, were essentially negative. He has no new complaint to offer today.   1/10/2024 He is here for follow up. CBC today si stable with mild anemia and lymhopenia.  6/10/2024 He is here for follow up. 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 In retrospect, on previous study this area was 0.6 x 0.6 cm and not FDG avid. Physical examination correlation suggested He then had resection by Dr. Marie  Final Diagnosis Right posterior neck subdermal lesion: -  One lymph node, involved by metastatic melanoma (1/1), (see Comment). The tumor cells are positive for Vimentin and Melanoma markers (SOX-10, Melan-A/focal, S-100/focal rare stain). The tumor cells are negative for AE1/AE3, synaptophysin, CD34, CD45, Desmin, HMB45. Lymphoid marker CD45 highlights lymph node architecture. Single-Gene-Next generation sequencing study for BRAF is pending, and the result will follow as an addendum. He was also admitted for bowel obstruction In April.  He has had 4 bowel obstructions in his life due to Crohn's. He gets constipated and at times soft bowel movement. His GI is  Dr. Evans Hedrick   His PET.CT did not show any activity in the terminal ilium. His CT showed at the time of bowel obstruction: Small bowel obstruction with transition zone in the ileum where there is circumferential wall thickening as well as some luminal stenosis. Interloop ascites. No evidence of pneumatosis or pneumoperitoneum  6/24/24 He is here for follow up. His NGS came back negative for mutations. I spoke with Dr. Evans Hedrick and he agreed that Sunil does not have any active Chorns, and his obtrusions are due to Stricture. He had no objections to start adjuvant immunotherapy and will monitor his closely.  07/17/2024 accompanied by his wife; Reports feeling well at the baseline of his health status, no changes in chronic conditions or new complaints since the last visit. Just with mild fatigue and is on constipation side now.  08/07/2024 accompanied by wife; Reports feeling well at the baseline of his health status, no changes in chronic conditions or new complaints since the last visit.  08/28/2024 Reports feeling well at the baseline of his health status, no changes in chronic conditions or new complaints since the last visit.  09/18/2024 He recently recovered from COVID. Now fees well and is ready to continue his treat,emt.

## 2024-09-26 NOTE — PHYSICAL EXAM
[Fully active, able to carry on all pre-disease performance without restriction] : Status 0 - Fully active, able to carry on all pre-disease performance without restriction [Normal] : affect appropriate [Restricted in physically strenuous activity but ambulatory and able to carry out work of a light or sedentary nature] : Status 1- Restricted in physically strenuous activity but ambulatory and able to carry out work of a light or sedentary nature, e.g., light house work, office work [de-identified] : He has an old scar and a new scar now in the right neck area  [de-identified] : PORT at right chest [de-identified] : light tone skin, freckles

## 2024-09-30 ENCOUNTER — APPOINTMENT (OUTPATIENT)
Dept: INTERVENTIONAL RADIOLOGY/VASCULAR | Facility: CLINIC | Age: 74
End: 2024-09-30
Payer: MEDICARE

## 2024-09-30 VITALS
TEMPERATURE: 97.2 F | SYSTOLIC BLOOD PRESSURE: 132 MMHG | HEART RATE: 59 BPM | OXYGEN SATURATION: 95 % | DIASTOLIC BLOOD PRESSURE: 70 MMHG

## 2024-09-30 PROCEDURE — 99212 OFFICE O/P EST SF 10 MIN: CPT

## 2024-09-30 NOTE — PHYSICAL EXAM
[No Respiratory Distress] : no respiratory distress [Normal Rate and Effort] : normal respiratory rhythm and effort [Not Tender] : non-tender [Soft] : abdomen soft [de-identified] : port site c/d/i, so erythema, steri-strips still in place

## 2024-09-30 NOTE — ASSESSMENT
[FreeTextEntry1] : 74 year-old male s/p port placement.  Plan:  Ok to continue using the port.  Advised to use soap and water and let steri-strips fall off on their own.  If they haven't fallen off in 1-2 weeks he will call use to remove them.

## 2024-10-09 ENCOUNTER — LABORATORY RESULT (OUTPATIENT)
Age: 74
End: 2024-10-09

## 2024-10-09 ENCOUNTER — APPOINTMENT (OUTPATIENT)
Age: 74
End: 2024-10-09
Payer: MEDICARE

## 2024-10-09 DIAGNOSIS — C43.4 MALIGNANT MELANOMA OF SCALP AND NECK: ICD-10-CM

## 2024-10-09 DIAGNOSIS — D72.810 LYMPHOCYTOPENIA: ICD-10-CM

## 2024-10-09 DIAGNOSIS — Z51.12 ENCOUNTER FOR ANTINEOPLASTIC CHEMOTHERAPY: ICD-10-CM

## 2024-10-09 DIAGNOSIS — Z51.11 ENCOUNTER FOR ANTINEOPLASTIC CHEMOTHERAPY: ICD-10-CM

## 2024-10-09 LAB
ALBUMIN SERPL ELPH-MCNC: 4.6 G/DL
ALP BLD-CCNC: 73 U/L
ALT SERPL-CCNC: 18 U/L
ANION GAP SERPL CALC-SCNC: 12 MMOL/L
AST SERPL-CCNC: 24 U/L
BILIRUB SERPL-MCNC: 0.4 MG/DL
BUN SERPL-MCNC: 19 MG/DL
CALCIUM SERPL-MCNC: 9.5 MG/DL
CHLORIDE SERPL-SCNC: 101 MMOL/L
CO2 SERPL-SCNC: 28 MMOL/L
CREAT SERPL-MCNC: 0.8 MG/DL
EGFR: 94 ML/MIN/1.73M2
GLUCOSE SERPL-MCNC: 120 MG/DL
HCT VFR BLD CALC: 38.2 %
HGB BLD-MCNC: 13.3 G/DL
MAGNESIUM SERPL-MCNC: 1.7 MG/DL
MCHC RBC-ENTMCNC: 28.9 PG
MCHC RBC-ENTMCNC: 34.8 G/DL
MCV RBC AUTO: 82.9 FL
PLATELET # BLD AUTO: 173 K/UL
PMV BLD: 10.2 FL
POTASSIUM SERPL-SCNC: 4 MMOL/L
PROT SERPL-MCNC: 7.1 G/DL
RBC # BLD: 4.61 M/UL
RBC # FLD: 13.2 %
SODIUM SERPL-SCNC: 141 MMOL/L
WBC # FLD AUTO: 5.12 K/UL

## 2024-10-09 PROCEDURE — G2211 COMPLEX E/M VISIT ADD ON: CPT

## 2024-10-09 PROCEDURE — 99214 OFFICE O/P EST MOD 30 MIN: CPT

## 2024-10-10 ENCOUNTER — RESULT REVIEW (OUTPATIENT)
Age: 74
End: 2024-10-10

## 2024-10-10 ENCOUNTER — OUTPATIENT (OUTPATIENT)
Dept: OUTPATIENT SERVICES | Facility: HOSPITAL | Age: 74
LOS: 1 days | End: 2024-10-10
Payer: MEDICARE

## 2024-10-10 DIAGNOSIS — Z00.8 ENCOUNTER FOR OTHER GENERAL EXAMINATION: ICD-10-CM

## 2024-10-10 DIAGNOSIS — Z98.890 OTHER SPECIFIED POSTPROCEDURAL STATES: Chronic | ICD-10-CM

## 2024-10-10 DIAGNOSIS — Z95.5 PRESENCE OF CORONARY ANGIOPLASTY IMPLANT AND GRAFT: Chronic | ICD-10-CM

## 2024-10-10 DIAGNOSIS — Z98.49 CATARACT EXTRACTION STATUS, UNSPECIFIED EYE: Chronic | ICD-10-CM

## 2024-10-10 DIAGNOSIS — C43.4 MALIGNANT MELANOMA OF SCALP AND NECK: ICD-10-CM

## 2024-10-10 LAB
GLUCOSE BLDC GLUCOMTR-MCNC: 142 MG/DL — HIGH (ref 70–99)
TSH SERPL-ACNC: 2.16 UIU/ML

## 2024-10-10 PROCEDURE — A9552: CPT

## 2024-10-10 PROCEDURE — 82962 GLUCOSE BLOOD TEST: CPT

## 2024-10-10 PROCEDURE — 78816 PET IMAGE W/CT FULL BODY: CPT | Mod: 26,PS,MH

## 2024-10-10 PROCEDURE — 78816 PET IMAGE W/CT FULL BODY: CPT | Mod: PS

## 2024-10-11 ENCOUNTER — APPOINTMENT (OUTPATIENT)
Dept: PLASTIC SURGERY | Facility: CLINIC | Age: 74
End: 2024-10-11

## 2024-10-11 DIAGNOSIS — D48.5 NEOPLASM OF UNCERTAIN BEHAVIOR OF SKIN: ICD-10-CM

## 2024-10-11 DIAGNOSIS — C43.4 MALIGNANT MELANOMA OF SCALP AND NECK: ICD-10-CM

## 2024-10-11 PROCEDURE — 11421 EXC H-F-NK-SP B9+MARG 0.6-1: CPT

## 2024-10-11 PROCEDURE — 13131 CMPLX RPR F/C/C/M/N/AX/G/H/F: CPT

## 2024-10-15 LAB — CORE LAB BIOPSY: NORMAL

## 2024-10-15 NOTE — ED ADULT TRIAGE NOTE - MODE OF ARRIVAL
Physical Therapy  Physical Therapy Orthopedic Evaluation    Patient Name: Dayanara Nelson  MRN: [id number]  Today's Date: 10/15/2024  Time Calculation  Start Time: 1520  Stop Time: 1610  Time Calculation (min): 50 min    Insurance:  Visit number: 1 of 20  Authorization info: NAN   Insurance Type: [place] (no vaso)     General:  Reason for visit: Right ankle sprain   Referred by: Dr. Cochran    School: [employer]  Sport: golf and ice hockey     Current Problem:  1. Acute right ankle pain  Follow Up In Physical Therapy          Precautions: Hx two L knee surgeries (2023, 2024)         Medical History Form: Reviewed (scanned into chart)    Subjective:     Chief Complaint: 18 year old female presents with acute R ankle pain/injury that occurred 9/22 while playing hockey. Mechanism and symptoms consistent with ankle sprain, particularly involving the syndesmotic. PT went to ED and was placed into a boot. Pt has had persistent pain since the injury and recently had an MRI- see findings below. Patient is continuing to have significant pain in her ankle, wearing boot and using crutches.   Parents gave permission for patient to be treated without them present.   Pt reportedly has surgery scheduled for October 31st.     Previously treated patient this year for her left knee s/p knee scope in January 2024.   Onset Date: 9/22/24  JEREMIAH: Hockey    Current Condition:   Worse    Pain:  Pain Assessment: 0-10  0-10 (Numeric) Pain Score: 7  Location: Right ankle  Description: throbbing,aching    Current: 7/10  Worst pain: 9/10  Aggravating Factors: Plantarflexion, Dorsiflexion, Inversion, Eversion, Standing, Walking, Stairs, Squatting, Running, and Jumping  Relieving Factors:  Rest    Relevant Information (PMH & Previous Tests/Imaging): MRI:  Findings suggestive of low-grade sprain of the anterior-inferior tibiofibular ligament. Mild bone marrow edema of the medial malleolus likely an osseous contusion in  the appropriate clinical context. Moderate-sized tibiotalar joint effusion with heterogeneous debris suggestive of synovitis.     Previous Interventions/Treatments: None    Prior Level of Function (PLOF)  Patient previously independent with all ADLs  Exercise/Physical Activity: hockey, lifting   Work/School: Sophomore at [employer]    Patients Living Environment: Reviewed and no concern    Primary Language: English    There are no spiritual/cultural practices/values/needs that are important to know    Patient's Goal(s) for Therapy: get back to playing hockey, fix ankle    Red Flags: Do you have any of the following? No  Fever/chills, unexplained weight changes, dizziness/fainting, unexplained change in bowel or bladder functions, unexplained malaise or muscle weakness, night pain/sweats, numbness or tingling    Objective:  Objective       ROM       Knee AROM (Degrees)      (R)  (L)  WNL       Ankle AROM (Degrees)      (R)  (L)  Plantarflexion: 25  65    Dorsiflexion: -15  5    Inversion: 18  30     Eversion: 8  16         Ankle PROM (Degrees)      (R)  (L)  Plantarflexion: 28  65     Dorsiflexion: -5  5    Inversion: 20  30     Eversion: 10  16       Pain with all ROM assessment and strength testing      Strength Testing       Knee    (R)  (L)  Flexion: 5  5     Extension: 5  5        Ankle    (R)  (L)  Plantarflexion: 3  5      Dorsiflexion: 3  5    Inversion: 3  5     Eversion: 3  5         Pain with all resisted testing R ankle     Palpation: + TTP near lateral malleolus, anterior interosseus, ATFL, peroneals       Ankle/Foot Joint Mobility: unable to assess due to patient high pain levels       Observation: antalgic gait- unable to put weight on R foot without boot on       Orthotics: none      Gait: amb with boot and B crutches into clinic minimal weight on R ankle      Figure 8: 44 cm R and L   Malleolar girth R 21.5cm L 21cm       Functional Screening  Pain with all WB exercise        Special Tests    NT due to  "recent MRI and pt high symptom levels      Outcome Measures:  Other Measures  Lower Extremity Funtional Score (LEFS): 5/80       EDUCATION: Home exercise program, plan of care, activity modifications, pain management, and injury pathology       Goals: Set and discussed today  Active       PT Problem       PT Goal 1       Start:  10/16/24    Expected End:  02/05/25       Patient will verbalize pain < 2/10 at rest and 6/10 worst by surgery, worst pain 3/10 by 16 weeks   AROM R DF to 0 degrees, PF >50 deg, inversion >25 and eversion > 15 deg to improve joint mechanics by 2 weeks (surgery) full R ankle ROM by 8 weeks post op   Patient will correctly perform home exercise program to progress functional status by 1 week   Patient able to perform SLS on foam for 1 minute without loss of balance to demonstrate improved stability and prevent falls   Strength of R DF, PF, inversion and eversion will be 90% or 5/5 MMT of the uninvolved side by discharge to demonstrate adequate stability and reduce risk of injury  LEFS score > 20/80 to demonstrate overall improved functional abilities by 6-8 weeks, 70/80 by discharge     LTG  Patient able to return to playing hockey  Patient will pass all ankle RTS testing for clearance to return to play                  Plan of care was developed with input and agreement by the patient and parent    Treatment Performed:      Therapeutic Exercise:    15 min  Ankle pumps-reviewed for HEP  Ankle ABCs-reviewed for HEP  Seated calf raises 2 x 10   Ankle isometrics 4 way with ball and band 2 x 10 5\" hold   Hip abduction/extension with band OTB   Towel curls 3 x 1 minute     Manual Therapy:    5 min  PROM R ankle    Neuromuscular Re-education:  0 min  0    Other:     10 min  Ice R ankle after session    PT Evaluation Time Entry  PT Evaluation (Low) Time Entry: 20  PT Therapeutic Procedures Time Entry  Manual Therapy Time Entry: 5  Therapeutic Exercise Time Entry: 15              Non-Billable " Time  Non-billable time: 10  Non-billable time reason: ice       Assessment: 18 year old female presents with complaints of acute right ankle sprain. Patient impairments include: limited right ankle ROM, pain with weight bearing, decreased strength, mild effusion, gait deviations and functional limitations. These result in limited participation in pain-free ADLs and inability to perform at their prior level of function. Pt would benefit from physical therapy to address the impairments found & listed previously in the objective section in order to return to safe and pain-free ADLs and prior level of function. Goals are to optimize ankle mobility and strength prior to surgery to improve her outcomes. Nura perform re-evaluation after surgery.        Clinical Presentation: Stable and/or uncomplicated characteristics    Plan:     Therapy plan of care will include the following interventions: aquatic therapy, gait training, dry needling, therapeutic exercise, therapeutic activities, education/instruction, home program, electrical stimulation, manual therapy, mechanical traction, neuromuscular re-education, biofeedback, kinesiotape, cryotherapy, vasopneumatic device with cold, edema control, self care/home management, blood flow restriction (BFR)     Frequency: 1-2 x Week  Duration: 16 weeks (including post op)   Rehab Potential/Prognosis: Adam Avitia, PT    Private Auto Walk in

## 2024-10-24 ENCOUNTER — APPOINTMENT (OUTPATIENT)
Dept: PLASTIC SURGERY | Facility: CLINIC | Age: 74
End: 2024-10-24
Payer: MEDICARE

## 2024-10-24 DIAGNOSIS — M67.449 GANGLION, UNSPECIFIED HAND: ICD-10-CM

## 2024-10-24 PROCEDURE — 99212 OFFICE O/P EST SF 10 MIN: CPT

## 2024-10-30 ENCOUNTER — LABORATORY RESULT (OUTPATIENT)
Age: 74
End: 2024-10-30

## 2024-10-30 ENCOUNTER — OUTPATIENT (OUTPATIENT)
Dept: OUTPATIENT SERVICES | Facility: HOSPITAL | Age: 74
LOS: 1 days | End: 2024-10-30
Payer: MEDICARE

## 2024-10-30 ENCOUNTER — APPOINTMENT (OUTPATIENT)
Age: 74
End: 2024-10-30
Payer: MEDICARE

## 2024-10-30 VITALS — HEART RATE: 62 BPM | DIASTOLIC BLOOD PRESSURE: 72 MMHG | SYSTOLIC BLOOD PRESSURE: 132 MMHG | TEMPERATURE: 98 F

## 2024-10-30 VITALS
TEMPERATURE: 97.9 F | WEIGHT: 200 LBS | RESPIRATION RATE: 18 BRPM | HEIGHT: 68 IN | BODY MASS INDEX: 30.31 KG/M2 | SYSTOLIC BLOOD PRESSURE: 137 MMHG | HEART RATE: 126 BPM | DIASTOLIC BLOOD PRESSURE: 82 MMHG

## 2024-10-30 DIAGNOSIS — Z51.11 ENCOUNTER FOR ANTINEOPLASTIC CHEMOTHERAPY: ICD-10-CM

## 2024-10-30 DIAGNOSIS — Z51.12 ENCOUNTER FOR ANTINEOPLASTIC CHEMOTHERAPY: ICD-10-CM

## 2024-10-30 DIAGNOSIS — Z98.49 CATARACT EXTRACTION STATUS, UNSPECIFIED EYE: Chronic | ICD-10-CM

## 2024-10-30 DIAGNOSIS — C43.4 MALIGNANT MELANOMA OF SCALP AND NECK: ICD-10-CM

## 2024-10-30 DIAGNOSIS — Z95.5 PRESENCE OF CORONARY ANGIOPLASTY IMPLANT AND GRAFT: Chronic | ICD-10-CM

## 2024-10-30 DIAGNOSIS — C61 MALIGNANT NEOPLASM OF PROSTATE: ICD-10-CM

## 2024-10-30 DIAGNOSIS — Z98.890 OTHER SPECIFIED POSTPROCEDURAL STATES: Chronic | ICD-10-CM

## 2024-10-30 DIAGNOSIS — E83.42 HYPOMAGNESEMIA: ICD-10-CM

## 2024-10-30 LAB
HCT VFR BLD CALC: 38.3 %
HGB BLD-MCNC: 13.4 G/DL
MCHC RBC-ENTMCNC: 29.3 PG
MCHC RBC-ENTMCNC: 35 G/DL
MCV RBC AUTO: 83.6 FL
PLATELET # BLD AUTO: 175 K/UL
PMV BLD: 10.7 FL
RBC # BLD: 4.58 M/UL
RBC # FLD: 13.2 %
WBC # FLD AUTO: 5.52 K/UL

## 2024-10-30 PROCEDURE — 84443 ASSAY THYROID STIM HORMONE: CPT

## 2024-10-30 PROCEDURE — G2211 COMPLEX E/M VISIT ADD ON: CPT

## 2024-10-30 PROCEDURE — 99214 OFFICE O/P EST MOD 30 MIN: CPT

## 2024-10-30 PROCEDURE — 85027 COMPLETE CBC AUTOMATED: CPT

## 2024-10-30 PROCEDURE — 96413 CHEMO IV INFUSION 1 HR: CPT

## 2024-10-30 PROCEDURE — 80053 COMPREHEN METABOLIC PANEL: CPT

## 2024-10-30 PROCEDURE — 36415 COLL VENOUS BLD VENIPUNCTURE: CPT

## 2024-10-30 PROCEDURE — 83735 ASSAY OF MAGNESIUM: CPT

## 2024-10-30 RX ORDER — PEMBROLIZUMAB 50 MG/2ML
200 INJECTION, POWDER, LYOPHILIZED, FOR SOLUTION INTRAVENOUS ONCE
Refills: 0 | Status: COMPLETED | OUTPATIENT
Start: 2024-10-30 | End: 2024-10-30

## 2024-10-30 RX ADMIN — PEMBROLIZUMAB 200 MILLIGRAM(S): 50 INJECTION, POWDER, LYOPHILIZED, FOR SOLUTION INTRAVENOUS at 14:57

## 2024-10-30 RX ADMIN — PEMBROLIZUMAB 200 MILLIGRAM(S): 50 INJECTION, POWDER, LYOPHILIZED, FOR SOLUTION INTRAVENOUS at 14:26

## 2024-10-31 LAB
ALBUMIN SERPL ELPH-MCNC: 4.3 G/DL
ALP BLD-CCNC: 96 U/L
ALT SERPL-CCNC: 20 U/L
ANION GAP SERPL CALC-SCNC: 14 MMOL/L
AST SERPL-CCNC: 20 U/L
BILIRUB SERPL-MCNC: 0.3 MG/DL
BUN SERPL-MCNC: 23 MG/DL
CALCIUM SERPL-MCNC: 9.3 MG/DL
CHLORIDE SERPL-SCNC: 103 MMOL/L
CO2 SERPL-SCNC: 24 MMOL/L
CREAT SERPL-MCNC: 0.9 MG/DL
EGFR: 91 ML/MIN/1.73M2
GLUCOSE SERPL-MCNC: 203 MG/DL
MAGNESIUM SERPL-MCNC: 1.8 MG/DL
POTASSIUM SERPL-SCNC: 3.4 MMOL/L
PROT SERPL-MCNC: 6.9 G/DL
SODIUM SERPL-SCNC: 141 MMOL/L
TSH SERPL-ACNC: 3.16 UIU/ML

## 2024-11-18 ENCOUNTER — APPOINTMENT (OUTPATIENT)
Dept: ORTHOPEDIC SURGERY | Facility: CLINIC | Age: 74
End: 2024-11-18
Payer: MEDICARE

## 2024-11-18 DIAGNOSIS — M79.641 PAIN IN RIGHT HAND: ICD-10-CM

## 2024-11-18 PROCEDURE — 72050 X-RAY EXAM NECK SPINE 4/5VWS: CPT

## 2024-11-18 PROCEDURE — 99203 OFFICE O/P NEW LOW 30 MIN: CPT

## 2024-11-18 PROCEDURE — 73130 X-RAY EXAM OF HAND: CPT | Mod: RT

## 2024-11-20 ENCOUNTER — LABORATORY RESULT (OUTPATIENT)
Age: 74
End: 2024-11-20

## 2024-11-20 ENCOUNTER — APPOINTMENT (OUTPATIENT)
Age: 74
End: 2024-11-20
Payer: MEDICARE

## 2024-11-20 ENCOUNTER — OUTPATIENT (OUTPATIENT)
Dept: OUTPATIENT SERVICES | Facility: HOSPITAL | Age: 74
LOS: 1 days | End: 2024-11-20
Payer: MEDICARE

## 2024-11-20 VITALS
HEART RATE: 67 BPM | SYSTOLIC BLOOD PRESSURE: 106 MMHG | WEIGHT: 201 LBS | BODY MASS INDEX: 30.46 KG/M2 | HEIGHT: 68 IN | TEMPERATURE: 97.8 F | DIASTOLIC BLOOD PRESSURE: 68 MMHG

## 2024-11-20 DIAGNOSIS — Z98.49 CATARACT EXTRACTION STATUS, UNSPECIFIED EYE: Chronic | ICD-10-CM

## 2024-11-20 DIAGNOSIS — Z51.11 ENCOUNTER FOR ANTINEOPLASTIC CHEMOTHERAPY: ICD-10-CM

## 2024-11-20 DIAGNOSIS — C43.4 MALIGNANT MELANOMA OF SCALP AND NECK: ICD-10-CM

## 2024-11-20 DIAGNOSIS — Z98.890 OTHER SPECIFIED POSTPROCEDURAL STATES: Chronic | ICD-10-CM

## 2024-11-20 DIAGNOSIS — E83.42 HYPOMAGNESEMIA: ICD-10-CM

## 2024-11-20 DIAGNOSIS — Z79.01 LONG TERM (CURRENT) USE OF ANTICOAGULANTS: ICD-10-CM

## 2024-11-20 DIAGNOSIS — Z95.5 PRESENCE OF CORONARY ANGIOPLASTY IMPLANT AND GRAFT: Chronic | ICD-10-CM

## 2024-11-20 DIAGNOSIS — C61 MALIGNANT NEOPLASM OF PROSTATE: ICD-10-CM

## 2024-11-20 DIAGNOSIS — Z51.12 ENCOUNTER FOR ANTINEOPLASTIC CHEMOTHERAPY: ICD-10-CM

## 2024-11-20 LAB
HCT VFR BLD CALC: 37.5 %
HGB BLD-MCNC: 13.1 G/DL
MCHC RBC-ENTMCNC: 30 PG
MCHC RBC-ENTMCNC: 34.9 G/DL
MCV RBC AUTO: 85.8 FL
PLATELET # BLD AUTO: 185 K/UL
PMV BLD: 10.7 FL
RBC # BLD: 4.37 M/UL
RBC # FLD: 13.5 %
WBC # FLD AUTO: 7.03 K/UL

## 2024-11-20 PROCEDURE — 96413 CHEMO IV INFUSION 1 HR: CPT

## 2024-11-20 PROCEDURE — G2211 COMPLEX E/M VISIT ADD ON: CPT

## 2024-11-20 PROCEDURE — 99214 OFFICE O/P EST MOD 30 MIN: CPT

## 2024-11-20 PROCEDURE — 83735 ASSAY OF MAGNESIUM: CPT

## 2024-11-20 PROCEDURE — 84443 ASSAY THYROID STIM HORMONE: CPT

## 2024-11-20 PROCEDURE — 36415 COLL VENOUS BLD VENIPUNCTURE: CPT

## 2024-11-20 PROCEDURE — 80053 COMPREHEN METABOLIC PANEL: CPT

## 2024-11-20 PROCEDURE — 85027 COMPLETE CBC AUTOMATED: CPT

## 2024-11-20 RX ORDER — PEMBROLIZUMAB 50 MG/2ML
200 INJECTION, POWDER, LYOPHILIZED, FOR SOLUTION INTRAVENOUS ONCE
Refills: 0 | Status: COMPLETED | OUTPATIENT
Start: 2024-11-20 | End: 2024-11-20

## 2024-11-20 RX ADMIN — PEMBROLIZUMAB 200 MILLIGRAM(S): 50 INJECTION, POWDER, LYOPHILIZED, FOR SOLUTION INTRAVENOUS at 16:24

## 2024-11-20 RX ADMIN — PEMBROLIZUMAB 200 MILLIGRAM(S): 50 INJECTION, POWDER, LYOPHILIZED, FOR SOLUTION INTRAVENOUS at 16:55

## 2024-11-21 ENCOUNTER — APPOINTMENT (OUTPATIENT)
Dept: PULMONOLOGY | Facility: CLINIC | Age: 74
End: 2024-11-21

## 2024-11-21 DIAGNOSIS — E83.42 HYPOMAGNESEMIA: ICD-10-CM

## 2024-11-21 LAB
ALBUMIN SERPL ELPH-MCNC: 4.4 G/DL
ALP BLD-CCNC: 76 U/L
ALT SERPL-CCNC: 20 U/L
ANION GAP SERPL CALC-SCNC: 11 MMOL/L
AST SERPL-CCNC: 20 U/L
BILIRUB SERPL-MCNC: 0.8 MG/DL
BUN SERPL-MCNC: 22 MG/DL
CALCIUM SERPL-MCNC: 9.6 MG/DL
CHLORIDE SERPL-SCNC: 101 MMOL/L
CO2 SERPL-SCNC: 26 MMOL/L
CREAT SERPL-MCNC: 1.4 MG/DL
EGFR: 53 ML/MIN/1.73M2
GLUCOSE SERPL-MCNC: 104 MG/DL
MAGNESIUM SERPL-MCNC: 2 MG/DL
POTASSIUM SERPL-SCNC: 3.8 MMOL/L
PROT SERPL-MCNC: 6.8 G/DL
SODIUM SERPL-SCNC: 138 MMOL/L
TSH SERPL-ACNC: 6.84 UIU/ML

## 2024-12-10 ENCOUNTER — OUTPATIENT (OUTPATIENT)
Dept: OUTPATIENT SERVICES | Facility: HOSPITAL | Age: 74
LOS: 1 days | End: 2024-12-10
Payer: MEDICARE

## 2024-12-10 ENCOUNTER — APPOINTMENT (OUTPATIENT)
Age: 74
End: 2024-12-10
Payer: MEDICARE

## 2024-12-10 ENCOUNTER — LABORATORY RESULT (OUTPATIENT)
Age: 74
End: 2024-12-10

## 2024-12-10 VITALS
SYSTOLIC BLOOD PRESSURE: 162 MMHG | OXYGEN SATURATION: 97 % | HEART RATE: 66 BPM | RESPIRATION RATE: 16 BRPM | BODY MASS INDEX: 31.37 KG/M2 | TEMPERATURE: 97.6 F | DIASTOLIC BLOOD PRESSURE: 80 MMHG | HEIGHT: 68 IN | WEIGHT: 207 LBS

## 2024-12-10 DIAGNOSIS — Z98.890 OTHER SPECIFIED POSTPROCEDURAL STATES: Chronic | ICD-10-CM

## 2024-12-10 DIAGNOSIS — E83.42 HYPOMAGNESEMIA: ICD-10-CM

## 2024-12-10 DIAGNOSIS — C61 MALIGNANT NEOPLASM OF PROSTATE: ICD-10-CM

## 2024-12-10 DIAGNOSIS — C43.4 MALIGNANT MELANOMA OF SCALP AND NECK: ICD-10-CM

## 2024-12-10 DIAGNOSIS — E03.2 HYPOTHYROIDISM DUE TO MEDICAMENTS AND OTHER EXOGENOUS SUBSTANCES: ICD-10-CM

## 2024-12-10 DIAGNOSIS — Z95.5 PRESENCE OF CORONARY ANGIOPLASTY IMPLANT AND GRAFT: Chronic | ICD-10-CM

## 2024-12-10 DIAGNOSIS — Z98.49 CATARACT EXTRACTION STATUS, UNSPECIFIED EYE: Chronic | ICD-10-CM

## 2024-12-10 LAB
HCT VFR BLD CALC: 35.3 %
HGB BLD-MCNC: 12.3 G/DL
MCHC RBC-ENTMCNC: 30 PG
MCHC RBC-ENTMCNC: 34.8 G/DL
MCV RBC AUTO: 86.1 FL
PLATELET # BLD AUTO: 147 K/UL
PMV BLD: 11.1 FL
RBC # BLD: 4.1 M/UL
RBC # FLD: 13.2 %
WBC # FLD AUTO: 4.9 K/UL

## 2024-12-10 PROCEDURE — 85027 COMPLETE CBC AUTOMATED: CPT

## 2024-12-10 PROCEDURE — 84443 ASSAY THYROID STIM HORMONE: CPT

## 2024-12-10 PROCEDURE — 99214 OFFICE O/P EST MOD 30 MIN: CPT

## 2024-12-10 PROCEDURE — 83735 ASSAY OF MAGNESIUM: CPT

## 2024-12-10 PROCEDURE — 36415 COLL VENOUS BLD VENIPUNCTURE: CPT

## 2024-12-10 PROCEDURE — 80053 COMPREHEN METABOLIC PANEL: CPT

## 2024-12-11 ENCOUNTER — APPOINTMENT (OUTPATIENT)
Age: 74
End: 2024-12-11

## 2024-12-11 ENCOUNTER — OUTPATIENT (OUTPATIENT)
Dept: OUTPATIENT SERVICES | Facility: HOSPITAL | Age: 74
LOS: 1 days | End: 2024-12-11
Payer: MEDICARE

## 2024-12-11 DIAGNOSIS — Z98.890 OTHER SPECIFIED POSTPROCEDURAL STATES: Chronic | ICD-10-CM

## 2024-12-11 DIAGNOSIS — Z98.49 CATARACT EXTRACTION STATUS, UNSPECIFIED EYE: Chronic | ICD-10-CM

## 2024-12-11 DIAGNOSIS — E83.42 HYPOMAGNESEMIA: ICD-10-CM

## 2024-12-11 PROBLEM — E03.2 HYPOTHYROIDISM DUE TO MEDICATION: Status: ACTIVE | Noted: 2024-12-11

## 2024-12-11 PROCEDURE — 96413 CHEMO IV INFUSION 1 HR: CPT

## 2024-12-11 RX ORDER — PEMBROLIZUMAB 50 MG/2ML
200 INJECTION, POWDER, LYOPHILIZED, FOR SOLUTION INTRAVENOUS ONCE
Refills: 0 | Status: COMPLETED | OUTPATIENT
Start: 2024-12-11 | End: 2024-12-11

## 2024-12-11 RX ORDER — LEVOTHYROXINE SODIUM 0.03 MG/1
25 TABLET ORAL DAILY
Qty: 90 | Refills: 0 | Status: ACTIVE | COMMUNITY
Start: 2024-12-11 | End: 1900-01-01

## 2024-12-11 RX ADMIN — PEMBROLIZUMAB 200 MILLIGRAM(S): 50 INJECTION, POWDER, LYOPHILIZED, FOR SOLUTION INTRAVENOUS at 11:27

## 2024-12-11 RX ADMIN — PEMBROLIZUMAB 200 MILLIGRAM(S): 50 INJECTION, POWDER, LYOPHILIZED, FOR SOLUTION INTRAVENOUS at 10:57

## 2024-12-17 ENCOUNTER — APPOINTMENT (OUTPATIENT)
Dept: ORTHOPEDIC SURGERY | Facility: CLINIC | Age: 74
End: 2024-12-17
Payer: MEDICARE

## 2024-12-17 ENCOUNTER — NON-APPOINTMENT (OUTPATIENT)
Age: 74
End: 2024-12-17

## 2024-12-17 DIAGNOSIS — G56.01 CARPAL TUNNEL SYNDROME, RIGHT UPPER LIMB: ICD-10-CM

## 2024-12-17 LAB
ALBUMIN SERPL ELPH-MCNC: 4.3 G/DL
ALP BLD-CCNC: 92 U/L
ALT SERPL-CCNC: 17 U/L
ANION GAP SERPL CALC-SCNC: 15 MMOL/L
AST SERPL-CCNC: 20 U/L
BILIRUB SERPL-MCNC: 0.3 MG/DL
BUN SERPL-MCNC: 19 MG/DL
CALCIUM SERPL-MCNC: 9.2 MG/DL
CHLORIDE SERPL-SCNC: 104 MMOL/L
CO2 SERPL-SCNC: 23 MMOL/L
CREAT SERPL-MCNC: 1 MG/DL
EGFR: 80 ML/MIN/1.73M2
GLUCOSE SERPL-MCNC: 176 MG/DL
MAGNESIUM SERPL-MCNC: 1.6 MG/DL
POTASSIUM SERPL-SCNC: 3.6 MMOL/L
PROT SERPL-MCNC: 6.7 G/DL
SODIUM SERPL-SCNC: 142 MMOL/L
TSH SERPL-ACNC: 14.36 UIU/ML

## 2024-12-17 PROCEDURE — 99203 OFFICE O/P NEW LOW 30 MIN: CPT

## 2024-12-19 ENCOUNTER — APPOINTMENT (OUTPATIENT)
Dept: PLASTIC SURGERY | Facility: CLINIC | Age: 74
End: 2024-12-19
Payer: MEDICARE

## 2024-12-19 PROCEDURE — G2211 COMPLEX E/M VISIT ADD ON: CPT | Mod: GC

## 2024-12-19 PROCEDURE — 99212 OFFICE O/P EST SF 10 MIN: CPT | Mod: GC

## 2025-01-02 ENCOUNTER — LABORATORY RESULT (OUTPATIENT)
Age: 75
End: 2025-01-02

## 2025-01-02 ENCOUNTER — APPOINTMENT (OUTPATIENT)
Age: 75
End: 2025-01-02
Payer: MEDICARE

## 2025-01-02 ENCOUNTER — OUTPATIENT (OUTPATIENT)
Dept: OUTPATIENT SERVICES | Facility: HOSPITAL | Age: 75
LOS: 1 days | End: 2025-01-02
Payer: MEDICARE

## 2025-01-02 VITALS
DIASTOLIC BLOOD PRESSURE: 93 MMHG | WEIGHT: 203 LBS | HEART RATE: 66 BPM | HEIGHT: 68 IN | BODY MASS INDEX: 30.77 KG/M2 | SYSTOLIC BLOOD PRESSURE: 166 MMHG | OXYGEN SATURATION: 96 % | TEMPERATURE: 97.7 F | RESPIRATION RATE: 16 BRPM

## 2025-01-02 DIAGNOSIS — M62.81 MUSCLE WEAKNESS (GENERALIZED): ICD-10-CM

## 2025-01-02 DIAGNOSIS — Z98.890 OTHER SPECIFIED POSTPROCEDURAL STATES: Chronic | ICD-10-CM

## 2025-01-02 DIAGNOSIS — Z98.49 CATARACT EXTRACTION STATUS, UNSPECIFIED EYE: Chronic | ICD-10-CM

## 2025-01-02 DIAGNOSIS — D72.810 LYMPHOCYTOPENIA: ICD-10-CM

## 2025-01-02 DIAGNOSIS — E83.42 HYPOMAGNESEMIA: ICD-10-CM

## 2025-01-02 DIAGNOSIS — C43.4 MALIGNANT MELANOMA OF SCALP AND NECK: ICD-10-CM

## 2025-01-02 DIAGNOSIS — Z95.5 PRESENCE OF CORONARY ANGIOPLASTY IMPLANT AND GRAFT: Chronic | ICD-10-CM

## 2025-01-02 LAB
HCT VFR BLD CALC: 36.4 %
HGB BLD-MCNC: 12.6 G/DL
MCHC RBC-ENTMCNC: 29.6 PG
MCHC RBC-ENTMCNC: 34.6 G/DL
MCV RBC AUTO: 85.6 FL
PLATELET # BLD AUTO: 231 K/UL
PMV BLD: 10 FL
RBC # BLD: 4.25 M/UL
RBC # FLD: 12.9 %
WBC # FLD AUTO: 6.79 K/UL

## 2025-01-02 PROCEDURE — 83735 ASSAY OF MAGNESIUM: CPT

## 2025-01-02 PROCEDURE — 86140 C-REACTIVE PROTEIN: CPT

## 2025-01-02 PROCEDURE — 82085 ASSAY OF ALDOLASE: CPT

## 2025-01-02 PROCEDURE — G2211 COMPLEX E/M VISIT ADD ON: CPT

## 2025-01-02 PROCEDURE — 82550 ASSAY OF CK (CPK): CPT

## 2025-01-02 PROCEDURE — 84443 ASSAY THYROID STIM HORMONE: CPT

## 2025-01-02 PROCEDURE — 80053 COMPREHEN METABOLIC PANEL: CPT

## 2025-01-02 PROCEDURE — 99215 OFFICE O/P EST HI 40 MIN: CPT

## 2025-01-02 PROCEDURE — 85027 COMPLETE CBC AUTOMATED: CPT

## 2025-01-02 PROCEDURE — 36415 COLL VENOUS BLD VENIPUNCTURE: CPT

## 2025-01-02 RX ORDER — PREDNISONE 20 MG/1
20 TABLET ORAL
Qty: 30 | Refills: 1 | Status: ACTIVE | COMMUNITY
Start: 2025-01-02 | End: 1900-01-01

## 2025-01-03 DIAGNOSIS — E83.42 HYPOMAGNESEMIA: ICD-10-CM

## 2025-01-04 ENCOUNTER — NON-APPOINTMENT (OUTPATIENT)
Age: 75
End: 2025-01-04

## 2025-01-07 ENCOUNTER — APPOINTMENT (OUTPATIENT)
Dept: NEUROLOGY | Facility: CLINIC | Age: 75
End: 2025-01-07
Payer: MEDICARE

## 2025-01-07 LAB
ALBUMIN SERPL ELPH-MCNC: 4.4 G/DL
ALDOLASE SERPL-CCNC: 4.3 U/L
ALP BLD-CCNC: 90 U/L
ALT SERPL-CCNC: 13 U/L
ANION GAP SERPL CALC-SCNC: 10 MMOL/L
AST SERPL-CCNC: 17 U/L
BILIRUB SERPL-MCNC: 0.3 MG/DL
BUN SERPL-MCNC: 22 MG/DL
CALCIUM SERPL-MCNC: 9 MG/DL
CHLORIDE SERPL-SCNC: 103 MMOL/L
CK SERPL-CCNC: 118 U/L
CO2 SERPL-SCNC: 27 MMOL/L
CREAT SERPL-MCNC: 1 MG/DL
CRP SERPL-MCNC: 12.1 MG/L
EGFR: 80 ML/MIN/1.73M2
GLUCOSE SERPL-MCNC: 61 MG/DL
MAGNESIUM SERPL-MCNC: 1.9 MG/DL
POTASSIUM SERPL-SCNC: 3.6 MMOL/L
PROT SERPL-MCNC: 7 G/DL
SODIUM SERPL-SCNC: 140 MMOL/L
TSH SERPL-ACNC: 37.21 UIU/ML

## 2025-01-07 PROCEDURE — 95886 MUSC TEST DONE W/N TEST COMP: CPT

## 2025-01-07 PROCEDURE — 95911 NRV CNDJ TEST 9-10 STUDIES: CPT

## 2025-01-08 ENCOUNTER — RESULT REVIEW (OUTPATIENT)
Age: 75
End: 2025-01-08

## 2025-01-08 ENCOUNTER — OUTPATIENT (OUTPATIENT)
Dept: OUTPATIENT SERVICES | Facility: HOSPITAL | Age: 75
LOS: 1 days | End: 2025-01-08
Payer: MEDICARE

## 2025-01-08 ENCOUNTER — APPOINTMENT (OUTPATIENT)
Age: 75
End: 2025-01-08

## 2025-01-08 DIAGNOSIS — Z98.49 CATARACT EXTRACTION STATUS, UNSPECIFIED EYE: Chronic | ICD-10-CM

## 2025-01-08 DIAGNOSIS — C43.4 MALIGNANT MELANOMA OF SCALP AND NECK: ICD-10-CM

## 2025-01-08 DIAGNOSIS — E83.42 HYPOMAGNESEMIA: ICD-10-CM

## 2025-01-08 DIAGNOSIS — Z95.5 PRESENCE OF CORONARY ANGIOPLASTY IMPLANT AND GRAFT: Chronic | ICD-10-CM

## 2025-01-08 DIAGNOSIS — Z98.890 OTHER SPECIFIED POSTPROCEDURAL STATES: Chronic | ICD-10-CM

## 2025-01-08 DIAGNOSIS — Z00.8 ENCOUNTER FOR OTHER GENERAL EXAMINATION: ICD-10-CM

## 2025-01-08 PROCEDURE — 70552 MRI BRAIN STEM W/DYE: CPT | Mod: 26

## 2025-01-08 PROCEDURE — 70552 MRI BRAIN STEM W/DYE: CPT

## 2025-01-08 PROCEDURE — 82607 VITAMIN B-12: CPT

## 2025-01-08 PROCEDURE — A9579: CPT

## 2025-01-08 PROCEDURE — 36415 COLL VENOUS BLD VENIPUNCTURE: CPT

## 2025-01-09 ENCOUNTER — NON-APPOINTMENT (OUTPATIENT)
Age: 75
End: 2025-01-09

## 2025-01-09 DIAGNOSIS — C43.4 MALIGNANT MELANOMA OF SCALP AND NECK: ICD-10-CM

## 2025-01-09 LAB — VIT B12 SERPL-MCNC: 386 PG/ML

## 2025-01-15 ENCOUNTER — OUTPATIENT (OUTPATIENT)
Dept: OUTPATIENT SERVICES | Facility: HOSPITAL | Age: 75
LOS: 1 days | End: 2025-01-15

## 2025-01-15 ENCOUNTER — LABORATORY RESULT (OUTPATIENT)
Age: 75
End: 2025-01-15

## 2025-01-15 ENCOUNTER — OUTPATIENT (OUTPATIENT)
Dept: OUTPATIENT SERVICES | Facility: HOSPITAL | Age: 75
LOS: 1 days | End: 2025-01-15
Payer: MEDICARE

## 2025-01-15 ENCOUNTER — APPOINTMENT (OUTPATIENT)
Age: 75
End: 2025-01-15
Payer: MEDICARE

## 2025-01-15 ENCOUNTER — APPOINTMENT (OUTPATIENT)
Age: 75
End: 2025-01-15

## 2025-01-15 VITALS
DIASTOLIC BLOOD PRESSURE: 80 MMHG | BODY MASS INDEX: 30.31 KG/M2 | HEART RATE: 78 BPM | HEIGHT: 68 IN | TEMPERATURE: 97.3 F | SYSTOLIC BLOOD PRESSURE: 156 MMHG | WEIGHT: 200 LBS

## 2025-01-15 DIAGNOSIS — E83.42 HYPOMAGNESEMIA: ICD-10-CM

## 2025-01-15 DIAGNOSIS — Z98.890 OTHER SPECIFIED POSTPROCEDURAL STATES: Chronic | ICD-10-CM

## 2025-01-15 DIAGNOSIS — Z98.49 CATARACT EXTRACTION STATUS, UNSPECIFIED EYE: Chronic | ICD-10-CM

## 2025-01-15 DIAGNOSIS — Z95.5 PRESENCE OF CORONARY ANGIOPLASTY IMPLANT AND GRAFT: Chronic | ICD-10-CM

## 2025-01-15 DIAGNOSIS — E03.2 HYPOTHYROIDISM DUE TO MEDICAMENTS AND OTHER EXOGENOUS SUBSTANCES: ICD-10-CM

## 2025-01-15 DIAGNOSIS — D72.810 LYMPHOCYTOPENIA: ICD-10-CM

## 2025-01-15 DIAGNOSIS — C43.4 MALIGNANT MELANOMA OF SCALP AND NECK: ICD-10-CM

## 2025-01-15 LAB
HCT VFR BLD CALC: 35.8 %
HGB BLD-MCNC: 12.4 G/DL
MCHC RBC-ENTMCNC: 30.5 PG
MCHC RBC-ENTMCNC: 34.6 G/DL
MCV RBC AUTO: 88.2 FL
PLATELET # BLD AUTO: 181 K/UL
PMV BLD: 10.6 FL
RBC # BLD: 4.06 M/UL
RBC # FLD: 13.1 %
WBC # FLD AUTO: 5.96 K/UL

## 2025-01-15 PROCEDURE — 99214 OFFICE O/P EST MOD 30 MIN: CPT

## 2025-01-15 PROCEDURE — G2211 COMPLEX E/M VISIT ADD ON: CPT

## 2025-01-19 ENCOUNTER — EMERGENCY (EMERGENCY)
Facility: HOSPITAL | Age: 75
LOS: 0 days | Discharge: HOME | End: 2025-01-19
Attending: STUDENT IN AN ORGANIZED HEALTH CARE EDUCATION/TRAINING PROGRAM
Payer: MEDICARE

## 2025-01-19 VITALS
TEMPERATURE: 98 F | DIASTOLIC BLOOD PRESSURE: 92 MMHG | OXYGEN SATURATION: 99 % | HEART RATE: 94 BPM | SYSTOLIC BLOOD PRESSURE: 172 MMHG | HEIGHT: 69 IN | WEIGHT: 197.09 LBS | RESPIRATION RATE: 18 BRPM

## 2025-01-19 DIAGNOSIS — S09.90XA UNSPECIFIED INJURY OF HEAD, INITIAL ENCOUNTER: ICD-10-CM

## 2025-01-19 DIAGNOSIS — Z98.890 OTHER SPECIFIED POSTPROCEDURAL STATES: Chronic | ICD-10-CM

## 2025-01-19 DIAGNOSIS — I48.91 UNSPECIFIED ATRIAL FIBRILLATION: ICD-10-CM
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
## 2025-01-19 DIAGNOSIS — S00.212A ABRASION OF LEFT EYELID AND PERIOCULAR AREA, INITIAL ENCOUNTER: ICD-10-CM

## 2025-01-19 DIAGNOSIS — Z95.5 PRESENCE OF CORONARY ANGIOPLASTY IMPLANT AND GRAFT: Chronic | ICD-10-CM

## 2025-01-19 DIAGNOSIS — Z98.49 CATARACT EXTRACTION STATUS, UNSPECIFIED EYE: Chronic | ICD-10-CM

## 2025-01-19 DIAGNOSIS — Z79.01 LONG TERM (CURRENT) USE OF ANTICOAGULANTS: ICD-10-CM

## 2025-01-19 DIAGNOSIS — Z23 ENCOUNTER FOR IMMUNIZATION: ICD-10-CM
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
## 2025-01-19 PROCEDURE — 72170 X-RAY EXAM OF PELVIS: CPT

## 2025-01-19 PROCEDURE — 76705 ECHO EXAM OF ABDOMEN: CPT | Mod: 26

## 2025-01-19 PROCEDURE — 99284 EMERGENCY DEPT VISIT MOD MDM: CPT | Mod: 25

## 2025-01-19 PROCEDURE — 71045 X-RAY EXAM CHEST 1 VIEW: CPT

## 2025-01-19 PROCEDURE — 70486 CT MAXILLOFACIAL W/O DYE: CPT | Mod: 26

## 2025-01-19 PROCEDURE — 76705 ECHO EXAM OF ABDOMEN: CPT

## 2025-01-19 PROCEDURE — 72125 CT NECK SPINE W/O DYE: CPT | Mod: MC

## 2025-01-19 PROCEDURE — 70450 CT HEAD/BRAIN W/O DYE: CPT | Mod: MC

## 2025-01-19 PROCEDURE — 99284 EMERGENCY DEPT VISIT MOD MDM: CPT

## 2025-01-19 PROCEDURE — 71045 X-RAY EXAM CHEST 1 VIEW: CPT | Mod: 26

## 2025-01-19 PROCEDURE — 83735 ASSAY OF MAGNESIUM: CPT

## 2025-01-19 PROCEDURE — 72125 CT NECK SPINE W/O DYE: CPT | Mod: 26

## 2025-01-19 PROCEDURE — 82962 GLUCOSE BLOOD TEST: CPT

## 2025-01-19 PROCEDURE — 36415 COLL VENOUS BLD VENIPUNCTURE: CPT

## 2025-01-19 PROCEDURE — 99285 EMERGENCY DEPT VISIT HI MDM: CPT

## 2025-01-19 PROCEDURE — 72170 X-RAY EXAM OF PELVIS: CPT | Mod: 26

## 2025-01-19 PROCEDURE — 70486 CT MAXILLOFACIAL W/O DYE: CPT | Mod: MC

## 2025-01-19 PROCEDURE — 80053 COMPREHEN METABOLIC PANEL: CPT

## 2025-01-19 PROCEDURE — 70450 CT HEAD/BRAIN W/O DYE: CPT | Mod: 26

## 2025-01-19 PROCEDURE — 90715 TDAP VACCINE 7 YRS/> IM: CPT

## 2025-01-19 PROCEDURE — 90471 IMMUNIZATION ADMIN: CPT

## 2025-01-19 RX ORDER — CLOSTRIDIUM TETANI TOXOID ANTIGEN (FORMALDEHYDE INACTIVATED), CORYNEBACTERIUM DIPHTHERIAE TOXOID ANTIGEN (FORMALDEHYDE INACTIVATED), BORDETELLA PERTUSSIS TOXOID ANTIGEN (GLUTARALDEHYDE INACTIVATED), BORDETELLA PERTUSSIS FILAMENTOUS HEMAGGLUTININ ANTIGEN (FORMALDEHYDE INACTIVATED), BORDETELLA PERTUSSIS PERTACTIN ANTIGEN, AND BORDETELLA PERTUSSIS FIMBRIAE 2/3 ANTIGEN 5; 2; 2.5; 5; 3; 5 [LF]/.5ML; [LF]/.5ML; UG/.5ML; UG/.5ML; UG/.5ML; UG/.5ML
0.5 INJECTION, SUSPENSION INTRAMUSCULAR ONCE
Refills: 0 | Status: COMPLETED | OUTPATIENT
Start: 2025-01-19 | End: 2025-01-19

## 2025-01-19 RX ADMIN — CLOSTRIDIUM TETANI TOXOID ANTIGEN (FORMALDEHYDE INACTIVATED), CORYNEBACTERIUM DIPHTHERIAE TOXOID ANTIGEN (FORMALDEHYDE INACTIVATED), BORDETELLA PERTUSSIS TOXOID ANTIGEN (GLUTARALDEHYDE INACTIVATED), BORDETELLA PERTUSSIS FILAMENTOUS HEMAGGLUTININ ANTIGEN (FORMALDEHYDE INACTIVATED), BORDETELLA PERTUSSIS PERTACTIN ANTIGEN, AND BORDETELLA PERTUSSIS FIMBRIAE 2/3 ANTIGEN 0.5 MILLILITER(S): 5; 2; 2.5; 5; 3; 5 INJECTION, SUSPENSION INTRAMUSCULAR at 17:09

## 2025-01-19 NOTE — ED PROVIDER NOTE - PHYSICAL EXAMINATION
Vital Signs: I have reviewed the initial vital signs.  Constitutional: WDWN in nad.  HEAD: No signs of basilar skull fracture.  Integumentary: No rash. No laceration, ecchymoses or swelling. + superficial abrasion to the left eyebrow.   EYES: No periorbital swelling/ecchymoses. PERRL, EOM intact. No nystagmus. No subconjunctival hemorrhage.   ENT: MMM. No rhinorrhea/otorrhea. No epistaxis,  No septal hematoma. No mastoid ecchymoses. No intraoral bleeding, No loose or cracked teeth, no active bleeding. No malocclusion.    NECK: Supple, non-tender, No palpable shelves or step-offs. Full ROM.   BACK: No spinous tenderness. No palpable shelves or step-offs.  Cardiovascular: RRR, radial pulses 2/4 b/l. dp and pt pulses 2/4/ b/l. No pain to palpation to chest wall.  Respiratory: CTA B/L, no wheezing or crackles, no stridor. No pain to palpation to ribs b/l. No crepitus. No subq emphysema.   Gastrointestinal: Abdomen soft, nd, nt. no r/g.  Musculoskeletal: FROM of all extremities. No bony tenderness  Neurologic: GCS 15. CN II-XII intact, no facial droop or slurring of speech. Motor 5/5 and sensation intact throughout upper and lower extremities. (-) Pronator.

## 2025-01-19 NOTE — CONSULT NOTE ADULT - ATTENDING COMMENTS
Trauma Attending Note Attestation  Patient was examined and evaluated at the bedside at 4:20 PM. Medications, radiological studies and all other relevant studies reviewed.     History as above.    Vital Signs Last 24 Hrs  T(C): 36.5 (19 Jan 2025 16:10), Max: 36.5 (19 Jan 2025 16:10)  T(F): 97.7 (19 Jan 2025 16:10), Max: 97.7 (19 Jan 2025 16:10)  HR: 94 (19 Jan 2025 16:10) (94 - 94)  BP: 172/92 (19 Jan 2025 16:10) (172/92 - 172/92)  BP(mean): --  RR: 18 (19 Jan 2025 16:10) (18 - 18)  SpO2: 99% (19 Jan 2025 16:10) (99% - 99%)    Parameters below as of 19 Jan 2025 16:10  Patient On (Oxygen Delivery Method): room air    Primary:  Airway - intact  Breathing - breath sounds bilaterally  Circulation - 2+ throughout  Disability - GCS 15, moving all extremities  Exposure - patient was exposed    I independently performed a medically appropriate exam. I have made revisions to the physical exam in the note above and agree with the exam as written.    CXR reviewed and interpreted by me - no hemothorax/pneumothorax  CTH/Csp reviewed and interpreted by me - no acute fractures or intracranial hemorrhage  CT Max/Face reviewed and interpreted by me - no acute fractures    Assessment/Plan:  74y Male PMH prostate cancer, melanoma, afib on eliquis s/p trip and fall with closed head injury while anticoagulated. Found to have abrasion above L eyebrow. No acute traumatic injuries identified requiring trauma surgery intervention or admission to trauma service. Disposition per EM team.    Eloy Aguilar MD  Trauma/Acute Care Surgery/Surgical Critical Care Attending
4 = No assist / stand by assistance

## 2025-01-19 NOTE — ED PROVIDER NOTE - PROGRESS NOTE DETAILS
pk: imaging reviewed, pt ambulating without difficulty, disc case with trauma, pt cleared for dc. all results d/w pt & copies given, strict return precautions discussed, rec outpt f.u with pcp. Patient agreeable with plan and demonstrates understanding.

## 2025-01-19 NOTE — ED PROVIDER NOTE - OBJECTIVE STATEMENT
Pt is a74y Male w PMHx of prostate cancer, melanoma on immunotherapy, and afib on Eliquis presents to the ED s/p mechanical fall, +HT, ?LOC, +AC.  Per pt, he tripped over something in a dark room, fell forward, and hit his  front head onto the edge of the bottom of the stair. Pt does not recall whether or not he lost consciousness but was able to stand up and ambulate after the fall. Denies any cp sob, presyncopal symptoms, vison changes, numbness tingling or focal weakness.

## 2025-01-19 NOTE — CONSULT NOTE ADULT - ASSESSMENT
ASSESSMENT:  74yM w/ PMHx of prostate cancer, melanoma on immunotherapy, and afib on Eliquis seen as a Trauma Alert s/p mechanical fall, +HT, ?LOC, +AC.  Patient tripped over something in a dark room and fell forward, hitting his head on the edge of a stair. He cannot recall whether or not he lost consciousness but was able to stand up and ambulate after the fall. Trauma assessment in ED: ABCs intact , GCS 15 , AAOx3. External signs of injury include superficial abrasion to the left eyebrow.     Injuries identified:   - L eyebrow abrasion     PLAN:   - Trauma Labs: (CBC, BMP, Coags, T&S)    Trauma Imaging to include the following:  - CXR, Pelvic Xray  - CT Head,  CT C-spine, CT Max/Face    Disposition pending results of above labs and imaging  Above plan discussed with Trauma attending, Dr. Aguilar, patient, and ED team  --------------------------------------------------------------------------------------  01-19-25 @ 16:43   ASSESSMENT:  74yM w/ PMHx of prostate cancer, melanoma on immunotherapy, and afib on Eliquis seen as a Trauma Alert s/p mechanical fall, +HT, ?LOC, +AC.  Patient tripped over something in a dark room and fell forward, hitting his head on the edge of a stair. He cannot recall whether or not he lost consciousness but was able to stand up and ambulate after the fall. Trauma assessment in ED: ABCs intact , GCS 15 , AAOx3. External signs of injury include superficial abrasion to the left eyebrow.     Injuries identified:   - L eyebrow abrasion     PLAN:    - No evidence of acute traumatic injury   - No trauma surgery intervention   - If admitted, will follow for tertiary survey   - Dispo per ED    Discussed with surgical attending on call, Dr. Aguilar  SPECTRA [id number]     Disposition pending results of above labs and imaging  Above plan discussed with Trauma attending, Dr. Aguilar, patient, and ED team  --------------------------------------------------------------------------------------  01-19-25 @ 16:43

## 2025-01-19 NOTE — ED PROVIDER NOTE - CLINICAL SUMMARY MEDICAL DECISION MAKING FREE TEXT BOX
74-year-old presents today for evaluation after mechanical fall.  Patient is hemodynamically stable and well-appearing on evaluation.  CT scans performed.  Patient had negative CT scans, felt better during ED stay, evaluated by surgery and recommended no acute trauma surgery intervention.  Patient was discharged to close follow-up with PMD.  Return precautions explained to patient.

## 2025-01-19 NOTE — ED PROVIDER NOTE - PATIENT PORTAL LINK FT
You can access the FollowMyHealth Patient Portal offered by [facility] by registering at the following website: [email or web address]. By joining [employer]’s FollowMyHealth portal, you will also be able to view your health information using other applications (apps) compatible with our system.

## 2025-01-19 NOTE — ED ADULT TRIAGE NOTE - SPO2 (%)
Dr. Bojorquez phoned unit for update, informed that patient has been sleeping comfortably through contractions and has received second dose of abx. Orders to start pitocin.   99

## 2025-01-19 NOTE — CONSULT NOTE ADULT - SUBJECTIVE AND OBJECTIVE BOX
TRAUMA ACTIVATION LEVEL:  ALERT   ACTIVATED BY: ED  INTUBATED:  NO    MECHANISM OF INJURY:   [] Blunt     [] MVC	  [x] Fall	  [] Pedestrian Struck	  [] Motorcycle     [] Assault     [] Bicycle collision    [] Sports injury    [] Penetrating    [] Gun Shot Wound      [] Stab Wound    GCS: 15 	E: 4	V: 5	M: 6    HPI: 74yM w/ PMHx of prostate cancer, melanoma on immunotherapy, and afib on Eliquis seen as a Trauma Alert s/p mechanical fall, +HT, ?LOC, +AC.  Patient tripped over something in a dark room and fell forward, hitting his head on the edge of a stair. He cannot recall whether or not he lost consciousness but was able to stand up and ambulate after the fall. Trauma assessment in ED: ABCs intact , GCS 15 , AAOx3. External signs of injury include superficial abrasion to the left eyebrow.     PAST MEDICAL & SURGICAL HISTORY:  Coronary artery disease  4 STENTS  Diabetes mellitus  Hypertension, unspecified type  Factor V Leiden  Afib  Skin cancer  Crohn's disease  DWIGHT treated with BiPAP  Essential tremor  HANDS R>L  H/O blood transfusion reaction  Deep vein thrombosis (DVT)  Prostate cancer  H/O therapeutic radiation  Skin melanoma  H/O neuropathy  CA skin, basal cell  Factor II deficiency  Lymphocyte disorder  2019 novel coronavirus disease (COVID-19)  202  History of coronary angioplasty with insertion of stent  History of Mohs surgery for squamous cell carcinoma of skin  H/O melanoma excision  H/O basal cell carcinoma excision  Status post cataract extraction and insertion of intraocular lens  ou    Allergies  No Known Allergies    Home Medications:  amLODIPine 5 mg oral tablet: 1 tab(s) orally 2 times a day (12 Sep 2024 07:17)  Centrum oral tablet: 1 tab(s) orally once a day (12 Sep 2024 07:17)  Co-Q10 100 mg oral capsule: 1 cap(s) orally once a day (12 Sep 2024 07:17)  Eliquis 5 mg oral tablet: 1 tab(s) orally 2 times a day (12 Sep 2024 07:17)  Farxiga 10 mg oral tablet: 1 tab(s) orally once a day (12 Sep 2024 07:17)  Fish Oil oral capsule:  (12 Sep 2024 07:17)  Klor-Con M10 oral tablet, extended release: 2 tab(s) orally 2 times a day (12 Sep 2024 07:17)  labetalol 100 mg oral tablet: 1 tab(s) orally 2 times a day (12 Sep 2024 07:17)  Lutein: 25 milligram(s) orally once a day (12 Sep 2024 07:17)  metFORMIN 750 mg oral tablet, extended release: 1 tab(s) orally 2 times a day (12 Sep 2024 07:17)  primidone 50 mg oral tablet: 1 tab(s) orally 2 times a day (12 Sep 2024 07:17)  sertraline 50 mg oral tablet: 1 tab(s) orally once a day (at bedtime) (12 Sep 2024 07:17)  solifenacin 5 mg oral tablet: 1 tab(s) orally once a day (at bedtime) (12 Sep 2024 07:17)  telmisartan 80 mg oral tablet: 1 tab(s) orally once a day (12 Sep 2024 07:17)  vitamin D-calcium (as carbonate) 1000 intl units (25 mcg)-90 mg oral tablet: 1 tab(s) orally once a day (12 Sep 2024 07:17)  Zypitamag 2 mg oral tablet: 1 tab(s) orally once a day (at bedtime) (12 Sep 2024 07:17)    ROS: 10-system review is otherwise negative except HPI above.      Primary Survey:    A - airway intact  B - bilateral breath sounds and good chest rise  C - palpable pulses in all extremities  D - GCS 15 on arrival, CONCEPCION  Exposure obtained    Vital Signs Last 24 Hrs  T(C): 36.5 (19 Jan 2025 16:10), Max: 36.5 (19 Jan 2025 16:10)  T(F): 97.7 (19 Jan 2025 16:10), Max: 97.7 (19 Jan 2025 16:10)  HR: 94 (19 Jan 2025 16:10) (94 - 94)  BP: 172/92 (19 Jan 2025 16:10) (172/92 - 172/92)  BP(mean): --  RR: 18 (19 Jan 2025 16:10) (18 - 18)  SpO2: 99% (19 Jan 2025 16:10) (99% - 99%)    Parameters below as of 19 Jan 2025 16:10  Patient On (Oxygen Delivery Method): room air    Secondary Survey:   General: NAD  HEENT: Normocephalic, superficial abrasion over left eyebrow, EOMI, PEERLA. no scalp lacerations   Neck: Soft, midline trachea. no c-spine tenderness  Chest: No chest wall tenderness, no subcutaneous emphysema   Cardiac: S1, S2, RRR  Respiratory: Bilateral breath sounds, clear and equal bilaterally  Abdomen: Soft, non-distended, non-tender, no rebound, no guarding.  Groin: Normal appearing, pelvis stable   Ext:  Moving b/l upper and lower extremities. Palpable Radial b/l UE, b/l DP palpable in LE.   Back: No T/L/S spine tenderness, No palpable runoff/stepoff/deformity  Rectal: FREDI with good tone    FAST: Negative    Labs:  CAPILLARY BLOOD GLUCOSE    POCT Blood Glucose.: 199 mg/dL (19 Jan 2025 16:20)    RADIOLOGY & ADDITIONAL STUDIES:  ---------------------------------------------------------------------------------------     TRAUMA ACTIVATION LEVEL:  ALERT   ACTIVATED BY: ED  INTUBATED:  NO    MECHANISM OF INJURY:   [] Blunt     [] MVC	  [x] Fall	  [] Pedestrian Struck	  [] Motorcycle     [] Assault     [] Bicycle collision    [] Sports injury    [] Penetrating    [] Gun Shot Wound      [] Stab Wound    GCS: 15 	E: 4	V: 5	M: 6    HPI: 74yM w/ PMHx of prostate cancer, melanoma on immunotherapy, and afib on Eliquis seen as a Trauma Alert s/p mechanical fall, +HT, ?LOC, +AC.  Patient tripped over something in a dark room and fell forward, hitting his head on the edge of a stair. He cannot recall whether or not he lost consciousness but was able to stand up and ambulate after the fall. Trauma assessment in ED: ABCs intact , GCS 15 , AAOx3. External signs of injury include superficial abrasion to the left eyebrow.     PAST MEDICAL & SURGICAL HISTORY:  Coronary artery disease  4 STENTS  Diabetes mellitus  Hypertension, unspecified type  Factor V Leiden  Afib  Skin cancer  Crohn's disease  DWIGHT treated with BiPAP  Essential tremor  HANDS R>L  H/O blood transfusion reaction  Deep vein thrombosis (DVT)  Prostate cancer  H/O therapeutic radiation  Skin melanoma  H/O neuropathy  CA skin, basal cell  Factor II deficiency  Lymphocyte disorder  2019 novel coronavirus disease (COVID-19)  202  History of coronary angioplasty with insertion of stent  History of Mohs surgery for squamous cell carcinoma of skin  H/O melanoma excision  H/O basal cell carcinoma excision  Status post cataract extraction and insertion of intraocular lens  ou    Allergies  No Known Allergies    Home Medications:  amLODIPine 5 mg oral tablet: 1 tab(s) orally 2 times a day (12 Sep 2024 07:17)  Centrum oral tablet: 1 tab(s) orally once a day (12 Sep 2024 07:17)  Co-Q10 100 mg oral capsule: 1 cap(s) orally once a day (12 Sep 2024 07:17)  Eliquis 5 mg oral tablet: 1 tab(s) orally 2 times a day (12 Sep 2024 07:17)  Farxiga 10 mg oral tablet: 1 tab(s) orally once a day (12 Sep 2024 07:17)  Fish Oil oral capsule:  (12 Sep 2024 07:17)  Klor-Con M10 oral tablet, extended release: 2 tab(s) orally 2 times a day (12 Sep 2024 07:17)  labetalol 100 mg oral tablet: 1 tab(s) orally 2 times a day (12 Sep 2024 07:17)  Lutein: 25 milligram(s) orally once a day (12 Sep 2024 07:17)  metFORMIN 750 mg oral tablet, extended release: 1 tab(s) orally 2 times a day (12 Sep 2024 07:17)  primidone 50 mg oral tablet: 1 tab(s) orally 2 times a day (12 Sep 2024 07:17)  sertraline 50 mg oral tablet: 1 tab(s) orally once a day (at bedtime) (12 Sep 2024 07:17)  solifenacin 5 mg oral tablet: 1 tab(s) orally once a day (at bedtime) (12 Sep 2024 07:17)  telmisartan 80 mg oral tablet: 1 tab(s) orally once a day (12 Sep 2024 07:17)  vitamin D-calcium (as carbonate) 1000 intl units (25 mcg)-90 mg oral tablet: 1 tab(s) orally once a day (12 Sep 2024 07:17)  Zypitamag 2 mg oral tablet: 1 tab(s) orally once a day (at bedtime) (12 Sep 2024 07:17)    ROS: 10-system review is otherwise negative except HPI above.      Primary Survey:    A - airway intact  B - bilateral breath sounds and good chest rise  C - palpable pulses in all extremities  D - GCS 15 on arrival, CONCEPCION  Exposure obtained    Vital Signs Last 24 Hrs  T(C): 36.5 (19 Jan 2025 16:10), Max: 36.5 (19 Jan 2025 16:10)  T(F): 97.7 (19 Jan 2025 16:10), Max: 97.7 (19 Jan 2025 16:10)  HR: 94 (19 Jan 2025 16:10) (94 - 94)  BP: 172/92 (19 Jan 2025 16:10) (172/92 - 172/92)  BP(mean): --  RR: 18 (19 Jan 2025 16:10) (18 - 18)  SpO2: 99% (19 Jan 2025 16:10) (99% - 99%)    Parameters below as of 19 Jan 2025 16:10  Patient On (Oxygen Delivery Method): room air    Secondary Survey:   General: NAD  HEENT: Normocephalic, superficial abrasion over left eyebrow with underlying tenderness, EOMI, PEERLA. no scalp lacerations   Neck: Soft, midline trachea. no c-spine tenderness  Chest: No chest wall tenderness, no subcutaneous emphysema   Cardiac: S1, S2, RRR  Respiratory: Bilateral breath sounds, clear and equal bilaterally  Abdomen: Soft, non-distended, non-tender, no rebound, no guarding.  Groin: Normal appearing, pelvis stable   Ext:  Moving b/l upper and lower extremities. Palpable Radial b/l UE, b/l DP palpable in LE.   Back: No T/L/S spine tenderness, No palpable runoff/stepoff/deformity  Rectal: FREDI with good tone    FAST: Negative    Labs:  CAPILLARY BLOOD GLUCOSE    POCT Blood Glucose.: 199 mg/dL (19 Jan 2025 16:20)    RADIOLOGY & ADDITIONAL STUDIES:  ---------------------------------------------------------------------------------------

## 2025-01-19 NOTE — ED PROVIDER NOTE - NSFOLLOWUPINSTRUCTIONS_ED_ALL_ED_FT
FALL PREVENTION IN THE HOME    Falls can cause injuries and can affect people from all age groups. There are many simple things that you can do to make your home safe and to help prevent falls.    WHAT CAN I DO ON THE OUTSIDE OF MY HOME?  Regularly repair the edges of walkways and driveways and fix any cracks.  Remove high doorway thresholds.  Trim any shrubbery on the main path into your home.  Use bright outdoor lighting.  Clear walkways of debris and clutter, including tools and rocks.  Regularly check that handrails are securely fastened and in good repair. Both sides of any steps should have handrails.  Install guardrails along the edges of any raised decks or porches.  Have leaves, snow, and ice cleared regularly.  Use sand or salt on walkways during winter months.  In the garage, clean up any spills right away, including grease or oil spills.    WHAT CAN I DO IN THE BATHROOM?  Use night lights.  Install grab bars by the toilet and in the tub and shower. Do not use towel bars as grab bars.  Use non-skid mats or decals on the floor of the tub or shower.  If you need to sit down while you are in the shower, use a plastic, non-slip stool.  Keep the floor dry. Immediately clean up any water that spills on the floor.  Remove soap buildup in the tub or shower on a regular basis.  Attach bath mats securely with double-sided non-slip rug tape.  Remove throw rugs and other tripping hazards from the floor.    WHAT CAN I DO IN THE BEDROOM?  Use night lights.  Make sure that a bedside light is easy to reach.  Do not use oversized bedding that drapes onto the floor.  Have a firm chair that has side arms to use for getting dressed.  Remove throw rugs and other tripping hazards from the floor.    WHAT CAN I DO IN THE KITCHEN?  Clean up any spills right away.  Avoid walking on wet floors.  Place frequently used items in easy-to-reach places.  If you need to reach for something above you, use a sturdy step stool that has a grab bar.  Keep electrical cables out of the way.  Do not use floor polish or wax that makes floors slippery. If you have to use wax, make sure that it is non-skid floor wax.  Remove throw rugs and other tripping hazards from the floor.    WHAT CAN I DO IN THE STAIRWAYS?  Do not leave any items on the stairs.  Make sure that there are handrails on both sides of the stairs. Fix handrails that are broken or loose. Make sure that handrails are as long as the stairways.  Check any carpeting to make sure that it is firmly attached to the stairs. Fix any carpet that is loose or worn.  Avoid having throw rugs at the top or bottom of stairways, or secure the rugs with carpet tape to prevent them from moving.  Make sure that you have a light switch at the top of the stairs and the bottom of the stairs. If you do not have them, have them installed.    WHAT ARE SOME OTHER FALL PREVENTION TIPS?  Wear closed-toe shoes that fit well and support your feet. Wear shoes that have rubber soles or low heels.  When you use a stepladder, make sure that it is completely opened and that the sides are firmly locked. Have someone hold the ladder while you are using it. Do not climb a closed stepladder.  Add color or contrast paint or tape to grab bars and handrails in your home. Place contrasting color strips on the first and last steps.  Use mobility aids as needed, such as canes, walkers, scooters, and crutches.  Turn on lights if it is dark. Replace any light bulbs that burn out.  Set up furniture so that there are clear paths. Keep the furniture in the same spot.  Fix any uneven floor surfaces.  Choose a carpet design that does not hide the edge of steps of a stairway.  Be aware of any and all pets.  Review your medicines with your healthcare provider. Some medicines can cause dizziness or changes in blood pressure, which increase your risk of falling.     Talk with your health care provider about other ways that you can decrease your risk of falls. This may include working with a physical therapist or  to improve your strength, balance, and endurance.    ADDITIONAL NOTES AND INSTRUCTIONS    Please follow up with your Primary MD in 24-48 hr.  Seek immediate medical care for any new/worsening signs or symptoms.

## 2025-01-19 NOTE — ED ADULT TRIAGE NOTE - TEMPERATURE IN CELSIUS (DEGREES C)
A/P:    72 y/o female PMHx DM, severe AS, HTN, HLD, anemia who was admitted to [facility] with syncope. CT negative for CVA. Echo showed severe AS. Transferred to [facility] under Dr. Moon for evaluation for TAVR vs BAV. Upon review of structural scans, patient was deemed an inappropriate candidate for TAVR 2/2 size of aorta. CTS consulted for evaluation for AVR. 3/14 cardiac cath pRCA 50%. Plan for OR for AVR Tuesday.    Neurovascular:   - No delirium. Pain well controlled with current regimen.  -Continue tylenol PRN    HEENT:   - Continue xalantan and timolol gtts     Cardiovascular:   - Severe AS, pre op for surgical AVR Tuesday   - Pre op workup complete   -Hemodynamically stable. HR controlled (56-84)  - Hx of HTN, BP controlled (113//70), continue metoprolol 25 q12 hours  - Continue ASA, atorvastatin 40 mg daily     Respiratory:   - 02 Sat = 98% on RA.  -Encourage C+DB and Use of IS 10x / hr while awake.  -CXR  without significant effusions, consolidations     GI:   - Stable.  -Continue GI PPX with protonix  -PO Diet.    Renal / :  - BUN/Cr stable at 11/0.72  -Continue to monitor renal function.  -Monitor I/O's.  - Replete electrolytes PRN    Endocrine:    -A1c 9.6, uncontrolled DM, endocrine following, per recs continue lantus 12, lispro 9, MISS  -TSH 1.530, no known hx thyroid disease     Hematologic:  -H/H stable at 10.7/34.8 with no obvious signs of bleeding  -Coagulation Panel.    ID:  -Pt remains afebrile with no elevation in WBC or signs of infection  -Continue to monitor CBC  -Observe for SIRS/Sepsis Syndrome.    Prophylaxis:  -DVT prophylaxis with 5000 SubQ Heparin q8h.  -SCD's    Disposition:  - OR Tuesday   36.5

## 2025-01-20 LAB
ALBUMIN SERPL ELPH-MCNC: 4.8 G/DL
ALP BLD-CCNC: 108 U/L
ALT SERPL-CCNC: 16 U/L
ANION GAP SERPL CALC-SCNC: 11 MMOL/L
AST SERPL-CCNC: 16 U/L
BILIRUB SERPL-MCNC: 0.3 MG/DL
BUN SERPL-MCNC: 27 MG/DL
CALCIUM SERPL-MCNC: 9.2 MG/DL
CHLORIDE SERPL-SCNC: 107 MMOL/L
CO2 SERPL-SCNC: 24 MMOL/L
CREAT SERPL-MCNC: 1 MG/DL
EGFR: 80 ML/MIN/1.73M2
GLUCOSE SERPL-MCNC: 207 MG/DL
MAGNESIUM SERPL-MCNC: 1.6 MG/DL
POTASSIUM SERPL-SCNC: 4.3 MMOL/L
PROT SERPL-MCNC: 6.9 G/DL
SODIUM SERPL-SCNC: 142 MMOL/L

## 2025-01-22 ENCOUNTER — OUTPATIENT (OUTPATIENT)
Dept: OUTPATIENT SERVICES | Facility: HOSPITAL | Age: 75
LOS: 1 days | End: 2025-01-22
Payer: MEDICARE

## 2025-01-22 ENCOUNTER — LABORATORY RESULT (OUTPATIENT)
Age: 75
End: 2025-01-22

## 2025-01-22 ENCOUNTER — APPOINTMENT (OUTPATIENT)
Age: 75
End: 2025-01-22

## 2025-01-22 ENCOUNTER — RESULT REVIEW (OUTPATIENT)
Age: 75
End: 2025-01-22

## 2025-01-22 VITALS — DIASTOLIC BLOOD PRESSURE: 75 MMHG | HEART RATE: 67 BPM | TEMPERATURE: 98 F | SYSTOLIC BLOOD PRESSURE: 124 MMHG

## 2025-01-22 VITALS
RESPIRATION RATE: 16 BRPM | TEMPERATURE: 97.5 F | HEART RATE: 67 BPM | DIASTOLIC BLOOD PRESSURE: 75 MMHG | SYSTOLIC BLOOD PRESSURE: 124 MMHG

## 2025-01-22 DIAGNOSIS — Z95.5 PRESENCE OF CORONARY ANGIOPLASTY IMPLANT AND GRAFT: Chronic | ICD-10-CM

## 2025-01-22 DIAGNOSIS — Z98.890 OTHER SPECIFIED POSTPROCEDURAL STATES: Chronic | ICD-10-CM

## 2025-01-22 DIAGNOSIS — C43.4 MALIGNANT MELANOMA OF SCALP AND NECK: ICD-10-CM

## 2025-01-22 DIAGNOSIS — E83.42 HYPOMAGNESEMIA: ICD-10-CM

## 2025-01-22 DIAGNOSIS — Z98.49 CATARACT EXTRACTION STATUS, UNSPECIFIED EYE: Chronic | ICD-10-CM

## 2025-01-22 LAB
HCT VFR BLD CALC: 36.4 %
HGB BLD-MCNC: 12.4 G/DL
MCHC RBC-ENTMCNC: 30.2 PG
MCHC RBC-ENTMCNC: 34.1 G/DL
MCV RBC AUTO: 88.8 FL
PLATELET # BLD AUTO: 146 K/UL
PMV BLD: 10.9 FL
RBC # BLD: 4.1 M/UL
RBC # FLD: 13.2 %
WBC # FLD AUTO: 5.75 K/UL

## 2025-01-22 PROCEDURE — 80053 COMPREHEN METABOLIC PANEL: CPT

## 2025-01-22 PROCEDURE — 96367 TX/PROPH/DG ADDL SEQ IV INF: CPT

## 2025-01-22 PROCEDURE — 96413 CHEMO IV INFUSION 1 HR: CPT

## 2025-01-22 PROCEDURE — 71046 X-RAY EXAM CHEST 2 VIEWS: CPT

## 2025-01-22 PROCEDURE — 84443 ASSAY THYROID STIM HORMONE: CPT

## 2025-01-22 PROCEDURE — 71046 X-RAY EXAM CHEST 2 VIEWS: CPT | Mod: 26

## 2025-01-22 PROCEDURE — 85027 COMPLETE CBC AUTOMATED: CPT

## 2025-01-22 PROCEDURE — 36415 COLL VENOUS BLD VENIPUNCTURE: CPT

## 2025-01-22 RX ORDER — ACETAMINOPHEN 500 MG/5ML
650 LIQUID (ML) ORAL ONCE
Refills: 0 | Status: COMPLETED | OUTPATIENT
Start: 2025-01-22 | End: 2025-01-22

## 2025-01-22 RX ORDER — MAGNESIUM SULFATE 500 MG/ML
2 SYRINGE (ML) INJECTION ONCE
Refills: 0 | Status: COMPLETED | OUTPATIENT
Start: 2025-01-22 | End: 2025-01-22

## 2025-01-22 RX ORDER — OXYCODONE 5 MG/1
5 TABLET ORAL EVERY 8 HOURS
Qty: 10 | Refills: 0 | Status: ACTIVE | COMMUNITY
Start: 2025-01-22 | End: 1900-01-01

## 2025-01-22 RX ORDER — PEMBROLIZUMAB 50 MG/2ML
200 INJECTION, POWDER, LYOPHILIZED, FOR SOLUTION INTRAVENOUS ONCE
Refills: 0 | Status: COMPLETED | OUTPATIENT
Start: 2025-01-22 | End: 2025-01-22

## 2025-01-22 RX ADMIN — PEMBROLIZUMAB 200 MILLIGRAM(S): 50 INJECTION, POWDER, LYOPHILIZED, FOR SOLUTION INTRAVENOUS at 11:55

## 2025-01-22 RX ADMIN — Medication 650 MILLIGRAM(S): at 11:42

## 2025-01-22 RX ADMIN — PEMBROLIZUMAB 200 MILLIGRAM(S): 50 INJECTION, POWDER, LYOPHILIZED, FOR SOLUTION INTRAVENOUS at 12:25

## 2025-01-22 RX ADMIN — Medication 2 GRAM(S): at 13:30

## 2025-01-22 RX ADMIN — Medication 25 GRAM(S): at 12:30

## 2025-01-23 DIAGNOSIS — C43.4 MALIGNANT MELANOMA OF SCALP AND NECK: ICD-10-CM

## 2025-01-23 LAB
ALBUMIN SERPL ELPH-MCNC: 4.4 G/DL
ALP BLD-CCNC: 80 U/L
ALT SERPL-CCNC: 15 U/L
ANION GAP SERPL CALC-SCNC: 14 MMOL/L
AST SERPL-CCNC: 15 U/L
BILIRUB SERPL-MCNC: 0.4 MG/DL
BUN SERPL-MCNC: 19 MG/DL
CALCIUM SERPL-MCNC: 8.8 MG/DL
CHLORIDE SERPL-SCNC: 101 MMOL/L
CO2 SERPL-SCNC: 25 MMOL/L
CREAT SERPL-MCNC: 0.9 MG/DL
EGFR: 91 ML/MIN/1.73M2
GLUCOSE SERPL-MCNC: 130 MG/DL
POTASSIUM SERPL-SCNC: 3.5 MMOL/L
PROT SERPL-MCNC: 6.5 G/DL
SODIUM SERPL-SCNC: 140 MMOL/L
TSH SERPL-ACNC: 32.8 UIU/ML

## 2025-02-05 ENCOUNTER — OUTPATIENT (OUTPATIENT)
Dept: OUTPATIENT SERVICES | Facility: HOSPITAL | Age: 75
LOS: 1 days | End: 2025-02-05
Payer: COMMERCIAL

## 2025-02-05 ENCOUNTER — APPOINTMENT (OUTPATIENT)
Age: 75
End: 2025-02-05
Payer: COMMERCIAL

## 2025-02-05 ENCOUNTER — LABORATORY RESULT (OUTPATIENT)
Age: 75
End: 2025-02-05

## 2025-02-05 VITALS
OXYGEN SATURATION: 96 % | DIASTOLIC BLOOD PRESSURE: 80 MMHG | HEART RATE: 74 BPM | TEMPERATURE: 98.2 F | SYSTOLIC BLOOD PRESSURE: 157 MMHG | BODY MASS INDEX: 30.77 KG/M2 | HEIGHT: 68 IN | WEIGHT: 203 LBS

## 2025-02-05 DIAGNOSIS — E83.42 HYPOMAGNESEMIA: ICD-10-CM

## 2025-02-05 DIAGNOSIS — Z98.890 OTHER SPECIFIED POSTPROCEDURAL STATES: Chronic | ICD-10-CM

## 2025-02-05 DIAGNOSIS — D72.810 LYMPHOCYTOPENIA: ICD-10-CM

## 2025-02-05 DIAGNOSIS — Z51.12 ENCOUNTER FOR ANTINEOPLASTIC CHEMOTHERAPY: ICD-10-CM

## 2025-02-05 DIAGNOSIS — Z51.11 ENCOUNTER FOR ANTINEOPLASTIC CHEMOTHERAPY: ICD-10-CM

## 2025-02-05 DIAGNOSIS — C43.4 MALIGNANT MELANOMA OF SCALP AND NECK: ICD-10-CM

## 2025-02-05 DIAGNOSIS — Z98.49 CATARACT EXTRACTION STATUS, UNSPECIFIED EYE: Chronic | ICD-10-CM

## 2025-02-05 LAB
HCT VFR BLD CALC: 37.5 %
HGB BLD-MCNC: 12.7 G/DL
MCHC RBC-ENTMCNC: 30.2 PG
MCHC RBC-ENTMCNC: 33.9 G/DL
MCV RBC AUTO: 89.3 FL
PLATELET # BLD AUTO: 214 K/UL
PMV BLD: 9.5 FL
RBC # BLD: 4.2 M/UL
RBC # FLD: 13.2 %
WBC # FLD AUTO: 5.48 K/UL

## 2025-02-05 PROCEDURE — G2211 COMPLEX E/M VISIT ADD ON: CPT

## 2025-02-05 PROCEDURE — 80053 COMPREHEN METABOLIC PANEL: CPT

## 2025-02-05 PROCEDURE — 99214 OFFICE O/P EST MOD 30 MIN: CPT

## 2025-02-05 PROCEDURE — 85379 FIBRIN DEGRADATION QUANT: CPT

## 2025-02-05 PROCEDURE — 85027 COMPLETE CBC AUTOMATED: CPT

## 2025-02-06 DIAGNOSIS — E83.42 HYPOMAGNESEMIA: ICD-10-CM

## 2025-02-06 LAB
ALBUMIN SERPL ELPH-MCNC: 4.6 G/DL
ALP BLD-CCNC: 90 U/L
ALT SERPL-CCNC: 18 U/L
ANION GAP SERPL CALC-SCNC: 14 MMOL/L
AST SERPL-CCNC: 17 U/L
BILIRUB SERPL-MCNC: 0.3 MG/DL
BUN SERPL-MCNC: 20 MG/DL
CALCIUM SERPL-MCNC: 9.4 MG/DL
CHLORIDE SERPL-SCNC: 105 MMOL/L
CO2 SERPL-SCNC: 23 MMOL/L
CREAT SERPL-MCNC: 1 MG/DL
DEPRECATED D DIMER PPP IA-ACNC: 259 NG/ML DDU
EGFR: 80 ML/MIN/1.73M2
GLUCOSE SERPL-MCNC: 128 MG/DL
POTASSIUM SERPL-SCNC: 4.2 MMOL/L
PROT SERPL-MCNC: 7 G/DL
SODIUM SERPL-SCNC: 142 MMOL/L

## 2025-02-11 ENCOUNTER — OUTPATIENT (OUTPATIENT)
Dept: OUTPATIENT SERVICES | Facility: HOSPITAL | Age: 75
LOS: 1 days | End: 2025-02-11
Payer: MEDICARE

## 2025-02-11 DIAGNOSIS — Z98.49 CATARACT EXTRACTION STATUS, UNSPECIFIED EYE: Chronic | ICD-10-CM

## 2025-02-11 DIAGNOSIS — R07.82 INTERCOSTAL PAIN: ICD-10-CM

## 2025-02-11 DIAGNOSIS — Z98.890 OTHER SPECIFIED POSTPROCEDURAL STATES: Chronic | ICD-10-CM

## 2025-02-11 DIAGNOSIS — M79.643 PAIN IN UNSPECIFIED HAND: ICD-10-CM

## 2025-02-11 DIAGNOSIS — Z95.5 PRESENCE OF CORONARY ANGIOPLASTY IMPLANT AND GRAFT: Chronic | ICD-10-CM

## 2025-02-11 PROCEDURE — 71100 X-RAY EXAM RIBS UNI 2 VIEWS: CPT | Mod: 26,LT

## 2025-02-11 PROCEDURE — 73130 X-RAY EXAM OF HAND: CPT | Mod: 26,50

## 2025-02-11 PROCEDURE — 71100 X-RAY EXAM RIBS UNI 2 VIEWS: CPT | Mod: LT

## 2025-02-11 PROCEDURE — 73130 X-RAY EXAM OF HAND: CPT | Mod: 50

## 2025-02-12 ENCOUNTER — OUTPATIENT (OUTPATIENT)
Dept: OUTPATIENT SERVICES | Facility: HOSPITAL | Age: 75
LOS: 1 days | End: 2025-02-12
Payer: MEDICARE

## 2025-02-12 ENCOUNTER — APPOINTMENT (OUTPATIENT)
Age: 75
End: 2025-02-12

## 2025-02-12 DIAGNOSIS — R07.82 INTERCOSTAL PAIN: ICD-10-CM

## 2025-02-12 DIAGNOSIS — M79.643 PAIN IN UNSPECIFIED HAND: ICD-10-CM

## 2025-02-12 DIAGNOSIS — E83.42 HYPOMAGNESEMIA: ICD-10-CM

## 2025-02-12 DIAGNOSIS — Z98.890 OTHER SPECIFIED POSTPROCEDURAL STATES: Chronic | ICD-10-CM

## 2025-02-12 LAB
AUTO BASOPHILS #: 0.05 K/UL
AUTO BASOPHILS %: 0.9 %
AUTO EOSINOPHILS #: 0.09 K/UL
AUTO EOSINOPHILS %: 1.6 %
AUTO IMMATURE GRANULOCYTES #: 0.05 K/UL
AUTO LYMPHOCYTES #: 0.52 K/UL
AUTO LYMPHOCYTES %: 9.3 %
AUTO MONOCYTES #: 0.54 K/UL
AUTO MONOCYTES %: 9.7 %
AUTO NEUTROPHILS #: 4.33 K/UL
AUTO NEUTROPHILS %: 77.6 %
AUTO NRBC #: 0 K/UL
HCT VFR BLD CALC: 36.2 %
HGB BLD-MCNC: 12.1 G/DL
IMM GRANULOCYTES NFR BLD AUTO: 0.9 %
MAN DIFF?: NORMAL
MCHC RBC-ENTMCNC: 30.3 PG
MCHC RBC-ENTMCNC: 33.4 G/DL
MCV RBC AUTO: 90.5 FL
PLATELET # BLD AUTO: 204 K/UL
PMV BLD AUTO: 0 /100 WBCS
PMV BLD: 9.9 FL
RBC # BLD: 4 M/UL
RBC # FLD: 13.3 %
WBC # FLD AUTO: 5.58 K/UL

## 2025-02-12 PROCEDURE — 85025 COMPLETE CBC W/AUTO DIFF WBC: CPT

## 2025-02-12 PROCEDURE — 36415 COLL VENOUS BLD VENIPUNCTURE: CPT

## 2025-02-12 PROCEDURE — 84443 ASSAY THYROID STIM HORMONE: CPT

## 2025-02-12 PROCEDURE — 96413 CHEMO IV INFUSION 1 HR: CPT

## 2025-02-12 PROCEDURE — 80053 COMPREHEN METABOLIC PANEL: CPT

## 2025-02-12 PROCEDURE — 83735 ASSAY OF MAGNESIUM: CPT

## 2025-02-12 RX ORDER — PEMBROLIZUMAB 50 MG/2ML
200 INJECTION, POWDER, LYOPHILIZED, FOR SOLUTION INTRAVENOUS ONCE
Refills: 0 | Status: COMPLETED | OUTPATIENT
Start: 2025-02-12 | End: 2025-02-12

## 2025-02-12 RX ADMIN — PEMBROLIZUMAB 200 MILLIGRAM(S): 50 INJECTION, POWDER, LYOPHILIZED, FOR SOLUTION INTRAVENOUS at 12:20

## 2025-02-12 RX ADMIN — PEMBROLIZUMAB 200 MILLIGRAM(S): 50 INJECTION, POWDER, LYOPHILIZED, FOR SOLUTION INTRAVENOUS at 11:38

## 2025-02-13 LAB
ALBUMIN SERPL ELPH-MCNC: 4.4 G/DL
ALP BLD-CCNC: 91 U/L
ALT SERPL-CCNC: 18 U/L
ANION GAP SERPL CALC-SCNC: 19 MMOL/L
AST SERPL-CCNC: 30 U/L
BILIRUB SERPL-MCNC: 0.4 MG/DL
BUN SERPL-MCNC: 24 MG/DL
CALCIUM SERPL-MCNC: 9.7 MG/DL
CHLORIDE SERPL-SCNC: 103 MMOL/L
CO2 SERPL-SCNC: 18 MMOL/L
CREAT SERPL-MCNC: 1.1 MG/DL
EGFR: 71 ML/MIN/1.73M2
GLUCOSE SERPL-MCNC: 138 MG/DL
MAGNESIUM SERPL-MCNC: 2 MG/DL
POTASSIUM SERPL-SCNC: 4.5 MMOL/L
PROT SERPL-MCNC: 6.8 G/DL
SODIUM SERPL-SCNC: 140 MMOL/L
TSH SERPL-ACNC: 44.3 UIU/ML

## 2025-02-24 ENCOUNTER — OUTPATIENT (OUTPATIENT)
Dept: OUTPATIENT SERVICES | Facility: HOSPITAL | Age: 75
LOS: 1 days | End: 2025-02-24
Payer: MEDICARE

## 2025-02-24 DIAGNOSIS — Z00.8 ENCOUNTER FOR OTHER GENERAL EXAMINATION: ICD-10-CM

## 2025-02-24 DIAGNOSIS — Z98.890 OTHER SPECIFIED POSTPROCEDURAL STATES: Chronic | ICD-10-CM

## 2025-02-24 DIAGNOSIS — Z95.5 PRESENCE OF CORONARY ANGIOPLASTY IMPLANT AND GRAFT: Chronic | ICD-10-CM

## 2025-02-24 DIAGNOSIS — Z98.49 CATARACT EXTRACTION STATUS, UNSPECIFIED EYE: Chronic | ICD-10-CM

## 2025-02-24 DIAGNOSIS — R93.5 ABNORMAL FINDINGS ON DIAGNOSTIC IMAGING OF OTHER ABDOMINAL REGIONS, INCLUDING RETROPERITONEUM: ICD-10-CM

## 2025-02-24 PROCEDURE — A9579: CPT

## 2025-02-24 PROCEDURE — 74183 MRI ABD W/O CNTR FLWD CNTR: CPT

## 2025-02-24 PROCEDURE — 74183 MRI ABD W/O CNTR FLWD CNTR: CPT | Mod: 26

## 2025-02-24 PROCEDURE — 72197 MRI PELVIS W/O & W/DYE: CPT | Mod: 26

## 2025-02-24 PROCEDURE — 72197 MRI PELVIS W/O & W/DYE: CPT

## 2025-02-25 DIAGNOSIS — R93.5 ABNORMAL FINDINGS ON DIAGNOSTIC IMAGING OF OTHER ABDOMINAL REGIONS, INCLUDING RETROPERITONEUM: ICD-10-CM

## 2025-03-05 ENCOUNTER — APPOINTMENT (OUTPATIENT)
Age: 75
End: 2025-03-05
Payer: MEDICARE

## 2025-03-05 ENCOUNTER — OUTPATIENT (OUTPATIENT)
Dept: OUTPATIENT SERVICES | Facility: HOSPITAL | Age: 75
LOS: 1 days | End: 2025-03-05
Payer: MEDICARE

## 2025-03-05 VITALS
BODY MASS INDEX: 30.77 KG/M2 | TEMPERATURE: 97.1 F | SYSTOLIC BLOOD PRESSURE: 145 MMHG | WEIGHT: 203 LBS | HEART RATE: 74 BPM | DIASTOLIC BLOOD PRESSURE: 77 MMHG | RESPIRATION RATE: 16 BRPM | OXYGEN SATURATION: 98 % | HEIGHT: 68 IN

## 2025-03-05 DIAGNOSIS — Z98.890 OTHER SPECIFIED POSTPROCEDURAL STATES: Chronic | ICD-10-CM

## 2025-03-05 DIAGNOSIS — Z95.5 PRESENCE OF CORONARY ANGIOPLASTY IMPLANT AND GRAFT: Chronic | ICD-10-CM

## 2025-03-05 DIAGNOSIS — M62.81 MUSCLE WEAKNESS (GENERALIZED): ICD-10-CM

## 2025-03-05 DIAGNOSIS — E83.42 HYPOMAGNESEMIA: ICD-10-CM

## 2025-03-05 DIAGNOSIS — Z98.49 CATARACT EXTRACTION STATUS, UNSPECIFIED EYE: Chronic | ICD-10-CM

## 2025-03-05 LAB
AUTO BASOPHILS #: 0.07 K/UL
AUTO BASOPHILS %: 1.2 %
AUTO EOSINOPHILS #: 0.16 K/UL
AUTO EOSINOPHILS %: 2.6 %
AUTO IMMATURE GRANULOCYTES #: 0.05 K/UL
AUTO LYMPHOCYTES #: 0.57 K/UL
AUTO LYMPHOCYTES %: 9.4 %
AUTO MONOCYTES #: 0.56 K/UL
AUTO MONOCYTES %: 9.3 %
AUTO NEUTROPHILS #: 4.63 K/UL
AUTO NEUTROPHILS %: 76.7 %
AUTO NRBC #: 0 K/UL
HCT VFR BLD CALC: 37 %
HGB BLD-MCNC: 12.7 G/DL
IMM GRANULOCYTES NFR BLD AUTO: 0.8 %
MAN DIFF?: NORMAL
MCHC RBC-ENTMCNC: 30.5 PG
MCHC RBC-ENTMCNC: 34.3 G/DL
MCV RBC AUTO: 88.9 FL
PLATELET # BLD AUTO: 179 K/UL
PMV BLD AUTO: 0 /100 WBCS
PMV BLD: 10.2 FL
RBC # BLD: 4.16 M/UL
RBC # FLD: 13.2 %
WBC # FLD AUTO: 6.04 K/UL

## 2025-03-05 PROCEDURE — 80053 COMPREHEN METABOLIC PANEL: CPT

## 2025-03-05 PROCEDURE — G2211 COMPLEX E/M VISIT ADD ON: CPT

## 2025-03-05 PROCEDURE — 99214 OFFICE O/P EST MOD 30 MIN: CPT

## 2025-03-05 PROCEDURE — 36415 COLL VENOUS BLD VENIPUNCTURE: CPT

## 2025-03-05 PROCEDURE — 84443 ASSAY THYROID STIM HORMONE: CPT

## 2025-03-05 PROCEDURE — 85025 COMPLETE CBC W/AUTO DIFF WBC: CPT

## 2025-03-05 PROCEDURE — 96413 CHEMO IV INFUSION 1 HR: CPT

## 2025-03-05 PROCEDURE — 86200 CCP ANTIBODY: CPT

## 2025-03-05 PROCEDURE — 86431 RHEUMATOID FACTOR QUANT: CPT

## 2025-03-05 RX ORDER — PEMBROLIZUMAB 50 MG/2ML
200 INJECTION, POWDER, LYOPHILIZED, FOR SOLUTION INTRAVENOUS ONCE
Refills: 0 | Status: COMPLETED | OUTPATIENT
Start: 2025-03-05 | End: 2025-03-05

## 2025-03-05 RX ADMIN — PEMBROLIZUMAB 200 MILLIGRAM(S): 50 INJECTION, POWDER, LYOPHILIZED, FOR SOLUTION INTRAVENOUS at 13:20

## 2025-03-05 RX ADMIN — PEMBROLIZUMAB 200 MILLIGRAM(S): 50 INJECTION, POWDER, LYOPHILIZED, FOR SOLUTION INTRAVENOUS at 13:55

## 2025-03-06 DIAGNOSIS — E83.42 HYPOMAGNESEMIA: ICD-10-CM

## 2025-03-06 LAB
ALBUMIN SERPL ELPH-MCNC: 4.5 G/DL
ALP BLD-CCNC: 92 U/L
ALT SERPL-CCNC: 20 U/L
ANION GAP SERPL CALC-SCNC: 11 MMOL/L
AST SERPL-CCNC: 19 U/L
BILIRUB SERPL-MCNC: 0.3 MG/DL
BUN SERPL-MCNC: 24 MG/DL
CALCIUM SERPL-MCNC: 9.5 MG/DL
CCP AB SER IA-ACNC: <8 U/ML
CHLORIDE SERPL-SCNC: 103 MMOL/L
CO2 SERPL-SCNC: 26 MMOL/L
CREAT SERPL-MCNC: 0.9 MG/DL
EGFRCR SERPLBLD CKD-EPI 2021: 91 ML/MIN/1.73M2
GLUCOSE SERPL-MCNC: 127 MG/DL
POTASSIUM SERPL-SCNC: 3.6 MMOL/L
PROT SERPL-MCNC: 6.9 G/DL
RF+CCP IGG SER-IMP: NEGATIVE
RHEUMATOID FACT SER QL: <10 IU/ML
SODIUM SERPL-SCNC: 140 MMOL/L
TSH SERPL-ACNC: 22.8 UIU/ML

## 2025-03-13 ENCOUNTER — NON-APPOINTMENT (OUTPATIENT)
Age: 75
End: 2025-03-13

## 2025-03-31 ENCOUNTER — APPOINTMENT (OUTPATIENT)
Dept: ENDOCRINOLOGY | Facility: CLINIC | Age: 75
End: 2025-03-31

## 2025-04-01 ENCOUNTER — OUTPATIENT (OUTPATIENT)
Dept: OUTPATIENT SERVICES | Facility: HOSPITAL | Age: 75
LOS: 1 days | End: 2025-04-01
Payer: MEDICARE

## 2025-04-01 ENCOUNTER — APPOINTMENT (OUTPATIENT)
Age: 75
End: 2025-04-01

## 2025-04-01 ENCOUNTER — OUTPATIENT (OUTPATIENT)
Dept: OUTPATIENT SERVICES | Facility: HOSPITAL | Age: 75
LOS: 1 days | End: 2025-04-01
Payer: COMMERCIAL

## 2025-04-01 VITALS
HEART RATE: 74 BPM | TEMPERATURE: 97.7 F | SYSTOLIC BLOOD PRESSURE: 138 MMHG | RESPIRATION RATE: 18 BRPM | DIASTOLIC BLOOD PRESSURE: 64 MMHG

## 2025-04-01 DIAGNOSIS — C61 MALIGNANT NEOPLASM OF PROSTATE: ICD-10-CM

## 2025-04-01 DIAGNOSIS — Z98.890 OTHER SPECIFIED POSTPROCEDURAL STATES: Chronic | ICD-10-CM

## 2025-04-01 DIAGNOSIS — Z95.5 PRESENCE OF CORONARY ANGIOPLASTY IMPLANT AND GRAFT: Chronic | ICD-10-CM

## 2025-04-01 DIAGNOSIS — Z98.49 CATARACT EXTRACTION STATUS, UNSPECIFIED EYE: Chronic | ICD-10-CM

## 2025-04-01 DIAGNOSIS — C43.4 MALIGNANT MELANOMA OF SCALP AND NECK: ICD-10-CM

## 2025-04-01 LAB
AUTO BASOPHILS #: 0.07 K/UL
AUTO BASOPHILS %: 0.8 %
AUTO EOSINOPHILS #: 0.07 K/UL
AUTO EOSINOPHILS %: 0.8 %
AUTO IMMATURE GRANULOCYTES #: 0.08 K/UL
AUTO LYMPHOCYTES #: 0.41 K/UL
AUTO LYMPHOCYTES %: 4.6 %
AUTO MONOCYTES #: 0.33 K/UL
AUTO MONOCYTES %: 3.7 %
AUTO NEUTROPHILS #: 7.87 K/UL
AUTO NEUTROPHILS %: 89.2 %
AUTO NRBC #: 0 K/UL
HCT VFR BLD CALC: 35.5 %
HGB BLD-MCNC: 12.2 G/DL
IMM GRANULOCYTES NFR BLD AUTO: 0.9 %
MAN DIFF?: NORMAL
MCHC RBC-ENTMCNC: 30.7 PG
MCHC RBC-ENTMCNC: 34.4 G/DL
MCV RBC AUTO: 89.2 FL
PLATELET # BLD AUTO: 199 K/UL
PMV BLD AUTO: 0 /100 WBCS
PMV BLD: 10.7 FL
RBC # BLD: 3.98 M/UL
RBC # FLD: 13.6 %
WBC # FLD AUTO: 8.83 K/UL

## 2025-04-01 PROCEDURE — 36591 DRAW BLOOD OFF VENOUS DEVICE: CPT

## 2025-04-01 PROCEDURE — 84443 ASSAY THYROID STIM HORMONE: CPT

## 2025-04-01 PROCEDURE — 36415 COLL VENOUS BLD VENIPUNCTURE: CPT

## 2025-04-01 PROCEDURE — 80053 COMPREHEN METABOLIC PANEL: CPT

## 2025-04-01 PROCEDURE — 85025 COMPLETE CBC W/AUTO DIFF WBC: CPT

## 2025-04-02 ENCOUNTER — APPOINTMENT (OUTPATIENT)
Age: 75
End: 2025-04-02
Payer: MEDICARE

## 2025-04-02 ENCOUNTER — APPOINTMENT (OUTPATIENT)
Dept: PULMONOLOGY | Facility: CLINIC | Age: 75
End: 2025-04-02

## 2025-04-02 ENCOUNTER — OUTPATIENT (OUTPATIENT)
Dept: OUTPATIENT SERVICES | Facility: HOSPITAL | Age: 75
LOS: 1 days | End: 2025-04-02
Payer: MEDICARE

## 2025-04-02 VITALS
OXYGEN SATURATION: 96 % | BODY MASS INDEX: 31.43 KG/M2 | HEIGHT: 68 IN | TEMPERATURE: 97.7 F | WEIGHT: 207.38 LBS | DIASTOLIC BLOOD PRESSURE: 76 MMHG | SYSTOLIC BLOOD PRESSURE: 120 MMHG | HEART RATE: 71 BPM

## 2025-04-02 VITALS
TEMPERATURE: 98 F | DIASTOLIC BLOOD PRESSURE: 76 MMHG | HEART RATE: 71 BPM | OXYGEN SATURATION: 96 % | SYSTOLIC BLOOD PRESSURE: 120 MMHG

## 2025-04-02 DIAGNOSIS — C43.4 MALIGNANT MELANOMA OF SCALP AND NECK: ICD-10-CM

## 2025-04-02 DIAGNOSIS — Z98.890 OTHER SPECIFIED POSTPROCEDURAL STATES: Chronic | ICD-10-CM

## 2025-04-02 DIAGNOSIS — Z95.5 PRESENCE OF CORONARY ANGIOPLASTY IMPLANT AND GRAFT: Chronic | ICD-10-CM

## 2025-04-02 DIAGNOSIS — Z51.11 ENCOUNTER FOR ANTINEOPLASTIC CHEMOTHERAPY: ICD-10-CM

## 2025-04-02 DIAGNOSIS — E03.2 HYPOTHYROIDISM DUE TO MEDICAMENTS AND OTHER EXOGENOUS SUBSTANCES: ICD-10-CM

## 2025-04-02 DIAGNOSIS — Z51.12 ENCOUNTER FOR ANTINEOPLASTIC CHEMOTHERAPY: ICD-10-CM

## 2025-04-02 DIAGNOSIS — Z98.49 CATARACT EXTRACTION STATUS, UNSPECIFIED EYE: Chronic | ICD-10-CM

## 2025-04-02 DIAGNOSIS — C61 MALIGNANT NEOPLASM OF PROSTATE: ICD-10-CM

## 2025-04-02 LAB
ALBUMIN SERPL ELPH-MCNC: 4.3 G/DL
ALP BLD-CCNC: 78 U/L
ALT SERPL-CCNC: 20 U/L
ANION GAP SERPL CALC-SCNC: 13 MMOL/L
AST SERPL-CCNC: 20 U/L
BILIRUB SERPL-MCNC: 0.4 MG/DL
BUN SERPL-MCNC: 21 MG/DL
CALCIUM SERPL-MCNC: 9.2 MG/DL
CHLORIDE SERPL-SCNC: 106 MMOL/L
CO2 SERPL-SCNC: 23 MMOL/L
CREAT SERPL-MCNC: 1 MG/DL
EGFRCR SERPLBLD CKD-EPI 2021: 80 ML/MIN/1.73M2
GLUCOSE SERPL-MCNC: 182 MG/DL
POTASSIUM SERPL-SCNC: 3.9 MMOL/L
PROT SERPL-MCNC: 6.7 G/DL
SODIUM SERPL-SCNC: 142 MMOL/L
TSH SERPL-ACNC: 7.29 UIU/ML

## 2025-04-02 PROCEDURE — G2211 COMPLEX E/M VISIT ADD ON: CPT

## 2025-04-02 PROCEDURE — 99214 OFFICE O/P EST MOD 30 MIN: CPT

## 2025-04-02 PROCEDURE — 96413 CHEMO IV INFUSION 1 HR: CPT

## 2025-04-02 RX ORDER — PEMBROLIZUMAB 50 MG/2ML
200 INJECTION, POWDER, LYOPHILIZED, FOR SOLUTION INTRAVENOUS ONCE
Refills: 0 | Status: COMPLETED | OUTPATIENT
Start: 2025-04-02 | End: 2025-04-02

## 2025-04-02 RX ADMIN — PEMBROLIZUMAB 200 MILLIGRAM(S): 50 INJECTION, POWDER, LYOPHILIZED, FOR SOLUTION INTRAVENOUS at 14:15

## 2025-04-02 RX ADMIN — PEMBROLIZUMAB 200 MILLIGRAM(S): 50 INJECTION, POWDER, LYOPHILIZED, FOR SOLUTION INTRAVENOUS at 13:42

## 2025-04-07 ENCOUNTER — RESULT REVIEW (OUTPATIENT)
Age: 75
End: 2025-04-07

## 2025-04-07 ENCOUNTER — OUTPATIENT (OUTPATIENT)
Dept: OUTPATIENT SERVICES | Facility: HOSPITAL | Age: 75
LOS: 1 days | End: 2025-04-07
Payer: MEDICARE

## 2025-04-07 DIAGNOSIS — Z98.890 OTHER SPECIFIED POSTPROCEDURAL STATES: Chronic | ICD-10-CM

## 2025-04-07 DIAGNOSIS — C43.4 MALIGNANT MELANOMA OF SCALP AND NECK: ICD-10-CM

## 2025-04-07 DIAGNOSIS — Z95.5 PRESENCE OF CORONARY ANGIOPLASTY IMPLANT AND GRAFT: Chronic | ICD-10-CM

## 2025-04-07 DIAGNOSIS — Z98.49 CATARACT EXTRACTION STATUS, UNSPECIFIED EYE: Chronic | ICD-10-CM

## 2025-04-07 LAB — GLUCOSE BLDC GLUCOMTR-MCNC: 102 MG/DL — HIGH (ref 70–99)

## 2025-04-07 PROCEDURE — 82962 GLUCOSE BLOOD TEST: CPT

## 2025-04-07 PROCEDURE — A9552: CPT

## 2025-04-07 PROCEDURE — 78815 PET IMAGE W/CT SKULL-THIGH: CPT | Mod: 26,PS

## 2025-04-07 PROCEDURE — 78815 PET IMAGE W/CT SKULL-THIGH: CPT | Mod: PS

## 2025-04-08 ENCOUNTER — OUTPATIENT (OUTPATIENT)
Dept: OUTPATIENT SERVICES | Facility: HOSPITAL | Age: 75
LOS: 1 days | End: 2025-04-08
Payer: MEDICARE

## 2025-04-08 ENCOUNTER — RESULT REVIEW (OUTPATIENT)
Age: 75
End: 2025-04-08

## 2025-04-08 DIAGNOSIS — Z98.890 OTHER SPECIFIED POSTPROCEDURAL STATES: Chronic | ICD-10-CM

## 2025-04-08 DIAGNOSIS — Z98.49 CATARACT EXTRACTION STATUS, UNSPECIFIED EYE: Chronic | ICD-10-CM

## 2025-04-08 DIAGNOSIS — R91.1 SOLITARY PULMONARY NODULE: ICD-10-CM

## 2025-04-08 DIAGNOSIS — C43.4 MALIGNANT MELANOMA OF SCALP AND NECK: ICD-10-CM

## 2025-04-08 DIAGNOSIS — Z95.5 PRESENCE OF CORONARY ANGIOPLASTY IMPLANT AND GRAFT: Chronic | ICD-10-CM

## 2025-04-08 DIAGNOSIS — Z00.8 ENCOUNTER FOR OTHER GENERAL EXAMINATION: ICD-10-CM

## 2025-04-08 PROCEDURE — 71250 CT THORAX DX C-: CPT

## 2025-04-08 PROCEDURE — 71250 CT THORAX DX C-: CPT | Mod: 26

## 2025-04-09 DIAGNOSIS — R91.1 SOLITARY PULMONARY NODULE: ICD-10-CM

## 2025-04-22 ENCOUNTER — APPOINTMENT (OUTPATIENT)
Age: 75
End: 2025-04-22
Payer: MEDICARE

## 2025-04-22 ENCOUNTER — OUTPATIENT (OUTPATIENT)
Dept: OUTPATIENT SERVICES | Facility: HOSPITAL | Age: 75
LOS: 1 days | End: 2025-04-22
Payer: MEDICARE

## 2025-04-22 VITALS
RESPIRATION RATE: 18 BRPM | HEIGHT: 68 IN | HEART RATE: 75 BPM | OXYGEN SATURATION: 96 % | BODY MASS INDEX: 32.13 KG/M2 | WEIGHT: 212 LBS | SYSTOLIC BLOOD PRESSURE: 153 MMHG | DIASTOLIC BLOOD PRESSURE: 77 MMHG | TEMPERATURE: 97.5 F

## 2025-04-22 DIAGNOSIS — C43.4 MALIGNANT MELANOMA OF SCALP AND NECK: ICD-10-CM

## 2025-04-22 DIAGNOSIS — D72.810 LYMPHOCYTOPENIA: ICD-10-CM

## 2025-04-22 DIAGNOSIS — Z98.49 CATARACT EXTRACTION STATUS, UNSPECIFIED EYE: Chronic | ICD-10-CM

## 2025-04-22 DIAGNOSIS — Z51.11 ENCOUNTER FOR ANTINEOPLASTIC CHEMOTHERAPY: ICD-10-CM

## 2025-04-22 DIAGNOSIS — Z98.890 OTHER SPECIFIED POSTPROCEDURAL STATES: Chronic | ICD-10-CM

## 2025-04-22 DIAGNOSIS — Z95.5 PRESENCE OF CORONARY ANGIOPLASTY IMPLANT AND GRAFT: Chronic | ICD-10-CM

## 2025-04-22 DIAGNOSIS — Z51.12 ENCOUNTER FOR ANTINEOPLASTIC CHEMOTHERAPY: ICD-10-CM

## 2025-04-22 LAB
ALBUMIN SERPL ELPH-MCNC: 4.3 G/DL
ALP BLD-CCNC: 99 U/L
ALT SERPL-CCNC: 24 U/L
ANION GAP SERPL CALC-SCNC: 16 MMOL/L
AST SERPL-CCNC: 27 U/L
AUTO BASOPHILS #: 0.02 K/UL
AUTO BASOPHILS %: 0.2 %
AUTO EOSINOPHILS #: 0.02 K/UL
AUTO EOSINOPHILS %: 0.2 %
AUTO IMMATURE GRANULOCYTES #: 0.12 K/UL
AUTO LYMPHOCYTES #: 0.46 K/UL
AUTO LYMPHOCYTES %: 5.4 %
AUTO MONOCYTES #: 0.35 K/UL
AUTO MONOCYTES %: 4.1 %
AUTO NEUTROPHILS #: 7.59 K/UL
AUTO NEUTROPHILS %: 88.7 %
AUTO NRBC #: 0 K/UL
BILIRUB SERPL-MCNC: 0.4 MG/DL
BUN SERPL-MCNC: 22 MG/DL
CALCIUM SERPL-MCNC: 9.5 MG/DL
CHLORIDE SERPL-SCNC: 99 MMOL/L
CO2 SERPL-SCNC: 20 MMOL/L
CREAT SERPL-MCNC: 1 MG/DL
EGFRCR SERPLBLD CKD-EPI 2021: 80 ML/MIN/1.73M2
GLUCOSE SERPL-MCNC: 307 MG/DL
HCT VFR BLD CALC: 34.5 %
HGB BLD-MCNC: 11.7 G/DL
IMM GRANULOCYTES NFR BLD AUTO: 1.4 %
MAN DIFF?: NORMAL
MCHC RBC-ENTMCNC: 30.2 PG
MCHC RBC-ENTMCNC: 33.9 G/DL
MCV RBC AUTO: 89.1 FL
PLATELET # BLD AUTO: 204 K/UL
PMV BLD AUTO: 0 /100 WBCS
PMV BLD: 9.8 FL
POTASSIUM SERPL-SCNC: 4.1 MMOL/L
PROT SERPL-MCNC: 6.8 G/DL
RBC # BLD: 3.87 M/UL
RBC # FLD: 13.1 %
SODIUM SERPL-SCNC: 135 MMOL/L
WBC # FLD AUTO: 8.56 K/UL

## 2025-04-22 PROCEDURE — G2211 COMPLEX E/M VISIT ADD ON: CPT

## 2025-04-22 PROCEDURE — 96413 CHEMO IV INFUSION 1 HR: CPT

## 2025-04-22 PROCEDURE — 99214 OFFICE O/P EST MOD 30 MIN: CPT

## 2025-04-22 PROCEDURE — 84443 ASSAY THYROID STIM HORMONE: CPT

## 2025-04-22 PROCEDURE — 85025 COMPLETE CBC W/AUTO DIFF WBC: CPT

## 2025-04-22 PROCEDURE — 80053 COMPREHEN METABOLIC PANEL: CPT

## 2025-04-22 RX ORDER — PEMBROLIZUMAB 50 MG/2ML
200 INJECTION, POWDER, LYOPHILIZED, FOR SOLUTION INTRAVENOUS ONCE
Refills: 0 | Status: COMPLETED | OUTPATIENT
Start: 2025-04-22 | End: 2025-04-22

## 2025-04-22 RX ADMIN — PEMBROLIZUMAB 200 MILLIGRAM(S): 50 INJECTION, POWDER, LYOPHILIZED, FOR SOLUTION INTRAVENOUS at 16:25

## 2025-04-22 RX ADMIN — PEMBROLIZUMAB 200 MILLIGRAM(S): 50 INJECTION, POWDER, LYOPHILIZED, FOR SOLUTION INTRAVENOUS at 15:55

## 2025-04-23 LAB — TSH SERPL-ACNC: 6.26 UIU/ML

## 2025-05-09 ENCOUNTER — OUTPATIENT (OUTPATIENT)
Dept: OUTPATIENT SERVICES | Facility: HOSPITAL | Age: 75
LOS: 1 days | End: 2025-05-09
Payer: COMMERCIAL

## 2025-05-09 ENCOUNTER — APPOINTMENT (OUTPATIENT)
Age: 75
End: 2025-05-09

## 2025-05-09 DIAGNOSIS — Z98.890 OTHER SPECIFIED POSTPROCEDURAL STATES: Chronic | ICD-10-CM

## 2025-05-09 DIAGNOSIS — Z51.11 ENCOUNTER FOR ANTINEOPLASTIC CHEMOTHERAPY: ICD-10-CM

## 2025-05-09 DIAGNOSIS — Z95.5 PRESENCE OF CORONARY ANGIOPLASTY IMPLANT AND GRAFT: Chronic | ICD-10-CM

## 2025-05-09 DIAGNOSIS — Z98.49 CATARACT EXTRACTION STATUS, UNSPECIFIED EYE: Chronic | ICD-10-CM

## 2025-05-09 PROCEDURE — 80053 COMPREHEN METABOLIC PANEL: CPT

## 2025-05-09 PROCEDURE — 83735 ASSAY OF MAGNESIUM: CPT

## 2025-05-09 PROCEDURE — 96523 IRRIG DRUG DELIVERY DEVICE: CPT

## 2025-05-09 PROCEDURE — 85025 COMPLETE CBC W/AUTO DIFF WBC: CPT

## 2025-05-09 PROCEDURE — 36415 COLL VENOUS BLD VENIPUNCTURE: CPT

## 2025-05-09 PROCEDURE — 84443 ASSAY THYROID STIM HORMONE: CPT

## 2025-05-10 DIAGNOSIS — Z51.11 ENCOUNTER FOR ANTINEOPLASTIC CHEMOTHERAPY: ICD-10-CM

## 2025-05-13 ENCOUNTER — APPOINTMENT (OUTPATIENT)
Age: 75
End: 2025-05-13
Payer: COMMERCIAL

## 2025-05-13 ENCOUNTER — OUTPATIENT (OUTPATIENT)
Dept: OUTPATIENT SERVICES | Facility: HOSPITAL | Age: 75
LOS: 1 days | End: 2025-05-13
Payer: COMMERCIAL

## 2025-05-13 VITALS
HEIGHT: 68 IN | BODY MASS INDEX: 31.98 KG/M2 | RESPIRATION RATE: 18 BRPM | DIASTOLIC BLOOD PRESSURE: 75 MMHG | TEMPERATURE: 98.2 F | WEIGHT: 211 LBS | HEART RATE: 71 BPM | OXYGEN SATURATION: 98 % | SYSTOLIC BLOOD PRESSURE: 128 MMHG

## 2025-05-13 DIAGNOSIS — Z95.5 PRESENCE OF CORONARY ANGIOPLASTY IMPLANT AND GRAFT: Chronic | ICD-10-CM

## 2025-05-13 DIAGNOSIS — Z51.11 ENCOUNTER FOR ANTINEOPLASTIC CHEMOTHERAPY: ICD-10-CM

## 2025-05-13 DIAGNOSIS — Z98.890 OTHER SPECIFIED POSTPROCEDURAL STATES: Chronic | ICD-10-CM

## 2025-05-13 DIAGNOSIS — E03.2 HYPOTHYROIDISM DUE TO MEDICAMENTS AND OTHER EXOGENOUS SUBSTANCES: ICD-10-CM

## 2025-05-13 DIAGNOSIS — Z98.49 CATARACT EXTRACTION STATUS, UNSPECIFIED EYE: Chronic | ICD-10-CM

## 2025-05-13 DIAGNOSIS — Z51.12 ENCOUNTER FOR ANTINEOPLASTIC CHEMOTHERAPY: ICD-10-CM

## 2025-05-13 DIAGNOSIS — C43.4 MALIGNANT MELANOMA OF SCALP AND NECK: ICD-10-CM

## 2025-05-13 LAB
ALBUMIN SERPL ELPH-MCNC: 4.4 G/DL
ALP BLD-CCNC: 112 U/L
ALT SERPL-CCNC: 16 U/L
ANION GAP SERPL CALC-SCNC: 13 MMOL/L
AST SERPL-CCNC: 16 U/L
AUTO BASOPHILS #: 0.09 K/UL
AUTO BASOPHILS %: 1.5 %
AUTO EOSINOPHILS #: 0.15 K/UL
AUTO EOSINOPHILS %: 2.5 %
AUTO IMMATURE GRANULOCYTES #: 0.04 K/UL
AUTO LYMPHOCYTES #: 0.52 K/UL
AUTO LYMPHOCYTES %: 8.7 %
AUTO MONOCYTES #: 0.66 K/UL
AUTO MONOCYTES %: 11.1 %
AUTO NEUTROPHILS #: 4.5 K/UL
AUTO NEUTROPHILS %: 75.5 %
AUTO NRBC #: 0 K/UL
BILIRUB SERPL-MCNC: 0.3 MG/DL
BUN SERPL-MCNC: 28 MG/DL
CALCIUM SERPL-MCNC: 10.3 MG/DL
CHLORIDE SERPL-SCNC: 105 MMOL/L
CO2 SERPL-SCNC: 26 MMOL/L
CREAT SERPL-MCNC: 1.1 MG/DL
EGFRCR SERPLBLD CKD-EPI 2021: 71 ML/MIN/1.73M2
GLUCOSE SERPL-MCNC: 142 MG/DL
HCT VFR BLD CALC: 37.9 %
HGB BLD-MCNC: 12.8 G/DL
IMM GRANULOCYTES NFR BLD AUTO: 0.7 %
MAGNESIUM SERPL-MCNC: 2.4 MG/DL
MAN DIFF?: NORMAL
MCHC RBC-ENTMCNC: 30.7 PG
MCHC RBC-ENTMCNC: 33.8 G/DL
MCV RBC AUTO: 90.9 FL
PLATELET # BLD AUTO: 219 K/UL
PMV BLD AUTO: 0 /100 WBCS
PMV BLD: 10.6 FL
POTASSIUM SERPL-SCNC: 3.7 MMOL/L
PROT SERPL-MCNC: 6.9 G/DL
RBC # BLD: 4.17 M/UL
RBC # FLD: 13 %
SODIUM SERPL-SCNC: 144 MMOL/L
TSH SERPL-ACNC: 30.3 UIU/ML
WBC # FLD AUTO: 5.96 K/UL

## 2025-05-13 PROCEDURE — G2211 COMPLEX E/M VISIT ADD ON: CPT

## 2025-05-13 PROCEDURE — 99214 OFFICE O/P EST MOD 30 MIN: CPT

## 2025-05-13 PROCEDURE — 96413 CHEMO IV INFUSION 1 HR: CPT

## 2025-05-13 RX ORDER — PEMBROLIZUMAB 50 MG/2ML
200 INJECTION, POWDER, LYOPHILIZED, FOR SOLUTION INTRAVENOUS ONCE
Refills: 0 | Status: COMPLETED | OUTPATIENT
Start: 2025-05-13 | End: 2025-05-13

## 2025-05-13 RX ADMIN — PEMBROLIZUMAB 200 MILLIGRAM(S): 50 INJECTION, POWDER, LYOPHILIZED, FOR SOLUTION INTRAVENOUS at 16:15

## 2025-05-13 RX ADMIN — PEMBROLIZUMAB 200 MILLIGRAM(S): 50 INJECTION, POWDER, LYOPHILIZED, FOR SOLUTION INTRAVENOUS at 16:45

## 2025-05-14 ENCOUNTER — NON-APPOINTMENT (OUTPATIENT)
Age: 75
End: 2025-05-14

## 2025-05-15 ENCOUNTER — APPOINTMENT (OUTPATIENT)
Dept: NEUROLOGY | Facility: CLINIC | Age: 75
End: 2025-05-15
Payer: MEDICARE

## 2025-05-15 VITALS
RESPIRATION RATE: 16 BRPM | SYSTOLIC BLOOD PRESSURE: 135 MMHG | BODY MASS INDEX: 30.36 KG/M2 | HEIGHT: 69 IN | WEIGHT: 205 LBS | DIASTOLIC BLOOD PRESSURE: 77 MMHG | OXYGEN SATURATION: 94 % | HEART RATE: 72 BPM

## 2025-05-15 DIAGNOSIS — G25.9 EXTRAPYRAMIDAL AND MOVEMENT DISORDER, UNSPECIFIED: ICD-10-CM

## 2025-05-15 DIAGNOSIS — Z82.49 FAMILY HISTORY OF ISCHEMIC HEART DISEASE AND OTHER DISEASES OF THE CIRCULATORY SYSTEM: ICD-10-CM

## 2025-05-15 DIAGNOSIS — R29.6 REPEATED FALLS: ICD-10-CM

## 2025-05-15 DIAGNOSIS — R41.89 OTHER SYMPTOMS AND SIGNS INVOLVING COGNITIVE FUNCTIONS AND AWARENESS: ICD-10-CM

## 2025-05-15 DIAGNOSIS — C44.91 BASAL CELL CARCINOMA OF SKIN, UNSPECIFIED: ICD-10-CM

## 2025-05-15 DIAGNOSIS — G25.2 OTHER SPECIFIED FORMS OF TREMOR: ICD-10-CM

## 2025-05-15 DIAGNOSIS — G62.9 POLYNEUROPATHY, UNSPECIFIED: ICD-10-CM

## 2025-05-15 DIAGNOSIS — R51.9 HEADACHE, UNSPECIFIED: ICD-10-CM

## 2025-05-15 DIAGNOSIS — R41.844 FRONTAL LOBE AND EXECUTIVE FUNCTION DEFICIT: ICD-10-CM

## 2025-05-15 DIAGNOSIS — Z81.8 FAMILY HISTORY OF OTHER MENTAL AND BEHAVIORAL DISORDERS: ICD-10-CM

## 2025-05-15 DIAGNOSIS — G47.30 SLEEP APNEA, UNSPECIFIED: ICD-10-CM

## 2025-05-15 PROCEDURE — 99205 OFFICE O/P NEW HI 60 MIN: CPT

## 2025-05-15 PROCEDURE — G2211 COMPLEX E/M VISIT ADD ON: CPT

## 2025-05-15 RX ORDER — PEMBROLIZUMAB 25 MG/ML
INJECTION, SOLUTION INTRAVENOUS
Refills: 0 | Status: ACTIVE | COMMUNITY

## 2025-05-27 ENCOUNTER — APPOINTMENT (OUTPATIENT)
Dept: VASCULAR SURGERY | Facility: CLINIC | Age: 75
End: 2025-05-27
Payer: MEDICARE

## 2025-05-27 DIAGNOSIS — E11.59 TYPE 2 DIABETES MELLITUS WITH OTHER CIRCULATORY COMPLICATIONS: ICD-10-CM

## 2025-05-27 PROCEDURE — 99212 OFFICE O/P EST SF 10 MIN: CPT

## 2025-05-30 ENCOUNTER — OUTPATIENT (OUTPATIENT)
Dept: OUTPATIENT SERVICES | Facility: HOSPITAL | Age: 75
LOS: 1 days | End: 2025-05-30
Payer: MEDICARE

## 2025-05-30 ENCOUNTER — APPOINTMENT (OUTPATIENT)
Age: 75
End: 2025-05-30

## 2025-05-30 VITALS — SYSTOLIC BLOOD PRESSURE: 155 MMHG | TEMPERATURE: 98 F | DIASTOLIC BLOOD PRESSURE: 80 MMHG | HEART RATE: 70 BPM

## 2025-05-30 DIAGNOSIS — Z98.890 OTHER SPECIFIED POSTPROCEDURAL STATES: Chronic | ICD-10-CM

## 2025-05-30 DIAGNOSIS — Z98.49 CATARACT EXTRACTION STATUS, UNSPECIFIED EYE: Chronic | ICD-10-CM

## 2025-05-30 DIAGNOSIS — C61 MALIGNANT NEOPLASM OF PROSTATE: ICD-10-CM

## 2025-05-30 DIAGNOSIS — Z51.11 ENCOUNTER FOR ANTINEOPLASTIC CHEMOTHERAPY: ICD-10-CM

## 2025-05-30 DIAGNOSIS — Z95.5 PRESENCE OF CORONARY ANGIOPLASTY IMPLANT AND GRAFT: Chronic | ICD-10-CM

## 2025-05-30 PROCEDURE — 80053 COMPREHEN METABOLIC PANEL: CPT

## 2025-05-30 PROCEDURE — 85025 COMPLETE CBC W/AUTO DIFF WBC: CPT

## 2025-05-30 PROCEDURE — 96523 IRRIG DRUG DELIVERY DEVICE: CPT

## 2025-05-30 PROCEDURE — 84443 ASSAY THYROID STIM HORMONE: CPT

## 2025-05-30 PROCEDURE — 36415 COLL VENOUS BLD VENIPUNCTURE: CPT

## 2025-05-31 DIAGNOSIS — C61 MALIGNANT NEOPLASM OF PROSTATE: ICD-10-CM

## 2025-06-02 ENCOUNTER — APPOINTMENT (OUTPATIENT)
Age: 75
End: 2025-06-02
Payer: MEDICARE

## 2025-06-02 ENCOUNTER — OUTPATIENT (OUTPATIENT)
Dept: OUTPATIENT SERVICES | Facility: HOSPITAL | Age: 75
LOS: 1 days | End: 2025-06-02
Payer: MEDICARE

## 2025-06-02 VITALS
WEIGHT: 209 LBS | SYSTOLIC BLOOD PRESSURE: 111 MMHG | BODY MASS INDEX: 30.96 KG/M2 | DIASTOLIC BLOOD PRESSURE: 69 MMHG | HEIGHT: 69 IN | HEART RATE: 70 BPM | OXYGEN SATURATION: 97 % | TEMPERATURE: 98.9 F

## 2025-06-02 DIAGNOSIS — Z86.03 PERSONAL HISTORY OF NEOPLASM OF UNCERTAIN BEHAVIOR: ICD-10-CM

## 2025-06-02 DIAGNOSIS — C43.4 MALIGNANT MELANOMA OF SCALP AND NECK: ICD-10-CM

## 2025-06-02 DIAGNOSIS — I82.90 ACUTE EMBOLISM AND THROMBOSIS OF UNSPECIFIED VEIN: ICD-10-CM

## 2025-06-02 DIAGNOSIS — Z95.5 PRESENCE OF CORONARY ANGIOPLASTY IMPLANT AND GRAFT: Chronic | ICD-10-CM

## 2025-06-02 DIAGNOSIS — Z98.890 OTHER SPECIFIED POSTPROCEDURAL STATES: Chronic | ICD-10-CM

## 2025-06-02 DIAGNOSIS — Z98.49 CATARACT EXTRACTION STATUS, UNSPECIFIED EYE: Chronic | ICD-10-CM

## 2025-06-02 DIAGNOSIS — G62.9 POLYNEUROPATHY, UNSPECIFIED: ICD-10-CM

## 2025-06-02 DIAGNOSIS — Z79.01 LONG TERM (CURRENT) USE OF ANTICOAGULANTS: ICD-10-CM

## 2025-06-02 DIAGNOSIS — C61 MALIGNANT NEOPLASM OF PROSTATE: ICD-10-CM

## 2025-06-02 LAB
ALBUMIN SERPL ELPH-MCNC: 4.4 G/DL
ALP BLD-CCNC: 84 U/L
ALT SERPL-CCNC: 19 U/L
ANION GAP SERPL CALC-SCNC: 16 MMOL/L
AST SERPL-CCNC: 22 U/L
AUTO BASOPHILS #: 0.08 K/UL
AUTO BASOPHILS %: 1.5 %
AUTO EOSINOPHILS #: 0.13 K/UL
AUTO EOSINOPHILS %: 2.4 %
AUTO IMMATURE GRANULOCYTES #: 0.05 K/UL
AUTO LYMPHOCYTES #: 0.52 K/UL
AUTO LYMPHOCYTES %: 9.5 %
AUTO MONOCYTES #: 0.61 K/UL
AUTO MONOCYTES %: 11.2 %
AUTO NEUTROPHILS #: 4.06 K/UL
AUTO NEUTROPHILS %: 74.5 %
AUTO NRBC #: 0 K/UL
BILIRUB SERPL-MCNC: 0.4 MG/DL
BUN SERPL-MCNC: 17 MG/DL
CALCIUM SERPL-MCNC: 9.4 MG/DL
CHLORIDE SERPL-SCNC: 100 MMOL/L
CO2 SERPL-SCNC: 24 MMOL/L
CREAT SERPL-MCNC: 1 MG/DL
EGFRCR SERPLBLD CKD-EPI 2021: 80 ML/MIN/1.73M2
GLUCOSE SERPL-MCNC: 192 MG/DL
HCT VFR BLD CALC: 36.1 %
HGB BLD-MCNC: 11.9 G/DL
IMM GRANULOCYTES NFR BLD AUTO: 0.9 %
MAN DIFF?: NORMAL
MCHC RBC-ENTMCNC: 30.1 PG
MCHC RBC-ENTMCNC: 33 G/DL
MCV RBC AUTO: 91.4 FL
PLATELET # BLD AUTO: 202 K/UL
PMV BLD AUTO: 0 /100 WBCS
PMV BLD: 11.1 FL
POTASSIUM SERPL-SCNC: 3.2 MMOL/L
PROT SERPL-MCNC: 6.7 G/DL
RBC # BLD: 3.95 M/UL
RBC # FLD: 12.8 %
SODIUM SERPL-SCNC: 140 MMOL/L
TSH SERPL-ACNC: 27.1 UIU/ML
WBC # FLD AUTO: 5.45 K/UL

## 2025-06-02 PROCEDURE — G2211 COMPLEX E/M VISIT ADD ON: CPT

## 2025-06-02 PROCEDURE — 99214 OFFICE O/P EST MOD 30 MIN: CPT

## 2025-06-02 PROCEDURE — 96361 HYDRATE IV INFUSION ADD-ON: CPT

## 2025-06-02 PROCEDURE — 99214 OFFICE O/P EST MOD 30 MIN: CPT | Mod: 25

## 2025-06-02 PROCEDURE — 96360 HYDRATION IV INFUSION INIT: CPT

## 2025-06-03 DIAGNOSIS — C61 MALIGNANT NEOPLASM OF PROSTATE: ICD-10-CM

## 2025-06-10 ENCOUNTER — RESULT REVIEW (OUTPATIENT)
Age: 75
End: 2025-06-10

## 2025-06-10 ENCOUNTER — OUTPATIENT (OUTPATIENT)
Dept: OUTPATIENT SERVICES | Facility: HOSPITAL | Age: 75
LOS: 1 days | End: 2025-06-10
Payer: MEDICARE

## 2025-06-10 DIAGNOSIS — Z00.8 ENCOUNTER FOR OTHER GENERAL EXAMINATION: ICD-10-CM

## 2025-06-10 DIAGNOSIS — Z98.49 CATARACT EXTRACTION STATUS, UNSPECIFIED EYE: Chronic | ICD-10-CM

## 2025-06-10 DIAGNOSIS — Z98.890 OTHER SPECIFIED POSTPROCEDURAL STATES: Chronic | ICD-10-CM

## 2025-06-10 DIAGNOSIS — Z95.5 PRESENCE OF CORONARY ANGIOPLASTY IMPLANT AND GRAFT: Chronic | ICD-10-CM

## 2025-06-10 DIAGNOSIS — R41.844 FRONTAL LOBE AND EXECUTIVE FUNCTION DEFICIT: ICD-10-CM

## 2025-06-10 DIAGNOSIS — G25.2 OTHER SPECIFIED FORMS OF TREMOR: ICD-10-CM

## 2025-06-10 PROCEDURE — 70551 MRI BRAIN STEM W/O DYE: CPT | Mod: 26

## 2025-06-10 PROCEDURE — 70551 MRI BRAIN STEM W/O DYE: CPT

## 2025-06-11 DIAGNOSIS — G25.2 OTHER SPECIFIED FORMS OF TREMOR: ICD-10-CM

## 2025-06-11 DIAGNOSIS — R41.844 FRONTAL LOBE AND EXECUTIVE FUNCTION DEFICIT: ICD-10-CM

## 2025-06-19 ENCOUNTER — APPOINTMENT (OUTPATIENT)
Dept: RHEUMATOLOGY | Facility: CLINIC | Age: 75
End: 2025-06-19
Payer: MEDICARE

## 2025-06-19 VITALS
SYSTOLIC BLOOD PRESSURE: 136 MMHG | OXYGEN SATURATION: 96 % | BODY MASS INDEX: 30.81 KG/M2 | HEART RATE: 73 BPM | DIASTOLIC BLOOD PRESSURE: 82 MMHG | HEIGHT: 69 IN | TEMPERATURE: 97.9 F | WEIGHT: 208 LBS

## 2025-06-19 PROCEDURE — 99204 OFFICE O/P NEW MOD 45 MIN: CPT

## 2025-06-25 ENCOUNTER — APPOINTMENT (OUTPATIENT)
Dept: NEUROLOGY | Facility: CLINIC | Age: 75
End: 2025-06-25
Payer: MEDICARE

## 2025-06-25 VITALS
WEIGHT: 205 LBS | OXYGEN SATURATION: 94 % | RESPIRATION RATE: 16 BRPM | HEART RATE: 71 BPM | HEIGHT: 69 IN | BODY MASS INDEX: 30.36 KG/M2 | DIASTOLIC BLOOD PRESSURE: 76 MMHG | SYSTOLIC BLOOD PRESSURE: 122 MMHG

## 2025-06-25 PROBLEM — I67.2 CEREBRAL ATHEROSCLEROSIS: Status: ACTIVE | Noted: 2025-06-25

## 2025-06-25 PROCEDURE — 99213 OFFICE O/P EST LOW 20 MIN: CPT | Mod: 25

## 2025-06-25 PROCEDURE — 95885 MUSC TST DONE W/NERV TST LIM: CPT

## 2025-06-25 PROCEDURE — 95913 NRV CNDJ TEST 13/> STUDIES: CPT

## 2025-06-25 RX ORDER — PITAVASTATIN CALCIUM 2.09 MG/1
2 TABLET, FILM COATED ORAL
Refills: 0 | Status: ACTIVE | COMMUNITY

## 2025-06-25 RX ORDER — PREDNISONE 10 MG/1
10 TABLET ORAL
Refills: 0 | Status: ACTIVE | COMMUNITY

## 2025-06-25 RX ORDER — APIXABAN 5 MG/1
5 TABLET, FILM COATED ORAL
Qty: 180 | Refills: 1 | Status: ACTIVE | COMMUNITY

## 2025-06-27 ENCOUNTER — LABORATORY RESULT (OUTPATIENT)
Age: 75
End: 2025-06-27

## 2025-06-30 ENCOUNTER — APPOINTMENT (OUTPATIENT)
Age: 75
End: 2025-06-30

## 2025-06-30 ENCOUNTER — APPOINTMENT (OUTPATIENT)
Age: 75
End: 2025-06-30
Payer: MEDICARE

## 2025-06-30 VITALS
OXYGEN SATURATION: 95 % | WEIGHT: 208 LBS | HEIGHT: 69 IN | BODY MASS INDEX: 30.81 KG/M2 | TEMPERATURE: 97.9 F | SYSTOLIC BLOOD PRESSURE: 127 MMHG | DIASTOLIC BLOOD PRESSURE: 76 MMHG | HEART RATE: 75 BPM

## 2025-06-30 PROCEDURE — 99214 OFFICE O/P EST MOD 30 MIN: CPT

## 2025-06-30 PROCEDURE — G2211 COMPLEX E/M VISIT ADD ON: CPT

## 2025-07-01 LAB
ALBUMIN MFR SERPL ELPH: 62.7 %
ALBUMIN SERPL ELPH-MCNC: 4.6 G/DL
ALBUMIN SERPL-MCNC: 4.2 G/DL
ALBUMIN/GLOB SERPL: 1.7 RATIO
ALP BLD-CCNC: 70 U/L
ALPHA1 GLOB MFR SERPL ELPH: 3.2 %
ALPHA1 GLOB SERPL ELPH-MCNC: 0.2 G/DL
ALPHA2 GLOB MFR SERPL ELPH: 9.5 %
ALPHA2 GLOB SERPL ELPH-MCNC: 0.6 G/DL
ALT SERPL-CCNC: 20 U/L
ANION GAP SERPL CALC-SCNC: 14 MMOL/L
ASIALO-GM1 ANTIBODIES, IGG/IGM: 87 IV
AST SERPL-CCNC: 20 U/L
AUTO BASOPHILS #: 0.05 K/UL
AUTO BASOPHILS %: 1 %
AUTO EOSINOPHILS #: 0.13 K/UL
AUTO EOSINOPHILS %: 2.5 %
AUTO IMMATURE GRANULOCYTES #: 0.03 K/UL
AUTO LYMPHOCYTES #: 0.51 K/UL
AUTO LYMPHOCYTES %: 9.8 %
AUTO MONOCYTES #: 0.56 K/UL
AUTO MONOCYTES %: 10.8 %
AUTO NEUTROPHILS #: 3.92 K/UL
AUTO NEUTROPHILS %: 75.3 %
AUTO NRBC #: 0 K/UL
B-GLOBULIN MFR SERPL ELPH: 11.5 %
B-GLOBULIN SERPL ELPH-MCNC: 0.8 G/DL
BILIRUB SERPL-MCNC: 0.4 MG/DL
BUN SERPL-MCNC: 19 MG/DL
CALCIUM SERPL-MCNC: 9.3 MG/DL
CHLORIDE SERPL-SCNC: 103 MMOL/L
CO2 SERPL-SCNC: 25 MMOL/L
CREAT SERPL-MCNC: 1 MG/DL
CRP SERPL-MCNC: <3 MG/L
EGFRCR SERPLBLD CKD-EPI 2021: 79 ML/MIN/1.73M2
ENA SS-A AB SER-ACNC: <0.2 AL
ENA SS-B AB SER-ACNC: <0.2 AL
ERYTHROCYTE [SEDIMENTATION RATE] IN BLOOD BY WESTERGREN METHOD: 2 MM/HR
GAMMA GLOB FLD ELPH-MCNC: 0.9 G/DL
GAMMA GLOB MFR SERPL ELPH: 13.1 %
GD1A ANTIBODIES, IGG/IGM: 6 IV
GD1B ANTIBODIES, IGG/IGM: 17 IV
GLUCOSE SERPL-MCNC: 116 MG/DL
GM1 ANTIBODIES, IGG/IGM: 11 IV
GM2 ANTIBODIES, IGG/IGM: 7 IV
GQ1B ANTIBODIES, IGG/IGM: 6 IV
HCT VFR BLD CALC: 36.7 %
HGB BLD-MCNC: 12.4 G/DL
IGA 24H UR QL IFE: NORMAL
IMM GRANULOCYTES NFR BLD AUTO: 0.6 %
INTERPRETATION SERPL IEP-IMP: NORMAL
M PROTEIN SPEC IFE-MCNC: NORMAL
MAN DIFF?: NORMAL
MCHC RBC-ENTMCNC: 30.2 PG
MCHC RBC-ENTMCNC: 33.8 G/DL
MCV RBC AUTO: 89.3 FL
PLATELET # BLD AUTO: 180 K/UL
PMV BLD AUTO: 0 /100 WBCS
PMV BLD: 10 FL
POTASSIUM SERPL-SCNC: 3.7 MMOL/L
PROT SERPL-MCNC: 6.7 G/DL
PROT SERPL-MCNC: 6.7 G/DL
PROT SERPL-MCNC: 7 G/DL
RBC # BLD: 4.11 M/UL
RBC # FLD: 12.9 %
RHEUMATOID FACT SERPL-ACNC: <10 IU/ML
SODIUM SERPL-SCNC: 142 MMOL/L
WBC # FLD AUTO: 5.2 K/UL

## 2025-07-02 LAB
ACE BLD-CCNC: 27 U/L
ANA TITR SER: ABNORMAL
ANA TITR SER: ABNORMAL

## 2025-07-03 LAB — TSH SERPL-ACNC: 25.4 UIU/ML

## 2025-07-24 ENCOUNTER — APPOINTMENT (OUTPATIENT)
Dept: NEUROLOGY | Facility: CLINIC | Age: 75
End: 2025-07-24
Payer: MEDICARE

## 2025-07-24 DIAGNOSIS — G61.81 CHRONIC INFLAMMATORY DEMYELINATING POLYNEURITIS: ICD-10-CM

## 2025-07-24 PROCEDURE — 98016 BRIEF COMUNICAJ TECH-BSD SVC: CPT

## 2025-07-28 ENCOUNTER — RX RENEWAL (OUTPATIENT)
Age: 75
End: 2025-07-28

## 2025-08-11 ENCOUNTER — APPOINTMENT (OUTPATIENT)
Age: 75
End: 2025-08-11
Payer: MEDICARE

## 2025-08-11 VITALS
WEIGHT: 209.5 LBS | BODY MASS INDEX: 31.03 KG/M2 | RESPIRATION RATE: 16 BRPM | HEART RATE: 76 BPM | SYSTOLIC BLOOD PRESSURE: 125 MMHG | OXYGEN SATURATION: 97 % | TEMPERATURE: 98.7 F | HEIGHT: 69 IN | DIASTOLIC BLOOD PRESSURE: 76 MMHG

## 2025-08-11 DIAGNOSIS — E03.2 HYPOTHYROIDISM DUE TO MEDICAMENTS AND OTHER EXOGENOUS SUBSTANCES: ICD-10-CM

## 2025-08-11 DIAGNOSIS — C43.4 MALIGNANT MELANOMA OF SCALP AND NECK: ICD-10-CM

## 2025-08-11 LAB
ALBUMIN SERPL ELPH-MCNC: 4.5 G/DL
ALP BLD-CCNC: 76 U/L
ALT SERPL-CCNC: 13 U/L
ANION GAP SERPL CALC-SCNC: 13 MMOL/L
AST SERPL-CCNC: 16 U/L
AUTO BASOPHILS #: 0.09 K/UL
AUTO BASOPHILS %: 1.5 %
AUTO EOSINOPHILS #: 0.19 K/UL
AUTO EOSINOPHILS %: 3.2 %
AUTO IMMATURE GRANULOCYTES #: 0.06 K/UL
AUTO LYMPHOCYTES #: 0.63 K/UL
AUTO LYMPHOCYTES %: 10.7 %
AUTO MONOCYTES #: 0.63 K/UL
AUTO MONOCYTES %: 10.7 %
AUTO NEUTROPHILS #: 4.28 K/UL
AUTO NEUTROPHILS %: 72.9 %
AUTO NRBC #: 0 K/UL
BILIRUB SERPL-MCNC: 0.2 MG/DL
BUN SERPL-MCNC: 17 MG/DL
CALCIUM SERPL-MCNC: 9.3 MG/DL
CHLORIDE SERPL-SCNC: 104 MMOL/L
CO2 SERPL-SCNC: 25 MMOL/L
CREAT SERPL-MCNC: 1.1 MG/DL
EGFRCR SERPLBLD CKD-EPI 2021: 71 ML/MIN/1.73M2
GLUCOSE SERPL-MCNC: 164 MG/DL
HCT VFR BLD CALC: 39.1 %
HGB BLD-MCNC: 13.3 G/DL
IMM GRANULOCYTES NFR BLD AUTO: 1 %
MAN DIFF?: NORMAL
MCHC RBC-ENTMCNC: 29.9 PG
MCHC RBC-ENTMCNC: 34 G/DL
MCV RBC AUTO: 87.9 FL
PLATELET # BLD AUTO: 193 K/UL
PMV BLD AUTO: 0 /100 WBCS
PMV BLD: 10.8 FL
POTASSIUM SERPL-SCNC: 4 MMOL/L
PROT SERPL-MCNC: 6.7 G/DL
RBC # BLD: 4.45 M/UL
RBC # FLD: 13.4 %
SODIUM SERPL-SCNC: 142 MMOL/L
WBC # FLD AUTO: 5.88 K/UL

## 2025-08-11 PROCEDURE — 99213 OFFICE O/P EST LOW 20 MIN: CPT

## 2025-08-12 ENCOUNTER — NON-APPOINTMENT (OUTPATIENT)
Age: 75
End: 2025-08-12

## 2025-08-12 LAB — TSH SERPL-ACNC: 37.6 UIU/ML

## 2025-08-13 ENCOUNTER — NON-APPOINTMENT (OUTPATIENT)
Age: 75
End: 2025-08-13

## 2025-08-19 ENCOUNTER — APPOINTMENT (OUTPATIENT)
Dept: PLASTIC SURGERY | Facility: CLINIC | Age: 75
End: 2025-08-19
Payer: MEDICARE

## 2025-08-19 DIAGNOSIS — G56.03 CARPAL TUNNEL SYNDROM,BILATERAL UPPER LIMBS: ICD-10-CM

## 2025-08-19 PROCEDURE — 99213 OFFICE O/P EST LOW 20 MIN: CPT

## 2025-08-19 PROCEDURE — G2211 COMPLEX E/M VISIT ADD ON: CPT

## 2025-09-19 ENCOUNTER — NON-APPOINTMENT (OUTPATIENT)
Age: 75
End: 2025-09-19

## 2025-09-26 PROBLEM — R76.8 POSITIVE ANA (ANTINUCLEAR ANTIBODY): Status: ACTIVE | Noted: 2025-09-26
